# Patient Record
Sex: FEMALE | Race: WHITE | Employment: OTHER | URBAN - NONMETROPOLITAN AREA
[De-identification: names, ages, dates, MRNs, and addresses within clinical notes are randomized per-mention and may not be internally consistent; named-entity substitution may affect disease eponyms.]

---

## 2017-03-28 ENCOUNTER — HOSPITAL ENCOUNTER (OUTPATIENT)
Dept: PHARMACY | Age: 82
Setting detail: THERAPIES SERIES
Discharge: HOME | End: 2017-03-28
Payer: MEDICARE

## 2017-03-28 VITALS
HEART RATE: 78 BPM | WEIGHT: 122.6 LBS | BODY MASS INDEX: 21.04 KG/M2 | DIASTOLIC BLOOD PRESSURE: 84 MMHG | SYSTOLIC BLOOD PRESSURE: 121 MMHG

## 2017-03-28 DIAGNOSIS — Z79.01 LONG TERM (CURRENT) USE OF ANTICOAGULANTS: ICD-10-CM

## 2017-03-28 LAB — INR BLD: 1.6

## 2017-03-28 PROCEDURE — G0463 HOSPITAL OUTPT CLINIC VISIT: HCPCS

## 2017-03-28 PROCEDURE — 85610 PROTHROMBIN TIME: CPT

## 2017-04-04 ENCOUNTER — HOSPITAL ENCOUNTER (OUTPATIENT)
Dept: PHARMACY | Age: 82
Setting detail: THERAPIES SERIES
Discharge: HOME | End: 2017-04-04
Payer: MEDICARE

## 2017-04-04 VITALS
DIASTOLIC BLOOD PRESSURE: 77 MMHG | WEIGHT: 124 LBS | SYSTOLIC BLOOD PRESSURE: 144 MMHG | HEART RATE: 79 BPM | BODY MASS INDEX: 21.28 KG/M2

## 2017-04-04 DIAGNOSIS — Z79.01 LONG TERM (CURRENT) USE OF ANTICOAGULANTS: ICD-10-CM

## 2017-04-04 LAB — INR BLD: 2.9

## 2017-04-04 PROCEDURE — G0463 HOSPITAL OUTPT CLINIC VISIT: HCPCS

## 2017-04-04 PROCEDURE — 85610 PROTHROMBIN TIME: CPT

## 2017-04-25 ENCOUNTER — HOSPITAL ENCOUNTER (OUTPATIENT)
Age: 82
Discharge: HOME | End: 2017-04-25
Payer: MEDICARE

## 2017-04-25 ENCOUNTER — HOSPITAL ENCOUNTER (OUTPATIENT)
Dept: PHARMACY | Age: 82
Setting detail: THERAPIES SERIES
Discharge: HOME | End: 2017-04-25
Payer: MEDICARE

## 2017-04-25 VITALS
DIASTOLIC BLOOD PRESSURE: 77 MMHG | WEIGHT: 123.2 LBS | BODY MASS INDEX: 21.15 KG/M2 | HEART RATE: 77 BPM | SYSTOLIC BLOOD PRESSURE: 145 MMHG

## 2017-04-25 DIAGNOSIS — Z79.01 LONG TERM (CURRENT) USE OF ANTICOAGULANTS: ICD-10-CM

## 2017-04-25 LAB
ABSOLUTE EOS #: 0.2 K/UL (ref 0–0.4)
ABSOLUTE LYMPH #: 1.6 K/UL (ref 1–4.8)
ABSOLUTE MONO #: 0.7 K/UL (ref 0–1)
ALBUMIN SERPL-MCNC: 3.9 G/DL (ref 3.5–5.2)
ALBUMIN/GLOBULIN RATIO: 1.6 (ref 1–2.5)
ALP BLD-CCNC: 77 U/L (ref 35–104)
ALT SERPL-CCNC: 23 U/L (ref 5–33)
ANION GAP SERPL CALCULATED.3IONS-SCNC: 13 MMOL/L (ref 9–17)
AST SERPL-CCNC: 28 U/L
BASOPHILS # BLD: 0 %
BASOPHILS ABSOLUTE: 0 K/UL (ref 0–0.2)
BILIRUB SERPL-MCNC: 0.51 MG/DL (ref 0.3–1.2)
BUN BLDV-MCNC: 29 MG/DL (ref 8–23)
BUN/CREAT BLD: 18 (ref 9–20)
CALCIUM SERPL-MCNC: 9.2 MG/DL (ref 8.6–10.4)
CARCINOEMBRYONIC ANTIGEN: 2.6 NG/ML
CHLORIDE BLD-SCNC: 105 MMOL/L (ref 98–107)
CO2: 23 MMOL/L (ref 20–31)
CREAT SERPL-MCNC: 1.59 MG/DL (ref 0.5–0.9)
DIFFERENTIAL TYPE: ABNORMAL
EOSINOPHILS RELATIVE PERCENT: 3 %
GFR AFRICAN AMERICAN: 38 ML/MIN
GFR NON-AFRICAN AMERICAN: 31 ML/MIN
GFR SERPL CREATININE-BSD FRML MDRD: ABNORMAL ML/MIN/{1.73_M2}
GFR SERPL CREATININE-BSD FRML MDRD: ABNORMAL ML/MIN/{1.73_M2}
GLUCOSE BLD-MCNC: 106 MG/DL (ref 70–99)
HCT VFR BLD CALC: 34.7 % (ref 36–46)
HEMOGLOBIN: 11.3 G/DL (ref 12–16)
INR BLD: 2.1
LYMPHOCYTES # BLD: 25 %
MCH RBC QN AUTO: 30.4 PG (ref 26–34)
MCHC RBC AUTO-ENTMCNC: 32.6 G/DL (ref 31–37)
MCV RBC AUTO: 93.4 FL (ref 80–100)
MONOCYTES # BLD: 11 %
PDW BLD-RTO: 14.1 % (ref 12.1–15.2)
PLATELET # BLD: 154 K/UL (ref 140–450)
PLATELET ESTIMATE: ABNORMAL
PMV BLD AUTO: ABNORMAL FL (ref 6–12)
POTASSIUM SERPL-SCNC: 5.7 MMOL/L (ref 3.7–5.3)
RBC # BLD: 3.72 M/UL (ref 4–5.2)
RBC # BLD: ABNORMAL 10*6/UL
SEG NEUTROPHILS: 61 %
SEGMENTED NEUTROPHILS ABSOLUTE COUNT: 3.9 K/UL (ref 1.8–7.7)
SODIUM BLD-SCNC: 141 MMOL/L (ref 135–144)
TOTAL PROTEIN: 6.4 G/DL (ref 6.4–8.3)
WBC # BLD: 6.4 K/UL (ref 3.5–11)
WBC # BLD: ABNORMAL 10*3/UL

## 2017-04-25 PROCEDURE — 85025 COMPLETE CBC W/AUTO DIFF WBC: CPT

## 2017-04-25 PROCEDURE — 36415 COLL VENOUS BLD VENIPUNCTURE: CPT

## 2017-04-25 PROCEDURE — 82378 CARCINOEMBRYONIC ANTIGEN: CPT

## 2017-04-25 PROCEDURE — 80053 COMPREHEN METABOLIC PANEL: CPT

## 2017-04-25 PROCEDURE — G0463 HOSPITAL OUTPT CLINIC VISIT: HCPCS

## 2017-04-25 PROCEDURE — 85610 PROTHROMBIN TIME: CPT

## 2017-04-25 RX ORDER — LANOLIN ALCOHOL/MO/W.PET/CERES
1000 CREAM (GRAM) TOPICAL DAILY
COMMUNITY

## 2017-04-26 ENCOUNTER — OFFICE VISIT (OUTPATIENT)
Dept: ONCOLOGY | Age: 82
End: 2017-04-26
Payer: MEDICARE

## 2017-04-26 VITALS
WEIGHT: 122 LBS | BODY MASS INDEX: 20.83 KG/M2 | TEMPERATURE: 99 F | HEART RATE: 75 BPM | HEIGHT: 64 IN | DIASTOLIC BLOOD PRESSURE: 92 MMHG | RESPIRATION RATE: 16 BRPM | SYSTOLIC BLOOD PRESSURE: 145 MMHG

## 2017-04-26 DIAGNOSIS — N18.30 CKD (CHRONIC KIDNEY DISEASE) STAGE 3, GFR 30-59 ML/MIN (HCC): Chronic | ICD-10-CM

## 2017-04-26 DIAGNOSIS — C18.3 MALIGNANT NEOPLASM OF HEPATIC FLEXURE (HCC): Primary | ICD-10-CM

## 2017-04-26 PROCEDURE — 1036F TOBACCO NON-USER: CPT | Performed by: INTERNAL MEDICINE

## 2017-04-26 PROCEDURE — 4040F PNEUMOC VAC/ADMIN/RCVD: CPT | Performed by: INTERNAL MEDICINE

## 2017-04-26 PROCEDURE — 1090F PRES/ABSN URINE INCON ASSESS: CPT | Performed by: INTERNAL MEDICINE

## 2017-04-26 PROCEDURE — 1123F ACP DISCUSS/DSCN MKR DOCD: CPT | Performed by: INTERNAL MEDICINE

## 2017-04-26 PROCEDURE — G8427 DOCREV CUR MEDS BY ELIG CLIN: HCPCS | Performed by: INTERNAL MEDICINE

## 2017-04-26 PROCEDURE — G8400 PT W/DXA NO RESULTS DOC: HCPCS | Performed by: INTERNAL MEDICINE

## 2017-04-26 PROCEDURE — 99214 OFFICE O/P EST MOD 30 MIN: CPT | Performed by: INTERNAL MEDICINE

## 2017-04-26 PROCEDURE — G8419 CALC BMI OUT NRM PARAM NOF/U: HCPCS | Performed by: INTERNAL MEDICINE

## 2017-04-26 RX ORDER — MECLIZINE HCL 12.5 MG/1
12.5 TABLET ORAL 3 TIMES DAILY PRN
COMMUNITY

## 2017-04-26 RX ORDER — LOPERAMIDE HYDROCHLORIDE 2 MG/1
2 CAPSULE ORAL 4 TIMES DAILY PRN
COMMUNITY
End: 2021-11-02

## 2017-05-23 ENCOUNTER — HOSPITAL ENCOUNTER (OUTPATIENT)
Dept: PHARMACY | Age: 82
Setting detail: THERAPIES SERIES
Discharge: HOME | End: 2017-05-23
Payer: MEDICARE

## 2017-05-23 VITALS
BODY MASS INDEX: 21.28 KG/M2 | DIASTOLIC BLOOD PRESSURE: 97 MMHG | SYSTOLIC BLOOD PRESSURE: 157 MMHG | WEIGHT: 124 LBS | HEART RATE: 80 BPM

## 2017-05-23 DIAGNOSIS — Z79.01 LONG TERM (CURRENT) USE OF ANTICOAGULANTS: ICD-10-CM

## 2017-05-23 LAB — INR BLD: 2

## 2017-05-23 PROCEDURE — 85610 PROTHROMBIN TIME: CPT

## 2017-05-23 PROCEDURE — 99211 OFF/OP EST MAY X REQ PHY/QHP: CPT

## 2017-06-27 ENCOUNTER — HOSPITAL ENCOUNTER (OUTPATIENT)
Dept: PHARMACY | Age: 82
Setting detail: THERAPIES SERIES
Discharge: HOME | End: 2017-06-27
Payer: MEDICARE

## 2017-06-27 VITALS
BODY MASS INDEX: 20.94 KG/M2 | SYSTOLIC BLOOD PRESSURE: 151 MMHG | WEIGHT: 122 LBS | HEART RATE: 73 BPM | DIASTOLIC BLOOD PRESSURE: 79 MMHG

## 2017-06-27 DIAGNOSIS — Z79.01 LONG TERM (CURRENT) USE OF ANTICOAGULANTS: ICD-10-CM

## 2017-06-27 LAB — INR BLD: 2.2

## 2017-06-27 PROCEDURE — 99211 OFF/OP EST MAY X REQ PHY/QHP: CPT

## 2017-06-27 PROCEDURE — 85610 PROTHROMBIN TIME: CPT

## 2017-07-24 ENCOUNTER — HOSPITAL ENCOUNTER (OUTPATIENT)
Dept: PHARMACY | Age: 82
Setting detail: THERAPIES SERIES
Discharge: HOME | End: 2017-07-24
Payer: MEDICARE

## 2017-07-24 VITALS
DIASTOLIC BLOOD PRESSURE: 83 MMHG | WEIGHT: 121 LBS | SYSTOLIC BLOOD PRESSURE: 155 MMHG | BODY MASS INDEX: 20.77 KG/M2 | HEART RATE: 73 BPM

## 2017-07-24 DIAGNOSIS — Z79.01 LONG TERM (CURRENT) USE OF ANTICOAGULANTS: ICD-10-CM

## 2017-07-24 LAB — INR BLD: 2.3

## 2017-07-24 PROCEDURE — 99211 OFF/OP EST MAY X REQ PHY/QHP: CPT

## 2017-07-24 PROCEDURE — 85610 PROTHROMBIN TIME: CPT

## 2017-09-05 ENCOUNTER — HOSPITAL ENCOUNTER (OUTPATIENT)
Dept: PHARMACY | Age: 82
Setting detail: THERAPIES SERIES
Discharge: HOME | End: 2017-09-05
Payer: MEDICARE

## 2017-09-05 VITALS
BODY MASS INDEX: 20.94 KG/M2 | HEART RATE: 74 BPM | DIASTOLIC BLOOD PRESSURE: 87 MMHG | SYSTOLIC BLOOD PRESSURE: 137 MMHG | WEIGHT: 122 LBS

## 2017-09-05 DIAGNOSIS — Z79.01 LONG TERM (CURRENT) USE OF ANTICOAGULANTS: ICD-10-CM

## 2017-09-05 LAB — INR BLD: 2.3

## 2017-09-05 PROCEDURE — 85610 PROTHROMBIN TIME: CPT

## 2017-09-05 PROCEDURE — 99211 OFF/OP EST MAY X REQ PHY/QHP: CPT

## 2017-09-05 RX ORDER — NEOMYCIN SULFATE, POLYMYXIN B SULFATE, AND DEXAMETHASONE 3.5; 10000; 1 MG/G; [USP'U]/G; MG/G
OINTMENT OPHTHALMIC NIGHTLY
COMMUNITY
End: 2017-10-17 | Stop reason: ALTCHOICE

## 2017-10-11 ENCOUNTER — HOSPITAL ENCOUNTER (OUTPATIENT)
Age: 82
Discharge: HOME | End: 2017-10-11
Payer: MEDICARE

## 2017-10-11 DIAGNOSIS — C80.1 ADENOCARCINOMA (HCC): ICD-10-CM

## 2017-10-11 DIAGNOSIS — C18.2 MALIGNANT NEOPLASM OF ASCENDING COLON (HCC): ICD-10-CM

## 2017-10-11 LAB
ABSOLUTE EOS #: 0.2 K/UL (ref 0–0.4)
ABSOLUTE LYMPH #: 1.5 K/UL (ref 1–4.8)
ABSOLUTE MONO #: 0.6 K/UL (ref 0–1)
ALBUMIN SERPL-MCNC: 3.7 G/DL (ref 3.5–5.2)
ALBUMIN/GLOBULIN RATIO: 1.3 (ref 1–2.5)
ALP BLD-CCNC: 78 U/L (ref 35–104)
ALT SERPL-CCNC: 21 U/L (ref 5–33)
ANION GAP SERPL CALCULATED.3IONS-SCNC: 12 MMOL/L (ref 9–17)
AST SERPL-CCNC: 28 U/L
BASOPHILS # BLD: 1 %
BASOPHILS ABSOLUTE: 0 K/UL (ref 0–0.2)
BILIRUB SERPL-MCNC: 0.55 MG/DL (ref 0.3–1.2)
BUN BLDV-MCNC: 21 MG/DL (ref 8–23)
BUN/CREAT BLD: 15 (ref 9–20)
CALCIUM SERPL-MCNC: 8.9 MG/DL (ref 8.6–10.4)
CARCINOEMBRYONIC ANTIGEN: 2.6 NG/ML
CHLORIDE BLD-SCNC: 104 MMOL/L (ref 98–107)
CO2: 23 MMOL/L (ref 20–31)
CREAT SERPL-MCNC: 1.42 MG/DL (ref 0.5–0.9)
DIFFERENTIAL TYPE: ABNORMAL
EOSINOPHILS RELATIVE PERCENT: 4 %
GFR AFRICAN AMERICAN: 43 ML/MIN
GFR NON-AFRICAN AMERICAN: 35 ML/MIN
GFR SERPL CREATININE-BSD FRML MDRD: ABNORMAL ML/MIN/{1.73_M2}
GFR SERPL CREATININE-BSD FRML MDRD: ABNORMAL ML/MIN/{1.73_M2}
GLUCOSE BLD-MCNC: 94 MG/DL (ref 70–99)
HCT VFR BLD CALC: 35.7 % (ref 36–46)
HEMOGLOBIN: 11.6 G/DL (ref 12–16)
LYMPHOCYTES # BLD: 23 %
MCH RBC QN AUTO: 30.6 PG (ref 26–34)
MCHC RBC AUTO-ENTMCNC: 32.5 G/DL (ref 31–37)
MCV RBC AUTO: 94.1 FL (ref 80–100)
MONOCYTES # BLD: 10 %
PDW BLD-RTO: 14 % (ref 12.1–15.2)
PLATELET # BLD: 129 K/UL (ref 140–450)
PLATELET ESTIMATE: ABNORMAL
PMV BLD AUTO: 11.3 FL (ref 6–12)
POTASSIUM SERPL-SCNC: 5.9 MMOL/L (ref 3.7–5.3)
RBC # BLD: 3.8 M/UL (ref 4–5.2)
RBC # BLD: ABNORMAL 10*6/UL
SEG NEUTROPHILS: 62 %
SEGMENTED NEUTROPHILS ABSOLUTE COUNT: 4.2 K/UL (ref 1.8–7.7)
SODIUM BLD-SCNC: 139 MMOL/L (ref 135–144)
TOTAL PROTEIN: 6.6 G/DL (ref 6.4–8.3)
WBC # BLD: 6.5 K/UL (ref 3.5–11)
WBC # BLD: ABNORMAL 10*3/UL

## 2017-10-11 PROCEDURE — 36415 COLL VENOUS BLD VENIPUNCTURE: CPT

## 2017-10-11 PROCEDURE — 80053 COMPREHEN METABOLIC PANEL: CPT

## 2017-10-11 PROCEDURE — 85025 COMPLETE CBC W/AUTO DIFF WBC: CPT

## 2017-10-11 PROCEDURE — 82378 CARCINOEMBRYONIC ANTIGEN: CPT

## 2017-10-13 ENCOUNTER — TELEPHONE (OUTPATIENT)
Dept: GASTROENTEROLOGY | Age: 82
End: 2017-10-13

## 2017-10-13 ENCOUNTER — OFFICE VISIT (OUTPATIENT)
Dept: ONCOLOGY | Age: 82
End: 2017-10-13
Payer: MEDICARE

## 2017-10-13 VITALS
DIASTOLIC BLOOD PRESSURE: 98 MMHG | WEIGHT: 122 LBS | HEIGHT: 64 IN | SYSTOLIC BLOOD PRESSURE: 153 MMHG | TEMPERATURE: 98.6 F | RESPIRATION RATE: 18 BRPM | HEART RATE: 72 BPM | BODY MASS INDEX: 20.83 KG/M2

## 2017-10-13 DIAGNOSIS — Z90.49 STATUS POST RIGHT HEMICOLECTOMY: ICD-10-CM

## 2017-10-13 DIAGNOSIS — C18.3 MALIGNANT NEOPLASM OF HEPATIC FLEXURE (HCC): Primary | ICD-10-CM

## 2017-10-13 DIAGNOSIS — Z85.038 HISTORY OF COLON CANCER: Primary | ICD-10-CM

## 2017-10-13 PROCEDURE — 4040F PNEUMOC VAC/ADMIN/RCVD: CPT | Performed by: INTERNAL MEDICINE

## 2017-10-13 PROCEDURE — 99214 OFFICE O/P EST MOD 30 MIN: CPT | Performed by: INTERNAL MEDICINE

## 2017-10-13 PROCEDURE — 1090F PRES/ABSN URINE INCON ASSESS: CPT | Performed by: INTERNAL MEDICINE

## 2017-10-13 PROCEDURE — G8420 CALC BMI NORM PARAMETERS: HCPCS | Performed by: INTERNAL MEDICINE

## 2017-10-13 PROCEDURE — G8427 DOCREV CUR MEDS BY ELIG CLIN: HCPCS | Performed by: INTERNAL MEDICINE

## 2017-10-13 PROCEDURE — G8484 FLU IMMUNIZE NO ADMIN: HCPCS | Performed by: INTERNAL MEDICINE

## 2017-10-13 PROCEDURE — 1123F ACP DISCUSS/DSCN MKR DOCD: CPT | Performed by: INTERNAL MEDICINE

## 2017-10-13 PROCEDURE — G8400 PT W/DXA NO RESULTS DOC: HCPCS | Performed by: INTERNAL MEDICINE

## 2017-10-13 PROCEDURE — 1036F TOBACCO NON-USER: CPT | Performed by: INTERNAL MEDICINE

## 2017-10-13 NOTE — LETTER
Luc Daniel MD    10/13/2017     Corazon Cuellar MD   [facility], [address]  [address]    Dear Christian Kevin : Thank you for referring Anabel Francis, 1935, to me for evaluation. Below are the relevant portions of my assessment and plan of care. 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 CURRENT THERAPY:         S/p Rt hemicolectomy 6/20/16. BRIEF CASE HISTORY:      Ms. Anabel Francis is a very pleasant 80 y.o. female with history of DVT status post IVC filter placement 1 year ago. She is maintained on Coumadin. No PE. Patient noticed rectal bleeding recently. She had blood mixed with the stool. This has been going on for the last few weeks. No abnormal pain. No nausea or vomiting. No constipation. No signs of obstruction. Patient had CT scan of the abdomen and pelvis on 5/24/2016 which was fine. She had colonoscopy on 5/25/2016 which showed a mass in the hepatic flexure with biopsy positive for invasive carcinoma. No weight loss or decreased appetite. No chest pain or shortness of breath. No headaches no back pain. No other complaints. Last colonoscopy was 5 years ago and was negative. INTERIM HISTORY:   Seen for follow up colon cancer. Doing well clinically. No complaints related to cancer. No abdominal pain. No GI bleeding. No weight loss or decreased appetite. She has chronic back pain. She didn't have  colonoscopy since her surgery  No other complaints. PAST MEDICAL HISTORY: has a past medical history of Abnormal EMG; Cancer ([address] Chronic kidney disease; Colon polyp; Compression fracture of lumbar vertebra (HCC) / prednisone related to PMR; Diarrhea; DVT (deep venous thrombosis) ([address] Emphysema; HTN (hypertension);  Hyperlipidemia; ALKPHOS 78 10/11/2017 1410    AST 28 10/11/2017 1410    ALT 21 10/11/2017 1410    BILITOT 0.55 10/11/2017 1410        Lab Results   Component Value Date    CEA 2.6 10/11/2017     CT scan of the abdomen and pelvis 5/24/2016:  Impression:    1. No acute abnormalities. 2. Mild nonspecific pericardial effusion. 3. Mild bibasilar atelectasis and pleural thickening. 4. Small bilateral peripelvic renal cysts. 5. Constipation and distal diverticulosis. 6. Nonvisualization of the appendix without secondary pericecal inflammatory changes. 7. Pelvic phleboliths. 8. Small amount of air in the bladder which may be related to recent catheterization. Recommend clinical correlation. 9. Aortic plaque and multilevel degenerative changes in the spine. 10. Chronic T11 compression fracture. Pathology from hepatic flexure mass by colonoscopy from 5/25/16:  -- Diagnosis --  COLON, HEPATIC FLEXURE MASS, BIOPSIES:  - INVASIVE ADENOCARCINOMA. SEE MICROSCOPIC DESCRIPTION. Pathology from surgery 6/20/16:  TERMINAL ILEUM AND RIGHT COLON, RESECTION.     -  MODERATELY TO POORLY DIFFERENTIATED (HIGH GRADE)            ADENOCARCINOMA OF THE ASCENDING COLON, INVADING          THROUGH THE BOWEL WALL TO THE FREE SEROSAL SURFACE            (SEROSAL SURFACE POSITIVE FOR ADENOCARCINOMA).       -  THE SURGICAL MARGINS ARE NEGATIVE FOR NEOPLASM.     -  LYMPHOVASCULAR INVASION IS PRESENT.         -  REGIONAL LYMPH NODES ARE NEGATIVE FOR MALIGNANCY (0/28). 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 Rectal bleeding secondary to colon cancer. resolved  Anemia secondary to bleeding. Resolved. History of DVT one year ago status post IVC filter placement, maintained on Coumadin. ECOG performance score 2      PLAN:   She is s/p right hemicolectomy. Labs reviewed and discussed. No clinical evidence of recurrence. CKD, follow up with PCP. Encourage hydration. Labs were reviewed  She will need a follow-up colonoscopy  Return in 6 months with labs            [facility] MD Cari  Hematologist/Medical Oncologist  Cell: [phone]                         If you have questions, please do not hesitate to call me. I look forward to following Zeke Ours along with you.     Sincerely,    Eli Zimmerman MD  Hematology/ Oncology  Cell [phone]

## 2017-10-13 NOTE — PROGRESS NOTES
(1949); Skin cancer excision; Hysterectomy (1973); Tonsillectomy (1941); Colonoscopy (24822080); Colonoscopy (5/25/2016); and colectomy (Right, 06/20/2016). CURRENT MEDICATIONS:  has a current medication list which includes the following prescription(s): lisinopril, carboxymethylcellulose, neomycin-polymyxin-dexameth, warfarin, carvedilol, meclizine, loperamide, vitamin b-12, atorvastatin, gabapentin, multiple vitamins-minerals, calcium carbonate, acetaminophen, and handicap placard. ALLERGIES:  is allergic to budesonide and hydrochlorothiazide. FAMILY HISTORY: Negative for any hematological or oncological conditions. SOCIAL HISTORY:  reports that she quit smoking about 36 years ago. She has a 30.00 pack-year smoking history. She has never used smokeless tobacco. She reports that she does not drink alcohol or use drugs. REVIEW OF SYSTEMS:     · General: No weakness or fatigue. No unanticipated weight loss or decreased appetite. No fever or chills. · Eyes: No blurred vision, eye pain or double vision. · Ears: No hearing problems or drainage. No tinnitus. · Throat: No sore throat, problems with swallowing or dysphagia. · Respiratory: No cough, sputum or hemoptysis. No shortness of breath. No pleuritic chest pain. · Cardiovascular: No chest pain, orthopnea or PND. No lower extremity edema. No palpitation. · Gastrointestinal: as above,   · Genitourinary: No dysuria, hematuria, frequency or urgency. · Musculoskeletal: No muscle aches or pains. No limitation of movement. No back pain. No gait disturbance, No joint complaints. · Dermatologic: No skin rashes or pruritus. No skin lesions or discolorations. · Psychiatric: No depression, anxiety, or stress or signs of schizophrenia. No change in mood or affect. · Hematologic: No history of bleeding tendency. No bruises or ecchymosis. No history of clotting problems. · Infectious disease: No fever, chills or frequent infections.

## 2017-10-17 ENCOUNTER — HOSPITAL ENCOUNTER (OUTPATIENT)
Dept: PHARMACY | Age: 82
Setting detail: THERAPIES SERIES
Discharge: HOME | End: 2017-10-17
Payer: MEDICARE

## 2017-10-17 VITALS
HEART RATE: 75 BPM | DIASTOLIC BLOOD PRESSURE: 77 MMHG | WEIGHT: 124.2 LBS | SYSTOLIC BLOOD PRESSURE: 137 MMHG | BODY MASS INDEX: 21.32 KG/M2

## 2017-10-17 DIAGNOSIS — I27.82 CHRONIC SADDLE PULMONARY EMBOLISM WITHOUT ACUTE COR PULMONALE (HCC): ICD-10-CM

## 2017-10-17 DIAGNOSIS — I26.92 CHRONIC SADDLE PULMONARY EMBOLISM WITHOUT ACUTE COR PULMONALE (HCC): ICD-10-CM

## 2017-10-17 DIAGNOSIS — Z79.01 LONG TERM CURRENT USE OF ANTICOAGULANT THERAPY: ICD-10-CM

## 2017-10-17 LAB — INR BLD: 2

## 2017-10-17 PROCEDURE — 85610 PROTHROMBIN TIME: CPT | Performed by: FAMILY MEDICINE

## 2017-10-17 PROCEDURE — 99211 OFF/OP EST MAY X REQ PHY/QHP: CPT | Performed by: FAMILY MEDICINE

## 2017-10-17 NOTE — PROGRESS NOTES
Fingerstick INR drawn per clinic protocol. Patient states no visible blood in urine and no black tarry stool. Denies any missed doses of warfarin. No change in other maintenance medications or in diet. Will continue current warfarin regimen and recheck INR in 6 week(s). Patient is scheduling her yearly colonoscopy-likely late December.

## 2017-10-21 RX ORDER — SODIUM, POTASSIUM,MAG SULFATES 17.5-3.13G
SOLUTION, RECONSTITUTED, ORAL ORAL
Qty: 2 BOTTLE | Refills: 0 | Status: ON HOLD | OUTPATIENT
Start: 2017-10-21 | End: 2017-11-12 | Stop reason: HOSPADM

## 2017-10-24 ENCOUNTER — TELEPHONE (OUTPATIENT)
Dept: GASTROENTEROLOGY | Age: 82
End: 2017-10-24

## 2017-11-09 ENCOUNTER — HOSPITAL ENCOUNTER (INPATIENT)
Age: 82
LOS: 3 days | Discharge: HOME | DRG: 305 | End: 2017-11-12
Attending: INTERNAL MEDICINE | Admitting: INTERNAL MEDICINE
Payer: MEDICARE

## 2017-11-09 ENCOUNTER — HOSPITAL ENCOUNTER (OUTPATIENT)
Dept: GENERAL RADIOLOGY | Age: 82
Discharge: HOME | End: 2017-11-09
Payer: MEDICARE

## 2017-11-09 ENCOUNTER — HOSPITAL ENCOUNTER (EMERGENCY)
Age: 82
Discharge: ANOTHER HOSPITAL | End: 2017-11-09
Attending: FAMILY MEDICINE
Payer: MEDICARE

## 2017-11-09 ENCOUNTER — HOSPITAL ENCOUNTER (OUTPATIENT)
Age: 82
Discharge: HOME | End: 2017-11-09
Payer: MEDICARE

## 2017-11-09 ENCOUNTER — APPOINTMENT (OUTPATIENT)
Dept: GENERAL RADIOLOGY | Age: 82
End: 2017-11-09
Payer: MEDICARE

## 2017-11-09 VITALS
OXYGEN SATURATION: 93 % | TEMPERATURE: 98.4 F | SYSTOLIC BLOOD PRESSURE: 176 MMHG | WEIGHT: 122 LBS | DIASTOLIC BLOOD PRESSURE: 90 MMHG | RESPIRATION RATE: 18 BRPM | BODY MASS INDEX: 20.83 KG/M2 | HEART RATE: 81 BPM | HEIGHT: 64 IN

## 2017-11-09 DIAGNOSIS — I27.82 CHRONIC SADDLE PULMONARY EMBOLISM WITHOUT ACUTE COR PULMONALE (HCC): ICD-10-CM

## 2017-11-09 DIAGNOSIS — N18.30 CKD (CHRONIC KIDNEY DISEASE) STAGE 3, GFR 30-59 ML/MIN (HCC): Primary | Chronic | ICD-10-CM

## 2017-11-09 DIAGNOSIS — I26.92 CHRONIC SADDLE PULMONARY EMBOLISM WITHOUT ACUTE COR PULMONALE (HCC): ICD-10-CM

## 2017-11-09 DIAGNOSIS — I10 ESSENTIAL HYPERTENSION: ICD-10-CM

## 2017-11-09 DIAGNOSIS — R07.9 CHEST PAIN, UNSPECIFIED TYPE: ICD-10-CM

## 2017-11-09 DIAGNOSIS — I16.0 HYPERTENSIVE URGENCY: ICD-10-CM

## 2017-11-09 DIAGNOSIS — Z79.01 LONG TERM CURRENT USE OF ANTICOAGULANT THERAPY: ICD-10-CM

## 2017-11-09 DIAGNOSIS — M54.6 ACUTE THORACIC BACK PAIN, UNSPECIFIED BACK PAIN LATERALITY: ICD-10-CM

## 2017-11-09 DIAGNOSIS — I21.4 NSTEMI (NON-ST ELEVATED MYOCARDIAL INFARCTION) (HCC): Primary | ICD-10-CM

## 2017-11-09 DIAGNOSIS — I82.412 ACUTE DEEP VEIN THROMBOSIS (DVT) OF FEMORAL VEIN OF LEFT LOWER EXTREMITY (HCC): ICD-10-CM

## 2017-11-09 LAB
ABSOLUTE EOS #: 0.4 K/UL (ref 0–0.4)
ABSOLUTE IMMATURE GRANULOCYTE: ABNORMAL K/UL (ref 0–0.3)
ABSOLUTE LYMPH #: 1.9 K/UL (ref 1–4.8)
ABSOLUTE MONO #: 0.7 K/UL (ref 0–1)
ALBUMIN SERPL-MCNC: 4.1 G/DL (ref 3.5–5.2)
ALBUMIN/GLOBULIN RATIO: 1.4 (ref 1–2.5)
ALP BLD-CCNC: 81 U/L (ref 35–104)
ALT SERPL-CCNC: 21 U/L (ref 5–33)
ANION GAP SERPL CALCULATED.3IONS-SCNC: 13 MMOL/L (ref 9–17)
ANION GAP SERPL CALCULATED.3IONS-SCNC: 14 MMOL/L (ref 9–17)
AST SERPL-CCNC: 30 U/L
BASOPHILS # BLD: 1 %
BASOPHILS ABSOLUTE: 0 K/UL (ref 0–0.2)
BILIRUB SERPL-MCNC: 0.47 MG/DL (ref 0.3–1.2)
BNP INTERPRETATION: ABNORMAL
BNP INTERPRETATION: ABNORMAL
BUN BLDV-MCNC: 26 MG/DL (ref 8–23)
BUN BLDV-MCNC: 27 MG/DL (ref 8–23)
BUN/CREAT BLD: 18 (ref 9–20)
BUN/CREAT BLD: 19 (ref 9–20)
C-REACTIVE PROTEIN: 0.4 MG/L (ref 0–5)
CALCIUM SERPL-MCNC: 9.1 MG/DL (ref 8.6–10.4)
CALCIUM SERPL-MCNC: 9.3 MG/DL (ref 8.6–10.4)
CHLORIDE BLD-SCNC: 106 MMOL/L (ref 98–107)
CHLORIDE BLD-SCNC: 106 MMOL/L (ref 98–107)
CK MB: 10.1 NG/ML
CO2: 21 MMOL/L (ref 20–31)
CO2: 23 MMOL/L (ref 20–31)
CREAT SERPL-MCNC: 1.43 MG/DL (ref 0.5–0.9)
CREAT SERPL-MCNC: 1.43 MG/DL (ref 0.5–0.9)
DIFFERENTIAL TYPE: ABNORMAL
EKG ATRIAL RATE: 85 BPM
EKG P AXIS: 36 DEGREES
EKG P-R INTERVAL: 134 MS
EKG Q-T INTERVAL: 356 MS
EKG QRS DURATION: 68 MS
EKG QTC CALCULATION (BAZETT): 423 MS
EKG R AXIS: -7 DEGREES
EKG T AXIS: 28 DEGREES
EKG VENTRICULAR RATE: 85 BPM
EOSINOPHILS RELATIVE PERCENT: 5 %
GFR AFRICAN AMERICAN: 43 ML/MIN
GFR AFRICAN AMERICAN: 43 ML/MIN
GFR NON-AFRICAN AMERICAN: 35 ML/MIN
GFR NON-AFRICAN AMERICAN: 35 ML/MIN
GFR SERPL CREATININE-BSD FRML MDRD: ABNORMAL ML/MIN/{1.73_M2}
GLUCOSE BLD-MCNC: 102 MG/DL (ref 70–99)
GLUCOSE BLD-MCNC: 103 MG/DL (ref 70–99)
HCT VFR BLD CALC: 37.1 % (ref 36–46)
HCT VFR BLD CALC: 38 % (ref 36–46)
HEMOGLOBIN: 12.1 G/DL (ref 12–16)
HEMOGLOBIN: 12.2 G/DL (ref 12–16)
IMMATURE GRANULOCYTES: ABNORMAL %
INR BLD: 2.3 (ref 0.9–1.2)
LYMPHOCYTES # BLD: 25 %
MCH RBC QN AUTO: 30 PG (ref 26–34)
MCH RBC QN AUTO: 30.5 PG (ref 26–34)
MCHC RBC AUTO-ENTMCNC: 31.8 G/DL (ref 31–37)
MCHC RBC AUTO-ENTMCNC: 32.8 G/DL (ref 31–37)
MCV RBC AUTO: 92.9 FL (ref 80–100)
MCV RBC AUTO: 94.6 FL (ref 80–100)
MONOCYTES # BLD: 10 %
PDW BLD-RTO: 14.3 % (ref 12.1–15.2)
PDW BLD-RTO: 14.6 % (ref 12.1–15.2)
PLATELET # BLD: 133 K/UL (ref 140–450)
PLATELET # BLD: 151 K/UL (ref 140–450)
PLATELET ESTIMATE: ABNORMAL
PMV BLD AUTO: 10.8 FL (ref 6–12)
PMV BLD AUTO: 11.1 FL (ref 6–12)
POTASSIUM SERPL-SCNC: 5.1 MMOL/L (ref 3.7–5.3)
POTASSIUM SERPL-SCNC: 5.1 MMOL/L (ref 3.7–5.3)
PRO-BNP: 888 PG/ML
PRO-BNP: 966 PG/ML
PROTHROMBIN TIME: 25.3 SEC (ref 9.7–12.2)
RBC # BLD: 3.99 M/UL (ref 4–5.2)
RBC # BLD: 4.02 M/UL (ref 4–5.2)
RBC # BLD: ABNORMAL 10*6/UL
SEG NEUTROPHILS: 59 %
SEGMENTED NEUTROPHILS ABSOLUTE COUNT: 4.7 K/UL (ref 1.8–7.7)
SODIUM BLD-SCNC: 141 MMOL/L (ref 135–144)
SODIUM BLD-SCNC: 142 MMOL/L (ref 135–144)
TOTAL PROTEIN: 7.1 G/DL (ref 6.4–8.3)
TROPONIN INTERP: ABNORMAL
TROPONIN T: 0.06 NG/ML
TROPONIN T: 0.07 NG/ML
TROPONIN T: 0.08 NG/ML
WBC # BLD: 7.7 K/UL (ref 3.5–11)
WBC # BLD: 8.4 K/UL (ref 3.5–11)
WBC # BLD: ABNORMAL 10*3/UL

## 2017-11-09 PROCEDURE — 80048 BASIC METABOLIC PNL TOTAL CA: CPT

## 2017-11-09 PROCEDURE — 6370000000 HC RX 637 (ALT 250 FOR IP): Performed by: FAMILY MEDICINE

## 2017-11-09 PROCEDURE — 85025 COMPLETE CBC W/AUTO DIFF WBC: CPT

## 2017-11-09 PROCEDURE — 85610 PROTHROMBIN TIME: CPT

## 2017-11-09 PROCEDURE — 80053 COMPREHEN METABOLIC PANEL: CPT

## 2017-11-09 PROCEDURE — 83880 ASSAY OF NATRIURETIC PEPTIDE: CPT

## 2017-11-09 PROCEDURE — 72072 X-RAY EXAM THORAC SPINE 3VWS: CPT

## 2017-11-09 PROCEDURE — 1200000000 HC SEMI PRIVATE

## 2017-11-09 PROCEDURE — 36415 COLL VENOUS BLD VENIPUNCTURE: CPT

## 2017-11-09 PROCEDURE — 85027 COMPLETE CBC AUTOMATED: CPT

## 2017-11-09 PROCEDURE — 93005 ELECTROCARDIOGRAM TRACING: CPT

## 2017-11-09 PROCEDURE — 84484 ASSAY OF TROPONIN QUANT: CPT

## 2017-11-09 PROCEDURE — 82553 CREATINE MB FRACTION: CPT

## 2017-11-09 PROCEDURE — 6370000000 HC RX 637 (ALT 250 FOR IP): Performed by: INTERNAL MEDICINE

## 2017-11-09 PROCEDURE — 86140 C-REACTIVE PROTEIN: CPT

## 2017-11-09 PROCEDURE — 71020 XR CHEST STANDARD TWO VW: CPT

## 2017-11-09 PROCEDURE — 99285 EMERGENCY DEPT VISIT HI MDM: CPT

## 2017-11-09 PROCEDURE — 96374 THER/PROPH/DIAG INJ IV PUSH: CPT

## 2017-11-09 PROCEDURE — 6360000002 HC RX W HCPCS: Performed by: FAMILY MEDICINE

## 2017-11-09 PROCEDURE — 2580000003 HC RX 258: Performed by: INTERNAL MEDICINE

## 2017-11-09 RX ORDER — SODIUM CHLORIDE 0.9 % (FLUSH) 0.9 %
10 SYRINGE (ML) INJECTION PRN
Status: DISCONTINUED | OUTPATIENT
Start: 2017-11-09 | End: 2017-11-12 | Stop reason: HOSPADM

## 2017-11-09 RX ORDER — POTASSIUM CHLORIDE 20 MEQ/1
40 TABLET, EXTENDED RELEASE ORAL PRN
Status: DISCONTINUED | OUTPATIENT
Start: 2017-11-09 | End: 2017-11-12 | Stop reason: HOSPADM

## 2017-11-09 RX ORDER — HEPARIN SODIUM 5000 [USP'U]/ML
5000 INJECTION, SOLUTION INTRAVENOUS; SUBCUTANEOUS ONCE
Status: COMPLETED | OUTPATIENT
Start: 2017-11-09 | End: 2017-11-09

## 2017-11-09 RX ORDER — SODIUM CHLORIDE 0.9 % (FLUSH) 0.9 %
10 SYRINGE (ML) INJECTION EVERY 12 HOURS SCHEDULED
Status: DISCONTINUED | OUTPATIENT
Start: 2017-11-09 | End: 2017-11-12 | Stop reason: HOSPADM

## 2017-11-09 RX ORDER — MORPHINE SULFATE 4 MG/ML
4 INJECTION, SOLUTION INTRAMUSCULAR; INTRAVENOUS
Status: DISCONTINUED | OUTPATIENT
Start: 2017-11-09 | End: 2017-11-12 | Stop reason: HOSPADM

## 2017-11-09 RX ORDER — ACETAMINOPHEN 325 MG/1
650 TABLET ORAL EVERY 4 HOURS PRN
Status: DISCONTINUED | OUTPATIENT
Start: 2017-11-09 | End: 2017-11-12 | Stop reason: HOSPADM

## 2017-11-09 RX ORDER — DOCUSATE SODIUM 100 MG/1
100 CAPSULE, LIQUID FILLED ORAL 2 TIMES DAILY
Status: DISCONTINUED | OUTPATIENT
Start: 2017-11-09 | End: 2017-11-12 | Stop reason: HOSPADM

## 2017-11-09 RX ORDER — MAGNESIUM SULFATE 1 G/100ML
1 INJECTION INTRAVENOUS PRN
Status: DISCONTINUED | OUTPATIENT
Start: 2017-11-09 | End: 2017-11-12 | Stop reason: HOSPADM

## 2017-11-09 RX ORDER — NITROGLYCERIN 0.4 MG/1
0.4 TABLET SUBLINGUAL EVERY 5 MIN PRN
Status: DISCONTINUED | OUTPATIENT
Start: 2017-11-09 | End: 2017-11-12 | Stop reason: HOSPADM

## 2017-11-09 RX ORDER — POTASSIUM CHLORIDE 20MEQ/15ML
40 LIQUID (ML) ORAL PRN
Status: DISCONTINUED | OUTPATIENT
Start: 2017-11-09 | End: 2017-11-12 | Stop reason: HOSPADM

## 2017-11-09 RX ORDER — ATORVASTATIN CALCIUM 20 MG/1
20 TABLET, FILM COATED ORAL NIGHTLY
Status: DISCONTINUED | OUTPATIENT
Start: 2017-11-09 | End: 2017-11-10

## 2017-11-09 RX ORDER — POTASSIUM CHLORIDE 7.45 MG/ML
10 INJECTION INTRAVENOUS PRN
Status: DISCONTINUED | OUTPATIENT
Start: 2017-11-09 | End: 2017-11-12 | Stop reason: HOSPADM

## 2017-11-09 RX ORDER — ONDANSETRON 2 MG/ML
4 INJECTION INTRAMUSCULAR; INTRAVENOUS EVERY 6 HOURS PRN
Status: DISCONTINUED | OUTPATIENT
Start: 2017-11-09 | End: 2017-11-12 | Stop reason: HOSPADM

## 2017-11-09 RX ORDER — HYDROCODONE BITARTRATE AND ACETAMINOPHEN 5; 325 MG/1; MG/1
1 TABLET ORAL EVERY 4 HOURS PRN
Status: DISCONTINUED | OUTPATIENT
Start: 2017-11-09 | End: 2017-11-12 | Stop reason: HOSPADM

## 2017-11-09 RX ORDER — ONDANSETRON 2 MG/ML
4 INJECTION INTRAMUSCULAR; INTRAVENOUS EVERY 6 HOURS PRN
Status: DISCONTINUED | OUTPATIENT
Start: 2017-11-09 | End: 2017-11-09 | Stop reason: SDUPTHER

## 2017-11-09 RX ORDER — ASPIRIN 81 MG/1
324 TABLET, CHEWABLE ORAL ONCE
Status: COMPLETED | OUTPATIENT
Start: 2017-11-09 | End: 2017-11-09

## 2017-11-09 RX ORDER — METOPROLOL TARTRATE 5 MG/5ML
5 INJECTION INTRAVENOUS EVERY 4 HOURS PRN
Status: DISCONTINUED | OUTPATIENT
Start: 2017-11-09 | End: 2017-11-12 | Stop reason: HOSPADM

## 2017-11-09 RX ORDER — NICOTINE 21 MG/24HR
1 PATCH, TRANSDERMAL 24 HOURS TRANSDERMAL DAILY PRN
Status: DISCONTINUED | OUTPATIENT
Start: 2017-11-09 | End: 2017-11-12 | Stop reason: HOSPADM

## 2017-11-09 RX ORDER — MORPHINE SULFATE 2 MG/ML
2 INJECTION, SOLUTION INTRAMUSCULAR; INTRAVENOUS
Status: DISCONTINUED | OUTPATIENT
Start: 2017-11-09 | End: 2017-11-12 | Stop reason: HOSPADM

## 2017-11-09 RX ADMIN — ASPIRIN 81 MG 324 MG: 81 TABLET ORAL at 17:36

## 2017-11-09 RX ADMIN — ATORVASTATIN CALCIUM 20 MG: 20 TABLET, FILM COATED ORAL at 23:58

## 2017-11-09 RX ADMIN — HEPARIN SODIUM 5000 UNITS: 5000 INJECTION, SOLUTION INTRAVENOUS; SUBCUTANEOUS at 17:39

## 2017-11-09 RX ADMIN — Medication 10 ML: at 23:10

## 2017-11-09 ASSESSMENT — PAIN SCALES - GENERAL
PAINLEVEL_OUTOF10: 0
PAINLEVEL_OUTOF10: 0
PAINLEVEL_OUTOF10: 8

## 2017-11-09 ASSESSMENT — PAIN DESCRIPTION - LOCATION: LOCATION: CHEST

## 2017-11-09 ASSESSMENT — PAIN DESCRIPTION - PAIN TYPE: TYPE: ACUTE PAIN

## 2017-11-09 NOTE — ED PROVIDER NOTES
[facility]  eMERGENCY dEPARTMENT eNCOUnter          [address] [facility]       Chief Complaint   Patient presents with    Chest Pain     onset about a week ago--mid epigastric       Nurses Notes reviewed and I agree except as noted in the HPI. HISTORY OF PRESENT ILLNESS    Lin Villanueva is a 80 y.o. female who presents To the emergency room with complaint of chest pain, patient states onset of midepigastric pain approximately one week prior, states yesterday while at the store she was having some back pain but attributed to her known issues at her T8 T9 T11 disc issues, states she was having some chest discomfort afterwards no shortness of breath and felt somewhat warm, had gone out to the car sit for a while felt better and went back in however again began to feel unwell. Patient followed up with her primary care today who ordered some outpatient labs after EKG and chest x-ray, outpatient labs showed an elevated troponin and CK-MB, patient was contacted or the emergency room for further evaluation. Patient describes the pain in her chest is primarily pressure-like, radiating an 8 out of 10, often worse with exertion though states it goes away with some rest.  Denies nausea denies diaphoresis denies trauma falls denies fever chills. Patient denies any specific cardiac history, does not have a cardiologist.    REVIEW OF SYSTEMS     Review of Systems   All other systems reviewed and are negative. PAST MEDICAL HISTORY    has a past medical history of Abnormal EMG; Cancer ([address] Chronic kidney disease; Colon polyp; Compression fracture of lumbar vertebra (HCC) / prednisone related to PMR; Diarrhea; DVT (deep venous thrombosis) ([address] Emphysema; HTN (hypertension); Hyperlipidemia; Irritable bowel syndrome; Lumbago; Osteoarthritis; Polymyalgia rheumatica ([address] Rheumatism; and Seborrheic keratoses.     SURGICAL HISTORY      has a past surgical history that includes Cataract removal with implant; Glaucoma surgery; Nasal septum surgery; Dental surgery; Appendectomy (); Skin cancer excision; Hysterectomy (); Tonsillectomy (); Colonoscopy ([id number]); Colonoscopy (2016); and colectomy (Right, 2016). CURRENT MEDICATIONS       Previous Medications    ACETAMINOPHEN 650 MG TABS    Take 650 mg by mouth every 4 hours as needed    ATORVASTATIN (LIPITOR) 80 MG TABLET    Take 1 tablet by mouth every evening    CALCIUM CARBONATE (OSCAL) 500 MG TABS TABLET    Take 500 mg by mouth daily    CARBOXYMETHYLCELLULOSE 1 % OPHTHALMIC SOLUTION    Place 1 drop into the right eye 3 times daily    CARVEDILOL (COREG) 25 MG TABLET    Take 1 tablet by mouth 2 times daily    GABAPENTIN (NEURONTIN) 100 MG CAPSULE    Take 100 mg by mouth daily as needed     HANDICAP PLACARD MISC    by Does not apply route Expires in 5 yrs. LISINOPRIL (PRINIVIL;ZESTRIL) 20 MG TABLET    Take 2 tablets by mouth daily    LOPERAMIDE (IMODIUM) 2 MG CAPSULE    Take 2 mg by mouth 4 times daily as needed for Diarrhea    MECLIZINE (ANTIVERT) 12.5 MG TABLET    Take 12.5 mg by mouth 3 times daily as needed    MULTIPLE VITAMINS-MINERALS (CENTRUM SILVER PO)    Take by mouth daily    NA SULFATE-K SULFATE-MG SULF (SUPREP BOWEL PREP KIT) 17.5-3.13-1.6 GM/180ML SOLN    Take as directed    VITAMIN B-12 (CYANOCOBALAMIN) 1000 MCG TABLET    Take 1,000 mcg by mouth daily    WARFARIN (COUMADIN) 2.5 MG TABLET    Take 1 tablet by mouth See Admin Instructions Takes 5 mg on Monday, Wednesday and  . Takes 2.5 mg all remaining days. ALLERGIES     is allergic to budesonide and hydrochlorothiazide. FAMILY HISTORY     indicated that her mother is . She indicated that her father is . She indicated that the status of her brother is unknown.    family history includes Colon Cancer in her father; Heart Disease in her brother; High Blood Pressure in her brother; Stroke in her brother.     SOCIAL HISTORY      reports that EKG: All EKG's are interpreted by the Emergency Department Physician who either signs or Co-signs this chart in the absence of a cardiologist.  EKG    The patient had an EKG which is interpreted by me in the absence of a Cardiologist.   [] Without comparison to previous. [x] With comparison to a previous EKG Dated 6/27/2017    Sinus rhythm rate 85 normal axis normal intervals, noted T wave inversions lead III seen in prior      RADIOLOGY: non-plain film images(s) such as CT, Ultrasound and MRI are read by the radiologist.  No orders to display       LABS:   Labs Reviewed   CBC WITH AUTO DIFFERENTIAL - Abnormal; Notable for the following:        Result Value    RBC 3.99 (*)     Platelets 087 (*)     All other components within normal limits   BASIC METABOLIC PANEL - Abnormal; Notable for the following:     Glucose 103 (*)     BUN 27 (*)     CREATININE 1.43 (*)     GFR Non- 35 (*)     GFR  43 (*)     All other components within normal limits   TROPONIN - Abnormal; Notable for the following:     Troponin T 0.08 (*)     All other components within normal limits   BRAIN NATRIURETIC PEPTIDE - Abnormal; Notable for the following:     Pro- (*)     All other components within normal limits   PROTIME-INR - Abnormal; Notable for the following:     Protime 25.3 (*)     INR 2.3 (*)     All other components within normal limits       EMERGENCY DEPARTMENT COURSE:   Vitals:    Vitals:    11/09/17 1610   BP: (!) 178/91   Pulse: 84   Resp: 18   Temp: 98.4 °F (36.9 °C)   TempSrc: Tympanic   SpO2: 97%   Weight: 122 lb (55.3 kg)   Height: 5' 4\" (1.626 m)     Patient history and physical exam taken at bedside, discussed patient's symptoms and exam findings as well as initial plan of workup to include EKG, chest x-ray, blood work with saline lock insertion, and chewable aspirin. Patient placed on cardiac monitor and continuous pulse oximetry resting semi-Fowlers in bed.     EKG as above    Chest x-ray reviewed    Outpatient labs reviewed noted troponin 0.07 CK-MB 10.1    Heparin 5000 units IV ordered    Current lab work reviewed noting troponin 0.08 proBNP 966    Discussed with patient diagnosis of NSTEMI, discussed need for further cardiac workup and transfer to higher care facility, usually patient had some resistance of this and she is concerned about her 's ability to get to the hospital, and explained the patient that not following through on this could become life-threatening, patient does agree to go.     Heart Score:6  History: High (+2), Moderate (+1), Slight (0)  EKG: ST depression (+2), non-specific repol disturbance (+1), normal (0)  Age: >65 (+2), 45-65 (+1), <45 (0)  Troponin: >3x limit (+2), 1-3x (+1), normal (0)  Risk Factors: >3 (+2), 1-2 (+1), none (0)  (Risk Factors include HTN, HLD, DM, tobacco, FHx, obesity)      Major Adverse Cardiac Events (MACE) stratification  0-3: 0.9-1.7% risk  4-6: 12-16.6% risk  >7: 50-65% risk    DEEPTI score:3  Age >71  ASA use in past 7 days  2 angina episodes in 24 hours  ST changes of 0.5mm on admission EKG  elevated cardiac markers  known CAD  Risk Factors (3 or more): HTN, HLD, DM, tobacco, FHx, obesity    Score 0-1 = 4.7% risk  Score 2 = 8.3% risk  Score 3 = 13.2% risk  Score 4 = 19.9% risk  Score 5 = 26.2% risk    Cardiology paged via transfer line @ ~ 1715 hrs    Received callback at 1742 hrs, Dr. Veda Lozano, cardiology at [facility], discussed patient's presentation and current workup, advised that patient transferred admitted through hospitalist consult, transplanted to contact hospitalist group    Received callback at 1802 hrs, Dr. Tone Suresh, hospitalist at [place] [address], called and discussed patient presentation and current workup including conversation with cardiology, accepted for transfer        CRITICAL CARE: There was a high probability of clinically significant/life threatening deterioration in this patient's condition which required my urgent intervention. Total critical care time was 30 minutes. This excludes any time for separately reportable procedures. CONSULTS:  As above    FINAL IMPRESSION      1. NSTEMI (non-ST elevated myocardial infarction) ([facility]          DISPOSITION/PLAN   Transfer    PATIENT REFERRED TO:  No follow-up provider specified. DISCHARGE MEDICATIONS:  New Prescriptions    No medications on file           Summation      Patient Course:  Transfer    ED Medications administered this visit:    Medications   heparin (porcine) injection 5,000 Units (not administered)   aspirin chewable tablet 324 mg (not administered)       New Prescriptions from this visit:    New Prescriptions    No medications on file       Follow-up:  No follow-up provider specified. Final Impression:   1.  NSTEMI (non-ST elevated myocardial infarction) ([facility]               (Please note that portions of this note were completed with a voice recognition program.  Efforts were made to edit the dictations but occasionally words are mis-transcribed.)    MD Janae Yousif MD  11/09/17 [id number]

## 2017-11-10 ENCOUNTER — ANTI-COAG VISIT (OUTPATIENT)
Dept: PHARMACY | Age: 82
End: 2017-11-10

## 2017-11-10 DIAGNOSIS — Z79.01 LONG TERM CURRENT USE OF ANTICOAGULANT THERAPY: ICD-10-CM

## 2017-11-10 LAB
ALBUMIN SERPL-MCNC: 3.7 G/DL (ref 3.5–5.2)
ALBUMIN/GLOBULIN RATIO: 1.3 (ref 1–2.5)
ALP BLD-CCNC: 72 U/L (ref 35–104)
ALT SERPL-CCNC: 19 U/L (ref 5–33)
ANION GAP SERPL CALCULATED.3IONS-SCNC: 15 MMOL/L (ref 9–17)
AST SERPL-CCNC: 27 U/L
BILIRUB SERPL-MCNC: 0.56 MG/DL (ref 0.3–1.2)
BUN BLDV-MCNC: 27 MG/DL (ref 8–23)
BUN/CREAT BLD: ABNORMAL (ref 9–20)
CALCIUM SERPL-MCNC: 8.9 MG/DL (ref 8.6–10.4)
CHLORIDE BLD-SCNC: 103 MMOL/L (ref 98–107)
CHOLESTEROL/HDL RATIO: 1.9
CHOLESTEROL: 120 MG/DL
CO2: 19 MMOL/L (ref 20–31)
CREAT SERPL-MCNC: 1.26 MG/DL (ref 0.5–0.9)
EKG ATRIAL RATE: 83 BPM
EKG P AXIS: 20 DEGREES
EKG P-R INTERVAL: 136 MS
EKG Q-T INTERVAL: 376 MS
EKG QRS DURATION: 82 MS
EKG QTC CALCULATION (BAZETT): 441 MS
EKG R AXIS: -15 DEGREES
EKG T AXIS: 10 DEGREES
EKG VENTRICULAR RATE: 83 BPM
GFR AFRICAN AMERICAN: 49 ML/MIN
GFR NON-AFRICAN AMERICAN: 41 ML/MIN
GFR SERPL CREATININE-BSD FRML MDRD: ABNORMAL ML/MIN/{1.73_M2}
GFR SERPL CREATININE-BSD FRML MDRD: ABNORMAL ML/MIN/{1.73_M2}
GLUCOSE BLD-MCNC: 92 MG/DL (ref 70–99)
HCT VFR BLD CALC: 37.7 % (ref 36.3–47.1)
HDLC SERPL-MCNC: 63 MG/DL
HEMOGLOBIN: 11.7 G/DL (ref 11.9–15.1)
INR BLD: 2.3
LDL CHOLESTEROL: 37 MG/DL (ref 0–130)
MCH RBC QN AUTO: 30.3 PG (ref 25.2–33.5)
MCHC RBC AUTO-ENTMCNC: 31 G/DL (ref 28.4–34.8)
MCV RBC AUTO: 97.7 FL (ref 82.6–102.9)
PDW BLD-RTO: 13.6 % (ref 11.8–14.4)
PLATELET # BLD: 124 K/UL (ref 138–453)
PMV BLD AUTO: 12.7 FL (ref 8.1–13.5)
POTASSIUM SERPL-SCNC: 4.9 MMOL/L (ref 3.7–5.3)
PROTHROMBIN TIME: 24.9 SEC (ref 9.4–12.6)
RBC # BLD: 3.86 M/UL (ref 3.95–5.11)
SODIUM BLD-SCNC: 137 MMOL/L (ref 135–144)
TOTAL PROTEIN: 6.6 G/DL (ref 6.4–8.3)
TRIGL SERPL-MCNC: 101 MG/DL
TROPONIN INTERP: ABNORMAL
TROPONIN T: 0.05 NG/ML
TROPONIN T: 0.05 NG/ML
TROPONIN T: 0.06 NG/ML
VLDLC SERPL CALC-MCNC: NORMAL MG/DL (ref 1–30)
WBC # BLD: 7.3 K/UL (ref 3.5–11.3)

## 2017-11-10 PROCEDURE — 85027 COMPLETE CBC AUTOMATED: CPT

## 2017-11-10 PROCEDURE — 80061 LIPID PANEL: CPT

## 2017-11-10 PROCEDURE — 36415 COLL VENOUS BLD VENIPUNCTURE: CPT

## 2017-11-10 PROCEDURE — 84484 ASSAY OF TROPONIN QUANT: CPT

## 2017-11-10 PROCEDURE — G8978 MOBILITY CURRENT STATUS: HCPCS

## 2017-11-10 PROCEDURE — 85610 PROTHROMBIN TIME: CPT

## 2017-11-10 PROCEDURE — 1200000000 HC SEMI PRIVATE

## 2017-11-10 PROCEDURE — G8987 SELF CARE CURRENT STATUS: HCPCS

## 2017-11-10 PROCEDURE — 97162 PT EVAL MOD COMPLEX 30 MIN: CPT

## 2017-11-10 PROCEDURE — 6370000000 HC RX 637 (ALT 250 FOR IP): Performed by: INTERNAL MEDICINE

## 2017-11-10 PROCEDURE — 97165 OT EVAL LOW COMPLEX 30 MIN: CPT

## 2017-11-10 PROCEDURE — 2580000003 HC RX 258: Performed by: INTERNAL MEDICINE

## 2017-11-10 PROCEDURE — 2500000003 HC RX 250 WO HCPCS: Performed by: NURSE PRACTITIONER

## 2017-11-10 PROCEDURE — G8988 SELF CARE GOAL STATUS: HCPCS

## 2017-11-10 PROCEDURE — 97535 SELF CARE MNGMENT TRAINING: CPT

## 2017-11-10 PROCEDURE — 99222 1ST HOSP IP/OBS MODERATE 55: CPT | Performed by: INTERNAL MEDICINE

## 2017-11-10 PROCEDURE — G8979 MOBILITY GOAL STATUS: HCPCS

## 2017-11-10 PROCEDURE — 80053 COMPREHEN METABOLIC PANEL: CPT

## 2017-11-10 PROCEDURE — 97530 THERAPEUTIC ACTIVITIES: CPT

## 2017-11-10 RX ORDER — CARVEDILOL 25 MG/1
25 TABLET ORAL 2 TIMES DAILY
Status: DISCONTINUED | OUTPATIENT
Start: 2017-11-10 | End: 2017-11-12 | Stop reason: HOSPADM

## 2017-11-10 RX ORDER — AMLODIPINE BESYLATE 5 MG/1
5 TABLET ORAL DAILY
Status: DISCONTINUED | OUTPATIENT
Start: 2017-11-10 | End: 2017-11-12 | Stop reason: HOSPADM

## 2017-11-10 RX ORDER — CALCIUM CARBONATE 500(1250)
500 TABLET ORAL DAILY
Status: DISCONTINUED | OUTPATIENT
Start: 2017-11-10 | End: 2017-11-11 | Stop reason: CLARIF

## 2017-11-10 RX ORDER — AMINOPHYLLINE DIHYDRATE 25 MG/ML
100 INJECTION, SOLUTION INTRAVENOUS
Status: DISCONTINUED | OUTPATIENT
Start: 2017-11-10 | End: 2017-11-10

## 2017-11-10 RX ORDER — SODIUM CHLORIDE 0.9 % (FLUSH) 0.9 %
10 SYRINGE (ML) INJECTION PRN
Status: DISCONTINUED | OUTPATIENT
Start: 2017-11-10 | End: 2017-11-10

## 2017-11-10 RX ORDER — NITROGLYCERIN 0.4 MG/1
0.4 TABLET SUBLINGUAL EVERY 5 MIN PRN
Status: DISCONTINUED | OUTPATIENT
Start: 2017-11-10 | End: 2017-11-10

## 2017-11-10 RX ORDER — CHOLECALCIFEROL (VITAMIN D3) 125 MCG
1000 CAPSULE ORAL DAILY
Status: DISCONTINUED | OUTPATIENT
Start: 2017-11-10 | End: 2017-11-12 | Stop reason: HOSPADM

## 2017-11-10 RX ORDER — LISINOPRIL 20 MG/1
40 TABLET ORAL DAILY
Status: DISCONTINUED | OUTPATIENT
Start: 2017-11-10 | End: 2017-11-12

## 2017-11-10 RX ORDER — SODIUM CHLORIDE 9 MG/ML
INJECTION, SOLUTION INTRAVENOUS ONCE
Status: DISCONTINUED | OUTPATIENT
Start: 2017-11-10 | End: 2017-11-10

## 2017-11-10 RX ORDER — ATORVASTATIN CALCIUM 80 MG/1
80 TABLET, FILM COATED ORAL EVERY EVENING
Status: DISCONTINUED | OUTPATIENT
Start: 2017-11-10 | End: 2017-11-12 | Stop reason: HOSPADM

## 2017-11-10 RX ORDER — WARFARIN SODIUM 5 MG/1
5 TABLET ORAL
Status: DISCONTINUED | OUTPATIENT
Start: 2017-11-10 | End: 2017-11-12 | Stop reason: HOSPADM

## 2017-11-10 RX ORDER — WARFARIN SODIUM 2.5 MG/1
2.5 TABLET ORAL
Status: DISCONTINUED | OUTPATIENT
Start: 2017-11-11 | End: 2017-11-12 | Stop reason: HOSPADM

## 2017-11-10 RX ORDER — METOPROLOL TARTRATE 5 MG/5ML
2.5 INJECTION INTRAVENOUS PRN
Status: DISCONTINUED | OUTPATIENT
Start: 2017-11-10 | End: 2017-11-10

## 2017-11-10 RX ORDER — LOPERAMIDE HYDROCHLORIDE 2 MG/1
2 CAPSULE ORAL 4 TIMES DAILY PRN
Status: DISCONTINUED | OUTPATIENT
Start: 2017-11-10 | End: 2017-11-12 | Stop reason: HOSPADM

## 2017-11-10 RX ORDER — WARFARIN SODIUM 2.5 MG/1
2.5 TABLET ORAL SEE ADMIN INSTRUCTIONS
Status: DISCONTINUED | OUTPATIENT
Start: 2017-11-10 | End: 2017-11-10 | Stop reason: DRUGHIGH

## 2017-11-10 RX ADMIN — CARVEDILOL 25 MG: 25 TABLET, FILM COATED ORAL at 09:30

## 2017-11-10 RX ADMIN — METOPROLOL TARTRATE 5 MG: 1 INJECTION, SOLUTION INTRAVENOUS at 04:07

## 2017-11-10 RX ADMIN — ATORVASTATIN CALCIUM 80 MG: 80 TABLET, FILM COATED ORAL at 16:25

## 2017-11-10 RX ADMIN — WARFARIN SODIUM 5 MG: 5 TABLET ORAL at 16:25

## 2017-11-10 RX ADMIN — LISINOPRIL 40 MG: 20 TABLET ORAL at 09:30

## 2017-11-10 RX ADMIN — AMLODIPINE BESYLATE 5 MG: 5 TABLET ORAL at 16:17

## 2017-11-10 RX ADMIN — Medication 10 ML: at 20:20

## 2017-11-10 RX ADMIN — CARVEDILOL 25 MG: 25 TABLET, FILM COATED ORAL at 20:19

## 2017-11-10 RX ADMIN — Medication 1000 MCG: at 16:20

## 2017-11-10 RX ADMIN — ASPIRIN 325 MG: 325 TABLET, COATED ORAL at 16:23

## 2017-11-10 RX ADMIN — Medication 10 ML: at 16:21

## 2017-11-10 RX ADMIN — SODIUM CHLORIDE, PRESERVATIVE FREE 10 ML: 5 INJECTION INTRAVENOUS at 10:00

## 2017-11-10 ASSESSMENT — PAIN SCALES - GENERAL: PAINLEVEL_OUTOF10: 0

## 2017-11-10 NOTE — PROGRESS NOTES
Occupational Therapy   Occupational Therapy Initial Assessment  Date: 11/10/2017   Patient Name: Emanuel Hamilton  MRN: [id number]     : 1935     HISTORY OF PRESENT ILLNESS:       The patient is a 80 y.o.  female with past medical hx of HTN, grade II diastolic dysfunction, colon ca s/p resection, DVT on chronic  Coumadin therapy who is admitted to the hospital for atypical chest pain. As per pt and EMS, pt reported mid epigastric chest pain that started 1week ago, the pain was exacerbated one day prior to admission while the pt was grocery shopping she developed pressure like chest pain rated 8/10 that was aggravated by exertion and alleviated by rest. The pain was accompanied by shortness of breath. Pt denies palpitations, diaphoresis, nausea or vomiting. The pt then followed up with PCP who ordered outpatient labs that revealed a positive cardiac injury panel. She subsequently presented to the ED where a repeat troponin T and CK-MB were also elevated to 0.07 and 10.1 respectively. The patient is on chronic coumadin therapy for a hx of DVTs however, she reports not taking it for the past couple of days because she is scheduled to have a colonoscopy on 17. Following that she has a stress test scheduled on 11/15/17. When the pt was seen and examined at bedside, she was short of breath following occupational therapy session. She denied chest pain, palpitations or diaphoresis. She has no other complaints     Patient Diagnosis(es): There were no encounter diagnoses. has a past medical history of Abnormal EMG; Cancer ([address] Chronic kidney disease; Colon polyp; Compression fracture of lumbar vertebra (HCC) / prednisone related to PMR; Diarrhea; DVT (deep venous thrombosis) ([address] Emphysema; HTN (hypertension); Hyperlipidemia; Irritable bowel syndrome; Lumbago; Osteoarthritis; Polymyalgia rheumatica ([address] Rheumatism; and Seborrheic keratoses.    has a past surgical history that includes Cataract removal with implant; Glaucoma surgery; Nasal septum surgery; Dental surgery; Appendectomy (1949); Skin cancer excision; Hysterectomy (1973); Tonsillectomy (1941); Colonoscopy ([id number]); Colonoscopy (5/25/2016); and colectomy (Right, 06/20/2016). Restrictions  Restrictions/Precautions  Restrictions/Precautions: Fall Risk, Cardiac  Required Braces or Orthoses?: No  Position Activity Restriction  Other position/activity restrictions:  Up with assistance    Subjective   General  Patient assessed for rehabilitation services?: Yes  Family / Caregiver Present: No  Pain Assessment  Patient Currently in Pain: Denies  Pain Assessment: 0-10  Pain Level: 0     Social/Functional History  Social/Functional History  Lives With: Spouse  Type of Home: House  Home Layout: One level  Home Access: Stairs to enter with rails  Entrance Stairs - Number of Steps: 2  Bathroom Shower/Tub: Tub/Shower unit  Bathroom Toilet: Standard  Bathroom Equipment: Grab bars in shower, Shower chair  Bathroom Accessibility: Accessible  Home Equipment: Rolling walker, Quad cane  Receives Help From: Family  ADL Assistance: Independent  Homemaking Assistance: Independent  Homemaking Responsibilities: Yes  Meal Prep Responsibility: Primary  Laundry Responsibility: Primary  Cleaning Responsibility: Primary  Shopping Responsibility: Secondary (shared with )  Ambulation Assistance: Independent  Transfer Assistance: Independent  Active : Yes  Occupation: Retired  Type of occupation: machinery   Leisure & Hobbies: like to play organ and paint     Objective   Vision: Impaired  Vision Exceptions: Wears glasses at all times  Hearing: Exceptions to [facility]  Hearing Exceptions: Bilateral hearing aid    Orientation  Overall Orientation Status: Within Functional Limits  Observation/Palpation  Posture: Good  Balance  Sitting Balance: Independent (seated in chair)  Standing Balance: Contact guard assistance (no device)  Standing Balance  Time: 3 min  Activity: sit <> stand transfer, functional mobility <> toilet  Sit to stand: Stand by assistance  Stand to sit: Stand by assistance  Comment: pt demo no LOB. SOB on increased effort. Pt reports only using cane for long distances or standing in line at the grocery story  Functional Mobility  Functional - Mobility Device: No device  Activity: To/from bathroom  Assist Level: Contact guard assistance  Toilet Transfers  Toilet - Technique: Ambulating  Equipment Used: Standard toilet (use of GB)  Toilet Transfer: Stand by assistance  ADL  Feeding: Independent  Grooming: Stand by assistance (for oral care and to comb hair)  UE Bathing: Stand by assistance (to wash chest/ B UE seated in chair)  LE Bathing: Stand by assistance (to wash LE seated in chair and complete pericare standing)  UE Dressing: Stand by assistance (to don/ doff gown)  LE Dressing: Stand by assistance (to don/ doff socks)  Toileting: Stand by assistance (use of standard toilet)  Additional Comments: Pt seated in chair on arrival. Pt performed sit > stand transfer and functional mobility vahid bathroom for toileting. Pt completed toileting and pericare and transferred to chair for sinkside ADL. Pt completed ADL activities as documented above. Pt noted to be SOB following activitt, required 5 min rest break seated in chair prior to return to bed. Pt returned to bed, call light in reach and RN notified on therapist exit.    Tone RUE  RUE Tone: Normotonic  Tone LUE  LUE Tone: Normotonic  Coordination  Movements Are Fluid And Coordinated: Yes     Bed mobility  Rolling to Right: Supervision  Supine to Sit:  (pt up to chair on arrival)  Sit to Supine: Supervision  Scooting: Supervision  Transfers  Stand Step Transfers: Contact guard assistance  Sit to stand: Stand by assistance  Stand to sit: Stand by assistance     Cognition  Overall Cognitive Status: WFL  Perception  Overall Perceptual Status: WFL     Sensation  Overall Sensation Status: WNL (denies numbness/ tingling)      LUE AROM (degrees)  LUE AROM : WFL  RUE AROM (degrees)  RUE AROM : WFL  LUE Strength  Gross LUE Strength: WFL  RUE Strength  Gross RUE Strength: WFL    Assessment   Performance deficits / Impairments: Decreased functional mobility ; Decreased endurance;Decreased ADL status; Decreased high-level IADLs  Prognosis: Good  Decision Making: Low Complexity  Patient Education: OT POC, discharge planning, rest breaks   Discharge Recommendations: Home with assist PRN  REQUIRES OT FOLLOW UP: Yes  Activity Tolerance  Activity Tolerance: Patient Tolerated treatment well;Patient limited by fatigue  Safety Devices  Safety Devices in place: Yes  Type of devices: Left in bed;Nurse notified;Call light within reach;Gait belt  Restraints  Initially in place: No  OT Equipment Recommendations  Equipment Needed: No        Discharge Recommendations:  Home with assist PRN     Plan   Plan  Times per week: 4-5x/wk   Current Treatment Recommendations: Endurance Training, Safety Education & Training, Self-Care / ADL, Patient/Caregiver Education & Training, Equipment Evaluation, Education, & procurement, Home Management Training    G-Code  OT G-codes  Functional Assessment Tool Used: Barthel Index  Score: 18/20  Functional Limitation: Self care  Self Care Current Status ():  At least 1 percent but less than 20 percent impaired, limited or restricted  Self Care Goal Status (): 0 percent impaired, limited or restricted    Goals  Short term goals  Time Frame for Short term goals: by discharge, pt will  Short term goal 1: demo I in UE ADL activites   Short term goal 2: demo I in LE ADL activities   Short term goal 3: demo understanding and I use of safe transfer tech during fucntional activities with use of quad cane PRN  Short term goal 4: demo understanding and I use of EC/WS, proper pursed lipped breathing tech and fall prevention tech during functional activites   Short term goal 5: demo increased standing

## 2017-11-10 NOTE — PROGRESS NOTES
Nutrition Assessment    Type and Reason for Visit: Initial, Positive Nutrition Screen    Nutrition Recommendations: Advance oral diet as tolerated. Malnutrition Assessment:  · Malnutrition Status: At risk for malnutrition    Nutrition Diagnosis:   · Problem: No nutrition diagnosis at this time  · Etiology: related to Insufficient energy/nutrient consumption     Signs and symptoms:  as evidenced by NPO status due to medical condition    Nutrition Assessment:  · Subjective Assessment: Pt sleeping at time of visit. Currently NPO. No chewing and swallowing difficulties at this time. · Current Nutrition Therapies:  · Oral Diet Orders: NPO   · Oral Diet intake: Unable to assess, NPO  · Oral Nutrition Supplement (ONS) Orders: None  · ONS intake: NPO  · Anthropometric Measures:  · Ht: 5' 2\" (157.5 cm)   · Current Body Wt: 117 lb 15.1 oz (53.5 kg)  · Ideal Body Wt: 110 lb (49.9 kg),  ·  % Ideal Body 107%  · BMI Classification: BMI 18.5 - 24.9 Normal Weight  · Comparative Standards (Estimated Nutrition Needs):  · Estimated Daily Total Kcal: 1400- 1600 kcals/day  · Estimated Daily Protein (g): 55-65 g/day    Estimated Intake vs Estimated Needs: Insufficient Data    Nutrition Risk Level: Moderate    Nutrition Interventions:   Continue NPO, Start oral diet  Continued Inpatient Monitoring, Education Not Indicated    Nutrition Evaluation:   · Evaluation: Goals set   · Goals: As diet is advanced, increase po intake to meet 50-75% of estimated nutrient needs    · Monitoring: Diet Progression, NPO Status, Meal Intake, Diet Tolerance, Chewing/Swallowing    See Adult Nutrition Doc Flowsheet for more detail.      Electronically signed by Antonio Mccoy DTR on 11/10/17 at 2:38 PM    Contact Number: [phone]

## 2017-11-10 NOTE — H&P
[facility]    HISTORY AND PHYSICAL EXAMINATION            Date:   11/10/2017  Patient name:  Michelle Schroeder  Date of admission:  11/9/2017  9:18 PM  MRN:   [id number]  Account:  [de-identified]  YOB: 1935  PCP:    Fausto Ceron MD  Room:   3027/3027-01  Code Status:    Full Code    Chief Complaint:     Chest Pain    History Obtained From:     patient, electronic medical record    History of Present Illness: The patient is a 80 y.o. Non-/non  female who presents with chest pain and she is admitted to the hospital for the management of  NSTEMI. 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 Of note, The patient is on chronic coumadin therapy for a hx of DVTs however, she reports not taking it for the past couple of days because she is scheduled to have a colonoscopy on Monday 11/13/17. She presented to [facility] with back pain, exertional SOB, and chest discomfort, which was mid-sternal, non radiating, pressure like, 8/10 at its worst in intensity. It started one week ago and initially she thought the pain may be related to her T8-T11 disc issues. She went to her PCP's office and EKG/CXR and basic labs were ordered and she was found to have an elevated trop and CKMB. Hence, she was sent to the ED for further evaluation. She was found to have trop of 0.07 which increased to 0.08. CKMB was elevated at 10.1. ProBNP was 966. Creatinine was elevated at 1.43. Also, BP was elevated at 180s/90s. Heart Score 6, DEEPTI score 3. She was given baby ASA, Heparin IV 5000 Units, and transferred here for further care.          Past Medical History:     Past Medical History:   Diagnosis Date    Abnormal EMG 2016    Right Carpal Tunnel & C5 Radiculopathy    Cancer ([facility] [place] [address]     colon    Chronic kidney negative increase in drinking, increase in urination, hot or cold intolerance  MUSCULOSKELETAL:  negative joint pains, muscle aches, swelling of joints  NEUROLOGICAL:  negative for headaches, dizziness, lightheadedness, numbness, pain, tingling extremities  BEHAVIOR/PSYCH:  negative for depression, anxiety    Physical Exam:   BP (!) 178/92   Pulse 87   Temp 98.1 °F (36.7 °C) (Oral)   Resp 21   Ht 5' 2\" (1.575 m)   Wt 118 lb (53.5 kg)   SpO2 97%   BMI 21.58 kg/m²   Temp (24hrs), Av °F (36.7 °C), Min:97.7 °F (36.5 °C), Max:98.4 °F (36.9 °C)    No results for input(s): POCGLU in the last 72 hours. Intake/Output Summary (Last 24 hours) at 11/10/17 1255  Last data filed at 11/10/17 5087   Gross per 24 hour   Intake              300 ml   Output             1100 ml   Net             -800 ml       General Appearance:  alert, well appearing, and in no acute distress  Mental status: oriented to person, place, and time with normal affect  Head:  normocephalic, atraumatic. Eye: no icterus, redness, pupils equal and reactive, extraocular eye movements intact, conjunctiva clear  Ear: normal external ear, no discharge, hearing intact  Nose:  no drainage noted  Mouth: mucous membranes moist  Neck: supple, no carotid bruits, thyroid not palpable  Lungs: Bilateral equal air entry, clear to ausculation, no wheezing, rales or rhonchi, normal effort  Cardiovascular: normal rate, regular rhythm, no murmur, gallop, rub. Abdomen: Soft, nontender, nondistended, normal bowel sounds, no hepatomegaly or splenomegaly  Neurologic: There are no new focal motor or sensory deficits, normal muscle tone and bulk, no abnormal sensation, normal speech, cranial nerves II through XII grossly intact  Skin: No gross lesions, rashes, bruising or bleeding on exposed skin area  Extremities:  peripheral pulses palpable, no pedal edema or calf pain with palpation. Bruising present on left arm, just proximal to popliteal fossa.  She says this is from tight BP cuff.    Psych: normal affect     Investigations:      Laboratory Testing:  Recent Results (from the past 24 hour(s))   CBC    Collection Time: 11/09/17  2:03 PM   Result Value Ref Range    WBC 8.4 3.5 - 11.0 k/uL    RBC 4.02 4.0 - 5.2 m/uL    Hemoglobin 12.1 12.0 - 16.0 g/dL    Hematocrit 38.0 36 - 46 %    MCV 94.6 80 - 100 fL    MCH 30.0 26 - 34 pg    MCHC 31.8 31 - 37 g/dL    RDW 14.6 12.1 - 15.2 %    Platelets 830 945 - 625 k/uL    MPV 11.1 6.0 - 12.0 fL   Comprehensive Metabolic Panel    Collection Time: 11/09/17  2:03 PM   Result Value Ref Range    Glucose 102 (H) 70 - 99 mg/dL    BUN 26 (H) 8 - 23 mg/dL    CREATININE 1.43 (H) 0.50 - 0.90 mg/dL    Bun/Cre Ratio 18 9 - 20    Calcium 9.3 8.6 - 10.4 mg/dL    Sodium 142 135 - 144 mmol/L    Potassium 5.1 3.7 - 5.3 mmol/L    Chloride 106 98 - 107 mmol/L    CO2 23 20 - 31 mmol/L    Anion Gap 13 9 - 17 mmol/L    Alkaline Phosphatase 81 35 - 104 U/L    ALT 21 5 - 33 U/L    AST 30 <32 U/L    Total Bilirubin 0.47 0.3 - 1.2 mg/dL    Total Protein 7.1 6.4 - 8.3 g/dL    Alb 4.1 3.5 - 5.2 g/dL    Albumin/Globulin Ratio 1.4 1.0 - 2.5    GFR Non- 35 (L) >60 mL/min    GFR  43 (L) >60 mL/min    GFR Comment          GFR Staging         Troponin I    Collection Time: 11/09/17  2:03 PM   Result Value Ref Range    Troponin T 0.07 (H) <0.03 ng/mL    Troponin Interp         CK MB    Collection Time: 11/09/17  2:03 PM   Result Value Ref Range    CK-MB 10.1 (HH) <5.4 ng/mL   C-Reactive Protein    Collection Time: 11/09/17  2:03 PM   Result Value Ref Range    CRP 0.4 0.0 - 5.0 mg/L   EKG 12 Lead    Collection Time: 11/09/17  4:09 PM   Result Value Ref Range    Ventricular Rate 85 BPM    Atrial Rate 85 BPM    P-R Interval 134 ms    QRS Duration 68 ms    Q-T Interval 356 ms    QTc Calculation (Bazett) 423 ms    P Axis 36 degrees    R Axis -7 degrees    T Axis 28 degrees   CBC Auto Differential    Collection Time: 11/09/17  4:28 PM   Result Value Time: 11/09/17 10:03 PM   Result Value Ref Range    Troponin T 0.06 (H) <0.03 ng/mL    Troponin Interp         EKG 12 Lead    Collection Time: 11/09/17 11:00 PM   Result Value Ref Range    Ventricular Rate 83 BPM    Atrial Rate 83 BPM    P-R Interval 136 ms    QRS Duration 82 ms    Q-T Interval 376 ms    QTc Calculation (Bazett) 441 ms    P Axis 20 degrees    R Axis -15 degrees    T Axis 10 degrees   Troponin    Collection Time: 11/10/17  1:47 AM   Result Value Ref Range    Troponin T 0.06 (H) <0.03 ng/mL    Troponin Interp         CBC    Collection Time: 11/10/17  5:45 AM   Result Value Ref Range    WBC 7.3 3.5 - 11.3 k/uL    RBC 3.86 (L) 3.95 - 5.11 m/uL    Hemoglobin 11.7 (L) 11.9 - 15.1 g/dL    Hematocrit 37.7 36.3 - 47.1 %    MCV 97.7 82.6 - 102.9 fL    MCH 30.3 25.2 - 33.5 pg    MCHC 31.0 28.4 - 34.8 g/dL    RDW 13.6 11.8 - 14.4 %    Platelets 753 (L) 246 - 453 k/uL    MPV 12.7 8.1 - 13.5 fL   Comprehensive metabolic panel    Collection Time: 11/10/17  5:45 AM   Result Value Ref Range    Glucose 92 70 - 99 mg/dL    BUN 27 (H) 8 - 23 mg/dL    CREATININE 1.26 (H) 0.50 - 0.90 mg/dL    Bun/Cre Ratio NOT REPORTED 9 - 20    Calcium 8.9 8.6 - 10.4 mg/dL    Sodium 137 135 - 144 mmol/L    Potassium 4.9 3.7 - 5.3 mmol/L    Chloride 103 98 - 107 mmol/L    CO2 19 (L) 20 - 31 mmol/L    Anion Gap 15 9 - 17 mmol/L    Alkaline Phosphatase 72 35 - 104 U/L    ALT 19 5 - 33 U/L    AST 27 <32 U/L    Total Bilirubin 0.56 0.3 - 1.2 mg/dL    Total Protein 6.6 6.4 - 8.3 g/dL    Alb 3.7 3.5 - 5.2 g/dL    Albumin/Globulin Ratio 1.3 1.0 - 2.5    GFR Non- 41 (L) >60 mL/min    GFR  49 (L) >60 mL/min    GFR Comment          GFR Staging NOT REPORTED    Lipid panel - fasting    Collection Time: 11/10/17  5:45 AM   Result Value Ref Range    Cholesterol 120 <200 mg/dL    HDL 63 >40 mg/dL    LDL Cholesterol 37 0 - 130 mg/dL    Chol/HDL Ratio 1.9 <5    Triglycerides 101 <150 mg/dL    VLDL NOT REPORTED 1 - 30 mg/dL PROTIME-INR    Collection Time: 11/10/17  5:45 AM   Result Value Ref Range    Protime 24.9 (H) 9.4 - 12.6 sec    INR 2.3    Troponin    Collection Time: 11/10/17  5:45 AM   Result Value Ref Range    Troponin T 0.05 (H) <0.03 ng/mL    Troponin Interp         Troponin    Collection Time: 11/10/17  9:19 AM   Result Value Ref Range    Troponin T 0.05 (H) <0.03 ng/mL    Troponin Interp             Imaging/Diagnostics:    EXAM:  XR CHEST STANDARD TWO VW  Impression Impression:  No acute cardiopulmonary process seen. No change. Increased tortuosity of the descending thoracic aorta. Progressive compression deformity at T9 and stable compression deformity of T11. EXAM:  XR THORACIC SPINE STANDARD  Impression Impression:  Worsening of compression deformities at T11 and T9 compared to 05/27/2016. Near vertebral plana morphology. Resulting exaggerated thoracic kyphosis. Consider follow-up MRI as warranted. Assessment :      Primary Problem  NSTEMI (non-ST elevated myocardial infarction) [facility])    Active Hospital Problems    Diagnosis Date Noted    NSTEMI (non-ST elevated myocardial infarction) ([facility] [address] [I21.4]     Hypertensive urgency [I16.0]     CKD (chronic kidney disease) stage 3, GFR 30-59 ml/min [N18.3] 05/26/2016    HTN (hypertension) [I10] 05/18/2015       Plan:     Patient status Admit as inpatient in the  Med/Surge    NSTEMI. Patient has no significant cardiac history. H/O HTN. Heart Score 6, DEEPTI Score 3. Received ASA 81 mg at OSH, as well as Heparin IV 5000 units once. On Coumadin therapy for h/o DVT, but on hold in anticipation of colonoscopy on Monday. Cardiology on board. Await further recs. NPO at this time. Will start heparin gtt as patient is supposed to be on therapeutic anticoagulation (lovenox) due to her h/o DVT. Hypertensive Urgency. BPmax of 213/83. PRN Labetalol. If continues to rise, may need cardene gtt vs nitro gtt. Cont home meds. CKD Stage 3, Baseline creat 1.3-1.4.  Currently normal-- 1.26. Avoid nephrotoxins. May need fluid/Mucomyst before any dye-loading procedures. Pain Mx/Nausea Mx    DVT PPx. On coumadin therapy. Consultations:   IP CONSULT TO CARDIOLOGY  PHARMACY TO DOSE WARFARIN    Patient is admitted as inpatient status because of co-morbidities listed above, severity of signs and symptoms as outlined, requirement for current medical therapies and most importantly because of direct risk to patient if care not provided in a hospital setting.     Bety Gregorio MD  11/10/2017  12:55 PM    Copy sent to Dr. Corazon Cuellar MD

## 2017-11-10 NOTE — CARE COORDINATION
Case Management Initial Discharge Plan  Gaston Parmar,         Readmission Risk              Readmission Risk:        8.25       Age 72 or Greater:  1    Admitted from SNF or Requires Paid or Family Care:  0    Currently has CHF,COPD,ARF,CRI,or is on dialysis:  0    Takes more than 5 Prescription Medications:  4    Takes Digoxin,Insulin,Anticoagulants,Narcotics or ASA/Plavix:  [address] in Past 12 Months:  0    On Disability:  0    Patient Considers own Health:  1.25            Met with:patient to discuss discharge plans. Information verified: address, contacts, phone number, , insurance Yes  PCP: Santy Harvey MD  Date of last visit: 2 months     Insurance Provider: medicare     Discharge Planning  Current Residence:  Private Residence  Living Arrangements:  Spouse/Significant Other   Home has one stories/no stairs to climb  Support Systems:  Children, Spouse/Significant Other  Current Services PTA:  None Supplier: n/a  Patient able to perform ADL's:Independent  DME used to aid ambulation prior to admission: cane/during admission, cane     Potential Assistance Needed:  N/A    Pharmacy: [facility]    Potential Assistance Purchasing Medications:  No  Does patient want to participate in local refill/ meds to beds program?  No    Patient agreeable to home care: No  Slaughter of choice provided:  n/a      Type of Home Care Services:  None  Patient expects to be discharged to:  home    Prior SNF/Rehab Placement and Facility: n/a  Agreeable to SNF/Rehab: No  Slaughter of choice provided: n/a   Evaluation: no    Expected Discharge date:      Follow Up Appointment: Best Day/ Time:      Transportation provider: family   Transportation arrangements needed for discharge: No    Discharge Plan: return home independently         Electronically signed by Karina Evans RN on 11/10/17 at 1:04 PM

## 2017-11-10 NOTE — PROGRESS NOTES
Smoking Cessation - topics covered   []  Health Risks  []  Benefits of Quitting   []  Smoking Cessation  []  Patient has no history of tobacco use  [x]  Patient is former smoker. Patient quit in 1981. [x]  No need for tobacco cessation education. []  Booklet given  []  Patient verbalizes understanding. []  Patient denies need for tobacco cessation education.   Brice Yepez  9:24 AM

## 2017-11-10 NOTE — PROGRESS NOTES
Pt admitted to Room 3027 by EMS from [facility]. Pt walked to rest room with nurse assist, connected to monitor, pt not complaining of any pain or discomfort at this time. Will continue to monitor.

## 2017-11-10 NOTE — CONSULTS
Attestation signed by      Attending Physician Statement:    I have discussed the care of  Laureen Aquino , including pertinent history and exam findings, with the Cardiology fellow/resident. I have seen and examined the patient and the key elements of all parts of the encounter have been performed by me. I agree with the assessment, plan and orders as documented by the fellow/resident, after I modified exam findings and plan of treatments, and the final version is my approved version of the assessment. Additional Comments:     81 yo F with CKD and mild troponin elevation comes in for some epigastric to lower chest pain mainly related to bending over. EKG was unremarkable. Plan: Unlikely ACS, will get stress test in am and Echo for evaluation of LV function. Start IV heparin once INR is lower than 2. Add Norvasc for uncontrolled systolic HTN       [facility]    Consult / H&P               Today's Date: 11/10/2017  Patient Name: Laureen Aquino  Date of admission: 11/9/2017  9:18 PM  Patient's age: 80 y.o., 1935  Admission Dx: NSTEMI (non-ST elevated myocardial infarction) ([facility] [address] [I21.4]    Reason for Consult:  Cardiac evaluation    Requesting Physician: Claude Massing, MD    CHIEF COMPLAINT:  Atypical chest pain    History Obtained From:  patient, electronic medical record    HISTORY OF PRESENT ILLNESS:      The patient is a 80 y.o.  female with past medical hx of HTN, grade II diastolic dysfunction, colon ca s/p resection, DVT on chronic  Coumadin therapy who is admitted to the hospital for atypical chest pain. As per pt and EMS, pt reported mid epigastric chest pain that started 1week ago, the pain was exacerbated one day prior to admission while the pt was grocery shopping she developed pressure like chest pain rated 8/10 that was aggravated by exertion and alleviated by rest. The pain was accompanied by shortness of breath.  Pt denies palpitations, diaphoresis, nausea or vomiting. The pt then followed up with PCP who ordered outpatient labs that revealed a positive cardiac injury panel. She subsequently presented to the ED where a repeat troponin T and CK-MB were also elevated to 0.07 and 10.1 respectively. The patient is on chronic coumadin therapy for a hx of DVTs however, she reports not taking it for the past couple of days because she is scheduled to have a colonoscopy on Monday 11/13/17. Following that she has a stress test scheduled on 11/15/17. When the pt was seen and examined at bedside, she was short of breath following occupational therapy session. She denied chest pain, palpitations or diaphoresis. She has no other complaints     Past Medical History:   has a past medical history of Abnormal EMG; Cancer ([facility] [address] Chronic kidney disease; Colon polyp; Compression fracture of lumbar vertebra (HCC) / prednisone related to PMR; Diarrhea; DVT (deep venous thrombosis) ([facility] [address] Emphysema; HTN (hypertension); Hyperlipidemia; Irritable bowel syndrome; Lumbago; Osteoarthritis; Polymyalgia rheumatica ([facility] [address] Rheumatism; and Seborrheic keratoses. Past Surgical History:   has a past surgical history that includes Cataract removal with implant; Glaucoma surgery; Nasal septum surgery; Dental surgery; Appendectomy (1949); Skin cancer excision; Hysterectomy (1973); Tonsillectomy (1941); Colonoscopy ([id number]); Colonoscopy (5/25/2016); and colectomy (Right, 06/20/2016). Home Medications:    Prior to Admission medications    Medication Sig Start Date End Date Taking?  Authorizing Provider   Na Sulfate-K Sulfate-Mg Sulf (SUPREP BOWEL PREP KIT) 17.5-3.13-1.6 GM/180ML SOLN Take as directed 10/21/17   Juliane Andres MD   lisinopril (PRINIVIL;ZESTRIL) 20 MG tablet Take 2 tablets by mouth daily 9/25/17   Kassandra eDy MD   carboxymethylcellulose 1 % ophthalmic solution Place 1 drop into the right eye 3 times daily    Historical Provider, MD   warfarin (COUMADIN) 2.5 MG tablet Take 1 tablet by mouth See Admin Instructions Takes 5 mg on Monday, Wednesday and  Fridays. Takes 2.5 mg all remaining days.  8/15/17   Marilee Hackett MD   carvedilol (COREG) 25 MG tablet Take 1 tablet by mouth 2 times daily 5/31/17   Marilee Hackett MD   meclizine (ANTIVERT) 12.5 MG tablet Take 12.5 mg by mouth 3 times daily as needed    Historical Provider, MD   loperamide (IMODIUM) 2 MG capsule Take 2 mg by mouth 4 times daily as needed for Diarrhea    Historical Provider, MD   vitamin B-12 (CYANOCOBALAMIN) 1000 MCG tablet Take 1,000 mcg by mouth daily    Historical Provider, MD   Handicap Placard MISC by Does not apply route Expires in 5 yrs. 2/28/17   Marilee Hackett MD   atorvastatin (LIPITOR) 80 MG tablet Take 1 tablet by mouth every evening 12/12/16   Marilee Hackett MD   gabapentin (NEURONTIN) 100 MG capsule Take 100 mg by mouth daily as needed  11/17/16   Historical Provider, MD   Multiple Vitamins-Minerals (CENTRUM SILVER PO) Take by mouth daily    Historical Provider, MD   calcium carbonate (OSCAL) 500 MG TABS tablet Take 500 mg by mouth daily    Historical Provider, MD   acetaminophen 650 MG TABS Take 650 mg by mouth every 4 hours as needed 6/16/16   Kiana Hunter DO      Current Facility-Administered Medications: atorvastatin (LIPITOR) tablet 80 mg, 80 mg, Oral, QPM  calcium carbonate (OSCAL) tablet 500 mg, 500 mg, Oral, Daily  carboxymethylcellulose 1 % ophthalmic solution 1 drop, 1 drop, Right Eye, TID  carvedilol (COREG) tablet 25 mg, 25 mg, Oral, BID  lisinopril (PRINIVIL;ZESTRIL) tablet 40 mg, 40 mg, Oral, Daily  loperamide (IMODIUM) capsule 2 mg, 2 mg, Oral, 4x Daily PRN  vitamin B-12 (CYANOCOBALAMIN) tablet 1,000 mcg, 1,000 mcg, Oral, Daily  warfarin (COUMADIN) daily dosing (placeholder), , Other, RX Placeholder  warfarin (COUMADIN) tablet 5 mg, 5 mg, Oral, Once per day on Mon Wed Fri  [START ON 11/11/2017] warfarin (COUMADIN) tablet 2.5 mg, 2.5 mg, Oral, Once per day on Sun Tue Thu There's been No change in energy level, No change in activity level. · Eyes: No visual changes or diplopia. No scleral icterus. · ENT: No Headaches  · Cardiovascular: No cardiac history  · Respiratory: No previous pulmonary problems, No cough  · Gastrointestinal: No abdominal pain. No change in bowel or bladder habits. · Genitourinary: No dysuria, trouble voiding, or hematuria. · Musculoskeletal:  No gait disturbance, No weakness or joint complaints. · Integumentary: No rash or pruritis. · Neurological: No headache, diplopia, change in muscle strength, numbness or tingling. No change in gait, balance, coordination, mood, affect, memory, mentation, behavior. · Psychiatric: No anxiety, or depression. · Endocrine: No temperature intolerance. No excessive thirst, fluid intake, or urination. No tremor. · Hematologic/Lymphatic: No abnormal bruising or bleeding, blood clots or swollen lymph nodes. · Allergic/Immunologic: No nasal congestion or hives. PHYSICAL EXAM:      BP (!) 188/92   Pulse 83   Temp 98 °F (36.7 °C) (Oral)   Resp 12   Ht 5' 2\" (1.575 m)   Wt 118 lb (53.5 kg)   SpO2 96%   BMI 21.58 kg/m²    Constitutional and General Appearance: alert, cooperative, no distress and appears stated age  HEENT: PERRL, no cervical lymphadenopathy. No masses palpable. Normal oral mucosa  Respiratory:  · Normal excursion and expansion without use of accessory muscles  · Resp Auscultation: Good respiratory effort. No for increased work of breathing.  On auscultation: clear to auscultation bilaterally  Cardiovascular:  · The apical impulse is not displaced  · Heart tones are crisp and normal. regular S1 and S2.  · Jugular venous pulsation Normal  · The carotid upstroke is normal in amplitude and contour without delay or bruit  · Peripheral pulses are symmetrical and full   Abdomen:   · No masses or tenderness  · Bowel sounds present  Extremities:  ·  No Cyanosis or Clubbing  ·  Lower extremity edema: No  · Skin: Warm and dry  Neurological:  · Alert and oriented. · Moves all extremities well  · No abnormalities of mood, affect, memory, mentation, or behavior are noted    DATA:    Diagnostics:    EKG: normal EKG, normal sinus rhythm, there are no previous tracings available for comparison. ECHO: previously taken 06/16 and show LVEF>55%, Grade II diastolic dysfunction and small pericardial effusion without any tamponade physiology. Ejection fraction: more than 55%  Stress Test: ordered, but not yet obtained. Cardiac Angiography: not obtained. Labs:     CBC:   Recent Labs      11/09/17   1628  11/10/17   0545   WBC  7.7  7.3   HGB  12.2  11.7*   HCT  37.1  37.7   PLT  133*  124*     BMP:   Recent Labs      11/09/17   1628  11/10/17   0545   NA  141  137   K  5.1  4.9   CO2  21  19*   BUN  27*  27*   CREATININE  1.43*  1.26*   LABGLOM  35*  41*   GLUCOSE  103*  92     BNP: No results for input(s): BNP in the last 72 hours. PT/INR:   Recent Labs      11/09/17   1628  11/10/17   0545   PROTIME  25.3*  24.9*   INR  2.3*  2.3     APTT:No results for input(s): APTT in the last 72 hours.   CARDIAC ENZYMES:  Recent Labs      11/09/17   1403   CKMB  10.1*     FASTING LIPID PANEL:  Lab Results   Component Value Date    HDL 63 11/10/2017    TRIG 101 11/10/2017     LIVER PROFILE:  Recent Labs      11/09/17   1403  11/10/17   0545   AST  30  27   ALT  21  19   LABALBU  4.1  3.7       IMPRESSION:    Patient Active Problem List   Diagnosis    Nuclear sclerotic cataract of left eye    Polymyalgia rheumatica ([facility] [address]    Low back pain / Compression Fracture Related    HTN (hypertension)    Compression fracture of lumbar vertebra (HCC) / prednisone related to PMR    Acute lower GI bleeding    CKD (chronic kidney disease) stage 3, GFR 30-59 ml/min    Mass of hepatic flexure of colon    UTI (urinary tract infection)    Adenocarcinoma (HCC)    Rectal bleeding    Anemia    Chronic deep vein thrombosis (DVT) of lower extremity ([facility] [address]    Failure to thrive in adult    Status post right hemicolectomy    Severe protein-calorie malnutrition Chrissy Cartagena: less than 60% of standard weight) ([facility] [address]    Long term current use of anticoagulant therapy    Pulmonary embolism (HCC)    Acute deep vein thrombosis (DVT) of femoral vein of left lower extremity (HCC)    Essential hypertension    Mid back pain on left side / compression fracture related    Malignant neoplasm of hepatic flexure (HCC)       RECOMMENDATIONS:  1. NSTEMI  - pt reports acute chest pain with positive cardiac injury profile  - EKG did not demonstrate any acute changes   - pt on ASA and coumadin, however coumadin was previously held due to colonoscopy appointment, latest INR 2.3   - stress test scheduled on 11/15/2017, Creatinine baseline around 1.2-1.4  - continue monitor and optimize medical therapy   2. Hypertension   - pt on coreg 25mg and lisinopril 40mg  - start on norvasc 5mg     3. HFpEF, Grade II dysfunction  - maintain positive fluid balance   - optimize medical therapy     Discussed with patient and Nurse.     Electronically signed by Maria Victoria Graham on 11/10/2017 at 10:00 [address] [place] [address] [facility]      [phone]

## 2017-11-10 NOTE — PROGRESS NOTES
Physical Therapy    Facility/Department: [facility]  Initial Assessment    NAME: Lin Villanueva  : 1935  MRN: [id number]    Date of Service: 11/10/2017  Lin Villanueva is a 80 y.o. female who presents To the emergency room with complaint of chest pain, patient states onset of midepigastric pain approximately one week prior, states yesterday while at the store she was having some back pain but attributed to her known issues at her T8 T9 T11 disc issues, states she was having some chest discomfort afterwards no shortness of breath and felt somewhat warm, had gone out to the car sit for a while felt better and went back in however again began to feel unwell. Patient followed up with her primary care today who ordered some outpatient labs after EKG and chest x-ray, outpatient labs showed an elevated troponin and CK-MB, patient was contacted or the emergency room for further evaluation. Patient describes the pain in her chest is primarily pressure-like, radiating an 8 out of 10, often worse with exertion though states it goes away with some rest.  Denies nausea denies diaphoresis denies trauma falls denies fever chills. Patient denies any specific cardiac history, does not have a cardiologist.  Patient Diagnosis(es): There were no encounter diagnoses. has a past medical history of Abnormal EMG; Cancer ([address] Chronic kidney disease; Colon polyp; Compression fracture of lumbar vertebra (HCC) / prednisone related to PMR; Diarrhea; DVT (deep venous thrombosis) ([address] Emphysema; HTN (hypertension); Hyperlipidemia; Irritable bowel syndrome; Lumbago; Osteoarthritis; Polymyalgia rheumatica ([address] Rheumatism; and Seborrheic keratoses. has a past surgical history that includes Cataract removal with implant; Glaucoma surgery; Nasal septum surgery; Dental surgery; Appendectomy (); Skin cancer excision; Hysterectomy (); Tonsillectomy (); Colonoscopy ([id number]);  Colonoscopy (2016); and colectomy (Right,

## 2017-11-11 ENCOUNTER — APPOINTMENT (OUTPATIENT)
Dept: NUCLEAR MEDICINE | Age: 82
DRG: 305 | End: 2017-11-11
Attending: INTERNAL MEDICINE
Payer: MEDICARE

## 2017-11-11 LAB
ANION GAP SERPL CALCULATED.3IONS-SCNC: 12 MMOL/L (ref 9–17)
BUN BLDV-MCNC: 32 MG/DL (ref 8–23)
BUN/CREAT BLD: ABNORMAL (ref 9–20)
CALCIUM SERPL-MCNC: 8.5 MG/DL (ref 8.6–10.4)
CHLORIDE BLD-SCNC: 111 MMOL/L (ref 98–107)
CO2: 18 MMOL/L (ref 20–31)
CREAT SERPL-MCNC: 1.42 MG/DL (ref 0.5–0.9)
GFR AFRICAN AMERICAN: 43 ML/MIN
GFR NON-AFRICAN AMERICAN: 35 ML/MIN
GFR SERPL CREATININE-BSD FRML MDRD: ABNORMAL ML/MIN/{1.73_M2}
GFR SERPL CREATININE-BSD FRML MDRD: ABNORMAL ML/MIN/{1.73_M2}
GLUCOSE BLD-MCNC: 95 MG/DL (ref 70–99)
HCT VFR BLD CALC: 36.6 % (ref 36.3–47.1)
HEMOGLOBIN: 11.4 G/DL (ref 11.9–15.1)
INR BLD: 2.3
LV EF: 65 %
LVEF MODALITY: NORMAL
MAGNESIUM: 2 MG/DL (ref 1.6–2.6)
MCH RBC QN AUTO: 30.7 PG (ref 25.2–33.5)
MCHC RBC AUTO-ENTMCNC: 31.1 G/DL (ref 28.4–34.8)
MCV RBC AUTO: 98.7 FL (ref 82.6–102.9)
PDW BLD-RTO: 13.4 % (ref 11.8–14.4)
PLATELET # BLD: 133 K/UL (ref 138–453)
PMV BLD AUTO: 12.4 FL (ref 8.1–13.5)
POTASSIUM SERPL-SCNC: 5.1 MMOL/L (ref 3.7–5.3)
PROTHROMBIN TIME: 25.1 SEC (ref 9.4–12.6)
RBC # BLD: 3.71 M/UL (ref 3.95–5.11)
SODIUM BLD-SCNC: 141 MMOL/L (ref 135–144)
WBC # BLD: 6.3 K/UL (ref 3.5–11.3)

## 2017-11-11 PROCEDURE — 6370000000 HC RX 637 (ALT 250 FOR IP): Performed by: INTERNAL MEDICINE

## 2017-11-11 PROCEDURE — 83735 ASSAY OF MAGNESIUM: CPT

## 2017-11-11 PROCEDURE — 99232 SBSQ HOSP IP/OBS MODERATE 35: CPT | Performed by: INTERNAL MEDICINE

## 2017-11-11 PROCEDURE — 6360000002 HC RX W HCPCS: Performed by: INTERNAL MEDICINE

## 2017-11-11 PROCEDURE — 2580000003 HC RX 258: Performed by: INTERNAL MEDICINE

## 2017-11-11 PROCEDURE — 3430000000 HC RX DIAGNOSTIC RADIOPHARMACEUTICAL: Performed by: INTERNAL MEDICINE

## 2017-11-11 PROCEDURE — 1200000000 HC SEMI PRIVATE

## 2017-11-11 PROCEDURE — 85610 PROTHROMBIN TIME: CPT

## 2017-11-11 PROCEDURE — A9500 TC99M SESTAMIBI: HCPCS | Performed by: INTERNAL MEDICINE

## 2017-11-11 PROCEDURE — 80048 BASIC METABOLIC PNL TOTAL CA: CPT

## 2017-11-11 PROCEDURE — 36415 COLL VENOUS BLD VENIPUNCTURE: CPT

## 2017-11-11 PROCEDURE — 78452 HT MUSCLE IMAGE SPECT MULT: CPT

## 2017-11-11 PROCEDURE — 85027 COMPLETE CBC AUTOMATED: CPT

## 2017-11-11 PROCEDURE — 93017 CV STRESS TEST TRACING ONLY: CPT | Performed by: NURSE PRACTITIONER

## 2017-11-11 RX ORDER — IBUPROFEN 200 MG
1250 CAPSULE ORAL DAILY
Status: DISCONTINUED | OUTPATIENT
Start: 2017-11-11 | End: 2017-11-12 | Stop reason: HOSPADM

## 2017-11-11 RX ORDER — AMINOPHYLLINE DIHYDRATE 25 MG/ML
100 INJECTION, SOLUTION INTRAVENOUS
Status: COMPLETED | OUTPATIENT
Start: 2017-11-11 | End: 2017-11-11

## 2017-11-11 RX ORDER — METOPROLOL TARTRATE 5 MG/5ML
2.5 INJECTION INTRAVENOUS PRN
Status: DISCONTINUED | OUTPATIENT
Start: 2017-11-11 | End: 2017-11-11

## 2017-11-11 RX ORDER — SODIUM CHLORIDE 0.9 % (FLUSH) 0.9 %
10 SYRINGE (ML) INJECTION PRN
Status: DISCONTINUED | OUTPATIENT
Start: 2017-11-11 | End: 2017-11-12 | Stop reason: HOSPADM

## 2017-11-11 RX ORDER — NITROGLYCERIN 0.4 MG/1
0.4 TABLET SUBLINGUAL EVERY 5 MIN PRN
Status: DISCONTINUED | OUTPATIENT
Start: 2017-11-11 | End: 2017-11-11

## 2017-11-11 RX ORDER — SODIUM CHLORIDE 0.9 % (FLUSH) 0.9 %
10 SYRINGE (ML) INJECTION PRN
Status: DISCONTINUED | OUTPATIENT
Start: 2017-11-11 | End: 2017-11-11

## 2017-11-11 RX ORDER — SODIUM CHLORIDE 9 MG/ML
INJECTION, SOLUTION INTRAVENOUS ONCE
Status: COMPLETED | OUTPATIENT
Start: 2017-11-11 | End: 2017-11-11

## 2017-11-11 RX ADMIN — Medication 10 ML: at 13:11

## 2017-11-11 RX ADMIN — TETRAKIS(2-METHOXYISOBUTYLISOCYANIDE)COPPER(I) TETRAFLUOROBORATE 14 MILLICURIE: 1 INJECTION, POWDER, LYOPHILIZED, FOR SOLUTION INTRAVENOUS at 07:45

## 2017-11-11 RX ADMIN — WARFARIN SODIUM 2.5 MG: 2.5 TABLET ORAL at 18:41

## 2017-11-11 RX ADMIN — Medication 500 MG: at 18:47

## 2017-11-11 RX ADMIN — SODIUM CHLORIDE, PRESERVATIVE FREE 10 ML: 5 INJECTION INTRAVENOUS at 11:13

## 2017-11-11 RX ADMIN — CARVEDILOL 25 MG: 25 TABLET, FILM COATED ORAL at 13:06

## 2017-11-11 RX ADMIN — REGADENOSON 0.4 MG: 0.08 INJECTION, SOLUTION INTRAVENOUS at 11:11

## 2017-11-11 RX ADMIN — SODIUM CHLORIDE: 9 INJECTION, SOLUTION INTRAVENOUS at 10:44

## 2017-11-11 RX ADMIN — LISINOPRIL 40 MG: 20 TABLET ORAL at 13:08

## 2017-11-11 RX ADMIN — ASPIRIN 325 MG: 325 TABLET, COATED ORAL at 13:08

## 2017-11-11 RX ADMIN — SODIUM CHLORIDE, PRESERVATIVE FREE 10 ML: 5 INJECTION INTRAVENOUS at 07:45

## 2017-11-11 RX ADMIN — Medication 1000 MCG: at 13:06

## 2017-11-11 RX ADMIN — Medication 10 ML: at 10:44

## 2017-11-11 RX ADMIN — TETRAKIS(2-METHOXYISOBUTYLISOCYANIDE)COPPER(I) TETRAFLUOROBORATE 30.5 MILLICURIE: 1 INJECTION, POWDER, LYOPHILIZED, FOR SOLUTION INTRAVENOUS at 11:13

## 2017-11-11 RX ADMIN — ATORVASTATIN CALCIUM 80 MG: 80 TABLET, FILM COATED ORAL at 18:41

## 2017-11-11 RX ADMIN — Medication 10 ML: at 21:05

## 2017-11-11 RX ADMIN — CARBOXYMETHYLCELLULOSE SODIUM 1 DROP: 10 GEL OPHTHALMIC at 21:04

## 2017-11-11 RX ADMIN — AMINOPHYLLINE 50 MG: 25 INJECTION, SOLUTION INTRAVENOUS at 11:14

## 2017-11-11 RX ADMIN — AMLODIPINE BESYLATE 5 MG: 5 TABLET ORAL at 13:08

## 2017-11-11 ASSESSMENT — PAIN SCALES - GENERAL: PAINLEVEL_OUTOF10: 4

## 2017-11-11 ASSESSMENT — PAIN DESCRIPTION - LOCATION: LOCATION: BACK

## 2017-11-11 ASSESSMENT — PAIN DESCRIPTION - PAIN TYPE: TYPE: CHRONIC PAIN

## 2017-11-11 ASSESSMENT — PAIN DESCRIPTION - DESCRIPTORS: DESCRIPTORS: SORE

## 2017-11-11 NOTE — PROGRESS NOTES
[facility]   Progress Note                   Date:   11/11/2017  Patient name: Aylin Ballesteros  Date of admission:  11/9/2017  9:18 PM  MRN:   [id number]  YOB: 1935  PCP: Katie Kwon MD    Reason for Admission:      Subjective:       No acute events overnight and BP is well controlled  No chest pain       Medications:   Scheduled Meds:   atorvastatin  80 mg Oral QPM    calcium carbonate  500 mg Oral Daily    carvedilol  25 mg Oral BID    lisinopril  40 mg Oral Daily    vitamin B-12  1,000 mcg Oral Daily    warfarin (COUMADIN) daily dosing (placeholder)   Other RX Placeholder    warfarin  5 mg Oral Once per day on Mon Wed Fri    warfarin  2.5 mg Oral Once per day on Sun Tue Thu Sat    amLODIPine  5 mg Oral Daily    carboxymethylcellulose PF  1 drop Right Eye TID    aspirin  325 mg Oral Daily    docusate sodium  100 mg Oral BID    sodium chloride flush  10 mL Intravenous 2 times per day    sodium chloride flush  10 mL Intravenous 2 times per day       Continuous Infusions:   sodium chloride         CBC:   Recent Labs      11/09/17   1628  11/10/17   0545  11/11/17   0709   WBC  7.7  7.3  6.3   HGB  12.2  11.7*  11.4*   PLT  133*  124*  133*     BMP:    Recent Labs      11/09/17   1628  11/10/17   0545  11/11/17   0709   NA  141  137  141   K  5.1  4.9  5.1   CL  106  103  111*   CO2  21  19*  18*   BUN  27*  27*  32*   CREATININE  1.43*  1.26*  1.42*   GLUCOSE  103*  92  95     Hepatic:   Recent Labs      11/09/17   1403  11/10/17   0545   AST  30  27   ALT  21  19   BILITOT  0.47  0.56   ALKPHOS  81  72     Troponin: No results for input(s): TROPONINI in the last 72 hours. BNP: No results for input(s): BNP in the last 72 hours.   Lipids:   Recent Labs      11/10/17   0545   CHOL  120   HDL  63     INR:   Recent Labs      11/09/17   1628  11/10/17   0545  11/11/17   0709   INR  2.3*  2.3  2.3       Objective:   Vitals: /81   Pulse 78   Temp 98.2 °F (36.8 °C) (Oral)   Resp 14   Ht 5' 2\" (1.575 m)   Wt 115 lb 9.6 oz (52.4 kg)   SpO2 94%   BMI 21.14 kg/m²     General appearance: awake, alert, in no apparent respiratory distress   HEENT: Head: Normocephalic, no lesions, without obvious abnormality  Neck: no JVD  Lungs: clear to auscultation bilaterally, no basilar rales, no wheezing   Heart: regular rate and rhythm, S1, S2 normal, no murmur, click, rub or gallop  Abdomen: soft, non-tender; bowel sounds normal  Extremities: No LE edema  Neurologic: Mental status: Alert, oriented. Motor and sensory not done. EKG: Normal sinus rhythm          Assessment / Acute Cardiac Problems:       Patient Active Problem List:     Nuclear sclerotic cataract of left eye     Polymyalgia rheumatica (HCC)     Low back pain / Compression Fracture Related     HTN (hypertension)     Compression fracture of lumbar vertebra (HCC) / prednisone related to PMR     Acute lower GI bleeding     CKD (chronic kidney disease) stage 3, GFR 30-59 ml/min     Mass of hepatic flexure of colon     UTI (urinary tract infection)     Adenocarcinoma (HCC)     Rectal bleeding     Anemia     Chronic deep vein thrombosis (DVT) of lower extremity (HCC)     Failure to thrive in adult     Status post right hemicolectomy     Severe protein-calorie malnutrition Josphine Carissa: less than 60% of standard weight) ([address]     Long term current use of anticoagulant therapy     Pulmonary embolism (HCC)     Acute deep vein thrombosis (DVT) of femoral vein of left lower extremity (HCC)     Essential hypertension     Mid back pain on left side / compression fracture related     Malignant neoplasm of hepatic flexure (HCC)     NSTEMI (non-ST elevated myocardial infarction) ([address]     Hypertensive urgency    1. Atypical chest pain   2. HTN urgency   3. CKD stage 3  4. Hx of DVT     Plan of Treatment:   1. F/U stress test and 2D ECHO   2. INR is 2   3. BP control   4. Continue ASA, BB, ACE       Discussed with patient and nursing. Nikky Anand MD PGY [address] [facility]   Pager - [phone]  Attending Physician Statement  I have discussed the care of the patient, including pertinent history and exam findings, with the resident. I have seen and examined the patient and the key elements of all parts of the encounter have been performed by me. I agree with the assessment, plan and orders as documented by the resident. Awaiting for  Stress test result . If negative then DC   Follow up in 2-3 wks  DR. Margarita Connolly

## 2017-11-11 NOTE — PLAN OF CARE
Problem: Falls - Risk of  Goal: Absence of falls  Outcome: Ongoing      Problem: Musculor/Skeletal Functional Status  Goal: Highest potential functional level  Outcome: Ongoing    Goal: Absence of falls  Outcome: Ongoing      Problem: Pain:  Goal: Pain level will decrease  Pain level will decrease   Outcome: Ongoing    Goal: Control of acute pain  Control of acute pain   Outcome: Ongoing    Goal: Control of chronic pain  Control of chronic pain   Outcome: Ongoing

## 2017-11-11 NOTE — PLAN OF CARE
[facility]   [address]    Second Visit Note  For more detailed information please refer to the progress note of the day      11/11/2017    6:29 PM    Name:   Bessy Ya  MRN:     [id number]     [place]:      [de-identified]   Room:   [facility]3027-01   Day:  2  Admit Date:  11/9/2017  9:18 PM    PCP:   Marilee Hackett MD  Code Status:  Full Code        Pt vitals were reviewed   New labs were reviewed   Patient was seen    Updated plan :     1. Patient's BP variable, from 190s SBP to 160s, then early 100s. 2. Monitor overnight as BP meds may need to be adjusted further. 3. Likely dc tomorrow.          Alan Lopez MD  11/11/2017  6:29 PM

## 2017-11-11 NOTE — PROGRESS NOTES
Emphysema; HTN (hypertension); Hyperlipidemia; Irritable bowel syndrome; Lumbago; Osteoarthritis; Polymyalgia rheumatica ([address] Rheumatism; and Seborrheic keratoses. Social History:   reports that she quit smoking about 36 years ago. She has a 30.00 pack-year smoking history. She has never used smokeless tobacco. She reports that she does not drink alcohol or use drugs. Family History:   Family History   Problem Relation Age of Onset    Colon Cancer Father     Heart Disease Brother     High Blood Pressure Brother     Stroke Brother        Vitals:  /81   Pulse 78   Temp 98.2 °F (36.8 °C) (Oral)   Resp 14   Ht 5' 2\" (1.575 m)   Wt 115 lb 9.6 oz (52.4 kg)   SpO2 94%   BMI 21.14 kg/m²   Temp (24hrs), Av °F (36.7 °C), Min:97.5 °F (36.4 °C), Max:98.2 °F (36.8 °C)    No results for input(s): POCGLU in the last 72 hours. I/O (24Hr):     Intake/Output Summary (Last 24 hours) at 17 0733  Last data filed at 17 0505   Gross per 24 hour   Intake              240 ml   Output              800 ml   Net             -560 ml       Labs:    Hematology:  Recent Labs      17   1403  17   1628  11/10/17   0545  17   0709   WBC  8.4  7.7  7.3  6.3   RBC  4.02  3.99*  3.86*  3.71*   HGB  12.1  12.2  11.7*  11.4*   HCT  38.0  37.1  37.7  36.6   MCV  94.6  92.9  97.7  98.7   MCH  30.0  30.5  30.3  30.7   MCHC  31.8  32.8  31.0  31.1   RDW  14.6  14.3  13.6  13.4   PLT  151  133*  124*  133*   MPV  11.1  10.8  12.7  12.4   CRP  0.4   --    --    --    INR   --   2.3*  2.3  2.3     Chemistry:  Recent Labs      17   1403  17   1628  17   2203  11/10/17   0147  11/10/17   0545  11/10/17   0919   NA  142  141   --    --   137   --    K  5.1  5.1   --    --   4.9   --    CL  106  106   --    --   103   --    CO2  23  21   --    --   19*   --    GLUCOSE  102*  103*   --    --   92   --    BUN  26*  27*   --    --   27*   --    CREATININE  1.43*  1.43*   --    --   1.26* nondistended, normal bowel sounds, no masses, hepatomegaly, splenomegaly  Extremities:  no edema, redness, tenderness in the calves  Skin:  no gross lesions, rashes, induration    Assessment:        Primary Problem  NSTEMI (non-ST elevated myocardial infarction) [facility])    Active Hospital Problems    Diagnosis Date Noted    NSTEMI (non-ST elevated myocardial infarction) ([facility] [address] [I21.4]     Hypertensive urgency [I16.0]     CKD (chronic kidney disease) stage 3, GFR 30-59 ml/min [N18.3] 05/26/2016    HTN (hypertension) [I10] 05/18/2015       Plan:        Atypical Chest Pain. H/O HTN. Heart Score 6, DEEPTI Score 3. Received ASA 81 mg at OSH, as well as Heparin IV 5000 units once. On Coumadin therapy for h/o DVT, but on hold in anticipation of colonoscopy on Monday. Currently on heparin gtt as patient is supposed to be on therapeutic anticoagulation (lovenox) due to her h/o DVT. PEr cardiology, stress test today-- negative. Discharge today with follow up in 2-3 weeks.      Hypertensive Urgency, resolved. Amlodipine added by cardiology yesterday. Lisinopril increased to 40 mg QD. Cont coreg at 25 BID.     CKD Stage 3, Baseline creat 1.3-1.4. Currently normal-- 1.26. Avoid nephrotoxins. May need fluid/Mucomyst before any dye-loading procedures.      Pain Mx/Nausea Mx     DVT PPx. Will need therapeutic lovenox for tomorrow until Monday-- day of colonoscopy.      Judy Godinez MD  11/11/2017  7:33 AM

## 2017-11-12 VITALS
RESPIRATION RATE: 18 BRPM | BODY MASS INDEX: 23.61 KG/M2 | OXYGEN SATURATION: 99 % | DIASTOLIC BLOOD PRESSURE: 62 MMHG | WEIGHT: 128.3 LBS | HEIGHT: 62 IN | SYSTOLIC BLOOD PRESSURE: 142 MMHG | HEART RATE: 65 BPM | TEMPERATURE: 97.9 F

## 2017-11-12 LAB
ANION GAP SERPL CALCULATED.3IONS-SCNC: 12 MMOL/L (ref 9–17)
BUN BLDV-MCNC: 41 MG/DL (ref 8–23)
BUN/CREAT BLD: ABNORMAL (ref 9–20)
CALCIUM SERPL-MCNC: 8.6 MG/DL (ref 8.6–10.4)
CHLORIDE BLD-SCNC: 104 MMOL/L (ref 98–107)
CO2: 21 MMOL/L (ref 20–31)
CREAT SERPL-MCNC: 1.64 MG/DL (ref 0.5–0.9)
GFR AFRICAN AMERICAN: 36 ML/MIN
GFR NON-AFRICAN AMERICAN: 30 ML/MIN
GFR SERPL CREATININE-BSD FRML MDRD: ABNORMAL ML/MIN/{1.73_M2}
GFR SERPL CREATININE-BSD FRML MDRD: ABNORMAL ML/MIN/{1.73_M2}
GLUCOSE BLD-MCNC: 100 MG/DL (ref 70–99)
HCT VFR BLD CALC: 37.3 % (ref 36.3–47.1)
HEMOGLOBIN: 11.6 G/DL (ref 11.9–15.1)
INR BLD: 2.5
MCH RBC QN AUTO: 30.3 PG (ref 25.2–33.5)
MCHC RBC AUTO-ENTMCNC: 31.1 G/DL (ref 28.4–34.8)
MCV RBC AUTO: 97.4 FL (ref 82.6–102.9)
PDW BLD-RTO: 13.3 % (ref 11.8–14.4)
PLATELET # BLD: 132 K/UL (ref 138–453)
PMV BLD AUTO: 12.1 FL (ref 8.1–13.5)
POTASSIUM SERPL-SCNC: 4.8 MMOL/L (ref 3.7–5.3)
PROTHROMBIN TIME: 27.1 SEC (ref 9.4–12.6)
RBC # BLD: 3.83 M/UL (ref 3.95–5.11)
SODIUM BLD-SCNC: 137 MMOL/L (ref 135–144)
WBC # BLD: 7.5 K/UL (ref 3.5–11.3)

## 2017-11-12 PROCEDURE — 85027 COMPLETE CBC AUTOMATED: CPT

## 2017-11-12 PROCEDURE — 80048 BASIC METABOLIC PNL TOTAL CA: CPT

## 2017-11-12 PROCEDURE — 2580000003 HC RX 258: Performed by: INTERNAL MEDICINE

## 2017-11-12 PROCEDURE — 6370000000 HC RX 637 (ALT 250 FOR IP): Performed by: INTERNAL MEDICINE

## 2017-11-12 PROCEDURE — 36415 COLL VENOUS BLD VENIPUNCTURE: CPT

## 2017-11-12 PROCEDURE — 85610 PROTHROMBIN TIME: CPT

## 2017-11-12 PROCEDURE — 99232 SBSQ HOSP IP/OBS MODERATE 35: CPT | Performed by: INTERNAL MEDICINE

## 2017-11-12 RX ORDER — LOPERAMIDE HYDROCHLORIDE 2 MG/1
2 CAPSULE ORAL 3 TIMES DAILY PRN
Status: DISCONTINUED | OUTPATIENT
Start: 2017-11-12 | End: 2017-11-12 | Stop reason: SDUPTHER

## 2017-11-12 RX ORDER — NITROGLYCERIN 0.4 MG/1
TABLET SUBLINGUAL
Qty: 25 TABLET | Refills: 3 | Status: SHIPPED | OUTPATIENT
Start: 2017-11-12 | End: 2017-11-17

## 2017-11-12 RX ORDER — LISINOPRIL 20 MG/1
20 TABLET ORAL DAILY
Status: DISCONTINUED | OUTPATIENT
Start: 2017-11-12 | End: 2017-11-12 | Stop reason: HOSPADM

## 2017-11-12 RX ORDER — LISINOPRIL 20 MG/1
20 TABLET ORAL DAILY
Qty: 30 TABLET | Refills: 3 | Status: SHIPPED | OUTPATIENT
Start: 2017-11-13 | End: 2017-12-14 | Stop reason: SDUPTHER

## 2017-11-12 RX ORDER — AMLODIPINE BESYLATE 5 MG/1
5 TABLET ORAL DAILY
Qty: 30 TABLET | Refills: 3 | Status: SHIPPED | OUTPATIENT
Start: 2017-11-13 | End: 2018-03-27 | Stop reason: SDUPTHER

## 2017-11-12 RX ADMIN — CALCIUM CARBONATE 1250 MG: 1250 TABLET ORAL at 08:22

## 2017-11-12 RX ADMIN — Medication 10 ML: at 08:28

## 2017-11-12 RX ADMIN — LOPERAMIDE HYDROCHLORIDE 2 MG: 2 CAPSULE ORAL at 08:23

## 2017-11-12 RX ADMIN — Medication 1000 MCG: at 08:22

## 2017-11-12 RX ADMIN — AMLODIPINE BESYLATE 5 MG: 5 TABLET ORAL at 08:27

## 2017-11-12 RX ADMIN — CARBOXYMETHYLCELLULOSE SODIUM 1 DROP: 10 GEL OPHTHALMIC at 08:24

## 2017-11-12 RX ADMIN — ASPIRIN 325 MG: 325 TABLET, COATED ORAL at 08:24

## 2017-11-12 RX ADMIN — CARVEDILOL 25 MG: 25 TABLET, FILM COATED ORAL at 08:27

## 2017-11-12 RX ADMIN — LISINOPRIL 20 MG: 20 TABLET ORAL at 08:27

## 2017-11-12 NOTE — PLAN OF CARE
Problem: Falls - Risk of  Goal: Absence of falls  Outcome: Ongoing      Problem: Musculor/Skeletal Functional Status  Goal: Highest potential functional level  Outcome: Ongoing      Problem: Pain:  Goal: Pain level will decrease  Pain level will decrease   Outcome: Ongoing    Goal: Control of acute pain  Control of acute pain   Outcome: Ongoing    Goal: Control of chronic pain  Control of chronic pain   Outcome: Ongoing

## 2017-11-12 NOTE — PROGRESS NOTES
Continue BB, ACE and ASA  4.Okay to dc after BP control as per cardiology and follow up outpatient in 2 week sin the clinic      Discussed with patient and nursing. Ruth Parrish MD [facility]   Pager - [phone]  Attending Physician Statement  I have discussed the care of the patient, including pertinent history and exam findings, with the resident. I have seen and examined the patient and the key elements of all parts of the encounter have been performed by me. I agree with the assessment, plan and orders as documented by the resident. Follow up in the office 2-3 wks   DR. Margarita Connolly

## 2017-11-12 NOTE — PROGRESS NOTES
at 180s/90s. Heart Score 6, DEEPTI score 3.      She was given baby ASA, Heparin IV 5000 Units, and transferred here for further care. Review of Systems:     Constitutional:  negative for chills, fevers, sweats  Respiratory:  negative for cough, dyspnea on exertion, hemoptysis, shortness of breath, wheezing  Cardiovascular:  negative for chest pain, chest pressure/discomfort, lower extremity edema, palpitations  Gastrointestinal:  negative for abdominal pain, constipation, diarrhea, nausea, vomiting  Neurological:  negative for dizziness, headache    Medications: Allergies: Allergies   Allergen Reactions    Budesonide      UNKNOWN REACTION    Hydrochlorothiazide      UNKNOWN REACTION       Current Meds:   Scheduled Meds:    lisinopril  20 mg Oral Daily    calcium carbonate  1,250 mg Oral Daily    atorvastatin  80 mg Oral QPM    carvedilol  25 mg Oral BID    vitamin B-12  1,000 mcg Oral Daily    warfarin (COUMADIN) daily dosing (placeholder)   Other RX Placeholder    warfarin  5 mg Oral Once per day on Mon Wed Fri    warfarin  2.5 mg Oral Once per day on Sun Tue Thu Sat    amLODIPine  5 mg Oral Daily    carboxymethylcellulose PF  1 drop Right Eye TID    aspirin  325 mg Oral Daily    docusate sodium  100 mg Oral BID    sodium chloride flush  10 mL Intravenous 2 times per day    sodium chloride flush  10 mL Intravenous 2 times per day     Continuous Infusions:    PRN Meds: sodium chloride flush, sodium chloride flush, loperamide, nitroGLYCERIN, potassium chloride **OR** potassium chloride **OR** potassium chloride, acetaminophen, HYDROcodone 5 mg - acetaminophen, magnesium hydroxide, morphine **OR** morphine, ondansetron, sodium chloride flush, magnesium sulfate, nicotine, sodium chloride flush, metoprolol    Data:     Past Medical History:   has a past medical history of Abnormal EMG; Cancer ([facility] [address] Chronic kidney disease; Colon polyp;  Compression fracture of lumbar vertebra (HCC) / prednisone related to PMR; Diarrhea; DVT (deep venous thrombosis) ([facility] [address] Emphysema; HTN (hypertension); Hyperlipidemia; Irritable bowel syndrome; Lumbago; Osteoarthritis; Polymyalgia rheumatica ([facility] [address] Rheumatism; and Seborrheic keratoses. Social History:   reports that she quit smoking about 36 years ago. She has a 30.00 pack-year smoking history. She has never used smokeless tobacco. She reports that she does not drink alcohol or use drugs. Family History:   Family History   Problem Relation Age of Onset    Colon Cancer Father     Heart Disease Brother     High Blood Pressure Brother     Stroke Brother        Vitals:  BP (!) 142/62   Pulse 66   Temp 97.9 °F (36.6 °C) (Oral)   Resp 18   Ht 5' 2\" (1.575 m)   Wt 128 lb 4.8 oz (58.2 kg)   SpO2 99%   BMI 23.47 kg/m²   Temp (24hrs), Av.7 °F (36.5 °C), Min:97.1 °F (36.2 °C), Max:98.1 °F (36.7 °C)    No results for input(s): POCGLU in the last 72 hours. I/O (24Hr):     Intake/Output Summary (Last 24 hours) at 17 0755  Last data filed at 17 1838   Gross per 24 hour   Intake              500 ml   Output              200 ml   Net              300 ml       Labs:    Hematology:  Recent Labs      17   1403  17   1628  11/10/17   0545  17   0709  17   0730   WBC  8.4  7.7  7.3  6.3  7.5   RBC  4.02  3.99*  3.86*  3.71*  3.83*   HGB  12.1  12.2  11.7*  11.4*  11.6*   HCT  38.0  37.1  37.7  36.6  37.3   MCV  94.6  92.9  97.7  98.7  97.4   MCH  30.0  30.5  30.3  30.7  30.3   MCHC  31.8  32.8  31.0  31.1  31.1   RDW  14.6  14.3  13.6  13.4  13.3   PLT  151  133*  124*  133*  132*   MPV  11.1  10.8  12.7  12.4  12.1   CRP  0.4   --    --    --    --    INR   --   2.3*  2.3  2.3   --      Chemistry:  Recent Labs      17   1403  17   1628  17   2203  11/10/17   0147  11/10/17   0545  11/10/17   0919  17   0709   NA  142  141   --    --   137   --   141   K  5.1  5.1   --    --   4.9   --   5.1   CL  106  106   -- --   103   --   111*   CO2  23  21   --    --   19*   --   18*   GLUCOSE  102*  103*   --    --   92   --   95   BUN  26*  27*   --    --   27*   --   32*   CREATININE  1.43*  1.43*   --    --   1.26*   --   1.42*   MG   --    --    --    --    --    --   2.0   ANIONGAP  13  14   --    --   15   --   12   LABGLOM  35*  35*   --    --   41*   --   35*   GFRAA  43*  43*   --    --   49*   --   43*   CALCIUM  9.3  9.1   --    --   8.9   --   8.5*   PROBNP   --   966*  888*   --    --    --    --    TROPONINT  0.07*  0.08*  0.06*  0.06*  0.05*  0.05*   --    CKMB  10.1*   --    --    --    --    --    --      Recent Labs      11/09/17   1403  11/10/17   0545   PROT  7.1  6.6   LABALBU  4.1  3.7   AST  30  27   ALT  21  19   ALKPHOS  81  72   BILITOT  0.47  0.56   CHOL   --   120   HDL   --   63   LDLCHOLESTEROL   --   37   CHOLHDLRATIO   --   1.9   TRIG   --   101   VLDL   --   NOT REPORTED         Lab Results   Component Value Date/Time    SPECIAL  UNSPECIFIED 06/22/2016 06:55 PM     Lab Results   Component Value Date/Time    CULTURE NO GROWTH 2 DAYS 06/22/2016 06:55 PM    CULTURE  06/22/2016 06:55 PM     Performed at [facility], [address] [place] [address]    CULTURE  ([phone] 06/22/2016 06:55 PM       No results found for: POCPH, PHART, PH, POCPCO2, KBU3NDT, PCO2, POCPO2, PO2ART, PO2, POCHCO3, KBV2FSG, HCO3, NBEA, PBEA, BEART, BE, THGBART, THB, NUF2DHG, NUSV7AUD, U8RAHZGM, O2SAT, FIO2    Radiology:    EXAM:  XR CHEST STANDARD TWO VW  Impression Impression:  No acute cardiopulmonary process seen. No change. Increased tortuosity of the descending thoracic aorta. Progressive compression deformity at T9 and stable compression deformity of T11.      EXAM:  XR THORACIC SPINE STANDARD  Impression Impression:  Worsening of compression deformities at T11 and T9 compared to 05/27/2016. Near vertebral plana morphology. Resulting exaggerated thoracic kyphosis. Consider follow-up MRI as warranted.

## 2017-11-13 ENCOUNTER — ANTI-COAG VISIT (OUTPATIENT)
Dept: PHARMACY | Age: 82
End: 2017-11-13

## 2017-11-13 DIAGNOSIS — Z79.01 LONG TERM CURRENT USE OF ANTICOAGULANT THERAPY: ICD-10-CM

## 2017-11-16 ENCOUNTER — HOSPITAL ENCOUNTER (OUTPATIENT)
Dept: NON INVASIVE DIAGNOSTICS | Age: 82
Discharge: HOME | End: 2017-11-16

## 2017-11-20 NOTE — DISCHARGE SUMMARY
[facility]    Discharge Summary     Patient ID: Imelda Bustos  :  1935   MRN: [id number]     ACCOUNT:  [de-identified]   Patient's PCP: Sam Barron MD  Admit Date: 2017   Discharge Date: 17    Length of Stay: 3  Code Status:  Prior  Admitting Physician: Alexandra Gill MD  Discharge Physician: Alexandra Gill MD     Active Discharge Diagnoses:     Primary Problem  NSTEMI (non-ST elevated myocardial infarction) [facility])      [facility] Problems    Diagnosis Date Noted    NSTEMI (non-ST elevated myocardial infarction) ([facility] [I21.4]     Hypertensive urgency [I16.0]     CKD (chronic kidney disease) stage 3, GFR 30-59 ml/min [N18.3] 2016    HTN (hypertension) [I10] 2015       Admission Condition:  fair     Discharged Condition: good    Hospital Stay:     Hospital Course: Imelda Bustos is a 80 y.o. female who was admitted for the management of   NSTEMI (non-ST elevated myocardial infarction) ([facility] , presented to ER with chest pain. The patient is a 80 y.o.  Non-/non  female who presents with chest pain and she is admitted to the hospital for the management of  NSTEMI.      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      Of note, The patient is on chronic coumadin therapy for a hx of DVTs however, she reports not taking it for the past couple of days because she is scheduled to have a colonoscopy on 17.      She presented to [address] [place] [address] with back pain, exertional SOB, and chest discomfort, which was mid-sternal, non radiating, pressure like, 8/10 at its worst in intensity.  It started one week ago and initially she thought the pain may be related to her T8-T11 disc issues.      She went to her PCP's office and EKG/CXR and basic labs were ordered and she was 11/12/2017    K 4.8 11/12/2017     11/12/2017    CO2 21 11/12/2017    ANIONGAP 12 11/12/2017    BUN 41 11/12/2017    CREATININE 1.64 11/12/2017    BUNCRER NOT REPORTED 11/12/2017    CALCIUM 8.6 11/12/2017    LABGLOM 30 11/12/2017    GFRAA 36 11/12/2017    GFR      11/12/2017    GFR NOT REPORTED 11/12/2017     CMP:    Lab Results   Component Value Date    GLUCOSE 100 11/12/2017     11/12/2017    K 4.8 11/12/2017     11/12/2017    CO2 21 11/12/2017    BUN 41 11/12/2017    CREATININE 1.64 11/12/2017    ANIONGAP 12 11/12/2017    ALKPHOS 72 11/10/2017    ALT 19 11/10/2017    AST 27 11/10/2017    BILITOT 0.56 11/10/2017    LABALBU 3.7 11/10/2017    ALBUMIN 1.3 11/10/2017    LABGLOM 30 11/12/2017    GFRAA 36 11/12/2017    GFR      11/12/2017    GFR NOT REPORTED 11/12/2017    PROT 6.6 11/10/2017    CALCIUM 8.6 11/12/2017     PT/INR:  No results found for: PTINR  U/A:    Lab Results   Component Value Date    COLORU YELLOW 06/22/2016    TURBIDITY CLEAR 06/22/2016    SPECGRAV 1.010 06/22/2016    HGBUR TRACE 06/22/2016    PHUR 7.0 06/22/2016    PROTEINU NEGATIVE 06/22/2016    GLUCOSEU NEGATIVE 06/22/2016    KETUA NEGATIVE 06/22/2016    BILIRUBINUR NEGATIVE 06/22/2016    UROBILINOGEN Normal 06/22/2016    NITRU NEGATIVE 06/22/2016    LEUKOCYTESUR TRACE 06/22/2016     TSH:    Lab Results   Component Value Date    TSH 0.74 11/12/2013       Radiology:    EXAM:  XR CHEST STANDARD TWO VW  Impression Impression:  No acute cardiopulmonary process seen. No change. Increased tortuosity of the descending thoracic aorta. Progressive compression deformity at T9 and stable compression deformity of T11.      EXAM:  XR THORACIC SPINE STANDARD  Impression Impression:  Worsening of compression deformities at T11 and T9 compared to 05/27/2016. Near vertebral plana morphology. Resulting exaggerated thoracic kyphosis.  Consider follow-up MRI as warranted.      Nm Myocardial Spect Rest Exercise Or Rx    Result Date: 11/11/2017  EXAMINATION: MYOCARDIAL PERFUSION IMAGING 11/11/2017 1:39 pm     1. No discrete perfusion abnormality to suggest myocardial ischemia/infarction. 2. No wall motion abnormality. Calculated ejection fraction of 65%. Consultations:    Consults:     Final Specialist Recommendations/Findings:   IP CONSULT TO CARDIOLOGY  PHARMACY TO DOSE WARFARIN      The patient was seen and examined on day of discharge and this discharge summary is in conjunction with any daily progress note from day of discharge. Discharge plan:     Disposition: Home    Physician Follow Up:     Florian Garcia MD  [place] [id number], [facility] [address]  [phone]    Schedule an appointment as soon as possible for a visit in 1 week         Requiring Further Evaluation/Follow Up POST HOSPITALIZATION/Incidental Findings: F/U with PCP, GI as planned    Diet: cardiac diet    Activity: As tolerated    Instructions to Patient: F/U with PCP and GI as planned    Discharge Medications:      Medication List      START taking these medications    amLODIPine 5 MG tablet  Commonly known as:  NORVASC  Take 1 tablet by mouth daily  Notes to patient:  Check blood pressure twice a day minimum         CHANGE how you take these medications    lisinopril 20 MG tablet  Commonly known as:  PRINIVIL;ZESTRIL  Take 1 tablet by mouth daily  What changed:  how much to take        CONTINUE taking these medications    Acetaminophen 650 MG Tabs  Take 650 mg by mouth every 4 hours as needed     atorvastatin 80 MG tablet  Commonly known as:  LIPITOR  Take 1 tablet by mouth every evening     calcium carbonate 500 MG Tabs tablet  Commonly known as:  OSCAL     carboxymethylcellulose 1 % ophthalmic solution     carvedilol 25 MG tablet  Commonly known as:  COREG  Take 1 tablet by mouth 2 times daily     CENTRUM SILVER PO     gabapentin 100 MG capsule  Commonly known as:  NEURONTIN     Handicap Placard Misc  by Does not apply route Expires in 5 yrs.

## 2017-11-28 ENCOUNTER — HOSPITAL ENCOUNTER (OUTPATIENT)
Dept: PHARMACY | Age: 82
Setting detail: THERAPIES SERIES
Discharge: HOME | End: 2017-11-28
Payer: MEDICARE

## 2017-11-28 VITALS
DIASTOLIC BLOOD PRESSURE: 80 MMHG | WEIGHT: 121 LBS | HEART RATE: 73 BPM | BODY MASS INDEX: 22.13 KG/M2 | SYSTOLIC BLOOD PRESSURE: 127 MMHG

## 2017-11-28 DIAGNOSIS — Z79.01 LONG TERM CURRENT USE OF ANTICOAGULANT THERAPY: ICD-10-CM

## 2017-11-28 LAB — INR BLD: 2.8

## 2017-11-28 PROCEDURE — 85610 PROTHROMBIN TIME: CPT

## 2017-11-28 PROCEDURE — 99211 OFF/OP EST MAY X REQ PHY/QHP: CPT

## 2017-12-08 ENCOUNTER — ANTI-COAG VISIT (OUTPATIENT)
Dept: PHARMACY | Age: 82
End: 2017-12-08

## 2017-12-08 DIAGNOSIS — I26.99 OTHER PULMONARY EMBOLISM WITHOUT ACUTE COR PULMONALE, UNSPECIFIED CHRONICITY (HCC): ICD-10-CM

## 2017-12-08 DIAGNOSIS — Z79.01 LONG TERM CURRENT USE OF ANTICOAGULANT THERAPY: ICD-10-CM

## 2017-12-08 PROBLEM — Z85.038 HISTORY OF COLON CANCER: Status: ACTIVE | Noted: 2017-12-08

## 2017-12-09 ENCOUNTER — HOSPITAL ENCOUNTER (OUTPATIENT)
Dept: INFUSION THERAPY | Age: 82
Discharge: HOME | End: 2017-12-09
Payer: MEDICARE

## 2017-12-09 VITALS
DIASTOLIC BLOOD PRESSURE: 88 MMHG | SYSTOLIC BLOOD PRESSURE: 136 MMHG | RESPIRATION RATE: 16 BRPM | HEART RATE: 73 BPM | TEMPERATURE: 97.8 F | OXYGEN SATURATION: 97 %

## 2017-12-09 DIAGNOSIS — I26.99 OTHER PULMONARY EMBOLISM WITHOUT ACUTE COR PULMONALE, UNSPECIFIED CHRONICITY (HCC): ICD-10-CM

## 2017-12-09 DIAGNOSIS — I82.412 ACUTE DEEP VEIN THROMBOSIS (DVT) OF FEMORAL VEIN OF LEFT LOWER EXTREMITY (HCC): ICD-10-CM

## 2017-12-09 DIAGNOSIS — Z79.01 LONG TERM CURRENT USE OF ANTICOAGULANT THERAPY: ICD-10-CM

## 2017-12-09 PROCEDURE — 96372 THER/PROPH/DIAG INJ SC/IM: CPT

## 2017-12-09 PROCEDURE — 6360000002 HC RX W HCPCS: Performed by: INTERNAL MEDICINE

## 2017-12-09 RX ADMIN — ENOXAPARIN SODIUM 60 MG: 60 INJECTION SUBCUTANEOUS at 11:10

## 2017-12-10 ENCOUNTER — HOSPITAL ENCOUNTER (OUTPATIENT)
Dept: INFUSION THERAPY | Age: 82
Discharge: HOME | End: 2017-12-10
Payer: MEDICARE

## 2017-12-10 VITALS
DIASTOLIC BLOOD PRESSURE: 73 MMHG | SYSTOLIC BLOOD PRESSURE: 121 MMHG | RESPIRATION RATE: 16 BRPM | OXYGEN SATURATION: 97 % | HEART RATE: 78 BPM | TEMPERATURE: 98.6 F

## 2017-12-10 DIAGNOSIS — I26.99 OTHER PULMONARY EMBOLISM WITHOUT ACUTE COR PULMONALE, UNSPECIFIED CHRONICITY (HCC): ICD-10-CM

## 2017-12-10 DIAGNOSIS — Z79.01 LONG TERM CURRENT USE OF ANTICOAGULANT THERAPY: ICD-10-CM

## 2017-12-10 DIAGNOSIS — I82.412 ACUTE DEEP VEIN THROMBOSIS (DVT) OF FEMORAL VEIN OF LEFT LOWER EXTREMITY (HCC): ICD-10-CM

## 2017-12-10 PROCEDURE — 96372 THER/PROPH/DIAG INJ SC/IM: CPT

## 2017-12-10 PROCEDURE — 6360000002 HC RX W HCPCS: Performed by: INTERNAL MEDICINE

## 2017-12-10 RX ADMIN — ENOXAPARIN SODIUM 60 MG: 60 INJECTION SUBCUTANEOUS at 10:59

## 2017-12-11 ENCOUNTER — HOSPITAL ENCOUNTER (OUTPATIENT)
Dept: INFUSION THERAPY | Age: 82
Discharge: HOME | End: 2017-12-11
Payer: MEDICARE

## 2017-12-11 VITALS
SYSTOLIC BLOOD PRESSURE: 141 MMHG | DIASTOLIC BLOOD PRESSURE: 83 MMHG | HEART RATE: 72 BPM | RESPIRATION RATE: 20 BRPM | TEMPERATURE: 97 F

## 2017-12-11 DIAGNOSIS — I82.412 ACUTE DEEP VEIN THROMBOSIS (DVT) OF FEMORAL VEIN OF LEFT LOWER EXTREMITY (HCC): ICD-10-CM

## 2017-12-11 DIAGNOSIS — I26.99 OTHER PULMONARY EMBOLISM WITHOUT ACUTE COR PULMONALE, UNSPECIFIED CHRONICITY (HCC): ICD-10-CM

## 2017-12-11 DIAGNOSIS — Z79.01 LONG TERM CURRENT USE OF ANTICOAGULANT THERAPY: ICD-10-CM

## 2017-12-11 PROCEDURE — 96372 THER/PROPH/DIAG INJ SC/IM: CPT

## 2017-12-11 PROCEDURE — 6360000002 HC RX W HCPCS: Performed by: INTERNAL MEDICINE

## 2017-12-11 RX ADMIN — ENOXAPARIN SODIUM 60 MG: 60 INJECTION SUBCUTANEOUS at 10:57

## 2017-12-12 ENCOUNTER — HOSPITAL ENCOUNTER (OUTPATIENT)
Dept: INFUSION THERAPY | Age: 82
Discharge: HOME | End: 2017-12-12
Payer: MEDICARE

## 2017-12-12 ENCOUNTER — ANESTHESIA EVENT (OUTPATIENT)
Dept: OPERATING ROOM | Age: 82
End: 2017-12-12
Payer: MEDICARE

## 2017-12-12 VITALS
TEMPERATURE: 96.2 F | HEART RATE: 76 BPM | RESPIRATION RATE: 20 BRPM | DIASTOLIC BLOOD PRESSURE: 69 MMHG | SYSTOLIC BLOOD PRESSURE: 114 MMHG

## 2017-12-12 DIAGNOSIS — Z79.01 LONG TERM CURRENT USE OF ANTICOAGULANT THERAPY: ICD-10-CM

## 2017-12-12 DIAGNOSIS — I26.99 OTHER PULMONARY EMBOLISM WITHOUT ACUTE COR PULMONALE, UNSPECIFIED CHRONICITY (HCC): ICD-10-CM

## 2017-12-12 DIAGNOSIS — I82.412 ACUTE DEEP VEIN THROMBOSIS (DVT) OF FEMORAL VEIN OF LEFT LOWER EXTREMITY (HCC): ICD-10-CM

## 2017-12-12 PROCEDURE — 6360000002 HC RX W HCPCS: Performed by: INTERNAL MEDICINE

## 2017-12-12 PROCEDURE — 96372 THER/PROPH/DIAG INJ SC/IM: CPT

## 2017-12-12 RX ADMIN — ENOXAPARIN SODIUM 60 MG: 60 INJECTION SUBCUTANEOUS at 10:53

## 2017-12-13 ENCOUNTER — ANESTHESIA (OUTPATIENT)
Dept: OPERATING ROOM | Age: 82
End: 2017-12-13
Payer: MEDICARE

## 2017-12-13 ENCOUNTER — HOSPITAL ENCOUNTER (OUTPATIENT)
Age: 82
Setting detail: OUTPATIENT SURGERY
Discharge: HOME | End: 2017-12-13
Attending: INTERNAL MEDICINE | Admitting: INTERNAL MEDICINE
Payer: MEDICARE

## 2017-12-13 VITALS
HEIGHT: 64 IN | BODY MASS INDEX: 19.29 KG/M2 | TEMPERATURE: 97.6 F | RESPIRATION RATE: 16 BRPM | HEART RATE: 77 BPM | DIASTOLIC BLOOD PRESSURE: 73 MMHG | SYSTOLIC BLOOD PRESSURE: 151 MMHG | WEIGHT: 113 LBS | OXYGEN SATURATION: 94 %

## 2017-12-13 VITALS
RESPIRATION RATE: 6 BRPM | DIASTOLIC BLOOD PRESSURE: 44 MMHG | SYSTOLIC BLOOD PRESSURE: 87 MMHG | OXYGEN SATURATION: 97 %

## 2017-12-13 PROCEDURE — 3700000001 HC ADD 15 MINUTES (ANESTHESIA): Performed by: INTERNAL MEDICINE

## 2017-12-13 PROCEDURE — 7100000011 HC PHASE II RECOVERY - ADDTL 15 MIN: Performed by: INTERNAL MEDICINE

## 2017-12-13 PROCEDURE — 3700000000 HC ANESTHESIA ATTENDED CARE: Performed by: INTERNAL MEDICINE

## 2017-12-13 PROCEDURE — 2500000003 HC RX 250 WO HCPCS: Performed by: NURSE ANESTHETIST, CERTIFIED REGISTERED

## 2017-12-13 PROCEDURE — C1773 RET DEV, INSERTABLE: HCPCS | Performed by: INTERNAL MEDICINE

## 2017-12-13 PROCEDURE — 3609010600 HC COLONOSCOPY POLYPECTOMY SNARE/COLD BIOPSY: Performed by: INTERNAL MEDICINE

## 2017-12-13 PROCEDURE — 6360000002 HC RX W HCPCS: Performed by: NURSE ANESTHETIST, CERTIFIED REGISTERED

## 2017-12-13 PROCEDURE — 45385 COLONOSCOPY W/LESION REMOVAL: CPT | Performed by: INTERNAL MEDICINE

## 2017-12-13 PROCEDURE — 7100000010 HC PHASE II RECOVERY - FIRST 15 MIN: Performed by: INTERNAL MEDICINE

## 2017-12-13 PROCEDURE — 88305 TISSUE EXAM BY PATHOLOGIST: CPT

## 2017-12-13 PROCEDURE — 2580000003 HC RX 258: Performed by: INTERNAL MEDICINE

## 2017-12-13 RX ORDER — FENTANYL CITRATE 50 UG/ML
INJECTION, SOLUTION INTRAMUSCULAR; INTRAVENOUS PRN
Status: DISCONTINUED | OUTPATIENT
Start: 2017-12-13 | End: 2017-12-13 | Stop reason: SDUPTHER

## 2017-12-13 RX ORDER — SODIUM CHLORIDE, SODIUM LACTATE, POTASSIUM CHLORIDE, CALCIUM CHLORIDE 600; 310; 30; 20 MG/100ML; MG/100ML; MG/100ML; MG/100ML
INJECTION, SOLUTION INTRAVENOUS CONTINUOUS
Status: DISCONTINUED | OUTPATIENT
Start: 2017-12-13 | End: 2017-12-13 | Stop reason: HOSPADM

## 2017-12-13 RX ORDER — PROPOFOL 10 MG/ML
INJECTION, EMULSION INTRAVENOUS CONTINUOUS PRN
Status: DISCONTINUED | OUTPATIENT
Start: 2017-12-13 | End: 2017-12-13 | Stop reason: SDUPTHER

## 2017-12-13 RX ORDER — PROPOFOL 10 MG/ML
INJECTION, EMULSION INTRAVENOUS PRN
Status: DISCONTINUED | OUTPATIENT
Start: 2017-12-13 | End: 2017-12-13 | Stop reason: SDUPTHER

## 2017-12-13 RX ORDER — LIDOCAINE HYDROCHLORIDE 20 MG/ML
INJECTION, SOLUTION INFILTRATION; PERINEURAL PRN
Status: DISCONTINUED | OUTPATIENT
Start: 2017-12-13 | End: 2017-12-13 | Stop reason: SDUPTHER

## 2017-12-13 RX ADMIN — FENTANYL CITRATE 50 MCG: 50 INJECTION INTRAMUSCULAR; INTRAVENOUS at 11:15

## 2017-12-13 RX ADMIN — LIDOCAINE HYDROCHLORIDE 100 MG: 20 INJECTION, SOLUTION INFILTRATION; PERINEURAL at 11:10

## 2017-12-13 RX ADMIN — PROPOFOL 120 MCG/KG/MIN: 10 INJECTION, EMULSION INTRAVENOUS at 11:10

## 2017-12-13 RX ADMIN — SODIUM CHLORIDE, POTASSIUM CHLORIDE, SODIUM LACTATE AND CALCIUM CHLORIDE: 600; 310; 30; 20 INJECTION, SOLUTION INTRAVENOUS at 10:43

## 2017-12-13 RX ADMIN — PROPOFOL 100 MG: 10 INJECTION, EMULSION INTRAVENOUS at 11:10

## 2017-12-13 RX ADMIN — FENTANYL CITRATE 25 MCG: 50 INJECTION INTRAMUSCULAR; INTRAVENOUS at 11:20

## 2017-12-13 ASSESSMENT — PAIN SCALES - GENERAL
PAINLEVEL_OUTOF10: 0
PAINLEVEL_OUTOF10: 0

## 2017-12-13 ASSESSMENT — PAIN - FUNCTIONAL ASSESSMENT: PAIN_FUNCTIONAL_ASSESSMENT: 0-10

## 2017-12-13 ASSESSMENT — COPD QUESTIONNAIRES: CAT_SEVERITY: MILD

## 2017-12-13 NOTE — ANESTHESIA POSTPROCEDURE EVALUATION
Department of Anesthesiology  Postprocedure Note    Patient: Bessy Ya  MRN: [id number]  [place]: 1935  Date of evaluation: 12/13/2017  Time:  12:51 PM     Procedure Summary     Date:  12/13/17 Room / [facility] ENDO 02 [facility]    Anesthesia Start:  9221 Anesthesia Stop:  0896    Procedure:  COLONOSCOPY POLYPECTOMY SNARE/COLD BIOPSY (N/A ) Diagnosis:  (PERSONAL HISTORY OF COLON CANCER)    Surgeon:  Goldie Bowman MD Responsible Provider:  Claiborne Aschoff, CRNA    Anesthesia Type:  general ASA Status:  3          Anesthesia Type: general    Moon Phase I:      Moon Phase II: Moon Score: 10    Last vitals: Reviewed and per EMR flowsheets.        Anesthesia Post Evaluation    Patient location during evaluation: PACU  Patient participation: complete - patient participated  Level of consciousness: awake and alert  Pain score: 0  Airway patency: patent  Nausea & Vomiting: no nausea and no vomiting  Complications: no  Cardiovascular status: blood pressure returned to baseline  Respiratory status: acceptable  Hydration status: euvolemic

## 2017-12-13 NOTE — ANESTHESIA PRE PROCEDURE
Facility-Administered Medications   Medication Dose Route Frequency Provider Last Rate Last Dose    lactated ringers infusion   Intravenous Continuous Hansel Caputo  mL/hr at 12/13/17 1043         Allergies:     Allergies   Allergen Reactions    Budesonide      UNKNOWN REACTION    Hydrochlorothiazide      UNKNOWN REACTION       Problem List:    Patient Active Problem List   Diagnosis Code    Nuclear sclerotic cataract of left eye H25.12    Polymyalgia rheumatica (HCC) M35.3    Low back pain / Compression Fracture Related M54.5    HTN (hypertension) I10    Compression fracture of lumbar vertebra (HCC) / prednisone related to PMR S32.000A    Acute lower GI bleeding K92.2    CKD (chronic kidney disease) stage 3, GFR 30-59 ml/min N18.3    Mass of hepatic flexure of colon K63.9    UTI (urinary tract infection) N39.0    Adenocarcinoma (HCC) C80.1    Rectal bleeding K62.5    Anemia D64.9    Chronic deep vein thrombosis (DVT) of lower extremity (HCC) I82.509    Failure to thrive in adult R62.7    Status post right hemicolectomy Z90.49    Severe protein-calorie malnutrition ([place]: less than 60% of standard weight) ([facility] [address] E43    Long term current use of anticoagulant therapy Z79.01    Pulmonary embolism (HCC) I26.99    Acute deep vein thrombosis (DVT) of femoral vein of left lower extremity (HCC) I82.412    Essential hypertension I10    Mid back pain on left side / compression fracture related M54.9    Malignant neoplasm of hepatic flexure (HCC) C18.3    NSTEMI (non-ST elevated myocardial infarction) ([facility] [address] I21.4    Hypertensive urgency I16.0    History of colon cancer Z85.038       Past Medical History:        Diagnosis Date    Abnormal EMG 2016    Right Carpal Tunnel & C5 Radiculopathy    Cancer (HCC)     colon    Chronic kidney disease     Colon polyp 2006, 2009    Compression fracture of lumbar vertebra (HCC) / prednisone related to PMR 7/15/2015    Diarrhea     DVT (deep venous thrombosis) ([facility] [address] 2015    Emphysema     HTN (hypertension)     Hyperlipidemia     Irritable bowel syndrome     Lumbago     Osteoarthritis     Polymyalgia rheumatica (HCC) 2015    Rheumatism     Seborrheic keratoses     on back       Past Surgical History:        Procedure Laterality Date    APPENDECTOMY  1949    CATARACT REMOVAL WITH IMPLANT      COLECTOMY Right 06/20/2016    open right hemicolectomy bowel resection    COLONOSCOPY  [id number]    Dr. Roman Friedman COLONOSCOPY  5/25/2016    -bx mass hepatic flexure,tattoo    DENTAL SURGERY      Implants    GLAUCOMA SURGERY      preventative surg    HYSTERECTOMY  1973    NASAL SEPTUM SURGERY      Deriated Septum Surgery    SKIN CANCER EXCISION      Basal cell on back and leg    TONSILLECTOMY  1941       Social History:    Social History   Substance Use Topics    Smoking status: Former Smoker     Packs/day: 1.00     Years: 30.00     Quit date: 2/17/1981    Smokeless tobacco: Never Used    Alcohol use No      Comment: rarely                                Counseling given: Not Answered      Vital Signs (Current):   Vitals:    12/13/17 1035   BP: (!) 129/90   Pulse: 91   Resp: 18   Temp: 37.3 °C (99.2 °F)   TempSrc: Temporal   SpO2: 95%   Weight: 113 lb (51.3 kg)   Height: 5' 4\" (1.626 m)                                              BP Readings from Last 3 Encounters:   12/13/17 (!) 129/90   12/12/17 114/69   12/11/17 (!) 141/83       NPO Status: Time of last liquid consumption: 0430                        Time of last solid consumption: 0700                        Date of last liquid consumption: 12/13/17                        Date of last solid food consumption: 12/12/17    BMI:   Wt Readings from Last 3 Encounters:   12/13/17 113 lb (51.3 kg)   11/28/17 121 lb (54.9 kg)   11/17/17 122 lb (55.3 kg)     Body mass index is 19.4 kg/m².     CBC:   Lab Results   Component Value Date    WBC 7.5 11/12/2017    RBC 3.83 11/12/2017    HGB 11.6 11/12/2017

## 2017-12-13 NOTE — H&P
Brayan Durbin, CRNA, 25 mcg at 12/13/17 1120       -- Budesonide     --  UNKNOWN REACTION   -- Hydrochlorothiazide     --  UNKNOWN REACTION  Review of patient's family history indicates:    Colon Cancer                   Father                    Heart Disease                  Brother                   High Blood Pressure            Brother                   Stroke                         Brother                   Social History    Marital status:              Spouse name:                       Years of education:                 Number of children:               Occupational History    None on file    Social History Main Topics    Smoking status: Former Smoker                                                                Packs/day: 1.00      Years: 30.00          Quit date: 2/17/1981    Smokeless tobacco: Never Used                        Alcohol use: No                 Comment: rarely    Drug use: No              Sexual activity: Not on file          Other Topics            Concern    None on file    Social History Narrative    None on file      ROS: Non-contributory    Physical Exam:  ---------------------------               12/13/17                      1035         ---------------------------   BP:        (!) 129/90       Pulse:         91           Resp:          18           Temp:   99.2 °F (37.3 °C)   SpO2:          95%         ---------------------------    Chest: Breath sounds were clear and equal with no rales, wheezes, or rhonchi. Respiratory effort was normal with no retractions or use of accessory muscles. Cardiovascular: Heart sounds were normal with a regular rate and rhythm. There were no murmurs, gallops or rubs. Abdomen: Bowel sounds were normal.  The abdomen was soft and non distended. There was no tenderness, guarding, rebound, or rigidity. There were no masses, hepatosplenomegaly, or hernias.     Plan: Colonoscopy      Electronically by Karen Eason Sherlynn Brunner, MD  on 12/13/2017 at 11:41 AM

## 2017-12-14 ENCOUNTER — HOSPITAL ENCOUNTER (OUTPATIENT)
Dept: INFUSION THERAPY | Age: 82
Discharge: HOME | End: 2017-12-14
Payer: MEDICARE

## 2017-12-14 VITALS
SYSTOLIC BLOOD PRESSURE: 130 MMHG | DIASTOLIC BLOOD PRESSURE: 84 MMHG | RESPIRATION RATE: 20 BRPM | HEART RATE: 94 BPM | TEMPERATURE: 98.1 F

## 2017-12-14 DIAGNOSIS — Z79.01 LONG TERM CURRENT USE OF ANTICOAGULANT THERAPY: ICD-10-CM

## 2017-12-14 DIAGNOSIS — I82.412 ACUTE DEEP VEIN THROMBOSIS (DVT) OF FEMORAL VEIN OF LEFT LOWER EXTREMITY (HCC): ICD-10-CM

## 2017-12-14 DIAGNOSIS — I26.99 OTHER PULMONARY EMBOLISM WITHOUT ACUTE COR PULMONALE, UNSPECIFIED CHRONICITY (HCC): ICD-10-CM

## 2017-12-14 PROCEDURE — 6360000002 HC RX W HCPCS: Performed by: INTERNAL MEDICINE

## 2017-12-14 PROCEDURE — 96372 THER/PROPH/DIAG INJ SC/IM: CPT

## 2017-12-14 RX ADMIN — ENOXAPARIN SODIUM 60 MG: 60 INJECTION SUBCUTANEOUS at 11:06

## 2017-12-15 ENCOUNTER — HOSPITAL ENCOUNTER (OUTPATIENT)
Dept: INFUSION THERAPY | Age: 82
Discharge: HOME | End: 2017-12-15
Payer: MEDICARE

## 2017-12-15 VITALS
RESPIRATION RATE: 20 BRPM | SYSTOLIC BLOOD PRESSURE: 130 MMHG | TEMPERATURE: 98.2 F | HEART RATE: 79 BPM | DIASTOLIC BLOOD PRESSURE: 85 MMHG

## 2017-12-15 DIAGNOSIS — I82.412 ACUTE DEEP VEIN THROMBOSIS (DVT) OF FEMORAL VEIN OF LEFT LOWER EXTREMITY (HCC): ICD-10-CM

## 2017-12-15 DIAGNOSIS — I26.99 OTHER PULMONARY EMBOLISM WITHOUT ACUTE COR PULMONALE, UNSPECIFIED CHRONICITY (HCC): ICD-10-CM

## 2017-12-15 DIAGNOSIS — Z79.01 LONG TERM CURRENT USE OF ANTICOAGULANT THERAPY: ICD-10-CM

## 2017-12-15 LAB — SURGICAL PATHOLOGY REPORT: NORMAL

## 2017-12-15 PROCEDURE — 96372 THER/PROPH/DIAG INJ SC/IM: CPT

## 2017-12-15 PROCEDURE — 6360000002 HC RX W HCPCS: Performed by: INTERNAL MEDICINE

## 2017-12-15 RX ADMIN — ENOXAPARIN SODIUM 60 MG: 60 INJECTION SUBCUTANEOUS at 10:56

## 2017-12-16 ENCOUNTER — HOSPITAL ENCOUNTER (OUTPATIENT)
Dept: INFUSION THERAPY | Age: 82
Discharge: HOME | End: 2017-12-16
Payer: MEDICARE

## 2017-12-16 ENCOUNTER — TELEPHONE (OUTPATIENT)
Dept: GASTROENTEROLOGY | Age: 82
End: 2017-12-16

## 2017-12-16 VITALS
SYSTOLIC BLOOD PRESSURE: 128 MMHG | DIASTOLIC BLOOD PRESSURE: 65 MMHG | RESPIRATION RATE: 20 BRPM | HEART RATE: 74 BPM | TEMPERATURE: 99.5 F

## 2017-12-16 DIAGNOSIS — I26.99 OTHER PULMONARY EMBOLISM WITHOUT ACUTE COR PULMONALE, UNSPECIFIED CHRONICITY (HCC): ICD-10-CM

## 2017-12-16 DIAGNOSIS — Z79.01 LONG TERM CURRENT USE OF ANTICOAGULANT THERAPY: ICD-10-CM

## 2017-12-16 DIAGNOSIS — I82.412 ACUTE DEEP VEIN THROMBOSIS (DVT) OF FEMORAL VEIN OF LEFT LOWER EXTREMITY (HCC): ICD-10-CM

## 2017-12-16 PROCEDURE — 6360000002 HC RX W HCPCS: Performed by: INTERNAL MEDICINE

## 2017-12-16 PROCEDURE — 96372 THER/PROPH/DIAG INJ SC/IM: CPT

## 2017-12-16 RX ADMIN — ENOXAPARIN SODIUM 60 MG: 60 INJECTION SUBCUTANEOUS at 11:36

## 2017-12-16 NOTE — PLAN OF CARE
Here for O.P. Lovenox injection, \"no unusual bleeding/bruising\", denies fever/chills/pain. Lovenox given (L) arm. Tolerated. Pt. Discharged ambulatory without complaint.

## 2017-12-17 ENCOUNTER — HOSPITAL ENCOUNTER (OUTPATIENT)
Dept: INFUSION THERAPY | Age: 82
Discharge: HOME | End: 2017-12-17
Payer: MEDICARE

## 2017-12-17 VITALS
SYSTOLIC BLOOD PRESSURE: 121 MMHG | HEART RATE: 73 BPM | TEMPERATURE: 98.6 F | DIASTOLIC BLOOD PRESSURE: 76 MMHG | RESPIRATION RATE: 18 BRPM

## 2017-12-17 DIAGNOSIS — I26.99 OTHER PULMONARY EMBOLISM WITHOUT ACUTE COR PULMONALE, UNSPECIFIED CHRONICITY (HCC): ICD-10-CM

## 2017-12-17 DIAGNOSIS — Z79.01 LONG TERM CURRENT USE OF ANTICOAGULANT THERAPY: ICD-10-CM

## 2017-12-17 DIAGNOSIS — I82.412 ACUTE DEEP VEIN THROMBOSIS (DVT) OF FEMORAL VEIN OF LEFT LOWER EXTREMITY (HCC): ICD-10-CM

## 2017-12-17 PROCEDURE — 96372 THER/PROPH/DIAG INJ SC/IM: CPT

## 2017-12-17 PROCEDURE — 6360000002 HC RX W HCPCS: Performed by: INTERNAL MEDICINE

## 2017-12-17 RX ADMIN — ENOXAPARIN SODIUM 60 MG: 60 INJECTION SUBCUTANEOUS at 11:03

## 2017-12-17 ASSESSMENT — PAIN SCALES - GENERAL: PAINLEVEL_OUTOF10: 0

## 2017-12-17 NOTE — PLAN OF CARE
Pt. Here for O.P. Lovenox, denies unusual bleeding or bruising. Denies diff. Breathing or pain. Lovenox given (R) posterior arm, tolerated. Pt. Discharged ambulatory without complaint.

## 2017-12-18 ENCOUNTER — HOSPITAL ENCOUNTER (OUTPATIENT)
Dept: PHARMACY | Age: 82
Setting detail: THERAPIES SERIES
Discharge: HOME | End: 2017-12-18
Payer: MEDICARE

## 2017-12-18 ENCOUNTER — HOSPITAL ENCOUNTER (OUTPATIENT)
Dept: INFUSION THERAPY | Age: 82
Discharge: HOME | End: 2017-12-18
Payer: MEDICARE

## 2017-12-18 VITALS
WEIGHT: 122 LBS | DIASTOLIC BLOOD PRESSURE: 84 MMHG | HEART RATE: 75 BPM | SYSTOLIC BLOOD PRESSURE: 124 MMHG | BODY MASS INDEX: 20.94 KG/M2

## 2017-12-18 DIAGNOSIS — I82.412 ACUTE DEEP VEIN THROMBOSIS (DVT) OF FEMORAL VEIN OF LEFT LOWER EXTREMITY (HCC): ICD-10-CM

## 2017-12-18 DIAGNOSIS — Z79.01 LONG TERM CURRENT USE OF ANTICOAGULANT THERAPY: ICD-10-CM

## 2017-12-18 DIAGNOSIS — I26.99 OTHER PULMONARY EMBOLISM WITHOUT ACUTE COR PULMONALE, UNSPECIFIED CHRONICITY (HCC): ICD-10-CM

## 2017-12-18 LAB — INR BLD: 1.1

## 2017-12-18 PROCEDURE — 85610 PROTHROMBIN TIME: CPT

## 2017-12-18 PROCEDURE — 99211 OFF/OP EST MAY X REQ PHY/QHP: CPT

## 2017-12-18 PROCEDURE — 6360000002 HC RX W HCPCS: Performed by: INTERNAL MEDICINE

## 2017-12-18 PROCEDURE — 96372 THER/PROPH/DIAG INJ SC/IM: CPT

## 2017-12-18 RX ADMIN — ENOXAPARIN SODIUM 60 MG: 60 INJECTION SUBCUTANEOUS at 14:09

## 2017-12-18 NOTE — PLAN OF CARE
Problem: OTHER  Goal: Other  To give injection with no problems noted,and to notify the Doctor if have any problems with the injection.    Outcome: Completed Date Met: 12/18/17

## 2017-12-18 NOTE — PLAN OF CARE
Problem: KNOWLEDGE DEFICIT  Goal: Patient/S.O. demonstrates understanding of disease process, treatment plan, medications, and discharge instructions.   Outcome: Completed Date Met: 12/18/17

## 2017-12-18 NOTE — PROGRESS NOTES
Fingerstick INR drawn per clinic protocol. Patient states no visible blood in urine and no black tarry stool. Denies any missed doses of warfarin. No change in other maintenance medications or in diet. Patient had colonoscopy on 12/13/17 and held warfarin 5 days prior to procedure and has been bridging with Lovenox 60 mg SQ every 24 hours. Patient was confused about amount of warfarin  to resume after procedure and began taking previous doses (with no dose increases). Patient had been instructed per clinic to take 5 mg daily (12/13/17 thru 12/17/17). INR was 1.1 today. Patient agreed to Lovenox injection today (in outpatient dept), but refused any further injections after today. Patient instructed to take warfarin 5 mg daily for the next 3 days. Will recheck INR in 3 day(s).

## 2017-12-19 NOTE — TELEPHONE ENCOUNTER
Sandrita Pineda called back for results. I informed her that the polyps which were removed were benign. Current guidelines call for a repeat screening colonoscopy in 3 years; however, they also state that it is not necessary to pursue this in view of her age. Dr. Alfreda Jimenez recommends to follow up with her PCP and see him as needed. Voices understanding.

## 2017-12-21 ENCOUNTER — HOSPITAL ENCOUNTER (OUTPATIENT)
Dept: PHARMACY | Age: 82
Setting detail: THERAPIES SERIES
Discharge: HOME | End: 2017-12-21
Payer: MEDICARE

## 2017-12-21 VITALS
WEIGHT: 121.4 LBS | SYSTOLIC BLOOD PRESSURE: 127 MMHG | BODY MASS INDEX: 20.84 KG/M2 | DIASTOLIC BLOOD PRESSURE: 72 MMHG | HEART RATE: 82 BPM

## 2017-12-21 DIAGNOSIS — I26.99 OTHER PULMONARY EMBOLISM WITHOUT ACUTE COR PULMONALE, UNSPECIFIED CHRONICITY (HCC): ICD-10-CM

## 2017-12-21 DIAGNOSIS — Z79.01 LONG TERM CURRENT USE OF ANTICOAGULANT THERAPY: ICD-10-CM

## 2017-12-21 LAB — INR BLD: 2

## 2017-12-21 PROCEDURE — 99211 OFF/OP EST MAY X REQ PHY/QHP: CPT | Performed by: FAMILY MEDICINE

## 2017-12-21 PROCEDURE — 85610 PROTHROMBIN TIME: CPT | Performed by: FAMILY MEDICINE

## 2018-01-17 PROBLEM — G89.4 CHRONIC PAIN SYNDROME: Status: ACTIVE | Noted: 2018-01-17

## 2018-02-01 ENCOUNTER — HOSPITAL ENCOUNTER (OUTPATIENT)
Dept: PHARMACY | Age: 83
Setting detail: THERAPIES SERIES
Discharge: HOME | End: 2018-02-01
Payer: MEDICARE

## 2018-02-01 VITALS
DIASTOLIC BLOOD PRESSURE: 62 MMHG | BODY MASS INDEX: 20.77 KG/M2 | HEART RATE: 77 BPM | WEIGHT: 121 LBS | SYSTOLIC BLOOD PRESSURE: 116 MMHG

## 2018-02-01 DIAGNOSIS — Z79.01 LONG TERM CURRENT USE OF ANTICOAGULANT THERAPY: ICD-10-CM

## 2018-02-01 LAB — INR BLD: 2.5

## 2018-02-01 PROCEDURE — 99211 OFF/OP EST MAY X REQ PHY/QHP: CPT

## 2018-02-01 PROCEDURE — 85610 PROTHROMBIN TIME: CPT

## 2018-03-15 ENCOUNTER — HOSPITAL ENCOUNTER (OUTPATIENT)
Dept: PHARMACY | Age: 83
Setting detail: THERAPIES SERIES
Discharge: HOME | End: 2018-03-15
Payer: MEDICARE

## 2018-03-15 VITALS
BODY MASS INDEX: 21.11 KG/M2 | SYSTOLIC BLOOD PRESSURE: 123 MMHG | DIASTOLIC BLOOD PRESSURE: 82 MMHG | WEIGHT: 123 LBS | HEART RATE: 75 BPM

## 2018-03-15 DIAGNOSIS — Z79.01 LONG TERM CURRENT USE OF ANTICOAGULANT THERAPY: ICD-10-CM

## 2018-03-15 DIAGNOSIS — I26.99 OTHER PULMONARY EMBOLISM WITHOUT ACUTE COR PULMONALE, UNSPECIFIED CHRONICITY (HCC): ICD-10-CM

## 2018-03-15 LAB — INR BLD: 2.1

## 2018-03-15 PROCEDURE — 85610 PROTHROMBIN TIME: CPT

## 2018-03-15 PROCEDURE — 99211 OFF/OP EST MAY X REQ PHY/QHP: CPT

## 2018-03-15 RX ORDER — TRAMADOL HYDROCHLORIDE 50 MG/1
50 TABLET ORAL DAILY PRN
COMMUNITY
End: 2018-04-18

## 2018-04-13 ENCOUNTER — HOSPITAL ENCOUNTER (OUTPATIENT)
Age: 83
Discharge: HOME | End: 2018-04-13
Payer: MEDICARE

## 2018-04-13 DIAGNOSIS — C18.2 MALIGNANT NEOPLASM OF ASCENDING COLON (HCC): ICD-10-CM

## 2018-04-13 DIAGNOSIS — C80.1 ADENOCARCINOMA (HCC): ICD-10-CM

## 2018-04-13 LAB
ALBUMIN SERPL-MCNC: 3.6 G/DL (ref 3.5–5.2)
ALBUMIN/GLOBULIN RATIO: 1.2 (ref 1–2.5)
ALP BLD-CCNC: 89 U/L (ref 35–104)
ALT SERPL-CCNC: 22 U/L (ref 5–33)
ANION GAP SERPL CALCULATED.3IONS-SCNC: 10 MMOL/L (ref 9–17)
AST SERPL-CCNC: 27 U/L
BILIRUB SERPL-MCNC: 0.35 MG/DL (ref 0.3–1.2)
BUN BLDV-MCNC: 30 MG/DL (ref 8–23)
BUN/CREAT BLD: 19 (ref 9–20)
CALCIUM SERPL-MCNC: 9.2 MG/DL (ref 8.6–10.4)
CARCINOEMBRYONIC ANTIGEN: 3.3 NG/ML
CHLORIDE BLD-SCNC: 110 MMOL/L (ref 98–107)
CO2: 20 MMOL/L (ref 20–31)
CREAT SERPL-MCNC: 1.6 MG/DL (ref 0.5–0.9)
GFR AFRICAN AMERICAN: 37 ML/MIN
GFR NON-AFRICAN AMERICAN: 31 ML/MIN
GFR SERPL CREATININE-BSD FRML MDRD: ABNORMAL ML/MIN/{1.73_M2}
GFR SERPL CREATININE-BSD FRML MDRD: ABNORMAL ML/MIN/{1.73_M2}
GLUCOSE BLD-MCNC: 106 MG/DL (ref 70–99)
HCT VFR BLD CALC: 36.3 % (ref 36.3–47.1)
HEMOGLOBIN: 11.4 G/DL (ref 11.9–15.1)
MCH RBC QN AUTO: 31 PG (ref 25.2–33.5)
MCHC RBC AUTO-ENTMCNC: 31.4 G/DL (ref 28.4–34.8)
MCV RBC AUTO: 98.6 FL (ref 82.6–102.9)
NRBC AUTOMATED: 0 PER 100 WBC
PDW BLD-RTO: 13.5 % (ref 11.8–14.4)
PLATELET # BLD: 188 K/UL (ref 138–453)
PMV BLD AUTO: 11.7 FL (ref 8.1–13.5)
POTASSIUM SERPL-SCNC: 5.5 MMOL/L (ref 3.7–5.3)
RBC # BLD: 3.68 M/UL (ref 3.95–5.11)
SODIUM BLD-SCNC: 140 MMOL/L (ref 135–144)
TOTAL PROTEIN: 6.5 G/DL (ref 6.4–8.3)
WBC # BLD: 6.7 K/UL (ref 3.5–11.3)

## 2018-04-13 PROCEDURE — 85027 COMPLETE CBC AUTOMATED: CPT

## 2018-04-13 PROCEDURE — 80053 COMPREHEN METABOLIC PANEL: CPT

## 2018-04-13 PROCEDURE — 82378 CARCINOEMBRYONIC ANTIGEN: CPT

## 2018-04-13 PROCEDURE — 36415 COLL VENOUS BLD VENIPUNCTURE: CPT

## 2018-04-18 ENCOUNTER — OFFICE VISIT (OUTPATIENT)
Dept: ONCOLOGY | Age: 83
End: 2018-04-18
Payer: MEDICARE

## 2018-04-18 VITALS
RESPIRATION RATE: 20 BRPM | HEART RATE: 82 BPM | HEIGHT: 64 IN | DIASTOLIC BLOOD PRESSURE: 72 MMHG | WEIGHT: 122.2 LBS | TEMPERATURE: 99 F | BODY MASS INDEX: 20.86 KG/M2 | SYSTOLIC BLOOD PRESSURE: 118 MMHG

## 2018-04-18 DIAGNOSIS — N18.30 CKD (CHRONIC KIDNEY DISEASE) STAGE 3, GFR 30-59 ML/MIN (HCC): Chronic | ICD-10-CM

## 2018-04-18 DIAGNOSIS — C18.3 MALIGNANT NEOPLASM OF HEPATIC FLEXURE (HCC): Primary | ICD-10-CM

## 2018-04-18 PROCEDURE — 1090F PRES/ABSN URINE INCON ASSESS: CPT | Performed by: INTERNAL MEDICINE

## 2018-04-18 PROCEDURE — G8420 CALC BMI NORM PARAMETERS: HCPCS | Performed by: INTERNAL MEDICINE

## 2018-04-18 PROCEDURE — 99214 OFFICE O/P EST MOD 30 MIN: CPT | Performed by: INTERNAL MEDICINE

## 2018-04-18 PROCEDURE — G8598 ASA/ANTIPLAT THER USED: HCPCS | Performed by: INTERNAL MEDICINE

## 2018-04-18 PROCEDURE — G8427 DOCREV CUR MEDS BY ELIG CLIN: HCPCS | Performed by: INTERNAL MEDICINE

## 2018-04-18 PROCEDURE — 1123F ACP DISCUSS/DSCN MKR DOCD: CPT | Performed by: INTERNAL MEDICINE

## 2018-04-18 PROCEDURE — 1036F TOBACCO NON-USER: CPT | Performed by: INTERNAL MEDICINE

## 2018-04-18 PROCEDURE — G8400 PT W/DXA NO RESULTS DOC: HCPCS | Performed by: INTERNAL MEDICINE

## 2018-04-18 PROCEDURE — 4040F PNEUMOC VAC/ADMIN/RCVD: CPT | Performed by: INTERNAL MEDICINE

## 2018-04-26 ENCOUNTER — HOSPITAL ENCOUNTER (OUTPATIENT)
Dept: PHARMACY | Age: 83
Setting detail: THERAPIES SERIES
Discharge: HOME | End: 2018-04-26
Payer: MEDICARE

## 2018-04-26 VITALS
WEIGHT: 125.8 LBS | SYSTOLIC BLOOD PRESSURE: 127 MMHG | DIASTOLIC BLOOD PRESSURE: 71 MMHG | BODY MASS INDEX: 21.59 KG/M2 | HEART RATE: 78 BPM

## 2018-04-26 DIAGNOSIS — Z79.01 LONG TERM CURRENT USE OF ANTICOAGULANT THERAPY: ICD-10-CM

## 2018-04-26 DIAGNOSIS — I26.99 OTHER PULMONARY EMBOLISM WITHOUT ACUTE COR PULMONALE, UNSPECIFIED CHRONICITY (HCC): ICD-10-CM

## 2018-04-26 LAB — INR BLD: 2.4

## 2018-04-26 PROCEDURE — 85610 PROTHROMBIN TIME: CPT | Performed by: FAMILY MEDICINE

## 2018-04-26 PROCEDURE — 99211 OFF/OP EST MAY X REQ PHY/QHP: CPT | Performed by: FAMILY MEDICINE

## 2018-05-14 ENCOUNTER — HOSPITAL ENCOUNTER (EMERGENCY)
Age: 83
Discharge: HOME | End: 2018-05-14
Payer: MEDICARE

## 2018-05-14 ENCOUNTER — APPOINTMENT (OUTPATIENT)
Dept: VASCULAR LAB | Age: 83
End: 2018-05-14
Payer: MEDICARE

## 2018-05-14 VITALS
WEIGHT: 120 LBS | OXYGEN SATURATION: 99 % | HEART RATE: 81 BPM | TEMPERATURE: 96.8 F | BODY MASS INDEX: 20.6 KG/M2 | RESPIRATION RATE: 16 BRPM | DIASTOLIC BLOOD PRESSURE: 84 MMHG | SYSTOLIC BLOOD PRESSURE: 160 MMHG

## 2018-05-14 DIAGNOSIS — M71.21 SYNOVIAL CYST OF RIGHT POPLITEAL SPACE: ICD-10-CM

## 2018-05-14 DIAGNOSIS — M79.604 RIGHT LEG PAIN: Primary | ICD-10-CM

## 2018-05-14 PROCEDURE — 99284 EMERGENCY DEPT VISIT MOD MDM: CPT

## 2018-05-14 PROCEDURE — 93971 EXTREMITY STUDY: CPT

## 2018-05-14 ASSESSMENT — ENCOUNTER SYMPTOMS
ABDOMINAL PAIN: 0
WHEEZING: 0
COUGH: 0
EYE REDNESS: 0
EYE DISCHARGE: 0
CONSTIPATION: 0
BLOOD IN STOOL: 0
SORE THROAT: 0
BACK PAIN: 0
VOMITING: 0
RHINORRHEA: 0
NAUSEA: 0
DIARRHEA: 0
CHEST TIGHTNESS: 0
SHORTNESS OF BREATH: 0

## 2018-05-14 ASSESSMENT — PAIN SCALES - GENERAL: PAINLEVEL_OUTOF10: 9

## 2018-05-14 ASSESSMENT — PAIN DESCRIPTION - PAIN TYPE: TYPE: ACUTE PAIN

## 2018-05-14 ASSESSMENT — PAIN DESCRIPTION - DESCRIPTORS: DESCRIPTORS: ACHING

## 2018-05-14 ASSESSMENT — PAIN DESCRIPTION - LOCATION: LOCATION: LEG

## 2018-05-14 ASSESSMENT — PAIN DESCRIPTION - ORIENTATION: ORIENTATION: RIGHT

## 2018-06-05 ENCOUNTER — HOSPITAL ENCOUNTER (OUTPATIENT)
Dept: PHARMACY | Age: 83
Setting detail: THERAPIES SERIES
Discharge: HOME | End: 2018-06-05
Payer: MEDICARE

## 2018-06-05 VITALS
BODY MASS INDEX: 20.94 KG/M2 | SYSTOLIC BLOOD PRESSURE: 126 MMHG | HEART RATE: 74 BPM | DIASTOLIC BLOOD PRESSURE: 64 MMHG | WEIGHT: 122 LBS

## 2018-06-05 DIAGNOSIS — Z79.01 LONG TERM CURRENT USE OF ANTICOAGULANT THERAPY: ICD-10-CM

## 2018-06-05 LAB — INR BLD: 2.3

## 2018-06-05 PROCEDURE — 99211 OFF/OP EST MAY X REQ PHY/QHP: CPT

## 2018-06-05 PROCEDURE — 85610 PROTHROMBIN TIME: CPT

## 2018-07-13 ENCOUNTER — HOSPITAL ENCOUNTER (EMERGENCY)
Age: 83
Discharge: HOME | End: 2018-07-13
Payer: MEDICARE

## 2018-07-13 ENCOUNTER — APPOINTMENT (OUTPATIENT)
Dept: CT IMAGING | Age: 83
End: 2018-07-13
Payer: MEDICARE

## 2018-07-13 VITALS
RESPIRATION RATE: 18 BRPM | DIASTOLIC BLOOD PRESSURE: 97 MMHG | WEIGHT: 117 LBS | BODY MASS INDEX: 20.08 KG/M2 | OXYGEN SATURATION: 98 % | HEART RATE: 83 BPM | SYSTOLIC BLOOD PRESSURE: 168 MMHG | TEMPERATURE: 98.5 F

## 2018-07-13 DIAGNOSIS — E87.5 HYPERKALEMIA: ICD-10-CM

## 2018-07-13 DIAGNOSIS — R10.31 RIGHT LOWER QUADRANT ABDOMINAL PAIN: Primary | ICD-10-CM

## 2018-07-13 LAB
-: ABNORMAL
ABSOLUTE EOS #: 0.21 K/UL (ref 0–0.44)
ABSOLUTE IMMATURE GRANULOCYTE: <0.03 K/UL (ref 0–0.3)
ABSOLUTE LYMPH #: 1.23 K/UL (ref 1.1–3.7)
ABSOLUTE MONO #: 0.62 K/UL (ref 0.1–1.2)
ALBUMIN SERPL-MCNC: 3.5 G/DL (ref 3.5–5.2)
ALBUMIN/GLOBULIN RATIO: 1.1 (ref 1–2.5)
ALLEN TEST: ABNORMAL
ALP BLD-CCNC: 77 U/L (ref 35–104)
ALT SERPL-CCNC: 21 U/L (ref 5–33)
AMORPHOUS: ABNORMAL
ANION GAP SERPL CALCULATED.3IONS-SCNC: 10 MMOL/L (ref 9–17)
ANION GAP SERPL CALCULATED.3IONS-SCNC: 11 MMOL/L (ref 9–17)
ANION GAP SERPL CALCULATED.3IONS-SCNC: 12 MMOL/L (ref 9–17)
AST SERPL-CCNC: 29 U/L
BACTERIA: ABNORMAL
BASOPHILS # BLD: 1 % (ref 0–2)
BASOPHILS ABSOLUTE: 0.03 K/UL (ref 0–0.2)
BILIRUB SERPL-MCNC: 0.55 MG/DL (ref 0.3–1.2)
BILIRUBIN URINE: NEGATIVE
BUN BLDV-MCNC: 19 MG/DL (ref 8–23)
BUN BLDV-MCNC: 21 MG/DL (ref 8–23)
BUN BLDV-MCNC: 21 MG/DL (ref 8–23)
BUN/CREAT BLD: 14 (ref 9–20)
CALCIUM SERPL-MCNC: 8.7 MG/DL (ref 8.6–10.4)
CALCIUM SERPL-MCNC: 8.9 MG/DL (ref 8.6–10.4)
CALCIUM SERPL-MCNC: 9.1 MG/DL (ref 8.6–10.4)
CARBOXYHEMOGLOBIN: ABNORMAL % (ref 0–5)
CASTS UA: ABNORMAL /LPF
CHLORIDE BLD-SCNC: 106 MMOL/L (ref 98–107)
CHLORIDE BLD-SCNC: 109 MMOL/L (ref 98–107)
CHLORIDE BLD-SCNC: 110 MMOL/L (ref 98–107)
CO2: 19 MMOL/L (ref 20–31)
CO2: 20 MMOL/L (ref 20–31)
CO2: 20 MMOL/L (ref 20–31)
COLOR: YELLOW
COMMENT UA: ABNORMAL
CREAT SERPL-MCNC: 1.39 MG/DL (ref 0.5–0.9)
CREAT SERPL-MCNC: 1.51 MG/DL (ref 0.5–0.9)
CREAT SERPL-MCNC: 1.51 MG/DL (ref 0.5–0.9)
CRYSTALS, UA: ABNORMAL /HPF
DIFFERENTIAL TYPE: ABNORMAL
EKG ATRIAL RATE: 71 BPM
EKG P AXIS: 3 DEGREES
EKG P-R INTERVAL: 140 MS
EKG Q-T INTERVAL: 390 MS
EKG QRS DURATION: 74 MS
EKG QTC CALCULATION (BAZETT): 423 MS
EKG R AXIS: -18 DEGREES
EKG T AXIS: 15 DEGREES
EKG VENTRICULAR RATE: 71 BPM
EOSINOPHILS RELATIVE PERCENT: 3 % (ref 1–4)
EPITHELIAL CELLS UA: ABNORMAL /HPF (ref 0–25)
FIO2: ABNORMAL
GFR AFRICAN AMERICAN: 40 ML/MIN
GFR AFRICAN AMERICAN: 40 ML/MIN
GFR AFRICAN AMERICAN: 44 ML/MIN
GFR NON-AFRICAN AMERICAN: 33 ML/MIN
GFR NON-AFRICAN AMERICAN: 33 ML/MIN
GFR NON-AFRICAN AMERICAN: 36 ML/MIN
GFR SERPL CREATININE-BSD FRML MDRD: ABNORMAL ML/MIN/{1.73_M2}
GLUCOSE BLD-MCNC: 101 MG/DL (ref 70–99)
GLUCOSE BLD-MCNC: 106 MG/DL (ref 70–99)
GLUCOSE BLD-MCNC: 99 MG/DL (ref 70–99)
GLUCOSE URINE: NEGATIVE
HCO3 VENOUS: 21 MMOL/L (ref 24–30)
HCT VFR BLD CALC: 35 % (ref 36.3–47.1)
HEMOGLOBIN: 11.2 G/DL (ref 11.9–15.1)
IMMATURE GRANULOCYTES: 0 %
KETONES, URINE: NEGATIVE
LEUKOCYTE ESTERASE, URINE: NEGATIVE
LIPASE: 62 U/L (ref 13–60)
LYMPHOCYTES # BLD: 19 % (ref 24–43)
MCH RBC QN AUTO: 31.1 PG (ref 25.2–33.5)
MCHC RBC AUTO-ENTMCNC: 32 G/DL (ref 28.4–34.8)
MCV RBC AUTO: 97.2 FL (ref 82.6–102.9)
METHEMOGLOBIN: ABNORMAL % (ref 0–1.9)
MODE: ABNORMAL
MONOCYTES # BLD: 10 % (ref 3–12)
MUCUS: ABNORMAL
NEGATIVE BASE EXCESS, VEN: 5.4 MMOL/L (ref 0–2)
NITRITE, URINE: NEGATIVE
NOTIFICATION TIME: ABNORMAL
NOTIFICATION: ABNORMAL
NRBC AUTOMATED: 0 PER 100 WBC
O2 DEVICE/FLOW/%: ABNORMAL
O2 SAT, VEN: 53.5 % (ref 60–85)
OTHER OBSERVATIONS UA: ABNORMAL
OXYHEMOGLOBIN: ABNORMAL % (ref 95–98)
PATIENT TEMP: 37
PCO2, VEN, TEMP ADJ: ABNORMAL MMHG (ref 39–55)
PCO2, VEN: 44.3 (ref 39–55)
PDW BLD-RTO: 13.3 % (ref 11.8–14.4)
PEEP/CPAP: ABNORMAL
PH UA: 6 (ref 5–9)
PH VENOUS: 7.29 (ref 7.32–7.42)
PH, VEN, TEMP ADJ: ABNORMAL (ref 7.32–7.42)
PLATELET # BLD: 154 K/UL (ref 138–453)
PLATELET ESTIMATE: ABNORMAL
PMV BLD AUTO: 11.5 FL (ref 8.1–13.5)
PO2, VEN, TEMP ADJ: ABNORMAL MMHG (ref 30–50)
PO2, VEN: 31.4 (ref 30–50)
POSITIVE BASE EXCESS, VEN: ABNORMAL MMOL/L (ref 0–2)
POTASSIUM SERPL-SCNC: 5.3 MMOL/L (ref 3.7–5.3)
POTASSIUM SERPL-SCNC: 6.2 MMOL/L (ref 3.7–5.3)
POTASSIUM SERPL-SCNC: 6.3 MMOL/L (ref 3.7–5.3)
PROTEIN UA: NEGATIVE
PSV: ABNORMAL
PT. POSITION: ABNORMAL
RBC # BLD: 3.6 M/UL (ref 3.95–5.11)
RBC # BLD: ABNORMAL 10*6/UL
RBC UA: ABNORMAL /HPF (ref 0–2)
RENAL EPITHELIAL, UA: ABNORMAL /HPF
RESPIRATORY RATE: ABNORMAL
SAMPLE SITE: ABNORMAL
SEG NEUTROPHILS: 67 % (ref 36–65)
SEGMENTED NEUTROPHILS ABSOLUTE COUNT: 4.27 K/UL (ref 1.5–8.1)
SET RATE: ABNORMAL
SODIUM BLD-SCNC: 137 MMOL/L (ref 135–144)
SODIUM BLD-SCNC: 140 MMOL/L (ref 135–144)
SODIUM BLD-SCNC: 140 MMOL/L (ref 135–144)
SPECIFIC GRAVITY UA: 1.01 (ref 1.01–1.02)
TEXT FOR RESPIRATORY: ABNORMAL
TOTAL HB: ABNORMAL G/DL (ref 12–16)
TOTAL PROTEIN: 6.6 G/DL (ref 6.4–8.3)
TOTAL RATE: ABNORMAL
TRICHOMONAS: ABNORMAL
TURBIDITY: CLEAR
URINE HGB: ABNORMAL
UROBILINOGEN, URINE: NORMAL
VT: ABNORMAL
WBC # BLD: 6.4 K/UL (ref 3.5–11.3)
WBC # BLD: ABNORMAL 10*3/UL
WBC UA: ABNORMAL /HPF (ref 0–5)
YEAST: ABNORMAL

## 2018-07-13 PROCEDURE — 93005 ELECTROCARDIOGRAM TRACING: CPT

## 2018-07-13 PROCEDURE — 2580000003 HC RX 258: Performed by: PHYSICIAN ASSISTANT

## 2018-07-13 PROCEDURE — 82805 BLOOD GASES W/O2 SATURATION: CPT

## 2018-07-13 PROCEDURE — 36415 COLL VENOUS BLD VENIPUNCTURE: CPT

## 2018-07-13 PROCEDURE — 81001 URINALYSIS AUTO W/SCOPE: CPT

## 2018-07-13 PROCEDURE — 6370000000 HC RX 637 (ALT 250 FOR IP): Performed by: PHYSICIAN ASSISTANT

## 2018-07-13 PROCEDURE — 80053 COMPREHEN METABOLIC PANEL: CPT

## 2018-07-13 PROCEDURE — 85025 COMPLETE CBC W/AUTO DIFF WBC: CPT

## 2018-07-13 PROCEDURE — 74176 CT ABD & PELVIS W/O CONTRAST: CPT

## 2018-07-13 PROCEDURE — 87086 URINE CULTURE/COLONY COUNT: CPT

## 2018-07-13 PROCEDURE — 83690 ASSAY OF LIPASE: CPT

## 2018-07-13 PROCEDURE — 6360000004 HC RX CONTRAST MEDICATION: Performed by: PHYSICIAN ASSISTANT

## 2018-07-13 PROCEDURE — 80048 BASIC METABOLIC PNL TOTAL CA: CPT

## 2018-07-13 PROCEDURE — 99284 EMERGENCY DEPT VISIT MOD MDM: CPT

## 2018-07-13 RX ORDER — SODIUM CHLORIDE 9 MG/ML
INJECTION, SOLUTION INTRAVENOUS CONTINUOUS
Status: DISCONTINUED | OUTPATIENT
Start: 2018-07-13 | End: 2018-07-13 | Stop reason: HOSPADM

## 2018-07-13 RX ORDER — SODIUM POLYSTYRENE SULFONATE 15 G/60ML
15 SUSPENSION ORAL; RECTAL ONCE
Status: COMPLETED | OUTPATIENT
Start: 2018-07-13 | End: 2018-07-13

## 2018-07-13 RX ORDER — CALCIUM CARBONATE 500(1250)
500 TABLET ORAL DAILY
COMMUNITY

## 2018-07-13 RX ADMIN — SODIUM CHLORIDE: 9 INJECTION, SOLUTION INTRAVENOUS at 14:24

## 2018-07-13 RX ADMIN — IOHEXOL 25 ML: 240 INJECTION, SOLUTION INTRATHECAL; INTRAVASCULAR; INTRAVENOUS; ORAL at 12:34

## 2018-07-13 RX ADMIN — SODIUM POLYSTYRENE SULFONATE 15 G: 15 SUSPENSION ORAL; RECTAL at 12:58

## 2018-07-13 ASSESSMENT — ENCOUNTER SYMPTOMS
SHORTNESS OF BREATH: 0
COUGH: 0
NAUSEA: 0
VOMITING: 0
DIARRHEA: 1
ABDOMINAL PAIN: 1

## 2018-07-13 ASSESSMENT — PAIN SCALES - GENERAL: PAINLEVEL_OUTOF10: 10

## 2018-07-13 ASSESSMENT — PAIN DESCRIPTION - DESCRIPTORS: DESCRIPTORS: SHARP

## 2018-07-13 ASSESSMENT — PAIN DESCRIPTION - LOCATION: LOCATION: ABDOMEN

## 2018-07-13 ASSESSMENT — PAIN DESCRIPTION - ORIENTATION: ORIENTATION: RIGHT

## 2018-07-13 ASSESSMENT — PAIN DESCRIPTION - PAIN TYPE: TYPE: ACUTE PAIN

## 2018-07-13 NOTE — ED PROVIDER NOTES
[facility] ED  eMERGENCY dEPARTMENT eNCOUnter      Pt Name: Gregorio Fuentes  MRN: [id number]  [place] 1935  Date of evaluation: 7/13/2018  Provider: Ju Patterson PA-C,    CHIEF COMPLAINT       Chief Complaint   Patient presents with    Abdominal Pain     RLQ pain, worse since last weekend       [facility]    Gregorio Fuentes is a 80 y.o. female who presents to the emergency department from home Station right lower quadrant pain radiates to her mid right side mainly for the past month or 2. Patient states that the pain is increased since this past weekend is been more constant. The pain is achy in nature without aggravating factors. She states she has diarrhea but this is not unusual for her. Patient denies any nausea or vomiting, numbness or paresthesias, fevers, back or flank pain or urinary symptoms or any other complaints at present. Triage notes and Nursing notes were reviewed by myself. Any discrepancies are addressed above.     PAST MEDICAL HISTORY     Past Medical History:   Diagnosis Date    Abnormal EMG 2016    Right Carpal Tunnel & C5 Radiculopathy    Cancer ([facility] [address]     colon    Chronic kidney disease     Colon polyp 2006, 2009    Compression fracture of lumbar vertebra (HCC) / prednisone related to PMR 7/15/2015    Diarrhea     DVT (deep venous thrombosis) (HCC) 2015    Emphysema     HTN (hypertension)     Hyperlipidemia     Irritable bowel syndrome     Lumbago     Osteoarthritis     Polymyalgia rheumatica (HCC) 2015    Rheumatism     Seborrheic keratoses     on back       SURGICAL HISTORY       Past Surgical History:   Procedure Laterality Date    APPENDECTOMY  1949    CATARACT REMOVAL WITH IMPLANT      COLECTOMY Right 06/20/2016    open right hemicolectomy bowel resection    COLONOSCOPY  [id number]    Dr. Dayana Durham COLONOSCOPY  5/25/2016    -bx mass hepatic flexure,tattoo    COLONOSCOPY  12/13/2017     reviewed. DIAGNOSTIC RESULTS     EKG: (none if blank)  All EKG's are interpreted by the Emergency Department Physician who either signs or Co-signs this chart in the absence of a cardiologist.        RADIOLOGY: (none if blank)   Interpretation per the Radiologist below, if available at the time of this note:    CT ABDOMEN PELVIS WO CONTRAST Additional Contrast? Radiologist Recommendation   Final Result   NO SIGNIFICANT CHANGE FROM THE STUDY OF 6/10/2016. SMALL PERICARDIAL EFFUSION. 2 AREAS OF SACCULAR DILATATION OF THE INFRARENAL ABDOMINAL AORTA. IVC FILTER. DIVERTICULAR DISEASE OF THE COLON WITHOUT PERICOLIC INFLAMMATORY CHANGE. NO    OBSTRUCTIVE UROPATHY. CHRONIC COMPRESSION FRACTURE OF T11.           LABS:  Labs Reviewed   CBC WITH AUTO DIFFERENTIAL - Abnormal; Notable for the following:        Result Value    RBC 3.60 (*)     Hemoglobin 11.2 (*)     Hematocrit 35.0 (*)     Seg Neutrophils 67 (*)     Lymphocytes 19 (*)     All other components within normal limits   COMPREHENSIVE METABOLIC PANEL - Abnormal; Notable for the following:     Glucose 106 (*)     CREATININE 1.51 (*)     Potassium 6.3 (*)     Chloride 110 (*)     GFR Non- 33 (*)     GFR  40 (*)     All other components within normal limits   LIPASE - Abnormal; Notable for the following:     Lipase 62 (*)     All other components within normal limits   URINALYSIS WITH MICROSCOPIC - Abnormal; Notable for the following:     Urine Hgb TRACE (*)     All other components within normal limits   BASIC METABOLIC PANEL - Abnormal; Notable for the following:     Glucose 101 (*)     CREATININE 1.51 (*)     Potassium 6.2 (*)     GFR Non- 33 (*)     GFR  40 (*)     All other components within normal limits   BLOOD GAS, VENOUS - Abnormal; Notable for the following:     pH, Edgar 7.294 (*)     HCO3, Venous 21.0 (*)     Negative Base Excess, Edgar 5.4 (*)     O2 Sat, Edgar 53.5 (*)     All other components SPENCER Maxwell on 7/13/18 at 11:24 AM             Lou Francisco PA-C  07/13/18 [facility], SPENCER  07/13/18 6518

## 2018-07-13 NOTE — ED NOTES
Lorenzo Hardin from lab calls with Critical K+ of 6.2.   Tamy SARKAR made aware     John Gaines RN  07/13/18 5403

## 2018-07-14 LAB
CULTURE: NORMAL
Lab: NORMAL
SPECIMEN DESCRIPTION: NORMAL
STATUS: NORMAL

## 2018-07-17 ENCOUNTER — HOSPITAL ENCOUNTER (OUTPATIENT)
Dept: PHARMACY | Age: 83
Setting detail: THERAPIES SERIES
Discharge: HOME | End: 2018-07-17
Payer: MEDICARE

## 2018-07-17 VITALS — DIASTOLIC BLOOD PRESSURE: 79 MMHG | SYSTOLIC BLOOD PRESSURE: 116 MMHG | HEART RATE: 75 BPM

## 2018-07-17 DIAGNOSIS — I26.99 OTHER PULMONARY EMBOLISM WITHOUT ACUTE COR PULMONALE, UNSPECIFIED CHRONICITY (HCC): ICD-10-CM

## 2018-07-17 DIAGNOSIS — Z79.01 LONG TERM CURRENT USE OF ANTICOAGULANT THERAPY: ICD-10-CM

## 2018-07-17 LAB — INR BLD: 4

## 2018-07-17 PROCEDURE — 99211 OFF/OP EST MAY X REQ PHY/QHP: CPT

## 2018-07-17 PROCEDURE — 85610 PROTHROMBIN TIME: CPT

## 2018-07-27 PROBLEM — E87.5 DRUG-INDUCED HYPERKALEMIA: Status: ACTIVE | Noted: 2018-07-27

## 2018-07-27 PROBLEM — T50.905A DRUG-INDUCED HYPERKALEMIA: Status: ACTIVE | Noted: 2018-07-27

## 2018-07-31 ENCOUNTER — HOSPITAL ENCOUNTER (OUTPATIENT)
Dept: PHARMACY | Age: 83
Setting detail: THERAPIES SERIES
Discharge: HOME | End: 2018-07-31
Payer: MEDICARE

## 2018-07-31 VITALS
DIASTOLIC BLOOD PRESSURE: 71 MMHG | SYSTOLIC BLOOD PRESSURE: 117 MMHG | WEIGHT: 120 LBS | BODY MASS INDEX: 20.6 KG/M2 | HEART RATE: 76 BPM

## 2018-07-31 DIAGNOSIS — Z79.01 LONG TERM CURRENT USE OF ANTICOAGULANT THERAPY: ICD-10-CM

## 2018-07-31 LAB — INR BLD: 3.2

## 2018-07-31 PROCEDURE — 85610 PROTHROMBIN TIME: CPT

## 2018-07-31 PROCEDURE — 99211 OFF/OP EST MAY X REQ PHY/QHP: CPT

## 2018-07-31 NOTE — PROGRESS NOTES
Patient states no visible blood in urine and no black tarry stool. No change in other medications. Will return to clinic in 3 weeks. Patient will hold warfarin today and decrease warfarin to 5 mg M,F and 2.5 mg all other days. Patient is rocking in her chair throughout the whole appointment due to back pain. Stopped Lisinopril due to hyperkalemia.

## 2018-08-21 ENCOUNTER — HOSPITAL ENCOUNTER (OUTPATIENT)
Dept: PHARMACY | Age: 83
Setting detail: THERAPIES SERIES
Discharge: HOME | End: 2018-08-21
Payer: MEDICARE

## 2018-08-21 VITALS
DIASTOLIC BLOOD PRESSURE: 80 MMHG | HEART RATE: 80 BPM | BODY MASS INDEX: 20.7 KG/M2 | WEIGHT: 120.6 LBS | SYSTOLIC BLOOD PRESSURE: 131 MMHG

## 2018-08-21 DIAGNOSIS — Z79.01 LONG TERM CURRENT USE OF ANTICOAGULANT THERAPY: ICD-10-CM

## 2018-08-21 DIAGNOSIS — I26.90 ACUTE SEPTIC PULMONARY EMBOLISM WITHOUT ACUTE COR PULMONALE (HCC): ICD-10-CM

## 2018-08-21 LAB — INR BLD: 3.2

## 2018-08-21 PROCEDURE — 99212 OFFICE O/P EST SF 10 MIN: CPT

## 2018-08-21 PROCEDURE — 85610 PROTHROMBIN TIME: CPT

## 2018-08-21 NOTE — PATIENT INSTRUCTIONS
Hold warfarin dose today. Decrease current dose of warfarin as instructed on dosing calendar provided. Continue to monitor urine and stool for signs and symptoms of bleeding. Please notify the clinic of any medication changes.

## 2018-08-27 ENCOUNTER — HOSPITAL ENCOUNTER (OUTPATIENT)
Age: 83
Discharge: HOME | End: 2018-08-27
Payer: MEDICARE

## 2018-08-27 DIAGNOSIS — T50.905A DRUG-INDUCED HYPERKALEMIA: ICD-10-CM

## 2018-08-27 DIAGNOSIS — E87.5 DRUG-INDUCED HYPERKALEMIA: ICD-10-CM

## 2018-08-27 LAB
ANION GAP SERPL CALCULATED.3IONS-SCNC: 9 MMOL/L (ref 9–17)
BUN BLDV-MCNC: 26 MG/DL (ref 8–23)
BUN/CREAT BLD: 17 (ref 9–20)
CALCIUM SERPL-MCNC: 9 MG/DL (ref 8.6–10.4)
CHLORIDE BLD-SCNC: 108 MMOL/L (ref 98–107)
CO2: 24 MMOL/L (ref 20–31)
CREAT SERPL-MCNC: 1.56 MG/DL (ref 0.5–0.9)
GFR AFRICAN AMERICAN: 38 ML/MIN
GFR NON-AFRICAN AMERICAN: 32 ML/MIN
GFR SERPL CREATININE-BSD FRML MDRD: ABNORMAL ML/MIN/{1.73_M2}
GFR SERPL CREATININE-BSD FRML MDRD: ABNORMAL ML/MIN/{1.73_M2}
GLUCOSE BLD-MCNC: 100 MG/DL (ref 70–99)
POTASSIUM SERPL-SCNC: 5.6 MMOL/L (ref 3.7–5.3)
SODIUM BLD-SCNC: 141 MMOL/L (ref 135–144)

## 2018-08-27 PROCEDURE — 80048 BASIC METABOLIC PNL TOTAL CA: CPT

## 2018-08-27 PROCEDURE — 36415 COLL VENOUS BLD VENIPUNCTURE: CPT

## 2018-09-04 ENCOUNTER — HOSPITAL ENCOUNTER (OUTPATIENT)
Dept: PHARMACY | Age: 83
Setting detail: THERAPIES SERIES
Discharge: HOME | End: 2018-09-04
Payer: MEDICARE

## 2018-09-04 VITALS
BODY MASS INDEX: 20.43 KG/M2 | WEIGHT: 119 LBS | HEART RATE: 76 BPM | DIASTOLIC BLOOD PRESSURE: 75 MMHG | SYSTOLIC BLOOD PRESSURE: 125 MMHG

## 2018-09-04 DIAGNOSIS — I26.90 ACUTE SEPTIC PULMONARY EMBOLISM WITHOUT ACUTE COR PULMONALE (HCC): ICD-10-CM

## 2018-09-04 DIAGNOSIS — Z79.01 LONG TERM CURRENT USE OF ANTICOAGULANT THERAPY: ICD-10-CM

## 2018-09-04 LAB — INR BLD: 1.7

## 2018-09-04 PROCEDURE — 85610 PROTHROMBIN TIME: CPT | Performed by: FAMILY MEDICINE

## 2018-09-04 PROCEDURE — 99212 OFFICE O/P EST SF 10 MIN: CPT | Performed by: FAMILY MEDICINE

## 2018-09-04 NOTE — PROGRESS NOTES
Fingerstick INR drawn per clinic protocol. Patient states no visible blood in urine and no black tarry stool. Denies any missed doses of warfarin. No change in other maintenance medications or in diet. Will increase current warfarin regimen and recheck INR in 2 week(s) per patient request. Patient states her BP has been running low in the mornings and she is having dizziness. BP this morning was 82/62 and she contacted Dr Jose Torres' office. Patient will take warfarin 3.75mg today then increase weekly dosage 6% to 3.75mg MWF and 2.5mg all other days. Patient confirms she has a pill splitter. Patient acknowledges working in consult agreement with pharmacist as referred by his/her physician.

## 2018-09-18 ENCOUNTER — HOSPITAL ENCOUNTER (OUTPATIENT)
Dept: PHARMACY | Age: 83
Setting detail: THERAPIES SERIES
Discharge: HOME | End: 2018-09-18
Payer: MEDICARE

## 2018-09-18 VITALS
HEART RATE: 86 BPM | SYSTOLIC BLOOD PRESSURE: 121 MMHG | WEIGHT: 119 LBS | DIASTOLIC BLOOD PRESSURE: 73 MMHG | BODY MASS INDEX: 20.43 KG/M2

## 2018-09-18 DIAGNOSIS — Z79.01 LONG TERM CURRENT USE OF ANTICOAGULANT THERAPY: ICD-10-CM

## 2018-09-18 LAB — INR BLD: 1.8

## 2018-09-18 PROCEDURE — 99212 OFFICE O/P EST SF 10 MIN: CPT

## 2018-09-18 PROCEDURE — 85610 PROTHROMBIN TIME: CPT

## 2018-10-02 ENCOUNTER — HOSPITAL ENCOUNTER (OUTPATIENT)
Dept: PHARMACY | Age: 83
Setting detail: THERAPIES SERIES
Discharge: HOME | End: 2018-10-02
Payer: MEDICARE

## 2018-10-02 VITALS — DIASTOLIC BLOOD PRESSURE: 61 MMHG | SYSTOLIC BLOOD PRESSURE: 128 MMHG | HEART RATE: 77 BPM

## 2018-10-02 DIAGNOSIS — Z79.01 LONG TERM CURRENT USE OF ANTICOAGULANT THERAPY: ICD-10-CM

## 2018-10-02 LAB — INR BLD: 2

## 2018-10-02 PROCEDURE — 99211 OFF/OP EST MAY X REQ PHY/QHP: CPT

## 2018-10-02 PROCEDURE — 85610 PROTHROMBIN TIME: CPT

## 2018-10-16 ENCOUNTER — APPOINTMENT (OUTPATIENT)
Dept: CT IMAGING | Age: 83
End: 2018-10-16
Payer: MEDICARE

## 2018-10-16 ENCOUNTER — HOSPITAL ENCOUNTER (EMERGENCY)
Age: 83
Discharge: HOME | End: 2018-10-16
Payer: MEDICARE

## 2018-10-16 ENCOUNTER — APPOINTMENT (OUTPATIENT)
Dept: GENERAL RADIOLOGY | Age: 83
End: 2018-10-16
Payer: MEDICARE

## 2018-10-16 VITALS
RESPIRATION RATE: 16 BRPM | HEART RATE: 82 BPM | DIASTOLIC BLOOD PRESSURE: 88 MMHG | TEMPERATURE: 97.6 F | SYSTOLIC BLOOD PRESSURE: 138 MMHG | OXYGEN SATURATION: 98 %
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
## 2018-10-16 DIAGNOSIS — E04.9 THYROID ENLARGEMENT: ICD-10-CM

## 2018-10-16 DIAGNOSIS — M25.551 PAIN OF RIGHT HIP JOINT: ICD-10-CM

## 2018-10-16 LAB
INR BLD: 1.8 (ref 0.9–1.2)
PROTHROMBIN TIME: 18.3 SEC (ref 9.7–12.2)

## 2018-10-16 PROCEDURE — 36415 COLL VENOUS BLD VENIPUNCTURE: CPT

## 2018-10-16 PROCEDURE — 72125 CT NECK SPINE W/O DYE: CPT

## 2018-10-16 PROCEDURE — 73552 X-RAY EXAM OF FEMUR 2/>: CPT

## 2018-10-16 PROCEDURE — 99284 EMERGENCY DEPT VISIT MOD MDM: CPT

## 2018-10-16 PROCEDURE — 85610 PROTHROMBIN TIME: CPT

## 2018-10-16 PROCEDURE — 70450 CT HEAD/BRAIN W/O DYE: CPT

## 2018-10-16 PROCEDURE — 72192 CT PELVIS W/O DYE: CPT

## 2018-10-16 ASSESSMENT — ENCOUNTER SYMPTOMS
SORE THROAT: 0
SHORTNESS OF BREATH: 0
ABDOMINAL PAIN: 0
CHEST TIGHTNESS: 0
BACK PAIN: 0
BLOOD IN STOOL: 0
EYE DISCHARGE: 0
NAUSEA: 0
EYE REDNESS: 0
VOMITING: 0
RHINORRHEA: 0
CONSTIPATION: 0
DIARRHEA: 0
WHEEZING: 0
COUGH: 0

## 2018-10-16 ASSESSMENT — PAIN SCALES - GENERAL: PAINLEVEL_OUTOF10: 10

## 2018-10-16 ASSESSMENT — PAIN DESCRIPTION - LOCATION: LOCATION: GROIN

## 2018-10-16 ASSESSMENT — PAIN DESCRIPTION - PAIN TYPE: TYPE: ACUTE PAIN

## 2018-10-16 NOTE — ED PROVIDER NOTES
rarely    Drug use: No    Sexual activity: Not Asked     Other Topics Concern    None     Social History Narrative    None       SCREENINGS    [place] Coma Scale  Eye Opening: Spontaneous  Best Verbal Response: Oriented  Best Motor Response: Obeys commands  [place] Coma Scale Score: 15 @FLOW([id number])@      PHYSICAL EXAM    (up to 7 for level 4, 8 or more for level 5)     ED Triage Vitals   BP Temp Temp Source Pulse Resp SpO2 Height Weight   10/16/18 1719 10/16/18 1718 10/16/18 1718 10/16/18 1719 10/16/18 1718 10/16/18 1718 -- --   (!) 145/92 97.5 °F (36.4 °C) Tympanic 87 17 95 %         Physical Exam   Constitutional: She is oriented to person, place, and time. She appears well-developed and well-nourished. Non-toxic appearance. She does not have a sickly appearance. She does not appear ill. No distress. HENT:   Head: Normocephalic and atraumatic. Head is without abrasion, without contusion and without laceration. Hair is normal.   Right Ear: External ear normal.   Left Ear: External ear normal.   Mouth/Throat: Oropharynx is clear and moist. No oropharyngeal exudate. Eyes: Pupils are equal, round, and reactive to light. Conjunctivae and EOM are normal. Right eye exhibits no discharge. Left eye exhibits no discharge. No scleral icterus. Neck: Normal range of motion and full passive range of motion without pain. Neck supple. No spinous process tenderness and no muscular tenderness present. No neck rigidity. No tracheal deviation, no edema, no erythema and normal range of motion present. No thyromegaly present. Cardiovascular: Normal rate, regular rhythm and intact distal pulses. Exam reveals no gallop and no friction rub. No murmur heard. Pulmonary/Chest: Effort normal and breath sounds normal. No stridor. No apnea, no tachypnea and no bradypnea. No respiratory distress. She has no decreased breath sounds. She has no wheezes. She has no rhonchi. She has no rales.  She exhibits no mass, no tenderness, program.  Efforts were made to edit the dictations but occasionally words are mis-transcribed.)            Becky Aparicio PA-C  10/16/18 8090

## 2018-10-19 ENCOUNTER — HOSPITAL ENCOUNTER (OUTPATIENT)
Dept: PHYSICAL THERAPY | Age: 83
Setting detail: THERAPIES SERIES
Discharge: HOME | End: 2018-10-19
Payer: MEDICARE

## 2018-10-19 ENCOUNTER — HOSPITAL ENCOUNTER (OUTPATIENT)
Age: 83
Discharge: HOME | End: 2018-10-19
Payer: MEDICARE

## 2018-10-19 DIAGNOSIS — C80.1 ADENOCARCINOMA (HCC): ICD-10-CM

## 2018-10-19 DIAGNOSIS — C18.2 MALIGNANT NEOPLASM OF ASCENDING COLON (HCC): ICD-10-CM

## 2018-10-19 LAB
ABSOLUTE EOS #: 0.22 K/UL (ref 0–0.44)
ABSOLUTE IMMATURE GRANULOCYTE: <0.03 K/UL (ref 0–0.3)
ABSOLUTE LYMPH #: 1.31 K/UL (ref 1.1–3.7)
ABSOLUTE MONO #: 0.68 K/UL (ref 0.1–1.2)
ALBUMIN SERPL-MCNC: 3.7 G/DL (ref 3.5–5.2)
ALBUMIN/GLOBULIN RATIO: 1.2 (ref 1–2.5)
ALP BLD-CCNC: 103 U/L (ref 35–104)
ALT SERPL-CCNC: 17 U/L (ref 5–33)
ANION GAP SERPL CALCULATED.3IONS-SCNC: 11 MMOL/L (ref 9–17)
AST SERPL-CCNC: 25 U/L
BASOPHILS # BLD: 1 % (ref 0–2)
BASOPHILS ABSOLUTE: 0.03 K/UL (ref 0–0.2)
BILIRUB SERPL-MCNC: 0.48 MG/DL (ref 0.3–1.2)
BUN BLDV-MCNC: 29 MG/DL (ref 8–23)
BUN/CREAT BLD: 19 (ref 9–20)
CALCIUM SERPL-MCNC: 9 MG/DL (ref 8.6–10.4)
CARCINOEMBRYONIC ANTIGEN: 2.7 NG/ML
CHLORIDE BLD-SCNC: 107 MMOL/L (ref 98–107)
CO2: 23 MMOL/L (ref 20–31)
CREAT SERPL-MCNC: 1.49 MG/DL (ref 0.5–0.9)
DIFFERENTIAL TYPE: ABNORMAL
EOSINOPHILS RELATIVE PERCENT: 4 % (ref 1–4)
GFR AFRICAN AMERICAN: 41 ML/MIN
GFR NON-AFRICAN AMERICAN: 33 ML/MIN
GFR SERPL CREATININE-BSD FRML MDRD: ABNORMAL ML/MIN/{1.73_M2}
GFR SERPL CREATININE-BSD FRML MDRD: ABNORMAL ML/MIN/{1.73_M2}
GLUCOSE BLD-MCNC: 110 MG/DL (ref 70–99)
HCT VFR BLD CALC: 35.6 % (ref 36.3–47.1)
HEMOGLOBIN: 10.9 G/DL (ref 11.9–15.1)
IMMATURE GRANULOCYTES: 0 %
LYMPHOCYTES # BLD: 22 % (ref 24–43)
MCH RBC QN AUTO: 31 PG (ref 25.2–33.5)
MCHC RBC AUTO-ENTMCNC: 30.6 G/DL (ref 28.4–34.8)
MCV RBC AUTO: 101.1 FL (ref 82.6–102.9)
MONOCYTES # BLD: 12 % (ref 3–12)
NRBC AUTOMATED: 0 PER 100 WBC
PDW BLD-RTO: 12.9 % (ref 11.8–14.4)
PLATELET # BLD: 179 K/UL (ref 138–453)
PLATELET ESTIMATE: ABNORMAL
PMV BLD AUTO: 11.6 FL (ref 8.1–13.5)
POTASSIUM SERPL-SCNC: 4.9 MMOL/L (ref 3.7–5.3)
RBC # BLD: 3.52 M/UL (ref 3.95–5.11)
RBC # BLD: ABNORMAL 10*6/UL
SEG NEUTROPHILS: 61 % (ref 36–65)
SEGMENTED NEUTROPHILS ABSOLUTE COUNT: 3.62 K/UL (ref 1.5–8.1)
SODIUM BLD-SCNC: 141 MMOL/L (ref 135–144)
TOTAL PROTEIN: 6.7 G/DL (ref 6.4–8.3)
WBC # BLD: 5.9 K/UL (ref 3.5–11.3)
WBC # BLD: ABNORMAL 10*3/UL

## 2018-10-19 PROCEDURE — 80053 COMPREHEN METABOLIC PANEL: CPT

## 2018-10-19 PROCEDURE — G8979 MOBILITY GOAL STATUS: HCPCS

## 2018-10-19 PROCEDURE — G8978 MOBILITY CURRENT STATUS: HCPCS

## 2018-10-19 PROCEDURE — 82378 CARCINOEMBRYONIC ANTIGEN: CPT

## 2018-10-19 PROCEDURE — 97035 APP MDLTY 1+ULTRASOUND EA 15: CPT

## 2018-10-19 PROCEDURE — 85025 COMPLETE CBC W/AUTO DIFF WBC: CPT

## 2018-10-19 PROCEDURE — 97162 PT EVAL MOD COMPLEX 30 MIN: CPT

## 2018-10-19 PROCEDURE — 36415 COLL VENOUS BLD VENIPUNCTURE: CPT

## 2018-10-19 PROCEDURE — G0283 ELEC STIM OTHER THAN WOUND: HCPCS

## 2018-10-19 NOTE — PLAN OF CARE
[facility]           Phone: [phone]             Outpatient Physical Therapy  Fax: [phone]                                           Date: 10/19/2018  Patient: Marvin Granger : 1935 [facility] #: [id number]   Referring Practitioner:  Dr. Amy Maria Referral Date:  10/17/18       [x] Plan of Care   [] Updated Plan of Care    Dates of Service to Include: 10/19/2018 to 18    Diagnosis:  Strain R hip S76.011A    Rehab (Treatment) Diagnosis:  Pain in R hip             Onset Date:  10/07/18    Attendance  Total # of Visits to Date: 1 No Show: 0 Canceled Appointment: 0    Assessment  Assessment: Pt states she was out in the garden about a week ago . She was helping her son and  clean up when she fell in the grass. She was able to get up with minimal noted injury until later when she felt significant pain which has worsened. She states from the fall she has significant groin pain/hip pain, broke 3 vertebrae, and now has blood clots in her LEs. Pt notes she is walking around the house with a walker and does have pretty significant pain ranging from 6 to 10/10, it is constant, can be shooting. Pt is having difficulty with walking. R hip ROM guarded due to pain. Grossly 2+ to 3-/5 and limited by pain    [x] Primary Impairment :   G Code:    [x] Mobility         [] Carry        [] Body Position       [] Self Care      [] Other:   Functional Impairment Current:  [] 0%    [] 1-19% [] 20-39% [] 40-59% [x] 60-79%    [] 80-99% [] 100%  Functional Impairment Goal:  [] 0%    [] 1-19% [x] 20-39% [] 40-59% [] 60-79%    [] 80-99% [] 100%  G Code Functional Impairment determined by:  [x] Clinical Judgment   [] Outcome Measure:     Goals  Short term goals  Time Frame for Short term goals: 3 weeks  Short term goal 1: Initiate appropriate modalities to assist in decreasing pain for improved mobility.   Short term goal 2: no epitaxis

## 2018-10-22 ENCOUNTER — HOSPITAL ENCOUNTER (OUTPATIENT)
Dept: PHYSICAL THERAPY | Age: 83
Setting detail: THERAPIES SERIES
Discharge: HOME | End: 2018-10-22
Payer: MEDICARE

## 2018-10-22 PROCEDURE — G0283 ELEC STIM OTHER THAN WOUND: HCPCS

## 2018-10-22 PROCEDURE — 97110 THERAPEUTIC EXERCISES: CPT

## 2018-10-22 NOTE — PROGRESS NOTES
Phone: Brandi           Fax: [phone]                           Outpatient Physical Therapy                                                                            Daily Note    Patient: Bertha Alvarez : 1935  [facility] #: [id number]   Referring Practitioner:  Dr. Heladio Thakkar          Date: 10/22/2018    Diagnosis: Strain R hip S76.011A  Treatment Diagnosis: Pain in R hip    Onset Date: 10/07/18  PT Insurance Information: Medicare/BCBS  Total # of Visits Approved: 18 Per Physician Order  Total # of Visits to Date: 2  No Show: 0  Canceled Appointment: 0      Pre-Treatment Pain:  3/10  Subjective: Pt states she is feeling better. Pt is walking with quad cane which she just started using today. Reports pain 3/10 in going/hip region     Exercises:  Exercise 1: supine marches, bridging, clam shells 10-15x ea. Exercise 2: SKTC 10x hold 5 sec. Exercise 3: Fig 4 stretch 5x                                                                      Assessment  Assessment: Pt ambulating with quad cane today. Hip ROM is improved and not as guarded. Still unable to perform a SLR today. Mod TTP ant hip soft tissue . Started on supine exercise and light hip stretching with good tolerance. Will progress to more functional exercise as tolerated. IFC/hp hip region at end of session     Patient Education  HEP  Pt verbalized/demonstrated good understanding:     [x] Yes         [] No, pt required further clarification. Post Treatment Pain:  3/10      Plan  Times per week: 2  Plan weeks: 6      Goals  (Total # of Visits to Date: 2)   Short Term Goals - Time Frame for Short term goals: 3 weeks     Short term goal 1: Initiate appropriate modalities to assist in decreasing pain for improved mobility.                                         []Met   []Partially met  []Not met   Short term goal 2: Pt will report no greater than 7/10 and improved tolerance to ADLs  []Met   []Partially met  []Not

## 2018-10-25 ENCOUNTER — HOSPITAL ENCOUNTER (OUTPATIENT)
Dept: PHYSICAL THERAPY | Age: 83
Setting detail: THERAPIES SERIES
Discharge: HOME | End: 2018-10-25
Payer: MEDICARE

## 2018-10-25 PROCEDURE — G0283 ELEC STIM OTHER THAN WOUND: HCPCS

## 2018-10-25 PROCEDURE — 97112 NEUROMUSCULAR REEDUCATION: CPT

## 2018-10-25 PROCEDURE — 97110 THERAPEUTIC EXERCISES: CPT

## 2018-10-25 NOTE — PROGRESS NOTES
agreeable to coming for at least one more visit at this time. Trial ant/lateral step ups on 6in step next visit. Patient Education  Exercise technique and rationale  Pt verbalized/demonstrated good understanding:     [x] Yes         [] No, pt required further clarification. Post Treatment Pain:  2/10      Plan  Times per week: 2  Plan weeks: 6      Goals  (Total # of Visits to Date: 3)   Short Term Goals - Time Frame for Short term goals: 3 weeks     Short term goal 1: Initiate appropriate modalities to assist in decreasing pain for improved mobility. -MET                                        []Met   []Partially met  []Not met   Short term goal 2: Pt will report no greater than 7/10 and improved tolerance to ADLs -met  []Met   []Partially met  []Not met      []Met   []Partially met  []Not met      []Met   []Partially met  []Not met     Long Term Goals - Time Frame for Long term goals : 6 weeks  Long term goal 1: Pt will be independent and compliant with her HEP []Met  []Partially met  []Not met   Long term goal 2: Pt will report improved pain and no greater than 5/10 in order to tolerate household activities []Met  []Partially met  []Not met   Long term goal 3: Pt will be able to return to ambulation with her q.c. with good balance and safety []Met  []Partially met  []Not met     []Met  []Partially met  []Not met     []Met  []Partially met  []Not met       Minutes Tracking:  Time In: 2852  Time Out: 1540  Minutes: Isaiah Alfonso PT, DPT Date: 10/25/2018

## 2018-10-26 ENCOUNTER — OFFICE VISIT (OUTPATIENT)
Dept: ONCOLOGY | Age: 83
End: 2018-10-26
Payer: MEDICARE

## 2018-10-26 VITALS
TEMPERATURE: 98.8 F | SYSTOLIC BLOOD PRESSURE: 132 MMHG | HEART RATE: 77 BPM | WEIGHT: 124.8 LBS | BODY MASS INDEX: 21.31 KG/M2 | RESPIRATION RATE: 18 BRPM | HEIGHT: 64 IN | DIASTOLIC BLOOD PRESSURE: 70 MMHG

## 2018-10-26 DIAGNOSIS — N18.30 CKD (CHRONIC KIDNEY DISEASE) STAGE 3, GFR 30-59 ML/MIN (HCC): ICD-10-CM

## 2018-10-26 DIAGNOSIS — C18.3 MALIGNANT NEOPLASM OF HEPATIC FLEXURE (HCC): Primary | ICD-10-CM

## 2018-10-26 PROCEDURE — G8598 ASA/ANTIPLAT THER USED: HCPCS | Performed by: INTERNAL MEDICINE

## 2018-10-26 PROCEDURE — 1123F ACP DISCUSS/DSCN MKR DOCD: CPT | Performed by: INTERNAL MEDICINE

## 2018-10-26 PROCEDURE — 1090F PRES/ABSN URINE INCON ASSESS: CPT | Performed by: INTERNAL MEDICINE

## 2018-10-26 PROCEDURE — 4040F PNEUMOC VAC/ADMIN/RCVD: CPT | Performed by: INTERNAL MEDICINE

## 2018-10-26 PROCEDURE — G8427 DOCREV CUR MEDS BY ELIG CLIN: HCPCS | Performed by: INTERNAL MEDICINE

## 2018-10-26 PROCEDURE — 1101F PT FALLS ASSESS-DOCD LE1/YR: CPT | Performed by: INTERNAL MEDICINE

## 2018-10-26 PROCEDURE — G8420 CALC BMI NORM PARAMETERS: HCPCS | Performed by: INTERNAL MEDICINE

## 2018-10-26 PROCEDURE — G8400 PT W/DXA NO RESULTS DOC: HCPCS | Performed by: INTERNAL MEDICINE

## 2018-10-26 PROCEDURE — 99214 OFFICE O/P EST MOD 30 MIN: CPT | Performed by: INTERNAL MEDICINE

## 2018-10-26 PROCEDURE — G8484 FLU IMMUNIZE NO ADMIN: HCPCS | Performed by: INTERNAL MEDICINE

## 2018-10-26 PROCEDURE — 1036F TOBACCO NON-USER: CPT | Performed by: INTERNAL MEDICINE

## 2018-10-26 NOTE — PROGRESS NOTES
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 CURRENT THERAPY:         S/p Rt hemicolectomy 6/20/16. Considering her age and comorbidity, she was not offered adjuvant chemotherapy and the decision was for surveillance/observation    BRIEF CASE HISTORY:      Ms. Georges Matos is a very pleasant 80 y.o. female with history of DVT status post IVC filter placement 1 year ago. She is maintained on Coumadin. No PE. Patient noticed rectal bleeding recently. She had blood mixed with the stool. This has been going on for the last few weeks. No abnormal pain. No nausea or vomiting. No constipation. No signs of obstruction. Patient had CT scan of the abdomen and pelvis on 5/24/2016 which was fine. She had colonoscopy on 5/25/2016 which showed a mass in the hepatic flexure with biopsy positive for invasive carcinoma. No weight loss or decreased appetite. No chest pain or shortness of breath. No headaches no back pain. No other complaints. Last colonoscopy was 5 years ago and was negative. Due to her age and comorbidity, after surgery, she did not receive adjuvant chemotherapy and she was observed  INTERIM HISTORY:   Seen for follow up colon cancer. Doing well clinically. No complaints related to cancer. She unfortunately fell on her right side, she was seen by emergency room physicians and CT scans were done, no fractures were appreciated. Chest chronic compression fractures in her thoracic spine but this has not changed. She is sent to physical therapy because of pain and that is helping significantly. She continues to have difficulty especially with going upstairs.   But she has no symptoms related to colon cancer, node about habits, no GI bleeding, no nausea or vomiting or other abdominal pain.    PAST MEDICAL HISTORY: has a past medical history of Abnormal EMG; Cancer ([facility] [address] Chronic kidney disease; Colon polyp; Compression fracture of lumbar vertebra (HCC) / prednisone related to PMR; Diarrhea; DVT (deep venous thrombosis) ([facility] [address] Emphysema; HTN (hypertension); Hyperlipidemia; Irritable bowel syndrome; Lumbago; Osteoarthritis; Polymyalgia rheumatica ([facility] [address] Rheumatism; and Seborrheic keratoses. PAST SURGICAL HISTORY: has a past surgical history that includes Cataract removal with implant; Glaucoma surgery; Nasal septum surgery; Dental surgery; Appendectomy (1949); Skin cancer excision; Hysterectomy (1973); Tonsillectomy (1941); Colonoscopy ([id number]); Colonoscopy (5/25/2016); colectomy (Right, 06/20/2016); Colonoscopy (12/13/2017); and pr colsc flx w/rmvl of tumor polyp lesion snare tq (N/A, 12/13/2017). CURRENT MEDICATIONS:  has a current medication list which includes the following prescription(s): amlodipine, calcium carbonate, warfarin, carvedilol, atorvastatin, meclizine, loperamide, vitamin b-12, handicap placard, and multiple vitamins-minerals. ALLERGIES:  is allergic to budesonide and hydrochlorothiazide. FAMILY HISTORY: Negative for any hematological or oncological conditions. SOCIAL HISTORY:  reports that she quit smoking about 37 years ago. She has a 30.00 pack-year smoking history. She has never used smokeless tobacco. She reports that she does not drink alcohol or use drugs. REVIEW OF SYSTEMS:     · General: No weakness or fatigue. No unanticipated weight loss or decreased appetite. No fever or chills. · Eyes: No blurred vision, eye pain or double vision. · Ears: No hearing problems or drainage. No tinnitus. · Throat: No sore throat, problems with swallowing or dysphagia. · Respiratory: No cough, sputum or hemoptysis. No shortness of breath. No pleuritic chest pain. · Cardiovascular: No chest pain, orthopnea or PND. No lower extremity edema. No palpitation.

## 2018-10-26 NOTE — LETTER
Eda Myles MD    10/26/2018     [facility] [address]  [address]    Dear Barb Gonzalez : Thank you for referring Mary Hoff, 1935, to me for evaluation. Below are the relevant portions of my assessment and plan of care. 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 CURRENT THERAPY:         S/p Rt hemicolectomy 6/20/16. Considering her age and comorbidity, she was not offered adjuvant chemotherapy and the decision was for surveillance/observation    BRIEF CASE HISTORY:      Ms. Mary Hoff is a very pleasant 80 y.o. female with history of DVT status post IVC filter placement 1 year ago. She is maintained on Coumadin. No PE. Patient noticed rectal bleeding recently. She had blood mixed with the stool. This has been going on for the last few weeks. No abnormal pain. No nausea or vomiting. No constipation. No signs of obstruction. Patient had CT scan of the abdomen and pelvis on 5/24/2016 which was fine. She had colonoscopy on 5/25/2016 which showed a mass in the hepatic flexure with biopsy positive for invasive carcinoma. No weight loss or decreased appetite. No chest pain or shortness of breath. No headaches no back pain. No other complaints. Last colonoscopy was 5 years ago and was negative. Due to her age and comorbidity, after surgery, she did not receive adjuvant chemotherapy and she was observed  INTERIM HISTORY:   Seen for follow up colon cancer. Doing well clinically. No complaints related to cancer. She unfortunately fell on her right side, she was seen by emergency room physicians and CT scans were done, no fractures were appreciated.   Chest chronic compression fractures in her thoracic spine but this has not · Ears: No hearing problems or drainage. No tinnitus. · Throat: No sore throat, problems with swallowing or dysphagia. · Respiratory: No cough, sputum or hemoptysis. No shortness of breath. No pleuritic chest pain. · Cardiovascular: No chest pain, orthopnea or PND. No lower extremity edema. No palpitation. · Gastrointestinal: as above,   · Genitourinary: No dysuria, hematuria, frequency or urgency. · Musculoskeletal: No muscle aches or pains. Chronic back pain. Right-sided pelvic pain related to the recent fall  · Dermatologic: No skin rashes or pruritus. No skin lesions or discolorations. · Psychiatric: No depression, anxiety, or stress or signs of schizophrenia. No change in mood or affect. · Hematologic: No history of bleeding tendency. No bruises or ecchymosis. No history of clotting problems. · Infectious disease: No fever, chills or frequent infections. · Endocrine: No problems with opacity. No polydipsia or polyuria. No temperature intolerance. · Neurologic: No headaches or dizziness. No weakness or numbness of the extremities. No changes in balance, coordination,  memory, mentation, behavior. · Allergic/Immunologic: No nasal congestion or hives. No repeated infections. PHYSICAL EXAM:  The patient is not in acute distress. On a wheelchair Vital signs: Height 5' 4\" (1.626 m), weight 124 lb 12.8 oz (56.6 kg), not currently breastfeeding. HEENT:  Eyes are normal. Ears, nose and throat are normal.  Neck: Supple. No lymph node enlargement. No thyroid enlargement. Trachea is centrally located. Chest:  Clear to auscultation. No wheezes or crepitations. Heart: Regular sinus rhythm. Abdomen: Soft, nontender. No hepatosplenomegaly. No masses. Extremities:  With no edema. Lymph Nodes:  No cervical, axillary or inguinal lymph node enlargement. Neurologic:  Conscious and oriented. No focal neurological deficits.  Psychosocial: No depression, anxiety or stress. Skin: No rashes, bruises or ecchymoses. Review of Diagnostic data:   Lab Results   Component Value Date    WBC 5.9 10/19/2018    HGB 10.9 (L) 10/19/2018    HCT 35.6 (L) 10/19/2018    .1 10/19/2018     10/19/2018       Chemistry        Component Value Date/Time     10/19/2018 1446    K 4.9 10/19/2018 1446     10/19/2018 1446    CO2 23 10/19/2018 1446    BUN 29 (H) 10/19/2018 1446    CREATININE 1.49 (H) 10/19/2018 1446        Component Value Date/Time    CALCIUM 9.0 10/19/2018 1446    ALKPHOS 103 10/19/2018 1446    AST 25 10/19/2018 1446    ALT 17 10/19/2018 1446    BILITOT 0.48 10/19/2018 1446        Lab Results   Component Value Date    CEA 2.7 10/19/2018     CT 7/2018  Impression   NO SIGNIFICANT CHANGE FROM THE STUDY OF 6/10/2016. SMALL PERICARDIAL EFFUSION. 2 AREAS OF SACCULAR DILATATION OF THE INFRARENAL ABDOMINAL AORTA. IVC FILTER. DIVERTICULAR DISEASE OF THE COLON WITHOUT PERICOLIC INFLAMMATORY CHANGE. NO    OBSTRUCTIVE UROPATHY. CHRONIC COMPRESSION FRACTURE OF T11. 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 Anemia secondary to bleeding. Resolved. History of DVT   status post IVC filter placement, maintained on Coumadin. Chronic back pain. CKD   ECOG performance score 2      PLAN:   She is s/p right hemicolectomy. Labs reviewed and discussed. No clinical evidence of recurrence. Advised her to continue working with physical therapy because of her recent fall  She is doing half years out of her surgery, due to performance status and comorbidities, I don't think she is a candidate for any aggressive therapy. We'll continue to follow her conservatively and watch for symptoms of recurrence. I will also watch her CEA. Irene Alcala MD  Hematologist/Medical Oncologist  Cell: [phone]                         If you have questions, please do not hesitate to call me.  I look forward

## 2018-10-29 ENCOUNTER — HOSPITAL ENCOUNTER (OUTPATIENT)
Dept: PHYSICAL THERAPY | Age: 83
Setting detail: THERAPIES SERIES
Discharge: HOME | End: 2018-10-29
Payer: MEDICARE

## 2018-10-29 PROCEDURE — G0283 ELEC STIM OTHER THAN WOUND: HCPCS

## 2018-10-29 PROCEDURE — 97110 THERAPEUTIC EXERCISES: CPT

## 2018-10-29 ASSESSMENT — PAIN SCALES - GENERAL: PAINLEVEL_OUTOF10: 1

## 2018-11-01 ENCOUNTER — HOSPITAL ENCOUNTER (OUTPATIENT)
Dept: PHYSICAL THERAPY | Age: 83
Setting detail: THERAPIES SERIES
Discharge: HOME | End: 2018-11-01
Payer: MEDICARE

## 2018-11-13 ENCOUNTER — HOSPITAL ENCOUNTER (OUTPATIENT)
Dept: PHARMACY | Age: 83
Setting detail: THERAPIES SERIES
Discharge: HOME | End: 2018-11-13
Payer: MEDICARE

## 2018-11-13 DIAGNOSIS — Z79.01 LONG TERM CURRENT USE OF ANTICOAGULANT THERAPY: ICD-10-CM

## 2018-11-13 LAB — INR BLD: 2

## 2018-11-13 PROCEDURE — 99211 OFF/OP EST MAY X REQ PHY/QHP: CPT

## 2018-11-13 PROCEDURE — 85610 PROTHROMBIN TIME: CPT

## 2018-12-04 ENCOUNTER — HOSPITAL ENCOUNTER (OUTPATIENT)
Dept: PHARMACY | Age: 83
Setting detail: THERAPIES SERIES
Discharge: HOME | End: 2018-12-04
Payer: MEDICARE

## 2018-12-04 DIAGNOSIS — I26.09 OTHER PULMONARY EMBOLISM WITH ACUTE COR PULMONALE, UNSPECIFIED CHRONICITY (HCC): ICD-10-CM

## 2018-12-04 DIAGNOSIS — Z79.01 LONG TERM CURRENT USE OF ANTICOAGULANT THERAPY: ICD-10-CM

## 2018-12-04 LAB — INR BLD: 1.8

## 2018-12-04 PROCEDURE — 99211 OFF/OP EST MAY X REQ PHY/QHP: CPT

## 2018-12-04 PROCEDURE — 85610 PROTHROMBIN TIME: CPT

## 2019-01-08 ENCOUNTER — HOSPITAL ENCOUNTER (OUTPATIENT)
Dept: PHARMACY | Age: 84
Setting detail: THERAPIES SERIES
Discharge: HOME | End: 2019-01-08
Payer: MEDICARE

## 2019-01-08 VITALS
BODY MASS INDEX: 20.6 KG/M2 | WEIGHT: 120 LBS | SYSTOLIC BLOOD PRESSURE: 150 MMHG | DIASTOLIC BLOOD PRESSURE: 72 MMHG | HEART RATE: 80 BPM

## 2019-01-08 DIAGNOSIS — Z79.01 LONG TERM CURRENT USE OF ANTICOAGULANT THERAPY: ICD-10-CM

## 2019-01-08 LAB — INR BLD: 2.4

## 2019-01-08 PROCEDURE — 99211 OFF/OP EST MAY X REQ PHY/QHP: CPT

## 2019-01-08 PROCEDURE — 85610 PROTHROMBIN TIME: CPT

## 2019-01-18 NOTE — DISCHARGE SUMMARY
Phone: Brandi          Fax: [phone]                            Outpatient Physical Therapy                                                                    Discharge Summary    Patient: Argelia Camacho  : 1935  CSN #: [id number]   Referring physician: No admitting provider for patient encounter. Referring Practitioner: Dr. Jam Shabazz      Diagnosis: Strain R hip S76.011A      Date Treatment Initiated: 10/19/18  Date of Last Treatment: 10/29/18      PT Visit Information  Onset Date: 10/07/18  PT Insurance Information: Medicare/[employer]  Total # of Visits Approved: 18  Total # of Visits to Date: 3  Plan of Care/Certification Expiration Date: 18  No Show: 0  Canceled Appointment: 1      Frequency/Duration   2 times per week   6 weeks      Treatment Received  [x]HP/CP      [x]Electrical Stim   [x]Therapeutic Exercise      [x]Gait Training  []Aquatics   [x]Ultrasound         [x]Patient Education/HEP   [x]Manual Therapy  []Traction    [x]Neuro-marlon        [x]Soft Tissue Mobs            []Home TENS  []Iontophoresis    []Orthotic casting/fitting      [x]Dry Needling    Assessment  Assessment: Pt. met all short term goals for decreased pain and made good progress toward improved functional mobility and was able to walk in the clinic with quad cane safely at last visit. Will dc patient at this time since no further PT visits were scheduled. Goals  Short term goals  Time Frame for Short term goals: 3 weeks  Short term goal 1: Initiate appropriate modalities to assist in decreasing pain for improved mobility. -MET  Short term goal 2: Pt will report no greater than 7/10 and improved tolerance to ADLs -met    Long term goals  Time Frame for Long term goals : 6 weeks  Long term goal 1: Pt will be independent and compliant with her HEP  Long term goal 2: Pt will report improved pain and no greater than 5/10 in order to tolerate household activities  Long term goal 3: Pt will be able to return to ambulation with her q.c. with good balance and safety      Reason for Discharge  [] Goals Achieved                       [] Poor Follow Through/Attendance                  [x] Optimal Function Achieved     [x] Patient Discharged Self    [] Hospitalization                         [] Physician discharge      Thank you for this referral      Nichole Burkitt, PT, DPT                    Date: 1/18/2019

## 2019-02-19 ENCOUNTER — HOSPITAL ENCOUNTER (OUTPATIENT)
Dept: PHARMACY | Age: 84
Setting detail: THERAPIES SERIES
Discharge: HOME | End: 2019-02-19
Payer: MEDICARE

## 2019-02-19 VITALS
DIASTOLIC BLOOD PRESSURE: 74 MMHG | WEIGHT: 121 LBS | BODY MASS INDEX: 20.77 KG/M2 | HEART RATE: 84 BPM | SYSTOLIC BLOOD PRESSURE: 114 MMHG

## 2019-02-19 DIAGNOSIS — Z79.01 LONG TERM CURRENT USE OF ANTICOAGULANT THERAPY: ICD-10-CM

## 2019-02-19 LAB — INR BLD: 2.6

## 2019-02-19 PROCEDURE — 99211 OFF/OP EST MAY X REQ PHY/QHP: CPT

## 2019-02-19 PROCEDURE — 85610 PROTHROMBIN TIME: CPT

## 2019-04-08 ENCOUNTER — HOSPITAL ENCOUNTER (OUTPATIENT)
Dept: PHARMACY | Age: 84
Setting detail: THERAPIES SERIES
Discharge: HOME | End: 2019-04-08
Payer: MEDICARE

## 2019-04-08 VITALS
WEIGHT: 121 LBS | BODY MASS INDEX: 20.77 KG/M2 | DIASTOLIC BLOOD PRESSURE: 59 MMHG | SYSTOLIC BLOOD PRESSURE: 128 MMHG | HEART RATE: 67 BPM

## 2019-04-08 DIAGNOSIS — Z79.01 LONG TERM CURRENT USE OF ANTICOAGULANT THERAPY: ICD-10-CM

## 2019-04-08 LAB — INR BLD: 1.8

## 2019-04-08 PROCEDURE — 85610 PROTHROMBIN TIME: CPT

## 2019-04-08 PROCEDURE — 99211 OFF/OP EST MAY X REQ PHY/QHP: CPT

## 2019-04-08 NOTE — PATIENT INSTRUCTIONS
Continue to monitor urine and stool. Continue to monitor for signs of bleeding. Return to clinic in 6 weeks. Take warfarin 3.75 mg today.

## 2019-04-08 NOTE — PROGRESS NOTES
Patient states no visible blood in urine and no black tarry stool. No change in other medications. Will return to clinic in 6 weeks patient request.  Patient to see Dr Papi Pena this Wed (4-10-19)about her right eye drooping. Patient will take warfarin 3.75 mg today. Patient is very quiet today. She says she's just tired. Her  is not doing well.

## 2019-04-26 ENCOUNTER — HOSPITAL ENCOUNTER (OUTPATIENT)
Age: 84
Discharge: HOME | End: 2019-04-26
Payer: MEDICARE

## 2019-04-26 DIAGNOSIS — C18.3 MALIGNANT NEOPLASM OF HEPATIC FLEXURE (HCC): ICD-10-CM

## 2019-04-26 DIAGNOSIS — I10 ESSENTIAL HYPERTENSION: ICD-10-CM

## 2019-04-26 LAB
ABSOLUTE EOS #: 0.28 K/UL (ref 0–0.44)
ABSOLUTE IMMATURE GRANULOCYTE: <0.03 K/UL (ref 0–0.3)
ABSOLUTE LYMPH #: 1.46 K/UL (ref 1.1–3.7)
ABSOLUTE MONO #: 0.73 K/UL (ref 0.1–1.2)
ALBUMIN SERPL-MCNC: 3.6 G/DL (ref 3.5–5.2)
ALBUMIN/GLOBULIN RATIO: 1.1 (ref 1–2.5)
ALP BLD-CCNC: 88 U/L (ref 35–104)
ALT SERPL-CCNC: 20 U/L (ref 5–33)
ANION GAP SERPL CALCULATED.3IONS-SCNC: 10 MMOL/L (ref 9–17)
AST SERPL-CCNC: 26 U/L
BASOPHILS # BLD: 1 % (ref 0–2)
BASOPHILS ABSOLUTE: 0.04 K/UL (ref 0–0.2)
BILIRUB SERPL-MCNC: 0.46 MG/DL (ref 0.3–1.2)
BUN BLDV-MCNC: 27 MG/DL (ref 8–23)
BUN/CREAT BLD: 16 (ref 9–20)
CALCIUM SERPL-MCNC: 8.8 MG/DL (ref 8.6–10.4)
CARCINOEMBRYONIC ANTIGEN: 2.9 NG/ML
CHLORIDE BLD-SCNC: 107 MMOL/L (ref 98–107)
CO2: 24 MMOL/L (ref 20–31)
CREAT SERPL-MCNC: 1.69 MG/DL (ref 0.5–0.9)
DIFFERENTIAL TYPE: ABNORMAL
EOSINOPHILS RELATIVE PERCENT: 5 % (ref 1–4)
GFR AFRICAN AMERICAN: 35 ML/MIN
GFR NON-AFRICAN AMERICAN: 29 ML/MIN
GFR SERPL CREATININE-BSD FRML MDRD: ABNORMAL ML/MIN/{1.73_M2}
GFR SERPL CREATININE-BSD FRML MDRD: ABNORMAL ML/MIN/{1.73_M2}
GLUCOSE BLD-MCNC: 109 MG/DL (ref 70–99)
HCT VFR BLD CALC: 35.8 % (ref 36.3–47.1)
HEMOGLOBIN: 11.1 G/DL (ref 11.9–15.1)
IMMATURE GRANULOCYTES: 0 %
LYMPHOCYTES # BLD: 23 % (ref 24–43)
MCH RBC QN AUTO: 30.7 PG (ref 25.2–33.5)
MCHC RBC AUTO-ENTMCNC: 31 G/DL (ref 28.4–34.8)
MCV RBC AUTO: 99.2 FL (ref 82.6–102.9)
MONOCYTES # BLD: 12 % (ref 3–12)
NRBC AUTOMATED: 0 PER 100 WBC
PDW BLD-RTO: 13.8 % (ref 11.8–14.4)
PLATELET # BLD: 156 K/UL (ref 138–453)
PLATELET ESTIMATE: ABNORMAL
PMV BLD AUTO: 12.2 FL (ref 8.1–13.5)
POTASSIUM SERPL-SCNC: 5.3 MMOL/L (ref 3.7–5.3)
RBC # BLD: 3.61 M/UL (ref 3.95–5.11)
RBC # BLD: ABNORMAL 10*6/UL
SEG NEUTROPHILS: 59 % (ref 36–65)
SEGMENTED NEUTROPHILS ABSOLUTE COUNT: 3.75 K/UL (ref 1.5–8.1)
SODIUM BLD-SCNC: 141 MMOL/L (ref 135–144)
TOTAL PROTEIN: 6.9 G/DL (ref 6.4–8.3)
WBC # BLD: 6.3 K/UL (ref 3.5–11.3)
WBC # BLD: ABNORMAL 10*3/UL

## 2019-04-26 PROCEDURE — 80053 COMPREHEN METABOLIC PANEL: CPT

## 2019-04-26 PROCEDURE — 82378 CARCINOEMBRYONIC ANTIGEN: CPT

## 2019-04-26 PROCEDURE — 36415 COLL VENOUS BLD VENIPUNCTURE: CPT

## 2019-04-26 PROCEDURE — 85025 COMPLETE CBC W/AUTO DIFF WBC: CPT

## 2019-05-03 ENCOUNTER — OFFICE VISIT (OUTPATIENT)
Dept: ONCOLOGY | Age: 84
End: 2019-05-03
Payer: MEDICARE

## 2019-05-03 VITALS
WEIGHT: 120 LBS | SYSTOLIC BLOOD PRESSURE: 139 MMHG | BODY MASS INDEX: 20.49 KG/M2 | TEMPERATURE: 98.6 F | RESPIRATION RATE: 18 BRPM | HEIGHT: 64 IN | HEART RATE: 77 BPM | DIASTOLIC BLOOD PRESSURE: 78 MMHG

## 2019-05-03 DIAGNOSIS — N18.30 CKD (CHRONIC KIDNEY DISEASE) STAGE 3, GFR 30-59 ML/MIN (HCC): ICD-10-CM

## 2019-05-03 DIAGNOSIS — C18.3 MALIGNANT NEOPLASM OF HEPATIC FLEXURE (HCC): Primary | ICD-10-CM

## 2019-05-03 PROCEDURE — 99214 OFFICE O/P EST MOD 30 MIN: CPT | Performed by: INTERNAL MEDICINE

## 2019-05-03 PROCEDURE — G8400 PT W/DXA NO RESULTS DOC: HCPCS | Performed by: INTERNAL MEDICINE

## 2019-05-03 PROCEDURE — 1123F ACP DISCUSS/DSCN MKR DOCD: CPT | Performed by: INTERNAL MEDICINE

## 2019-05-03 PROCEDURE — G8420 CALC BMI NORM PARAMETERS: HCPCS | Performed by: INTERNAL MEDICINE

## 2019-05-03 PROCEDURE — 1090F PRES/ABSN URINE INCON ASSESS: CPT | Performed by: INTERNAL MEDICINE

## 2019-05-03 PROCEDURE — 4040F PNEUMOC VAC/ADMIN/RCVD: CPT | Performed by: INTERNAL MEDICINE

## 2019-05-03 PROCEDURE — G8427 DOCREV CUR MEDS BY ELIG CLIN: HCPCS | Performed by: INTERNAL MEDICINE

## 2019-05-03 PROCEDURE — G8598 ASA/ANTIPLAT THER USED: HCPCS | Performed by: INTERNAL MEDICINE

## 2019-05-03 PROCEDURE — 1036F TOBACCO NON-USER: CPT | Performed by: INTERNAL MEDICINE

## 2019-05-03 NOTE — PROGRESS NOTES
Irritable bowel syndrome, Lumbago, Osteoarthritis, Polymyalgia rheumatica ([facility] [address] Rheumatism, and Seborrheic keratoses. PAST SURGICAL HISTORY: has a past surgical history that includes Cataract removal with implant; Glaucoma surgery; Nasal septum surgery; Dental surgery; Appendectomy (1949); Skin cancer excision; Hysterectomy (1973); Tonsillectomy (1941); Colonoscopy ([id number]); Colonoscopy (5/25/2016); colectomy (Right, 06/20/2016); Colonoscopy (12/13/2017); and pr colsc flx w/rmvl of tumor polyp lesion snare tq (N/A, 12/13/2017). CURRENT MEDICATIONS:  has a current medication list which includes the following prescription(s): carvedilol, neomycin-polymyxin-dexameth, amlodipine, warfarin, atorvastatin, calcium carbonate, meclizine, loperamide, vitamin b-12, handicap placard, and multiple vitamins-minerals. ALLERGIES:  is allergic to budesonide and hydrochlorothiazide. FAMILY HISTORY: Negative for any hematological or oncological conditions. SOCIAL HISTORY:  reports that she quit smoking about 38 years ago. She has a 30.00 pack-year smoking history. She has never used smokeless tobacco. She reports that she does not drink alcohol or use drugs. REVIEW OF SYSTEMS:     · General: No weakness or fatigue. No unanticipated weight loss or decreased appetite. No fever or chills. · Eyes: No blurred vision, eye pain or double vision. · Ears: No hearing problems or drainage. No tinnitus. · Throat: No sore throat, problems with swallowing or dysphagia. · Respiratory: No cough, sputum or hemoptysis. No shortness of breath. No pleuritic chest pain. · Cardiovascular: No chest pain, orthopnea or PND. No lower extremity edema. No palpitation. · Gastrointestinal: as above,   · Genitourinary: No dysuria, hematuria, frequency or urgency. · Musculoskeletal: No muscle aches or pains. Chronic back pain. Right-sided pelvic pain related to the recent fall  · Dermatologic: No skin rashes or pruritus.  No skin lesions or discolorations. · Psychiatric: No depression, anxiety, or stress or signs of schizophrenia. No change in mood or affect. · Hematologic: No history of bleeding tendency. No bruises or ecchymosis. No history of clotting problems. · Infectious disease: No fever, chills or frequent infections. · Endocrine: No problems with opacity. No polydipsia or polyuria. No temperature intolerance. · Neurologic: No headaches or dizziness. No weakness or numbness of the extremities. No changes in balance, coordination,  memory, mentation, behavior. · Allergic/Immunologic: No nasal congestion or hives. No repeated infections. PHYSICAL EXAM:  The patient is not in acute distress. On a wheelchair Vital signs: Blood pressure 139/78, pulse 77, temperature 98.6 °F (37 °C), temperature source Temporal, resp. rate 18, height 5' 4\" (1.626 m), weight 120 lb (54.4 kg), not currently breastfeeding. HEENT:  Eyes are normal. Ears, nose and throat are normal.  Neck: Supple. No lymph node enlargement. No thyroid enlargement. Trachea is centrally located. Chest:  Clear to auscultation. No wheezes or crepitations. Heart: Regular sinus rhythm. Abdomen: Soft, nontender. No hepatosplenomegaly. No masses. Extremities:  With no edema. Lymph Nodes:  No cervical, axillary or inguinal lymph node enlargement. Neurologic:  Conscious and oriented. No focal neurological deficits. Psychosocial: No depression, anxiety or stress. Skin: No rashes, bruises or ecchymoses.       Review of Diagnostic data:   Lab Results   Component Value Date    WBC 6.3 04/26/2019    HGB 11.1 (L) 04/26/2019    HCT 35.8 (L) 04/26/2019    MCV 99.2 04/26/2019     04/26/2019       Chemistry        Component Value Date/Time     04/26/2019 1228    K 5.3 04/26/2019 1228     04/26/2019 1228    CO2 24 04/26/2019 1228    BUN 27 (H) 04/26/2019 1228    CREATININE 1.69 (H) 04/26/2019 1228        Component Value Date/Time    CALCIUM 8.8 04/26/2019 1228    ALKPHOS 88 04/26/2019 1228    AST 26 04/26/2019 1228    ALT 20 04/26/2019 1228    BILITOT 0.46 04/26/2019 1228        Lab Results   Component Value Date    CEA 2.9 04/26/2019     CT 7/2018  Impression   NO SIGNIFICANT CHANGE FROM THE STUDY OF 6/10/2016. SMALL PERICARDIAL EFFUSION. 2 AREAS OF SACCULAR DILATATION OF THE INFRARENAL ABDOMINAL AORTA. IVC FILTER. DIVERTICULAR DISEASE OF THE COLON WITHOUT PERICOLIC INFLAMMATORY CHANGE. NO    OBSTRUCTIVE UROPATHY. CHRONIC COMPRESSION FRACTURE OF T11. 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 Anemia secondary to bleeding. Resolved. History of DVT   status post IVC filter placement, maintained on Coumadin. Chronic back pain. CKD   ECOG performance score 1  Anemia of chronic renal insufficiency      PLAN:   She is s/p right hemicolectomy. Labs reviewed and discussed. No clinical evidence of recurrence. Doing fairly well clinically. We'll continue to follow her conservatively and watch for symptoms of recurrence. I will also watch her CEA. Patient has anemia of chronic renal insufficiency. No treatment needed at this level. It will be monitored. Patient will be seen again in 6 months with repeated labs. Sooner if she wants any problems.                                   [address] [facility]                          Cell: [phone]

## 2019-05-20 ENCOUNTER — HOSPITAL ENCOUNTER (OUTPATIENT)
Dept: PHARMACY | Age: 84
Setting detail: THERAPIES SERIES
Discharge: HOME | End: 2019-05-20
Payer: MEDICARE

## 2019-05-20 VITALS
DIASTOLIC BLOOD PRESSURE: 69 MMHG | WEIGHT: 118 LBS | HEART RATE: 75 BPM | SYSTOLIC BLOOD PRESSURE: 123 MMHG | BODY MASS INDEX: 20.25 KG/M2

## 2019-05-20 DIAGNOSIS — I26.99 OTHER PULMONARY EMBOLISM WITHOUT ACUTE COR PULMONALE, UNSPECIFIED CHRONICITY (HCC): ICD-10-CM

## 2019-05-20 DIAGNOSIS — Z79.01 LONG TERM CURRENT USE OF ANTICOAGULANT THERAPY: ICD-10-CM

## 2019-05-20 LAB — INR BLD: 2.3

## 2019-05-20 PROCEDURE — 99211 OFF/OP EST MAY X REQ PHY/QHP: CPT

## 2019-05-20 PROCEDURE — 85610 PROTHROMBIN TIME: CPT

## 2019-07-01 ENCOUNTER — HOSPITAL ENCOUNTER (OUTPATIENT)
Dept: PHARMACY | Age: 84
Setting detail: THERAPIES SERIES
Discharge: HOME | End: 2019-07-01
Payer: MEDICARE

## 2019-07-01 VITALS
WEIGHT: 118.2 LBS | SYSTOLIC BLOOD PRESSURE: 139 MMHG | DIASTOLIC BLOOD PRESSURE: 75 MMHG | HEART RATE: 76 BPM | BODY MASS INDEX: 20.29 KG/M2

## 2019-07-01 DIAGNOSIS — Z79.01 LONG TERM CURRENT USE OF ANTICOAGULANT THERAPY: ICD-10-CM

## 2019-07-01 LAB — INR BLD: 1.8

## 2019-07-01 PROCEDURE — 99211 OFF/OP EST MAY X REQ PHY/QHP: CPT

## 2019-07-01 PROCEDURE — 85610 PROTHROMBIN TIME: CPT

## 2019-07-31 PROBLEM — S00.03XA CONTUSION OF SCALP: Status: ACTIVE | Noted: 2019-07-31

## 2019-08-13 ENCOUNTER — HOSPITAL ENCOUNTER (OUTPATIENT)
Dept: PHARMACY | Age: 84
Setting detail: THERAPIES SERIES
Discharge: HOME | End: 2019-08-13
Payer: MEDICARE

## 2019-08-13 VITALS
BODY MASS INDEX: 20.25 KG/M2 | SYSTOLIC BLOOD PRESSURE: 133 MMHG | DIASTOLIC BLOOD PRESSURE: 73 MMHG | HEART RATE: 71 BPM | WEIGHT: 118 LBS

## 2019-08-13 DIAGNOSIS — Z79.01 LONG TERM CURRENT USE OF ANTICOAGULANT THERAPY: ICD-10-CM

## 2019-08-13 DIAGNOSIS — I26.09 OTHER PULMONARY EMBOLISM WITH ACUTE COR PULMONALE, UNSPECIFIED CHRONICITY (HCC): ICD-10-CM

## 2019-08-13 LAB — INR BLD: 1.8

## 2019-08-13 PROCEDURE — 85610 PROTHROMBIN TIME: CPT | Performed by: FAMILY MEDICINE

## 2019-08-13 PROCEDURE — 99212 OFFICE O/P EST SF 10 MIN: CPT | Performed by: FAMILY MEDICINE

## 2019-08-13 NOTE — PROGRESS NOTES
Fingerstick INR drawn per clinic protocol. Patient states no visible blood in urine and no black tarry stool. Denies any missed doses of warfarin. No change in other maintenance medications or in diet. Will increase current warfarin regimen and recheck INR in 2 week(s). Patient states she loves to eat salads and iron slaw and has been having more of both recently. She would like to continue eating these types of foods therefore we will increase her weekly dosage 5% to 2.5mg warfarin on Fridays only and 3.75mg all other days. 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 Patient did see Dr Angie Botello about 5 weeks ago for a contusion on her head which is still sore. Patient was working in [employer] and stood up and hit the back of her head on the metal bird feeder. She did not have bleeding and states Dr Angie Botello indicates she would have tenderness for 6-8 weeks. Patient acknowledges working in consult agreement with pharmacist as referred by his/her physician.

## 2019-08-26 ENCOUNTER — HOSPITAL ENCOUNTER (OUTPATIENT)
Dept: PHARMACY | Age: 84
Setting detail: THERAPIES SERIES
Discharge: HOME | End: 2019-08-26
Payer: MEDICARE

## 2019-08-26 VITALS
BODY MASS INDEX: 20.25 KG/M2 | DIASTOLIC BLOOD PRESSURE: 68 MMHG | WEIGHT: 118 LBS | SYSTOLIC BLOOD PRESSURE: 123 MMHG | HEART RATE: 69 BPM

## 2019-08-26 DIAGNOSIS — Z79.01 LONG TERM CURRENT USE OF ANTICOAGULANT THERAPY: ICD-10-CM

## 2019-08-26 LAB — INR BLD: 2.3

## 2019-08-26 PROCEDURE — 85610 PROTHROMBIN TIME: CPT

## 2019-08-26 PROCEDURE — 99211 OFF/OP EST MAY X REQ PHY/QHP: CPT

## 2019-09-26 ENCOUNTER — HOSPITAL ENCOUNTER (OUTPATIENT)
Dept: CT IMAGING | Age: 84
Discharge: HOME | End: 2019-09-28
Payer: MEDICARE

## 2019-09-26 ENCOUNTER — HOSPITAL ENCOUNTER (OUTPATIENT)
Dept: LAB | Age: 84
Discharge: HOME | End: 2019-09-26
Payer: MEDICARE

## 2019-09-26 DIAGNOSIS — I10 ESSENTIAL HYPERTENSION: ICD-10-CM

## 2019-09-26 DIAGNOSIS — G44.311 INTRACTABLE ACUTE POST-TRAUMATIC HEADACHE: ICD-10-CM

## 2019-09-26 LAB
ALBUMIN SERPL-MCNC: 3.8 G/DL (ref 3.5–5.2)
ALBUMIN/GLOBULIN RATIO: 1.2 (ref 1–2.5)
ALP BLD-CCNC: 79 U/L (ref 35–104)
ALT SERPL-CCNC: 19 U/L (ref 5–33)
ANION GAP SERPL CALCULATED.3IONS-SCNC: 14 MMOL/L (ref 9–17)
AST SERPL-CCNC: 25 U/L
BILIRUB SERPL-MCNC: 0.48 MG/DL (ref 0.3–1.2)
BUN BLDV-MCNC: 25 MG/DL (ref 8–23)
BUN/CREAT BLD: 15 (ref 9–20)
CALCIUM SERPL-MCNC: 8.9 MG/DL (ref 8.6–10.4)
CHLORIDE BLD-SCNC: 107 MMOL/L (ref 98–107)
CO2: 21 MMOL/L (ref 20–31)
CREAT SERPL-MCNC: 1.67 MG/DL (ref 0.5–0.9)
GFR AFRICAN AMERICAN: 35 ML/MIN
GFR NON-AFRICAN AMERICAN: 29 ML/MIN
GFR SERPL CREATININE-BSD FRML MDRD: ABNORMAL ML/MIN/{1.73_M2}
GFR SERPL CREATININE-BSD FRML MDRD: ABNORMAL ML/MIN/{1.73_M2}
GLUCOSE BLD-MCNC: 105 MG/DL (ref 70–99)
POTASSIUM SERPL-SCNC: 5 MMOL/L (ref 3.7–5.3)
SODIUM BLD-SCNC: 142 MMOL/L (ref 135–144)
TOTAL PROTEIN: 7 G/DL (ref 6.4–8.3)

## 2019-09-26 PROCEDURE — 80053 COMPREHEN METABOLIC PANEL: CPT

## 2019-09-26 PROCEDURE — 36415 COLL VENOUS BLD VENIPUNCTURE: CPT

## 2019-09-26 PROCEDURE — 70450 CT HEAD/BRAIN W/O DYE: CPT

## 2019-10-10 ENCOUNTER — HOSPITAL ENCOUNTER (OUTPATIENT)
Dept: PHARMACY | Age: 84
Setting detail: THERAPIES SERIES
Discharge: HOME | End: 2019-10-10
Payer: MEDICARE

## 2019-10-10 VITALS
WEIGHT: 122 LBS | BODY MASS INDEX: 20.94 KG/M2 | SYSTOLIC BLOOD PRESSURE: 135 MMHG | HEART RATE: 77 BPM | DIASTOLIC BLOOD PRESSURE: 88 MMHG

## 2019-10-10 DIAGNOSIS — Z79.01 LONG TERM CURRENT USE OF ANTICOAGULANT THERAPY: ICD-10-CM

## 2019-10-10 LAB — INR BLD: 3.3

## 2019-10-10 PROCEDURE — 85610 PROTHROMBIN TIME: CPT

## 2019-10-10 PROCEDURE — 99212 OFFICE O/P EST SF 10 MIN: CPT

## 2019-10-15 ENCOUNTER — HOSPITAL ENCOUNTER (OUTPATIENT)
Age: 84
Discharge: HOME | End: 2019-10-17
Payer: MEDICARE

## 2019-10-15 ENCOUNTER — HOSPITAL ENCOUNTER (OUTPATIENT)
Dept: GENERAL RADIOLOGY | Age: 84
Discharge: HOME | End: 2019-10-17
Payer: MEDICARE

## 2019-10-15 DIAGNOSIS — M54.6 ACUTE MIDLINE THORACIC BACK PAIN: ICD-10-CM

## 2019-10-15 PROCEDURE — 72072 X-RAY EXAM THORAC SPINE 3VWS: CPT

## 2019-11-01 ENCOUNTER — HOSPITAL ENCOUNTER (OUTPATIENT)
Dept: PHARMACY | Age: 84
Setting detail: THERAPIES SERIES
Discharge: HOME | End: 2019-11-01
Payer: MEDICARE

## 2019-11-01 ENCOUNTER — HOSPITAL ENCOUNTER (OUTPATIENT)
Age: 84
Discharge: HOME | End: 2019-11-01
Payer: MEDICARE

## 2019-11-01 VITALS
SYSTOLIC BLOOD PRESSURE: 143 MMHG | BODY MASS INDEX: 20.43 KG/M2 | DIASTOLIC BLOOD PRESSURE: 72 MMHG | WEIGHT: 119 LBS | HEART RATE: 75 BPM

## 2019-11-01 DIAGNOSIS — Z79.01 LONG TERM CURRENT USE OF ANTICOAGULANT THERAPY: ICD-10-CM

## 2019-11-01 DIAGNOSIS — C18.3 MALIGNANT NEOPLASM OF HEPATIC FLEXURE (HCC): ICD-10-CM

## 2019-11-01 LAB
ABSOLUTE EOS #: 0.2 K/UL (ref 0–0.44)
ABSOLUTE IMMATURE GRANULOCYTE: <0.03 K/UL (ref 0–0.3)
ABSOLUTE LYMPH #: 1.84 K/UL (ref 1.1–3.7)
ABSOLUTE MONO #: 0.71 K/UL (ref 0.1–1.2)
ALBUMIN SERPL-MCNC: 3.7 G/DL (ref 3.5–5.2)
ALBUMIN/GLOBULIN RATIO: 1.2 (ref 1–2.5)
ALP BLD-CCNC: 88 U/L (ref 35–104)
ALT SERPL-CCNC: 18 U/L (ref 5–33)
ANION GAP SERPL CALCULATED.3IONS-SCNC: 11 MMOL/L (ref 9–17)
AST SERPL-CCNC: 25 U/L
BASOPHILS # BLD: 1 % (ref 0–2)
BASOPHILS ABSOLUTE: 0.06 K/UL (ref 0–0.2)
BILIRUB SERPL-MCNC: 0.4 MG/DL (ref 0.3–1.2)
BUN BLDV-MCNC: 25 MG/DL (ref 8–23)
BUN/CREAT BLD: 16 (ref 9–20)
CALCIUM SERPL-MCNC: 9.2 MG/DL (ref 8.6–10.4)
CARCINOEMBRYONIC ANTIGEN: 3 NG/ML
CHLORIDE BLD-SCNC: 106 MMOL/L (ref 98–107)
CO2: 23 MMOL/L (ref 20–31)
CREAT SERPL-MCNC: 1.57 MG/DL (ref 0.5–0.9)
DIFFERENTIAL TYPE: ABNORMAL
EOSINOPHILS RELATIVE PERCENT: 3 % (ref 1–4)
GFR AFRICAN AMERICAN: 38 ML/MIN
GFR NON-AFRICAN AMERICAN: 31 ML/MIN
GFR SERPL CREATININE-BSD FRML MDRD: ABNORMAL ML/MIN/{1.73_M2}
GFR SERPL CREATININE-BSD FRML MDRD: ABNORMAL ML/MIN/{1.73_M2}
GLUCOSE BLD-MCNC: 104 MG/DL (ref 70–99)
HCT VFR BLD CALC: 37.2 % (ref 36.3–47.1)
HEMOGLOBIN: 11.5 G/DL (ref 11.9–15.1)
IMMATURE GRANULOCYTES: 0 %
INR BLD: 4
LYMPHOCYTES # BLD: 28 % (ref 24–43)
MCH RBC QN AUTO: 30.9 PG (ref 25.2–33.5)
MCHC RBC AUTO-ENTMCNC: 30.9 G/DL (ref 28.4–34.8)
MCV RBC AUTO: 100 FL (ref 82.6–102.9)
MONOCYTES # BLD: 11 % (ref 3–12)
NRBC AUTOMATED: 0 PER 100 WBC
PDW BLD-RTO: 13.4 % (ref 11.8–14.4)
PLATELET # BLD: 202 K/UL (ref 138–453)
PLATELET ESTIMATE: ABNORMAL
PMV BLD AUTO: 11.3 FL (ref 8.1–13.5)
POTASSIUM SERPL-SCNC: 5 MMOL/L (ref 3.7–5.3)
RBC # BLD: 3.72 M/UL (ref 3.95–5.11)
RBC # BLD: ABNORMAL 10*6/UL
SEG NEUTROPHILS: 57 % (ref 36–65)
SEGMENTED NEUTROPHILS ABSOLUTE COUNT: 3.82 K/UL (ref 1.5–8.1)
SODIUM BLD-SCNC: 140 MMOL/L (ref 135–144)
TOTAL PROTEIN: 6.9 G/DL (ref 6.4–8.3)
WBC # BLD: 6.7 K/UL (ref 3.5–11.3)
WBC # BLD: ABNORMAL 10*3/UL

## 2019-11-01 PROCEDURE — 85025 COMPLETE CBC W/AUTO DIFF WBC: CPT

## 2019-11-01 PROCEDURE — 85610 PROTHROMBIN TIME: CPT

## 2019-11-01 PROCEDURE — 99212 OFFICE O/P EST SF 10 MIN: CPT

## 2019-11-01 PROCEDURE — 80053 COMPREHEN METABOLIC PANEL: CPT

## 2019-11-01 PROCEDURE — 36415 COLL VENOUS BLD VENIPUNCTURE: CPT

## 2019-11-01 PROCEDURE — 82378 CARCINOEMBRYONIC ANTIGEN: CPT

## 2019-11-08 ENCOUNTER — OFFICE VISIT (OUTPATIENT)
Dept: ONCOLOGY | Age: 84
End: 2019-11-08
Payer: MEDICARE

## 2019-11-08 VITALS
TEMPERATURE: 98.4 F | RESPIRATION RATE: 18 BRPM | DIASTOLIC BLOOD PRESSURE: 73 MMHG | WEIGHT: 119 LBS | BODY MASS INDEX: 20.32 KG/M2 | HEART RATE: 81 BPM | HEIGHT: 64 IN | SYSTOLIC BLOOD PRESSURE: 118 MMHG

## 2019-11-08 DIAGNOSIS — C18.3 MALIGNANT NEOPLASM OF HEPATIC FLEXURE (HCC): Primary | ICD-10-CM

## 2019-11-08 DIAGNOSIS — N18.30 CKD (CHRONIC KIDNEY DISEASE) STAGE 3, GFR 30-59 ML/MIN (HCC): ICD-10-CM

## 2019-11-08 PROCEDURE — G8427 DOCREV CUR MEDS BY ELIG CLIN: HCPCS | Performed by: INTERNAL MEDICINE

## 2019-11-08 PROCEDURE — G8598 ASA/ANTIPLAT THER USED: HCPCS | Performed by: INTERNAL MEDICINE

## 2019-11-08 PROCEDURE — G8420 CALC BMI NORM PARAMETERS: HCPCS | Performed by: INTERNAL MEDICINE

## 2019-11-08 PROCEDURE — 1090F PRES/ABSN URINE INCON ASSESS: CPT | Performed by: INTERNAL MEDICINE

## 2019-11-08 PROCEDURE — G8482 FLU IMMUNIZE ORDER/ADMIN: HCPCS | Performed by: INTERNAL MEDICINE

## 2019-11-08 PROCEDURE — 1123F ACP DISCUSS/DSCN MKR DOCD: CPT | Performed by: INTERNAL MEDICINE

## 2019-11-08 PROCEDURE — 99214 OFFICE O/P EST MOD 30 MIN: CPT | Performed by: INTERNAL MEDICINE

## 2019-11-08 PROCEDURE — 1036F TOBACCO NON-USER: CPT | Performed by: INTERNAL MEDICINE

## 2019-11-08 PROCEDURE — 4040F PNEUMOC VAC/ADMIN/RCVD: CPT | Performed by: INTERNAL MEDICINE

## 2019-11-08 PROCEDURE — G8400 PT W/DXA NO RESULTS DOC: HCPCS | Performed by: INTERNAL MEDICINE

## 2019-11-14 ENCOUNTER — HOSPITAL ENCOUNTER (OUTPATIENT)
Dept: PHARMACY | Age: 84
Setting detail: THERAPIES SERIES
Discharge: HOME | End: 2019-11-14
Payer: MEDICARE

## 2019-11-14 VITALS
BODY MASS INDEX: 20.77 KG/M2 | DIASTOLIC BLOOD PRESSURE: 84 MMHG | SYSTOLIC BLOOD PRESSURE: 141 MMHG | HEART RATE: 76 BPM | WEIGHT: 121 LBS

## 2019-11-14 DIAGNOSIS — Z79.01 LONG TERM CURRENT USE OF ANTICOAGULANT THERAPY: ICD-10-CM

## 2019-11-14 LAB — INR BLD: 3.9

## 2019-11-14 PROCEDURE — 99212 OFFICE O/P EST SF 10 MIN: CPT

## 2019-11-14 PROCEDURE — 85610 PROTHROMBIN TIME: CPT

## 2019-12-02 ENCOUNTER — HOSPITAL ENCOUNTER (OUTPATIENT)
Dept: PHARMACY | Age: 84
Setting detail: THERAPIES SERIES
Discharge: HOME | End: 2019-12-02
Payer: MEDICARE

## 2019-12-02 ENCOUNTER — HOSPITAL ENCOUNTER (OUTPATIENT)
Age: 84
Discharge: HOME | End: 2019-12-02
Payer: MEDICARE

## 2019-12-02 VITALS
SYSTOLIC BLOOD PRESSURE: 140 MMHG | WEIGHT: 118 LBS | HEART RATE: 80 BPM | DIASTOLIC BLOOD PRESSURE: 77 MMHG | BODY MASS INDEX: 20.25 KG/M2

## 2019-12-02 DIAGNOSIS — Z13.220 SCREENING CHOLESTEROL LEVEL: ICD-10-CM

## 2019-12-02 DIAGNOSIS — N18.30 CKD (CHRONIC KIDNEY DISEASE) STAGE 3, GFR 30-59 ML/MIN (HCC): ICD-10-CM

## 2019-12-02 DIAGNOSIS — Z79.01 LONG TERM CURRENT USE OF ANTICOAGULANT THERAPY: ICD-10-CM

## 2019-12-02 DIAGNOSIS — I10 ESSENTIAL HYPERTENSION: ICD-10-CM

## 2019-12-02 LAB
ALBUMIN SERPL-MCNC: 3.8 G/DL (ref 3.5–5.2)
ALBUMIN/GLOBULIN RATIO: 1.2 (ref 1–2.5)
ALP BLD-CCNC: 82 U/L (ref 35–104)
ALT SERPL-CCNC: 17 U/L (ref 5–33)
ANION GAP SERPL CALCULATED.3IONS-SCNC: 9 MMOL/L (ref 9–17)
AST SERPL-CCNC: 26 U/L
BILIRUB SERPL-MCNC: 0.69 MG/DL (ref 0.3–1.2)
BUN BLDV-MCNC: 27 MG/DL (ref 8–23)
BUN/CREAT BLD: 18 (ref 9–20)
CALCIUM SERPL-MCNC: 9.2 MG/DL (ref 8.6–10.4)
CHLORIDE BLD-SCNC: 107 MMOL/L (ref 98–107)
CHOLESTEROL/HDL RATIO: 1.9
CHOLESTEROL: 127 MG/DL
CO2: 22 MMOL/L (ref 20–31)
CREAT SERPL-MCNC: 1.5 MG/DL (ref 0.5–0.9)
GFR AFRICAN AMERICAN: 40 ML/MIN
GFR NON-AFRICAN AMERICAN: 33 ML/MIN
GFR SERPL CREATININE-BSD FRML MDRD: ABNORMAL ML/MIN/{1.73_M2}
GFR SERPL CREATININE-BSD FRML MDRD: ABNORMAL ML/MIN/{1.73_M2}
GLUCOSE FASTING: 109 MG/DL (ref 70–99)
HDLC SERPL-MCNC: 66 MG/DL
INR BLD: 2.7
LDL CHOLESTEROL: 41 MG/DL (ref 0–130)
POTASSIUM SERPL-SCNC: 5 MMOL/L (ref 3.7–5.3)
SODIUM BLD-SCNC: 138 MMOL/L (ref 135–144)
TOTAL PROTEIN: 7 G/DL (ref 6.4–8.3)
TRIGL SERPL-MCNC: 99 MG/DL
VLDLC SERPL CALC-MCNC: NORMAL MG/DL (ref 1–30)

## 2019-12-02 PROCEDURE — 80053 COMPREHEN METABOLIC PANEL: CPT

## 2019-12-02 PROCEDURE — 85610 PROTHROMBIN TIME: CPT

## 2019-12-02 PROCEDURE — 99211 OFF/OP EST MAY X REQ PHY/QHP: CPT

## 2019-12-02 PROCEDURE — 80061 LIPID PANEL: CPT

## 2019-12-02 PROCEDURE — 36415 COLL VENOUS BLD VENIPUNCTURE: CPT

## 2020-01-13 ENCOUNTER — HOSPITAL ENCOUNTER (OUTPATIENT)
Dept: PHARMACY | Age: 85
Setting detail: THERAPIES SERIES
Discharge: HOME | End: 2020-01-13
Payer: MEDICARE

## 2020-01-13 VITALS
WEIGHT: 120 LBS | SYSTOLIC BLOOD PRESSURE: 139 MMHG | HEART RATE: 77 BPM | BODY MASS INDEX: 20.6 KG/M2 | DIASTOLIC BLOOD PRESSURE: 76 MMHG

## 2020-01-13 LAB — INR BLD: 2.5

## 2020-01-13 PROCEDURE — 99211 OFF/OP EST MAY X REQ PHY/QHP: CPT

## 2020-01-13 PROCEDURE — 85610 PROTHROMBIN TIME: CPT

## 2020-01-13 NOTE — PATIENT INSTRUCTIONS
Continue to monitor urine and stool. Continue to monitor for signs of bleeding. Return to clinic in 6 weeks. Continue warfarin dosage of 1.5 tablets for 3.75 mg on M,W,F and whole 2.5 mg tablet all other days.

## 2020-01-14 ENCOUNTER — TELEPHONE (OUTPATIENT)
Dept: PHARMACY | Age: 85
End: 2020-01-14

## 2020-01-14 NOTE — TELEPHONE ENCOUNTER
Tess from Dr Saurabh Rosario' office called to clarify referral details. Patient has recent history of PE and DVT and Dr Saurabh Rosario' would like to continue warfarin indefinitely. Patient has a check up appointment with him in April and they will discuss anticoagulation at that time. I have updated referral to reflect this information and faxed to HIM.

## 2020-03-05 ENCOUNTER — HOSPITAL ENCOUNTER (OUTPATIENT)
Dept: PHARMACY | Age: 85
Setting detail: THERAPIES SERIES
Discharge: HOME | End: 2020-03-05
Payer: MEDICARE

## 2020-03-05 VITALS
BODY MASS INDEX: 20.6 KG/M2 | SYSTOLIC BLOOD PRESSURE: 134 MMHG | HEART RATE: 75 BPM | DIASTOLIC BLOOD PRESSURE: 72 MMHG | WEIGHT: 120 LBS

## 2020-03-05 LAB — INR BLD: 2

## 2020-03-05 PROCEDURE — 85610 PROTHROMBIN TIME: CPT

## 2020-03-05 PROCEDURE — 99211 OFF/OP EST MAY X REQ PHY/QHP: CPT

## 2020-03-05 NOTE — PROGRESS NOTES
Patient states no visible blood in urine and no black tarry stool.  No change in other medications.  Will return to clinic in [facility] dosage of 1.5 tablets for 3.75 mg on M,W,F and whole 2.5 mg tablet all other days. Tess from Dr Melquiades Fuentes' office called (1/14/20) to clarify referral details. Patient has recent history of PE and DVT and Dr Melquiades Fuentes' would like to continue warfarin indefinitely. Patient has a check up appointment with him in April and they will discuss anticoagulation at that time. I have updated referral to reflect this information and faxed to HIM.

## 2020-04-14 ENCOUNTER — TELEPHONE (OUTPATIENT)
Dept: PHARMACY | Age: 85
End: 2020-04-14

## 2020-04-14 NOTE — TELEPHONE ENCOUNTER
Linette Lemus contacted clinic requesting details for appt on Thursday. I have screened patient at this time and provided instruction for entering facility. Patient states understanding and does not have a mask at this time.

## 2020-04-16 ENCOUNTER — APPOINTMENT (OUTPATIENT)
Dept: PHARMACY | Age: 85
End: 2020-04-16
Payer: MEDICARE

## 2020-04-16 ENCOUNTER — HOSPITAL ENCOUNTER (OUTPATIENT)
Dept: PHARMACY | Age: 85
Setting detail: THERAPIES SERIES
Discharge: HOME | End: 2020-04-16
Payer: MEDICARE

## 2020-04-16 DIAGNOSIS — I82.501 CHRONIC DEEP VEIN THROMBOSIS (DVT) OF RIGHT LOWER EXTREMITY, UNSPECIFIED VEIN (HCC): ICD-10-CM

## 2020-04-16 LAB
INR REFERENCE RANGE:: ABNORMAL
POC INR: 2.2 (ref 0.9–1.2)

## 2020-04-16 PROCEDURE — 36415 COLL VENOUS BLD VENIPUNCTURE: CPT

## 2020-04-16 PROCEDURE — 85610 PROTHROMBIN TIME: CPT

## 2020-04-16 PROCEDURE — 99211 OFF/OP EST MAY X REQ PHY/QHP: CPT

## 2020-04-16 NOTE — PROGRESS NOTES
Coagucheck finger stick INR resulted by [facility] lab today is 2.2. Patient states no visible blood in urine and no black tarry stool. No change in other medications. Will return to clinic in 8 weeks. Patient will continue warfarin dosage of 1.5 tablets for 3.75 mg on M,W,F and whole 2.5 mg tablet all other days. Patient verbalizes via telephone call understanding of warfarin dosing instruction. CLINICAL PHARMACY CONSULT: MED RECONCILIATION/REVIEW ADDENDUM    For Pharmacy Admin Tracking Only    PHSO: Yes  Total # of Interventions Recommended: 0    - Maintenance Safety Lab Monitoring #: 1  Recommended intervention potential cost savings:   Accepted intervention potential cost savings:    Total Interventions Accepted: 0  Time Spent (min): 30    Kofi AzevedoD

## 2020-06-10 ENCOUNTER — TELEPHONE (OUTPATIENT)
Dept: PHARMACY | Age: 85
End: 2020-06-10

## 2020-06-11 ENCOUNTER — HOSPITAL ENCOUNTER (OUTPATIENT)
Dept: PHARMACY | Age: 85
Setting detail: THERAPIES SERIES
Discharge: HOME | End: 2020-06-11
Payer: MEDICARE

## 2020-06-11 VITALS — TEMPERATURE: 97.8 F

## 2020-06-11 LAB — INR BLD: 2

## 2020-06-11 PROCEDURE — 99211 OFF/OP EST MAY X REQ PHY/QHP: CPT

## 2020-06-11 PROCEDURE — 85610 PROTHROMBIN TIME: CPT

## 2020-06-11 NOTE — PROGRESS NOTES
Patient states no visible blood in urine and no black tarry stool. No change in other medications. Will return to clinic in 6 weeks. Patient states she is very busy and needs to go to [address]. She is getting new kitchen phyllis. Patient will continue   warfarin dosage of 1.5 tablets for 3.75 mg on M,W,F and whole 2.5 mg tablet all other days.      CLINICAL PHARMACY CONSULT: MED RECONCILIATION/REVIEW ADDENDUM    For Pharmacy Admin Tracking Only    PHSO: Yes  Total # of Interventions Recommended: 0    - Maintenance Safety Lab Monitoring #: 1  Recommended intervention potential cost savings:   Accepted intervention potential cost savings:    Total Interventions Accepted: 0  Time Spent (min): 30    James Mak PharmD

## 2020-07-22 ENCOUNTER — TELEPHONE (OUTPATIENT)
Dept: PHARMACY | Age: 85
End: 2020-07-22

## 2020-07-23 ENCOUNTER — HOSPITAL ENCOUNTER (OUTPATIENT)
Dept: PHARMACY | Age: 85
Setting detail: THERAPIES SERIES
Discharge: HOME | End: 2020-07-23
Payer: MEDICARE

## 2020-07-23 VITALS — TEMPERATURE: 99.5 F

## 2020-07-23 LAB — INR BLD: 2.1

## 2020-07-23 PROCEDURE — 85610 PROTHROMBIN TIME: CPT

## 2020-07-23 PROCEDURE — 99211 OFF/OP EST MAY X REQ PHY/QHP: CPT

## 2020-07-23 NOTE — PROGRESS NOTES
Patient states no visible blood in urine and no black tarry stool.  No change in other medications.  Will return to clinic in [place] Patient will continue   warfarin dosage of 1.5 tablets for 3.75 mg on M,W,F and whole 2.5 mg tablet all other days.   Saw patient DULCE. CLINICAL PHARMACY CONSULT: MED RECONCILIATION/REVIEW ADDENDUM    For Pharmacy Admin Tracking Only    PHSO: Yes  Total # of Interventions Recommended: 0    - Maintenance Safety Lab Monitoring #: 1  Recommended intervention potential cost savings:   Accepted intervention potential cost savings:    Total Interventions Accepted: 1  Time Spent (min): 30    Cristian Rose, PharmD

## 2020-09-02 ENCOUNTER — TELEPHONE (OUTPATIENT)
Dept: PHARMACY | Age: 85
End: 2020-09-02

## 2020-09-02 NOTE — TELEPHONE ENCOUNTER
Recent Travel Screening and Travel History documentation:     Travel Screening     Question   Response    In the last month, have you been in contact with someone who was confirmed or suspected to have Coronavirus / COVID-19? No / Unsure    Do you have any of the following symptoms? None of these    Have you traveled internationally in the last month? No      Travel History   Travel since 08/02/20     No documented travel since 08/02/20         Patient denies s/s covid/travel screen. Provided instruction for curbside INR check/appointment. Patient states understanding.

## 2020-09-03 ENCOUNTER — HOSPITAL ENCOUNTER (OUTPATIENT)
Dept: PHARMACY | Age: 85
Setting detail: THERAPIES SERIES
Discharge: HOME | End: 2020-09-03
Payer: MEDICARE

## 2020-09-03 VITALS — TEMPERATURE: 98.5 F

## 2020-09-03 LAB — INR BLD: 2

## 2020-09-03 PROCEDURE — 99211 OFF/OP EST MAY X REQ PHY/QHP: CPT

## 2020-09-03 PROCEDURE — 85610 PROTHROMBIN TIME: CPT

## 2020-09-03 NOTE — PROGRESS NOTES
Patient states no visible blood in urine and no black tarry stool.  No change in other medications.  Will return to clinic in 6 weeks.   Patient will continue   warfarin dosage of 1.5 tablets for 3.75 mg on M,W,F and whole 2.5 mg tablet all other days.   Saw patient DULCE. CLINICAL PHARMACY CONSULT: MED RECONCILIATION/REVIEW ADDENDUM    For Pharmacy Admin Tracking Only    PHSO: Yes  Total # of Interventions Recommended: 0    - Maintenance Safety Lab Monitoring #: 1  Recommended intervention potential cost savings:   Accepted intervention potential cost savings:    Total Interventions Accepted: 1  Time Spent (min): 30    Carolina Paulson, KofiD

## 2020-09-28 ENCOUNTER — HOSPITAL ENCOUNTER (EMERGENCY)
Age: 85
Discharge: HOME | End: 2020-09-28
Attending: FAMILY MEDICINE
Payer: MEDICARE

## 2020-09-28 VITALS
TEMPERATURE: 98 F | RESPIRATION RATE: 16 BRPM | HEART RATE: 72 BPM | DIASTOLIC BLOOD PRESSURE: 71 MMHG | OXYGEN SATURATION: 97 % | SYSTOLIC BLOOD PRESSURE: 143 MMHG

## 2020-09-28 PROCEDURE — 99283 EMERGENCY DEPT VISIT LOW MDM: CPT

## 2020-09-28 PROCEDURE — 6360000002 HC RX W HCPCS: Performed by: FAMILY MEDICINE

## 2020-09-28 PROCEDURE — 90715 TDAP VACCINE 7 YRS/> IM: CPT | Performed by: FAMILY MEDICINE

## 2020-09-28 PROCEDURE — 90471 IMMUNIZATION ADMIN: CPT | Performed by: FAMILY MEDICINE

## 2020-09-28 RX ADMIN — TETANUS TOXOID, REDUCED DIPHTHERIA TOXOID AND ACELLULAR PERTUSSIS VACCINE, ADSORBED 0.5 ML: 5; 2.5; 8; 8; 2.5 SUSPENSION INTRAMUSCULAR at 14:29

## 2020-09-28 NOTE — ED PROVIDER NOTES
[facility] ED  EMERGENCY DEPARTMENT ENCOUNTER      Pt Name: Phylis Klinefelter  MRN: [id number]  [place] 1935  Date of evaluation: 9/28/2020  Provider: Velia Hardy MD    CHIEF COMPLAINT     Chief Complaint   Patient presents with    Hand Injury     patient has small abraision to right hand. HISTORY OF PRESENT ILLNESS   (Location/Symptom, Timing/Onset, Context/Setting,Quality, Duration, Modifying Factors, Severity)  Note limiting factors. Phylis Klinefelter is a80 y.o. female who presents to the emergency department      A 80years old lady that was working in the garden yesterday and hit her knuckle on the right hand against the stalk of a plant. She sustained a small abrasion and she is concerned it might be infected. Nursing Notes werereviewed. REVIEW OF SYSTEMS    (2-9 systems for level 4, 10 or more for level 5)     Review of Systems   Skin:        SmAll abrasion to NPH MP joint #2 on the right about 3 mm in length surrounded by a small bruise roughly about 1 cm in diameter. Except as noted above the remainder of the review of systems was reviewed and negative.        PAST MEDICAL HISTORY     Past Medical History:   Diagnosis Date    Abnormal EMG 2016    Right Carpal Tunnel & C5 Radiculopathy    Cancer ([facility] [address]     colon    Chronic kidney disease     Colon polyp 2006, 2009    Compression fracture of lumbar vertebra (HCC) / prednisone related to PMR 7/15/2015    Diarrhea     DVT (deep venous thrombosis) ([facility]) 2015    Emphysema     HTN (hypertension)     Hyperlipidemia     Irritable bowel syndrome     Lumbago     Osteoarthritis     Polymyalgia rheumatica ([facility]) 2015    Rheumatism     Seborrheic keratoses     on back         SURGICALHISTORY       Past Surgical History:   Procedure Laterality Date    APPENDECTOMY  1949    CATARACT REMOVAL WITH IMPLANT      COLECTOMY Right 06/20/2016    open right hemicolectomy bowel resection    COLONOSCOPY  [id number]    Dr. Toni Mcgowan  COLONOSCOPY  5/25/2016    -bx mass hepatic flexure,tattoo    COLONOSCOPY  12/13/2017    Dr Morales-polyp(adenomatous)hemorrhoids    DENTAL SURGERY      Implants    GLAUCOMA SURGERY      preventative surg    HYSTERECTOMY  1973    NASAL SEPTUM SURGERY      Deriated Septum Surgery    GA COLSC FLX W/RMVL OF TUMOR POLYP LESION SNARE TQ N/A 12/13/2017    COLONOSCOPY POLYPECTOMY SNARE/COLD BIOPSY performed by Veda Velazquez MD at [facility]      Basal cell on back and leg    TONSILLECTOMY  1941         CURRENT MEDICATIONS       Previous Medications    AMLODIPINE (NORVASC) 5 MG TABLET    Take 1 tablet by mouth daily    ATORVASTATIN (LIPITOR) 80 MG TABLET    Take 1 tablet by mouth every evening    CALCIUM CARBONATE (OSCAL) 500 MG TABS TABLET    Take 500 mg by mouth daily    CARVEDILOL (COREG) 25 MG TABLET    Take 1 tablet by mouth 2 times daily    HANDICAP PLACARD MISC    by Does not apply route Expires in 5 yrs.     LOPERAMIDE (IMODIUM) 2 MG CAPSULE    Take 2 mg by mouth 4 times daily as needed for Diarrhea    MECLIZINE (ANTIVERT) 12.5 MG TABLET    Take 12.5 mg by mouth 3 times daily as needed    MULTIPLE VITAMINS-MINERALS (CENTRUM SILVER PO)    Take by mouth daily    NEOMYCIN-POLYMYXIN-DEXAMETH 3.5-25997-1.1 OINT        VITAMIN B-12 (CYANOCOBALAMIN) 1000 MCG TABLET    Take 1,000 mcg by mouth daily    WARFARIN (COUMADIN) 2.5 MG TABLET    Take 1 tablet by mouth daily Take 1 tablet (2.5 mg) Fridays  and take 1.5 tablets (3.75 mg) tab orally on remaining days            Budesonide and Hydrochlorothiazide    FAMILY HISTORY       Family History   Problem Relation Age of Onset    Colon Cancer Father     Heart Disease Brother     High Blood Pressure Brother     Stroke Brother           SOCIAL HISTORY       Social History     Socioeconomic History    Marital status:      Spouse name: None    Number of children: None    Years of education: None    Highest education level: None Occupational History    None   Social Needs    Financial resource strain: Not hard at all   [employer] insecurity     Worry: Never true     Inability: Never true   [employer] needs     Medical: No     Non-medical: No   Tobacco Use    Smoking status: Former Smoker     Packs/day: 1.00     Years: 30.00     Pack years: 30.00     Last attempt to quit: 1981     Years since quittin.6    Smokeless tobacco: Never Used   Substance and Sexual Activity    Alcohol use: No     Frequency: Never     Drinks per session: Patient refused     Binge frequency: Never     Comment: rarely    Drug use: No    Sexual activity: None   Lifestyle    Physical activity     Days per week: 0 days     Minutes per session: 0 min    Stress: Not at all   Relationships    Social connections     Talks on phone: None     Gets together: None     Attends [employer] service: None     Active member of club or organization: None     Attends meetings of clubs or organizations: None     Relationship status: None    Intimate partner violence     Fear of current or ex partner: None     Emotionally abused: None     Physically abused: None     Forced sexual activity: None   Other Topics Concern    None   Social History Narrative    None       SCREENINGS                    PHYSICAL EXAM    (up to 7 for level 4, 8 or more for level 5)     ED Triage Vitals [20 1339]   BP Temp Temp Source Pulse Resp SpO2 Height Weight   (!) 143/71 98 °F (36.7 °C) Oral 72 16 97 % -- --       Physical Exam  Skin:     Comments: Altered millimeter abrasion to the MP joint area of digit 2 on the right surrounded by a small bruise roughly about 1 cm in diameter. No bony deformity no evidence of a cellulitis/infection. There is no erythema or edema.          DIAGNOSTIC RESULTS     EKG: All EKG's are interpreted by the Emergency Department Physician who either signs orCo-signs this chart in the absence of a cardiologist.        RADIOLOGY:   plain film images such as CT, Ultrasound and MRI are read by the radiologist. Plain radiographic images are visualized and preliminarily interpreted by the emergency physician with the below findings:        Interpretation per the Radiologist below, ifavailable at the time of this note:    No orders to display         ED BEDSIDE ULTRASOUND:   Performed by ED Physician - none    LABS:  Labs Reviewed - No data to display    All other labs were within normal range ornot returned as of this dictation. EMERGENCY DEPARTMENT COURSE and DIFFERENTIAL DIAGNOSIS/MDM:   Vitals:    Vitals:    09/28/20 1339   BP: (!) 143/71   Pulse: 72   Resp: 16   Temp: 98 °F (36.7 °C)   TempSrc: Oral   SpO2: 97%           MDM  Number of Diagnoses or Management Options  Abrasion of right hand, initial encounter:   Diagnosis management comments: Small hand abrasion should be discharged home with wound care instructions and received a Tdap shot in the ER. CRITICAL CARE TIME   Total CriticalCare time was  minutes, excluding separately reportable procedures. There was a high probability of clinically significant/life threatening deterioration in the patient's condition which required my urgent intervention. CONSULTS:  None    PROCEDURES:  Unlessotherwise noted below, none     Procedures    FINAL IMPRESSION      1.  Abrasion of right hand, initial encounter          DISPOSITION/PLAN   DISPOSITION        PATIENT REFERRED TO:  Cristobal Serna MD  [facility], [address]  [phone]    In 2 days  For wound re-check      DISCHARGE MEDICATIONS:  New Prescriptions    No medications on file              (Please note that portions of this note were completed with a voice recognition program.  Efforts were made to edit the dictations but occasionally words are mis-transcribed.)      Gracie Sylvester MD (electronically signed)  Attending Emergency Physician            Gracie Sylvester MD  09/28/20 Samm Rivas MD  09/30/20

## 2020-10-14 ENCOUNTER — TELEPHONE (OUTPATIENT)
Dept: PHARMACY | Age: 85
End: 2020-10-14

## 2020-10-14 NOTE — TELEPHONE ENCOUNTER
Recent Travel Screening and Travel History documentation:     Travel Screening     Question   Response    In the last month, have you been in contact with someone who was confirmed or suspected to have Coronavirus / COVID-19? Have you had a COVID-19 viral test in the last 14 days? Unable to assess    Do you have any of the following new or worsening symptoms? Unable to assess    Have you traveled internationally in the last month? Unable to assess      Travel History   Travel since 09/14/20     No documented travel since 09/14/20         Left message for patient and provided instruction for crubside INR check/appiontment. Instructed patient to notify clinic  if fever or s/s respiratory illness.

## 2020-10-15 ENCOUNTER — HOSPITAL ENCOUNTER (OUTPATIENT)
Dept: PHARMACY | Age: 85
Setting detail: THERAPIES SERIES
Discharge: HOME | End: 2020-10-15
Payer: MEDICARE

## 2020-10-15 VITALS — TEMPERATURE: 96.9 F

## 2020-10-15 LAB — INR BLD: 2.7

## 2020-10-15 PROCEDURE — 85610 PROTHROMBIN TIME: CPT

## 2020-10-15 PROCEDURE — 99211 OFF/OP EST MAY X REQ PHY/QHP: CPT

## 2020-10-15 NOTE — PROGRESS NOTES
[facility] [address] [facility]/[place]  Medication Management  ANTICOAGULATION    Referring Doctor: Talon Hutton INR: 2-3    TODAY'S INR: 2.7    WARFARIN Dosage:  Patient will continue warfarin dosage of 1.5 tablets for 3.75 mg on M,W,F and whole 2.5 mg tablet all other days.     Saw patient CURBSIDE. INR (no units)   Date Value   10/15/2020 2.7   09/03/2020 2   07/23/2020 2.1   06/11/2020 2   03/05/2020 2   01/13/2020 2.5   12/02/2019 2.7     POC INR (no units)   Date Value   04/16/2020 2.2 (H)     INR Reference Range: (no units)   Date Value   04/16/2020            Hemoglobin (g/dL)   Date Value   11/01/2019 11.5 (L)   04/26/2019 11.1 (L)   10/19/2018 10.9 (L)     Hematocrit (%)   Date Value   11/01/2019 37.2   04/26/2019 35.8 (L)   10/19/2018 35.6 (L)     Platelets (k/uL)   Date Value   11/01/2019 202   04/26/2019 156   10/19/2018 179     Notes:    Fingerstick INR drawn per clinic protocol. Patient states no visible blood in urine and no black tarry stool. Denies any missed doses of warfarin. No change in other maintenance medications or in diet. Will recheck INR in 6 weeks. Patient acknowledges working in consult agreement with pharmacist as referred by his/her physician. CLINICAL PHARMACY CONSULT: MED RECONCILIATION/REVIEW ADDENDUM    For Pharmacy Admin Tracking Only    PHSO: Yes  Total # of Interventions Recommended: 0    - Maintenance Safety Lab Monitoring #: 1  Recommended intervention potential cost savings:   Accepted intervention potential cost savings:    Total Interventions Accepted: 1  Time Spent (min): 30    Karo Mendez, KofiD

## 2020-10-15 NOTE — PATIENT INSTRUCTIONS
Continue to monitor urine and stool. Continue to monitor for signs of bleeding. Return to clinic in 6 weeks.   Patient will continue   warfarin dosage of 1.5 tablets for 3.75 mg on M,W,F and whole 2.5 mg tablet all other days.

## 2020-10-20 ENCOUNTER — HOSPITAL ENCOUNTER (OUTPATIENT)
Age: 85
Discharge: HOME | End: 2020-10-20
Payer: MEDICARE

## 2020-10-20 LAB
ANION GAP SERPL CALCULATED.3IONS-SCNC: 8 MMOL/L (ref 9–17)
BUN BLDV-MCNC: 25 MG/DL (ref 8–23)
BUN/CREAT BLD: 17 (ref 9–20)
CALCIUM SERPL-MCNC: 8.7 MG/DL (ref 8.6–10.4)
CHLORIDE BLD-SCNC: 111 MMOL/L (ref 98–107)
CHOLESTEROL, FASTING: 121 MG/DL
CHOLESTEROL/HDL RATIO: 1.9
CO2: 23 MMOL/L (ref 20–31)
CREAT SERPL-MCNC: 1.47 MG/DL (ref 0.5–0.9)
GFR AFRICAN AMERICAN: 41 ML/MIN
GFR NON-AFRICAN AMERICAN: 34 ML/MIN
GFR SERPL CREATININE-BSD FRML MDRD: ABNORMAL ML/MIN/{1.73_M2}
GFR SERPL CREATININE-BSD FRML MDRD: ABNORMAL ML/MIN/{1.73_M2}
GLUCOSE FASTING: 97 MG/DL (ref 70–99)
HDLC SERPL-MCNC: 64 MG/DL
LDL CHOLESTEROL: 38 MG/DL (ref 0–130)
POTASSIUM SERPL-SCNC: 5.1 MMOL/L (ref 3.7–5.3)
SODIUM BLD-SCNC: 142 MMOL/L (ref 135–144)
TRIGLYCERIDE, FASTING: 95 MG/DL
VLDLC SERPL CALC-MCNC: NORMAL MG/DL (ref 1–30)

## 2020-10-20 PROCEDURE — 80048 BASIC METABOLIC PNL TOTAL CA: CPT

## 2020-10-20 PROCEDURE — 80061 LIPID PANEL: CPT

## 2020-10-20 PROCEDURE — 36415 COLL VENOUS BLD VENIPUNCTURE: CPT

## 2020-11-13 ENCOUNTER — HOSPITAL ENCOUNTER (OUTPATIENT)
Age: 85
Discharge: HOME | End: 2020-11-13
Payer: MEDICARE

## 2020-11-13 LAB
ABSOLUTE EOS #: 0.25 K/UL (ref 0–0.44)
ABSOLUTE IMMATURE GRANULOCYTE: <0.03 K/UL (ref 0–0.3)
ABSOLUTE LYMPH #: 1.43 K/UL (ref 1.1–3.7)
ABSOLUTE MONO #: 0.81 K/UL (ref 0.1–1.2)
ALBUMIN SERPL-MCNC: 3.7 G/DL (ref 3.5–5.2)
ALBUMIN/GLOBULIN RATIO: 1.4 (ref 1–2.5)
ALP BLD-CCNC: 75 U/L (ref 35–104)
ALT SERPL-CCNC: 18 U/L (ref 5–33)
ANION GAP SERPL CALCULATED.3IONS-SCNC: 7 MMOL/L (ref 9–17)
AST SERPL-CCNC: 28 U/L
BASOPHILS # BLD: 1 % (ref 0–2)
BASOPHILS ABSOLUTE: 0.03 K/UL (ref 0–0.2)
BILIRUB SERPL-MCNC: 0.36 MG/DL (ref 0.3–1.2)
BUN BLDV-MCNC: 25 MG/DL (ref 8–23)
BUN/CREAT BLD: 15 (ref 9–20)
CALCIUM SERPL-MCNC: 8.8 MG/DL (ref 8.6–10.4)
CARCINOEMBRYONIC ANTIGEN: 3.1 NG/ML
CHLORIDE BLD-SCNC: 109 MMOL/L (ref 98–107)
CO2: 20 MMOL/L (ref 20–31)
CREAT SERPL-MCNC: 1.65 MG/DL (ref 0.5–0.9)
DIFFERENTIAL TYPE: ABNORMAL
EOSINOPHILS RELATIVE PERCENT: 5 % (ref 1–4)
GFR AFRICAN AMERICAN: 36 ML/MIN
GFR NON-AFRICAN AMERICAN: 30 ML/MIN
GFR SERPL CREATININE-BSD FRML MDRD: ABNORMAL ML/MIN/{1.73_M2}
GFR SERPL CREATININE-BSD FRML MDRD: ABNORMAL ML/MIN/{1.73_M2}
GLUCOSE BLD-MCNC: 108 MG/DL (ref 70–99)
HCT VFR BLD CALC: 36.2 % (ref 36.3–47.1)
HEMOGLOBIN: 11.4 G/DL (ref 11.9–15.1)
IMMATURE GRANULOCYTES: 0 %
LYMPHOCYTES # BLD: 26 % (ref 24–43)
MCH RBC QN AUTO: 32.2 PG (ref 25.2–33.5)
MCHC RBC AUTO-ENTMCNC: 31.5 G/DL (ref 28.4–34.8)
MCV RBC AUTO: 102.3 FL (ref 82.6–102.9)
MONOCYTES # BLD: 15 % (ref 3–12)
NRBC AUTOMATED: 0 PER 100 WBC
PDW BLD-RTO: 13.1 % (ref 11.8–14.4)
PLATELET # BLD: 156 K/UL (ref 138–453)
PLATELET ESTIMATE: ABNORMAL
PMV BLD AUTO: 11.3 FL (ref 8.1–13.5)
POTASSIUM SERPL-SCNC: 4.9 MMOL/L (ref 3.7–5.3)
RBC # BLD: 3.54 M/UL (ref 3.95–5.11)
RBC # BLD: ABNORMAL 10*6/UL
SEG NEUTROPHILS: 53 % (ref 36–65)
SEGMENTED NEUTROPHILS ABSOLUTE COUNT: 2.92 K/UL (ref 1.5–8.1)
SODIUM BLD-SCNC: 136 MMOL/L (ref 135–144)
TOTAL PROTEIN: 6.3 G/DL (ref 6.4–8.3)
WBC # BLD: 5.5 K/UL (ref 3.5–11.3)
WBC # BLD: ABNORMAL 10*3/UL

## 2020-11-13 PROCEDURE — 80053 COMPREHEN METABOLIC PANEL: CPT

## 2020-11-13 PROCEDURE — 85025 COMPLETE CBC W/AUTO DIFF WBC: CPT

## 2020-11-13 PROCEDURE — 36415 COLL VENOUS BLD VENIPUNCTURE: CPT

## 2020-11-13 PROCEDURE — 82378 CARCINOEMBRYONIC ANTIGEN: CPT

## 2020-11-18 ENCOUNTER — OFFICE VISIT (OUTPATIENT)
Dept: ONCOLOGY | Age: 85
End: 2020-11-18
Payer: MEDICARE

## 2020-11-18 VITALS
WEIGHT: 113 LBS | HEIGHT: 64 IN | TEMPERATURE: 98.3 F | SYSTOLIC BLOOD PRESSURE: 139 MMHG | HEART RATE: 79 BPM | DIASTOLIC BLOOD PRESSURE: 78 MMHG | RESPIRATION RATE: 18 BRPM | BODY MASS INDEX: 19.29 KG/M2

## 2020-11-18 PROCEDURE — 99211 OFF/OP EST MAY X REQ PHY/QHP: CPT

## 2020-11-18 PROCEDURE — 99213 OFFICE O/P EST LOW 20 MIN: CPT | Performed by: INTERNAL MEDICINE

## 2020-11-18 PROCEDURE — 1036F TOBACCO NON-USER: CPT | Performed by: INTERNAL MEDICINE

## 2020-11-18 PROCEDURE — G8428 CUR MEDS NOT DOCUMENT: HCPCS | Performed by: INTERNAL MEDICINE

## 2020-11-18 PROCEDURE — G8400 PT W/DXA NO RESULTS DOC: HCPCS | Performed by: INTERNAL MEDICINE

## 2020-11-18 PROCEDURE — 4040F PNEUMOC VAC/ADMIN/RCVD: CPT | Performed by: INTERNAL MEDICINE

## 2020-11-18 PROCEDURE — G8420 CALC BMI NORM PARAMETERS: HCPCS | Performed by: INTERNAL MEDICINE

## 2020-11-18 PROCEDURE — 1090F PRES/ABSN URINE INCON ASSESS: CPT | Performed by: INTERNAL MEDICINE

## 2020-11-18 PROCEDURE — 1123F ACP DISCUSS/DSCN MKR DOCD: CPT | Performed by: INTERNAL MEDICINE

## 2020-11-18 PROCEDURE — G8484 FLU IMMUNIZE NO ADMIN: HCPCS | Performed by: INTERNAL MEDICINE

## 2020-11-18 NOTE — PROGRESS NOTES
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 CURRENT THERAPY:         S/p Rt hemicolectomy 6/20/16. Considering her age and comorbidity, she was not offered adjuvant chemotherapy and the decision was for surveillance/observation    BRIEF CASE HISTORY:      Ms. Ann Jacob is a very pleasant 80 y.o. female with history of DVT status post IVC filter placement 1 year ago. She is maintained on Coumadin. No PE. Patient noticed rectal bleeding recently. She had blood mixed with the stool. This has been going on for the last few weeks. No abnormal pain. No nausea or vomiting. No constipation. No signs of obstruction. Patient had CT scan of the abdomen and pelvis on 5/24/2016 which was fine. She had colonoscopy on 5/25/2016 which showed a mass in the hepatic flexure with biopsy positive for invasive carcinoma. No weight loss or decreased appetite. No chest pain or shortness of breath. No headaches no back pain. No other complaints. Last colonoscopy was 5 years ago and was negative. Due to her age and comorbidity, after surgery, she did not receive adjuvant chemotherapy and she was observed    INTERIM HISTORY:   Seen for follow up colon cancer. Doing well clinically. No complaints related to cancer. Back pain is about the same. No nausea or vomiting. No GI bleeding.    Overall condition is stable     PAST MEDICAL HISTORY: has a past medical history of Abnormal EMG, Cancer ([address] Chronic kidney disease, Colon polyp, Compression fracture of lumbar vertebra (HCC) / prednisone related to PMR, Diarrhea, DVT (deep venous thrombosis) ([address] Emphysema, HTN (hypertension), Hyperlipidemia, Irritable bowel syndrome, Lumbago, Osteoarthritis, Polymyalgia rheumatica ([address] Rheumatism, and Seborrheic · Hematologic: No history of bleeding tendency. No bruises or ecchymosis. No history of clotting problems. · Infectious disease: No fever, chills or frequent infections. · Endocrine: No problems with opacity. No polydipsia or polyuria. No temperature intolerance. · Neurologic: No headaches or dizziness. No weakness or numbness of the extremities. No changes in balance, coordination,  memory, mentation, behavior. · Allergic/Immunologic: No nasal congestion or hives. No repeated infections. PHYSICAL EXAM:  The patient is not in acute distress. On a wheelchair Vital signs: Blood pressure 139/78, pulse 79, temperature 98.3 °F (36.8 °C), temperature source Temporal, resp. rate 18, height 5' 4\" (1.626 m), weight 113 lb (51.3 kg), not currently breastfeeding. HEENT:  Eyes are normal. Ears, nose and throat are normal.  Neck: Supple. No lymph node enlargement. No thyroid enlargement. Trachea is centrally located. Chest:  Clear to auscultation. No wheezes or crepitations. Heart: Regular sinus rhythm. Abdomen: Soft, nontender. No hepatosplenomegaly. No masses. Extremities:  With no edema. Lymph Nodes:  No cervical, axillary or inguinal lymph node enlargement. Neurologic:  Conscious and oriented. No focal neurological deficits. Psychosocial: No depression, anxiety or stress. Skin: No rashes, bruises or ecchymoses.       Review of Diagnostic data:   Lab Results   Component Value Date    WBC 5.5 11/13/2020    HGB 11.4 (L) 11/13/2020    HCT 36.2 (L) 11/13/2020    .3 11/13/2020     11/13/2020       Chemistry        Component Value Date/Time     11/13/2020 1309    K 4.9 11/13/2020 1309     (H) 11/13/2020 1309    CO2 20 11/13/2020 1309    BUN 25 (H) 11/13/2020 1309    CREATININE 1.65 (H) 11/13/2020 1309        Component Value Date/Time    CALCIUM 8.8 11/13/2020 1309    ALKPHOS 75 11/13/2020 1309    AST 28 11/13/2020 1309    ALT 18 11/13/2020 1309    BILITOT 0.36 11/13/2020 1309 Lab Results   Component Value Date    CEA 3.1 11/13/2020     CT 7/2018  Impression   NO SIGNIFICANT CHANGE FROM THE STUDY OF 6/10/2016. SMALL PERICARDIAL EFFUSION. 2 AREAS OF SACCULAR DILATATION OF THE INFRARENAL ABDOMINAL AORTA. IVC FILTER. DIVERTICULAR DISEASE OF THE COLON WITHOUT PERICOLIC INFLAMMATORY CHANGE. NO    OBSTRUCTIVE UROPATHY. CHRONIC COMPRESSION FRACTURE OF T11. 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 Anemia secondary to bleeding. Resolved. History of DVT   status post IVC filter placement, maintained on Coumadin. Chronic back pain. CKD   ECOG performance score 1  Anemia of chronic renal insufficiency      PLAN:   Pt is doing well, no evidence of recurrence. Clinical condition is stable labs are stable  CEA is slightly up, likely due to renal dysfunction   We will continue to follow until the 5 years edilberto. Return in 6 months.                                  BILL RM [facility] MD Cari  Hematologist/Medical Oncologist  Cell: [phone]

## 2020-12-02 ENCOUNTER — TELEPHONE (OUTPATIENT)
Dept: PHARMACY | Age: 85
End: 2020-12-02

## 2020-12-03 ENCOUNTER — HOSPITAL ENCOUNTER (OUTPATIENT)
Dept: PHARMACY | Age: 85
Setting detail: THERAPIES SERIES
Discharge: HOME | End: 2020-12-03
Payer: MEDICARE

## 2020-12-03 VITALS
DIASTOLIC BLOOD PRESSURE: 62 MMHG | WEIGHT: 115 LBS | HEART RATE: 78 BPM | BODY MASS INDEX: 19.74 KG/M2 | SYSTOLIC BLOOD PRESSURE: 103 MMHG

## 2020-12-03 LAB — INR BLD: 1.9

## 2020-12-03 PROCEDURE — 85610 PROTHROMBIN TIME: CPT | Performed by: FAMILY MEDICINE

## 2020-12-03 PROCEDURE — 99211 OFF/OP EST MAY X REQ PHY/QHP: CPT | Performed by: FAMILY MEDICINE

## 2020-12-03 NOTE — PATIENT INSTRUCTIONS
Please take 3.75mg (1 1//2 tablets) warfarin today then return to your regular dosing. Continue current dose of warfarin as instructed on dosing calendar provided. Continue to monitor urine and stool for signs and symptoms of bleeding. Please notify the clinic of any medication changes. Please remember to bring all medications (both prescription and OTC) to your next visit. Kindly notify the clinic if you are unable to make to your next appointment.

## 2020-12-03 NOTE — PROGRESS NOTES
Linda [address] [facility]/[place]  Medication Management  ANTICOAGULATION    Referring Doctor: Marybeth Murrieta    GOAL INR: 2.0-3.0    TODAY'S INR: 1.9    WARFARIN Dosage: 3.75mg x1 then 3.75mg MWF, 2.5mg all other days    INR (no units)   Date Value   12/03/2020 1.9   10/15/2020 2.7   09/03/2020 2   07/23/2020 2.1   06/11/2020 2   03/05/2020 2   01/13/2020 2.5     POC INR (no units)   Date Value   04/16/2020 2.2 (H)     INR Reference Range: (no units)   Date Value   04/16/2020            Notes:    Fingerstick INR drawn per clinic protocol. Patient states no visible blood in urine and no black tarry stool. Denies any missed doses of warfarin. No change in other maintenance medications or in diet. Will recheck INR in 6 weeks. Patient acknowledges working in consult agreement with pharmacist as referred by his/her physician.                   CLINICAL PHARMACY CONSULT: MED RECONCILIATION/REVIEW ADDENDUM    For Pharmacy Admin Tracking Only    PHSO: Yes  Total # of Interventions Recommended: 1  - Increased Dose #: 1  - Maintenance Safety Lab Monitoring #: 1    Total Interventions Accepted: 2  Time Spent (min): 81 [facility] [facility]

## 2020-12-21 ENCOUNTER — APPOINTMENT (OUTPATIENT)
Dept: GENERAL RADIOLOGY | Age: 85
End: 2020-12-21
Payer: MEDICARE

## 2020-12-21 ENCOUNTER — HOSPITAL ENCOUNTER (OUTPATIENT)
Age: 85
Setting detail: OBSERVATION
Discharge: HOME | End: 2020-12-22
Attending: EMERGENCY MEDICINE | Admitting: INTERNAL MEDICINE
Payer: MEDICARE

## 2020-12-21 PROBLEM — R07.9 CHEST PAIN: Status: ACTIVE | Noted: 2020-12-21

## 2020-12-21 LAB
ABSOLUTE EOS #: 0.23 K/UL (ref 0–0.44)
ABSOLUTE IMMATURE GRANULOCYTE: <0.03 K/UL (ref 0–0.3)
ABSOLUTE LYMPH #: 1.69 K/UL (ref 1.1–3.7)
ABSOLUTE MONO #: 0.87 K/UL (ref 0.1–1.2)
ALBUMIN SERPL-MCNC: 3.6 G/DL (ref 3.5–5.2)
ALBUMIN/GLOBULIN RATIO: 1.3 (ref 1–2.5)
ALP BLD-CCNC: 87 U/L (ref 35–104)
ALT SERPL-CCNC: 15 U/L (ref 5–33)
ANION GAP SERPL CALCULATED.3IONS-SCNC: 9 MMOL/L (ref 9–17)
AST SERPL-CCNC: 25 U/L
BASOPHILS # BLD: 0 % (ref 0–2)
BASOPHILS ABSOLUTE: 0.03 K/UL (ref 0–0.2)
BILIRUB SERPL-MCNC: 0.27 MG/DL (ref 0.3–1.2)
BUN BLDV-MCNC: 23 MG/DL (ref 8–23)
BUN/CREAT BLD: 15 (ref 9–20)
CALCIUM SERPL-MCNC: 8.5 MG/DL (ref 8.6–10.4)
CHLORIDE BLD-SCNC: 108 MMOL/L (ref 98–107)
CO2: 22 MMOL/L (ref 20–31)
CREAT SERPL-MCNC: 1.5 MG/DL (ref 0.5–0.9)
DIFFERENTIAL TYPE: ABNORMAL
EOSINOPHILS RELATIVE PERCENT: 3 % (ref 1–4)
GFR AFRICAN AMERICAN: 40 ML/MIN
GFR NON-AFRICAN AMERICAN: 33 ML/MIN
GFR SERPL CREATININE-BSD FRML MDRD: ABNORMAL ML/MIN/{1.73_M2}
GFR SERPL CREATININE-BSD FRML MDRD: ABNORMAL ML/MIN/{1.73_M2}
GLUCOSE BLD-MCNC: 111 MG/DL (ref 70–99)
HCT VFR BLD CALC: 35.3 % (ref 36.3–47.1)
HEMOGLOBIN: 11 G/DL (ref 11.9–15.1)
IMMATURE GRANULOCYTES: 0 %
INR BLD: 2.8
LYMPHOCYTES # BLD: 25 % (ref 24–43)
MCH RBC QN AUTO: 31 PG (ref 25.2–33.5)
MCHC RBC AUTO-ENTMCNC: 31.2 G/DL (ref 28.4–34.8)
MCV RBC AUTO: 99.4 FL (ref 82.6–102.9)
MONOCYTES # BLD: 13 % (ref 3–12)
NRBC AUTOMATED: 0 PER 100 WBC
PARTIAL THROMBOPLASTIN TIME: 36.8 SEC (ref 23.9–33.8)
PDW BLD-RTO: 13.2 % (ref 11.8–14.4)
PLATELET # BLD: 161 K/UL (ref 138–453)
PLATELET ESTIMATE: ABNORMAL
PMV BLD AUTO: 11.2 FL (ref 8.1–13.5)
POTASSIUM SERPL-SCNC: 4.9 MMOL/L (ref 3.7–5.3)
PROTHROMBIN TIME: 29.8 SEC (ref 11.5–14.2)
RBC # BLD: 3.55 M/UL (ref 3.95–5.11)
RBC # BLD: ABNORMAL 10*6/UL
SARS-COV-2, RAPID: NOT DETECTED
SARS-COV-2: NORMAL
SARS-COV-2: NORMAL
SEG NEUTROPHILS: 59 % (ref 36–65)
SEGMENTED NEUTROPHILS ABSOLUTE COUNT: 3.87 K/UL (ref 1.5–8.1)
SODIUM BLD-SCNC: 139 MMOL/L (ref 135–144)
SOURCE: NORMAL
TOTAL PROTEIN: 6.4 G/DL (ref 6.4–8.3)
TROPONIN INTERP: ABNORMAL
TROPONIN T: ABNORMAL NG/ML
TROPONIN, HIGH SENSITIVITY: 54 NG/L (ref 0–14)
TROPONIN, HIGH SENSITIVITY: 61 NG/L (ref 0–14)
TROPONIN, HIGH SENSITIVITY: 62 NG/L (ref 0–14)
WBC # BLD: 6.7 K/UL (ref 3.5–11.3)
WBC # BLD: ABNORMAL 10*3/UL

## 2020-12-21 PROCEDURE — G0378 HOSPITAL OBSERVATION PER HR: HCPCS

## 2020-12-21 PROCEDURE — 85730 THROMBOPLASTIN TIME PARTIAL: CPT

## 2020-12-21 PROCEDURE — 72070 X-RAY EXAM THORAC SPINE 2VWS: CPT

## 2020-12-21 PROCEDURE — 85610 PROTHROMBIN TIME: CPT

## 2020-12-21 PROCEDURE — U0002 COVID-19 LAB TEST NON-CDC: HCPCS

## 2020-12-21 PROCEDURE — 85025 COMPLETE CBC W/AUTO DIFF WBC: CPT

## 2020-12-21 PROCEDURE — C9803 HOPD COVID-19 SPEC COLLECT: HCPCS

## 2020-12-21 PROCEDURE — 80053 COMPREHEN METABOLIC PANEL: CPT

## 2020-12-21 PROCEDURE — 36415 COLL VENOUS BLD VENIPUNCTURE: CPT

## 2020-12-21 PROCEDURE — 93005 ELECTROCARDIOGRAM TRACING: CPT | Performed by: EMERGENCY MEDICINE

## 2020-12-21 PROCEDURE — 6370000000 HC RX 637 (ALT 250 FOR IP): Performed by: EMERGENCY MEDICINE

## 2020-12-21 PROCEDURE — 6370000000 HC RX 637 (ALT 250 FOR IP): Performed by: FAMILY MEDICINE

## 2020-12-21 PROCEDURE — U0003 INFECTIOUS AGENT DETECTION BY NUCLEIC ACID (DNA OR RNA); SEVERE ACUTE RESPIRATORY SYNDROME CORONAVIRUS 2 (SARS-COV-2) (CORONAVIRUS DISEASE [COVID-19]), AMPLIFIED PROBE TECHNIQUE, MAKING USE OF HIGH THROUGHPUT TECHNOLOGIES AS DESCRIBED BY CMS-2020-01-R: HCPCS

## 2020-12-21 PROCEDURE — 84484 ASSAY OF TROPONIN QUANT: CPT

## 2020-12-21 PROCEDURE — 99284 EMERGENCY DEPT VISIT MOD MDM: CPT

## 2020-12-21 PROCEDURE — 71045 X-RAY EXAM CHEST 1 VIEW: CPT

## 2020-12-21 PROCEDURE — 2580000003 HC RX 258: Performed by: FAMILY MEDICINE

## 2020-12-21 RX ORDER — ATORVASTATIN CALCIUM 40 MG/1
40 TABLET, FILM COATED ORAL NIGHTLY
Status: DISCONTINUED | OUTPATIENT
Start: 2020-12-21 | End: 2020-12-22 | Stop reason: HOSPADM

## 2020-12-21 RX ORDER — ASPIRIN 81 MG/1
324 TABLET, CHEWABLE ORAL ONCE
Status: COMPLETED | OUTPATIENT
Start: 2020-12-21 | End: 2020-12-21

## 2020-12-21 RX ORDER — ACETAMINOPHEN 650 MG/1
650 SUPPOSITORY RECTAL EVERY 6 HOURS PRN
Status: DISCONTINUED | OUTPATIENT
Start: 2020-12-21 | End: 2020-12-22 | Stop reason: HOSPADM

## 2020-12-21 RX ORDER — WARFARIN SODIUM 2.5 MG/1
2.5 TABLET ORAL
Status: DISCONTINUED | OUTPATIENT
Start: 2020-12-22 | End: 2020-12-22 | Stop reason: SDUPTHER

## 2020-12-21 RX ORDER — ONDANSETRON 2 MG/ML
4 INJECTION INTRAMUSCULAR; INTRAVENOUS EVERY 6 HOURS PRN
Status: DISCONTINUED | OUTPATIENT
Start: 2020-12-21 | End: 2020-12-22 | Stop reason: HOSPADM

## 2020-12-21 RX ORDER — LISINOPRIL 5 MG/1
5 TABLET ORAL DAILY
Status: DISCONTINUED | OUTPATIENT
Start: 2020-12-22 | End: 2020-12-22 | Stop reason: HOSPADM

## 2020-12-21 RX ORDER — FAMOTIDINE 20 MG/1
20 TABLET, FILM COATED ORAL DAILY
Status: DISCONTINUED | OUTPATIENT
Start: 2020-12-22 | End: 2020-12-22

## 2020-12-21 RX ORDER — ACETAMINOPHEN 325 MG/1
650 TABLET ORAL EVERY 6 HOURS PRN
Status: DISCONTINUED | OUTPATIENT
Start: 2020-12-21 | End: 2020-12-22 | Stop reason: HOSPADM

## 2020-12-21 RX ORDER — POLYETHYLENE GLYCOL 3350 17 G/17G
17 POWDER, FOR SOLUTION ORAL DAILY PRN
Status: DISCONTINUED | OUTPATIENT
Start: 2020-12-21 | End: 2020-12-22 | Stop reason: HOSPADM

## 2020-12-21 RX ORDER — WARFARIN SODIUM 7.5 MG/1
3.75 TABLET ORAL
Status: DISCONTINUED | OUTPATIENT
Start: 2020-12-21 | End: 2020-12-22 | Stop reason: SDUPTHER

## 2020-12-21 RX ORDER — SODIUM CHLORIDE 0.9 % (FLUSH) 0.9 %
10 SYRINGE (ML) INJECTION EVERY 12 HOURS SCHEDULED
Status: DISCONTINUED | OUTPATIENT
Start: 2020-12-21 | End: 2020-12-22 | Stop reason: HOSPADM

## 2020-12-21 RX ORDER — SODIUM CHLORIDE 9 MG/ML
INJECTION, SOLUTION INTRAVENOUS CONTINUOUS
Status: DISCONTINUED | OUTPATIENT
Start: 2020-12-21 | End: 2020-12-22 | Stop reason: HOSPADM

## 2020-12-21 RX ORDER — PROMETHAZINE HYDROCHLORIDE 25 MG/1
12.5 TABLET ORAL EVERY 6 HOURS PRN
Status: DISCONTINUED | OUTPATIENT
Start: 2020-12-21 | End: 2020-12-22 | Stop reason: HOSPADM

## 2020-12-21 RX ORDER — SODIUM CHLORIDE 0.9 % (FLUSH) 0.9 %
10 SYRINGE (ML) INJECTION PRN
Status: DISCONTINUED | OUTPATIENT
Start: 2020-12-21 | End: 2020-12-22 | Stop reason: HOSPADM

## 2020-12-21 RX ADMIN — METOPROLOL TARTRATE 25 MG: 25 TABLET, FILM COATED ORAL at 22:41

## 2020-12-21 RX ADMIN — SODIUM CHLORIDE: 9 INJECTION, SOLUTION INTRAVENOUS at 22:45

## 2020-12-21 RX ADMIN — ASPIRIN 81 MG CHEWABLE TABLET 324 MG: 81 TABLET CHEWABLE at 18:27

## 2020-12-21 RX ADMIN — WARFARIN SODIUM 3.75 MG: 7.5 TABLET ORAL at 22:41

## 2020-12-21 RX ADMIN — ATORVASTATIN CALCIUM 40 MG: 40 TABLET, FILM COATED ORAL at 22:41

## 2020-12-21 ASSESSMENT — PAIN SCALES - GENERAL
PAINLEVEL_OUTOF10: 0
PAINLEVEL_OUTOF10: 5

## 2020-12-21 ASSESSMENT — PAIN DESCRIPTION - LOCATION: LOCATION: BACK

## 2020-12-21 ASSESSMENT — PAIN DESCRIPTION - ORIENTATION: ORIENTATION: LOWER

## 2020-12-21 ASSESSMENT — PAIN DESCRIPTION - PAIN TYPE: TYPE: CHRONIC PAIN

## 2020-12-21 ASSESSMENT — PAIN DESCRIPTION - DESCRIPTORS: DESCRIPTORS: ACHING

## 2020-12-21 NOTE — ED PROVIDER NOTES
[facility]  EMERGENCY DEPARTMENT ENCOUNTER      Pt Name: Reyna Boothe  MRN: [id number]  [place] 1935  Date of evaluation: 12/21/2020  Provider: Marian Alonso MD    CHIEF COMPLAINT       Chief Complaint   Patient presents with    Chest Pain     pt states she had chest pain earlier today, denies any at present         HISTORY OF PRESENT ILLNESS   (Location/Symptom, Timing/Onset, Context/Setting, Quality, Duration, Modifying Factors, Severity)  Note limiting factors. Reyna Boothe is a 80 y.o. female who presents to the emergency department      66-year-old female presented to emergency department for evaluation of sudden onset of substernal chest pain that started at approximately 5 PM.  Patient states she was at home making dinner when the symptoms began. Patient states she experienced sharp aching pressure that extended towards her left shoulder and back. Pain lasted several minutes. Able to the emergency department the pain is completely resolved. She did not have any nausea or vomiting. She did not any diaphoresis. Denies any cough. Denies any shortness of breath. Patient has a occasional intermittent episodes of similar type pain but they have not been nearly as intense. She denies any known history of any cardiac disease. She does have a history of DVT and currently takes Coumadin. She has not had any fevers or chills. She has not had any known sick contacts. Denies any URI symptoms. No other acute concerns. Nothing was taken at home for relief. Nursing Notes were reviewed. REVIEW OF SYSTEMS    (2-9 systems for level 4, 10 or more for level 5)     Review of Systems   All other systems reviewed and are negative. Except as noted above the remainder of the review of systems was reviewed and negative.        PAST MEDICAL HISTORY     Past Medical History:   Diagnosis Date    Abnormal EMG 2016    Right Carpal Tunnel & C5 Radiculopathy    Cancer ([facility] [address]     colon  Chronic kidney disease     Colon polyp 2006, 2009    Compression fracture of lumbar vertebra (HCC) / prednisone related to PMR 7/15/2015    Diarrhea     DVT (deep venous thrombosis) (HCC) 2015    Emphysema     HTN (hypertension)     Hyperlipidemia     Irritable bowel syndrome     Lumbago     Osteoarthritis     Polymyalgia rheumatica (HCC) 2015    Rheumatism     Seborrheic keratoses     on back         SURGICAL HISTORY       Past Surgical History:   Procedure Laterality Date    APPENDECTOMY  1949    CATARACT REMOVAL WITH IMPLANT      COLECTOMY Right 06/20/2016    open right hemicolectomy bowel resection    COLONOSCOPY  [id number]    Dr. Markell Lopez COLONOSCOPY  5/25/2016    -bx mass hepatic flexure,tattoo    COLONOSCOPY  12/13/2017    Dr Morales-polyp(adenomatous)hemorrhoids    DENTAL SURGERY      Implants    GLAUCOMA SURGERY      preventative surg    HYSTERECTOMY  1973    NASAL SEPTUM SURGERY      Deriated Septum Surgery    IN COLSC FLX W/RMVL OF TUMOR POLYP LESION SNARE TQ N/A 12/13/2017    COLONOSCOPY POLYPECTOMY SNARE/COLD BIOPSY performed by Camilo Roque MD at [facility]      Basal cell on back and leg    TONSILLECTOMY  1941         CURRENT MEDICATIONS       Previous Medications    AMLODIPINE (NORVASC) 5 MG TABLET    Take 1 tablet by mouth daily    ATORVASTATIN (LIPITOR) 80 MG TABLET    Take 1 tablet by mouth every evening    CALCIUM CARBONATE (OSCAL) 500 MG TABS TABLET    Take 500 mg by mouth daily    CARVEDILOL (COREG) 25 MG TABLET    Take 1 tablet by mouth 2 times daily    HANDICAP PLACARD MISC    by Does not apply route Expires in 5 yrs.     LOPERAMIDE (IMODIUM) 2 MG CAPSULE    Take 2 mg by mouth 4 times daily as needed for Diarrhea    MECLIZINE (ANTIVERT) 12.5 MG TABLET    Take 12.5 mg by mouth 3 times daily as needed    MULTIPLE VITAMINS-MINERALS (CENTRUM SILVER PO)    Take by mouth daily VITAMIN B-12 (CYANOCOBALAMIN) 1000 MCG TABLET    Take 1,000 mcg by mouth daily    WARFARIN (COUMADIN) 2.5 MG TABLET    Take 1 tablet by mouth daily Take 1 tablet (2.5 mg)   and take 1.5 tablets (3.75 mg) tab orally on remaining days       ALLERGIES     Budesonide and Hydrochlorothiazide    FAMILY HISTORY       Family History   Problem Relation Age of Onset    Colon Cancer Father     Heart Disease Brother     High Blood Pressure Brother     Stroke Brother           SOCIAL HISTORY       Social History     Socioeconomic History    Marital status:      Spouse name: Not on file    Number of children: Not on file    Years of education: Not on file    Highest education level: Not on file   Occupational History    Not on file   Social Needs    Financial resource strain: Not hard at all   Qiro insecurity     Worry: Never true     Inability: Never true   [employer] needs     Medical: No     Non-medical: No   Tobacco Use    Smoking status: Former Smoker     Packs/day: 1.00     Years: 30.00     Pack years: 30.00     Quit date: 1981     Years since quittin.8    Smokeless tobacco: Never Used   Substance and Sexual Activity    Alcohol use: No     Frequency: Never     Drinks per session: Patient refused     Binge frequency: Never     Comment: rarely    Drug use: No    Sexual activity: Not on file   Lifestyle    Physical activity     Days per week: 0 days     Minutes per session: 0 min    Stress: Not at all   Relationships    Social connections     Talks on phone: Once a week     Gets together: Once a week     Attends Jewish service: Never     Active member of club or organization: No     Attends meetings of clubs or organizations: Never     Relationship status:     Intimate partner violence     Fear of current or ex partner: No     Emotionally abused: No     Physically abused: No     Forced sexual activity: No   Other Topics Concern    Not on file Social History Narrative    Not on file       SCREENINGS                        PHYSICAL EXAM    (up to 7 for level 4, 8 or more for level 5)     ED Triage Vitals [12/21/20 1718]   BP Temp Temp Source Pulse Resp SpO2 Height Weight   (!) 169/63 98.4 °F (36.9 °C) Tympanic 79 16 97 % -- 111 lb (50.3 kg)       Physical Exam  Vitals signs reviewed. Constitutional:       General: She is not in acute distress. Appearance: She is not toxic-appearing. Comments: Febrile. Nontoxic-appearing. Patient is resting comfortably and appears in no acute distress   HENT:      Head: Normocephalic and atraumatic. Eyes:      General: No scleral icterus. Conjunctiva/sclera: Conjunctivae normal.   Neck:      Musculoskeletal: Normal range of motion and neck supple. Cardiovascular:      Rate and Rhythm: Normal rate and regular rhythm. Pulmonary:      Effort: Pulmonary effort is normal. No respiratory distress. Breath sounds: Normal breath sounds. Abdominal:      General: There is no distension. Palpations: Abdomen is soft. Tenderness: There is no abdominal tenderness. Musculoskeletal: Normal range of motion. General: No swelling or tenderness. Comments: Kyphosis. Patient has no midline tenderness to palpation of the thoracic cervical or lumbar spine   Skin:     General: Skin is warm and dry. Findings: No rash. Neurological:      General: No focal deficit present. Mental Status: She is alert and oriented to person, place, and time.          DIAGNOSTIC RESULTS     EKG: All EKG's are interpreted by the Emergency Department Physician who either signs or Co-signs this chart in the absence of a cardiologist.        RADIOLOGY:   Non-plain film images such as CT, Ultrasound and MRI are read by the radiologist. Plain radiographic images are visualized and preliminarily interpreted by the emergency physician with the below findings: She is resting comfortably. She remains pain-free. She did receive aspirin 324 mg in the ED. Initial EKG did not show any acute findings of ischemia or infarction. Her initial troponin is elevated at 61. She has had a stress test in the past which was unremarkable. Denies having any cardiac catheterizations or previous heart issues. She was discussed via telephone with Dr. Yenny Bell hospitalist on call. We will repeat her EKG as well as her troponin. If these are not significantly changed we will consider admitting locally for observation and rule out. Patient was signed out to Dr. Nataly Vasquez for final disposition         [address] [place] [address]   Total Critical Care time was  minutes, excluding separately reportable procedures. There was a high probability of clinically significant/life threatening deterioration in the patient's condition which required my urgent intervention. CONSULTS:  None    PROCEDURES:  Unless otherwise noted below, none     Procedures        FINAL IMPRESSION    No diagnosis found. DISPOSITION/PLAN   DISPOSITION        PATIENT REFERRED TO:  No follow-up provider specified. DISCHARGE MEDICATIONS:  New Prescriptions    No medications on file     Controlled Substances Monitoring:     No flowsheet data found.     (Please note that portions of this note were completed with a voice recognition program.  Efforts were made to edit the dictations but occasionally words are mis-transcribed.)    Roberto Regalado MD (electronically signed)  Attending Emergency Physician            Roberto Regalado MD  12/21/20 Eliza Ziegler MD  12/21/20 [id number]

## 2020-12-22 ENCOUNTER — APPOINTMENT (OUTPATIENT)
Dept: NON INVASIVE DIAGNOSTICS | Age: 85
End: 2020-12-22
Payer: MEDICARE

## 2020-12-22 VITALS
SYSTOLIC BLOOD PRESSURE: 177 MMHG | RESPIRATION RATE: 16 BRPM | TEMPERATURE: 99.1 F | OXYGEN SATURATION: 98 % | HEIGHT: 64 IN | HEART RATE: 77 BPM | WEIGHT: 113.4 LBS | BODY MASS INDEX: 19.36 KG/M2 | DIASTOLIC BLOOD PRESSURE: 74 MMHG

## 2020-12-22 LAB
CHOLESTEROL/HDL RATIO: 1.8
CHOLESTEROL: 128 MG/DL
EKG ATRIAL RATE: 72 BPM
EKG ATRIAL RATE: 74 BPM
EKG ATRIAL RATE: 77 BPM
EKG ATRIAL RATE: 79 BPM
EKG P AXIS: 15 DEGREES
EKG P AXIS: 30 DEGREES
EKG P AXIS: 7 DEGREES
EKG P AXIS: 71 DEGREES
EKG P-R INTERVAL: 134 MS
EKG P-R INTERVAL: 134 MS
EKG P-R INTERVAL: 136 MS
EKG P-R INTERVAL: 138 MS
EKG Q-T INTERVAL: 380 MS
EKG Q-T INTERVAL: 388 MS
EKG Q-T INTERVAL: 388 MS
EKG Q-T INTERVAL: 402 MS
EKG QRS DURATION: 68 MS
EKG QRS DURATION: 76 MS
EKG QRS DURATION: 78 MS
EKG QRS DURATION: 82 MS
EKG QTC CALCULATION (BAZETT): 430 MS
EKG QTC CALCULATION (BAZETT): 435 MS
EKG QTC CALCULATION (BAZETT): 439 MS
EKG QTC CALCULATION (BAZETT): 440 MS
EKG R AXIS: -13 DEGREES
EKG R AXIS: -20 DEGREES
EKG R AXIS: -21 DEGREES
EKG R AXIS: 0 DEGREES
EKG T AXIS: 17 DEGREES
EKG T AXIS: 26 DEGREES
EKG T AXIS: 30 DEGREES
EKG T AXIS: 9 DEGREES
EKG VENTRICULAR RATE: 72 BPM
EKG VENTRICULAR RATE: 74 BPM
EKG VENTRICULAR RATE: 77 BPM
EKG VENTRICULAR RATE: 79 BPM
HDLC SERPL-MCNC: 72 MG/DL
INR BLD: 2.6
LDL CHOLESTEROL: 42 MG/DL (ref 0–130)
LV EF: 60 %
LVEF MODALITY: NORMAL
PROTHROMBIN TIME: 27.6 SEC (ref 11.5–14.2)
TRIGL SERPL-MCNC: 71 MG/DL
TROPONIN INTERP: ABNORMAL
TROPONIN T: ABNORMAL NG/ML
TROPONIN, HIGH SENSITIVITY: 57 NG/L (ref 0–14)
VLDLC SERPL CALC-MCNC: NORMAL MG/DL (ref 1–30)

## 2020-12-22 PROCEDURE — 85610 PROTHROMBIN TIME: CPT

## 2020-12-22 PROCEDURE — 93010 ELECTROCARDIOGRAM REPORT: CPT | Performed by: INTERNAL MEDICINE

## 2020-12-22 PROCEDURE — 93005 ELECTROCARDIOGRAM TRACING: CPT | Performed by: EMERGENCY MEDICINE

## 2020-12-22 PROCEDURE — 6370000000 HC RX 637 (ALT 250 FOR IP): Performed by: FAMILY MEDICINE

## 2020-12-22 PROCEDURE — 36415 COLL VENOUS BLD VENIPUNCTURE: CPT

## 2020-12-22 PROCEDURE — 93306 TTE W/DOPPLER COMPLETE: CPT

## 2020-12-22 PROCEDURE — 78452 HT MUSCLE IMAGE SPECT MULT: CPT

## 2020-12-22 PROCEDURE — G0378 HOSPITAL OBSERVATION PER HR: HCPCS

## 2020-12-22 PROCEDURE — 99204 OFFICE O/P NEW MOD 45 MIN: CPT | Performed by: FAMILY MEDICINE

## 2020-12-22 PROCEDURE — 84484 ASSAY OF TROPONIN QUANT: CPT

## 2020-12-22 PROCEDURE — 6360000002 HC RX W HCPCS: Performed by: FAMILY MEDICINE

## 2020-12-22 PROCEDURE — 3430000000 HC RX DIAGNOSTIC RADIOPHARMACEUTICAL: Performed by: INTERNAL MEDICINE

## 2020-12-22 PROCEDURE — 93017 CV STRESS TEST TRACING ONLY: CPT

## 2020-12-22 PROCEDURE — A9500 TC99M SESTAMIBI: HCPCS | Performed by: INTERNAL MEDICINE

## 2020-12-22 PROCEDURE — 80061 LIPID PANEL: CPT

## 2020-12-22 RX ORDER — WARFARIN SODIUM 2.5 MG/1
2.5 TABLET ORAL
Status: DISCONTINUED | OUTPATIENT
Start: 2020-12-22 | End: 2020-12-22 | Stop reason: HOSPADM

## 2020-12-22 RX ORDER — LISINOPRIL 5 MG/1
5 TABLET ORAL DAILY
Qty: 30 TABLET | Refills: 3 | Status: SHIPPED | OUTPATIENT
Start: 2020-12-23 | End: 2021-01-04

## 2020-12-22 RX ORDER — FAMOTIDINE 10 MG
10 TABLET ORAL DAILY
Status: DISCONTINUED | OUTPATIENT
Start: 2020-12-22 | End: 2020-12-22 | Stop reason: HOSPADM

## 2020-12-22 RX ADMIN — Medication 10 MILLICURIE: at 12:23

## 2020-12-22 RX ADMIN — FAMOTIDINE 10 MG: 10 TABLET ORAL at 08:41

## 2020-12-22 RX ADMIN — REGADENOSON 0.4 MG: 0.08 INJECTION, SOLUTION INTRAVENOUS at 12:33

## 2020-12-22 RX ADMIN — LISINOPRIL 5 MG: 5 TABLET ORAL at 08:41

## 2020-12-22 RX ADMIN — Medication 30 MILLICURIE: at 12:23

## 2020-12-22 RX ADMIN — ACETAMINOPHEN 650 MG: 325 TABLET, FILM COATED ORAL at 14:29

## 2020-12-22 ASSESSMENT — PAIN DESCRIPTION - DESCRIPTORS: DESCRIPTORS: HEADACHE

## 2020-12-22 ASSESSMENT — PAIN DESCRIPTION - LOCATION: LOCATION: HEAD

## 2020-12-22 ASSESSMENT — PAIN SCALES - GENERAL
PAINLEVEL_OUTOF10: 10
PAINLEVEL_OUTOF10: 0
PAINLEVEL_OUTOF10: 8

## 2020-12-22 ASSESSMENT — PAIN DESCRIPTION - PAIN TYPE: TYPE: ACUTE PAIN

## 2020-12-22 NOTE — ED PROVIDER NOTES
-----------------------------------------PROGRESS NOTES---------------------------------------------       Time: 7:02 PM EST   Care assumed from Dr. Alexus James (EP). Past medical, surgical, social, and family history reviewed. Vitals and home medications reviewed. Pending second troponin, repeat EKG. Time: 8:02 PM EST  Discussed all findings with the patient who is agreeable to admission overnight for repeat evaluation and cardiology consultation. Case discussed with Dr. Catherine Garza who agrees to place the patient on observation pending cardiology evaluation.   Case discussed with Dr. Leena Michaels, the on-duty cardiologist, who agrees to consult on the patient in the a.m.    -------------------------------------LABORATORY RESULTS------------------------------------------    Recent Results (from the past 48 hour(s))   CBC Auto Differential    Collection Time: 12/21/20  5:35 PM   Result Value Ref Range    WBC 6.7 3.5 - 11.3 k/uL    RBC 3.55 (L) 3.95 - 5.11 m/uL    Hemoglobin 11.0 (L) 11.9 - 15.1 g/dL    Hematocrit 35.3 (L) 36.3 - 47.1 %    MCV 99.4 82.6 - 102.9 fL    MCH 31.0 25.2 - 33.5 pg    MCHC 31.2 28.4 - 34.8 g/dL    RDW 13.2 11.8 - 14.4 %    Platelets 857 188 - 590 k/uL    MPV 11.2 8.1 - 13.5 fL    NRBC Automated 0.0 0.0 per 100 WBC    Differential Type NOT REPORTED     Seg Neutrophils 59 36 - 65 %    Lymphocytes 25 24 - 43 %    Monocytes 13 (H) 3 - 12 %    Eosinophils % 3 1 - 4 %    Basophils 0 0 - 2 %    Immature Granulocytes 0 0 %    Segs Absolute 3.87 1.50 - 8.10 k/uL    Absolute Lymph # 1.69 1.10 - 3.70 k/uL    Absolute Mono # 0.87 0.10 - 1.20 k/uL    Absolute Eos # 0.23 0.00 - 0.44 k/uL    Basophils Absolute 0.03 0.00 - 0.20 k/uL    Absolute Immature Granulocyte <0.03 0.00 - 0.30 k/uL    WBC Morphology NOT REPORTED     RBC Morphology NOT REPORTED     Platelet Estimate NOT REPORTED    Troponin    Collection Time: 12/21/20  5:35 PM   Result Value Ref Range    Troponin, High Sensitivity 61 (HH) 0 - 14 ng/L Troponin T NOT REPORTED <0.03 ng/mL    Troponin Interp NOT REPORTED    Comprehensive Metabolic Panel w/ Reflex to MG    Collection Time: 12/21/20  5:35 PM   Result Value Ref Range    Glucose 111 (H) 70 - 99 mg/dL    BUN 23 8 - 23 mg/dL    CREATININE 1.50 (H) 0.50 - 0.90 mg/dL    Bun/Cre Ratio 15 9 - 20    Calcium 8.5 (L) 8.6 - 10.4 mg/dL    Sodium 139 135 - 144 mmol/L    Potassium 4.9 3.7 - 5.3 mmol/L    Chloride 108 (H) 98 - 107 mmol/L    CO2 22 20 - 31 mmol/L    Anion Gap 9 9 - 17 mmol/L    Alkaline Phosphatase 87 35 - 104 U/L    ALT 15 5 - 33 U/L    AST 25 <32 U/L    Total Bilirubin 0.27 (L) 0.3 - 1.2 mg/dL    Total Protein 6.4 6.4 - 8.3 g/dL    Alb 3.6 3.5 - 5.2 g/dL    Albumin/Globulin Ratio 1.3 1.0 - 2.5    GFR Non- 33 (L) >60 mL/min    GFR African American 40 (L) >60 mL/min    GFR Comment          GFR Staging         Protime-INR    Collection Time: 12/21/20  5:35 PM   Result Value Ref Range    Protime 29.8 (H) 11.5 - 14.2 sec    INR 2.8    APTT    Collection Time: 12/21/20  5:35 PM   Result Value Ref Range    PTT 36.8 (H) 23.9 - 33.8 sec   EKG 12 Lead    Collection Time: 12/21/20  5:46 PM   Result Value Ref Range    Ventricular Rate 79 BPM    Atrial Rate 79 BPM    P-R Interval 134 ms    QRS Duration 78 ms    Q-T Interval 380 ms    QTc Calculation (Bazett) 435 ms    P Axis 30 degrees    R Axis -13 degrees    T Axis 9 degrees   Troponin    Collection Time: 12/21/20  7:05 PM   Result Value Ref Range    Troponin, High Sensitivity 62 (HH) 0 - 14 ng/L    Troponin T NOT REPORTED <0.03 ng/mL    Troponin Interp NOT REPORTED    EKG 12 Lead    Collection Time: 12/21/20  7:14 PM   Result Value Ref Range    Ventricular Rate 74 BPM    Atrial Rate 74 BPM    P-R Interval 136 ms    QRS Duration 82 ms    Q-T Interval 388 ms    QTc Calculation (Bazett) 430 ms    P Axis 7 degrees    R Axis 0 degrees    T Axis 30 degrees   COVID-19    Collection Time: 12/21/20  7:35 PM    Specimen: Other Result Value Ref Range    SARS-CoV-2          SARS-CoV-2, Rapid Not Detected Not Detected    Source . NASOPHARYNGEAL SWAB     SARS-CoV-2              ---------------------------------------RADIOLOGY RESULTS-------------------------------------------    XR THORACIC SPINE (2 VIEWS)   Final Result   1. Multilevel vertebral body height loss with kyphosis, similar to prior   examination. 2. Multilevel degenerative change. 3. Possible fractured leg of the IVC filter. XR CHEST PORTABLE   Final Result   No acute cardiopulmonary disease. Stable marked tortuosity of the thoracic   aorta. I have personally viewed all of the X-ray images and reviewed the radiologist's interpretation(s) and concur.         ---------------------------------------------COUNSELING--------------------------------------------------    Counseling: The emergency provider has spoken with the patient and her  and discussed today's findings, in addition to providing specific detail for the plan of care and counseling regarding the diagnosis and prognosis. They are agreeable with the plan.       -----------------------------------------PROCEDURE NOTE---------------------------------------------    None.    -----------------------------------MEDICAL DECISION MAKING--------------------------------------    Vital Signs: Reviewed the patient's vital signs. Pulse oximetry interpretation: Not hypoxic     Nursing Notes: Reviewed and utilized the nursing notes.     ED Orders:    Orders Placed This Encounter   Procedures    XR CHEST PORTABLE    XR THORACIC SPINE (2 VIEWS)    CBC Auto Differential    Troponin    Comprehensive Metabolic Panel w/ Reflex to MG    Protime-INR    APTT    Troponin    COVID-19    Telemetry monitoring    Vital signs    EKG 12 Lead    EKG 12 Lead    EKG 12 Lead    Insert peripheral IV    Insert peripheral IV       Electrocardiogram:  EKG # 2 Ordered, reviewed, and independently interpreted by the EP. Time Interpreted: 1915 hrs. EKG interpreted by me in the absence of the cardiologist contemporaneously with patient care shows normal sinus rhythm rate of 74 bpm, normal ME 0.14, normal QTC 0.43, normal axis +0. No STEMI. No bundle branch block pattern. No acute ischemia. ED Physician Core Measures: Measure #34, Adult Chest Pain:  A 12-lead EKG was performed in the ED. Critical Care:  No      -------------------------CLINICAL IMPRESSION AND DISPOSITION-----------------------------    CLINICAL IMPRESSION:  1.  Chest pain, moderate coronary artery risk        PRESCRIPTIONS:  New Prescriptions    No medications on file        DISPOSITION:  DISPOSITION Admitted To Observation 12/21/2020 08:03:54 PM      PATIENT CONDITION:  stable         Tatiana Johns MD  12/21/20 2007

## 2020-12-22 NOTE — CONSULTS
Molly [place] am scribing for and in the presence of Rikki Colbert. Oswald LINARES, MS, F.A.C.C..    Patient: Sean Mayo  : 1935  Date of Admission: 2020  Primary Care Physician: Cassia Vicente  Today's Date: 2020    REASON FOR CONSULTATION/CC: Chest discomfort     HPI: I had the pleasure of seeing Ms. Mathew Johnson today who is a 80 y.o. female with a history of intermittent chest discomfort leading to this consultation. She denies having heart disease in the past. She has had blood clots in past is on Warfarin. Her stress test and echo done today 2020 were both unremarkable. Ms. Mathew Johnson was admitted yesterday for chest pain. She denies any further episodes since being admitted. She states it felt like she was hit by 2x4 happened several times for about 20-30 minutes. Ms. Mathew Johnson reports she took blood pressure and it was very high around 190's and worsened with deep breaths. She felt sick today after testing. She does report she did have some pains occasionally although those only last seconds. She did not know if it was from her back pain. She has had heart burn in past although this is different type of pain. She does have lightheaded/dizziness at times. She does not eat very much and drinks a lot of pop. Denies blood in urine or stool. She denied any current shortness of breath, abdominal pain, bleeding problems, problems with her medications or any other concerns at this time.      Past Medical History:   Diagnosis Date    Abnormal EMG 2016    Right Carpal Tunnel & C5 Radiculopathy    Cancer ([address]     colon    Chronic kidney disease     Colon polyp ,     Compression fracture of lumbar vertebra (HCC) / prednisone related to PMR 7/15/2015    Diarrhea     DVT (deep venous thrombosis) (HCC) 2015    Emphysema     HTN (hypertension)     Hyperlipidemia     Irritable bowel syndrome     Lumbago     Osteoarthritis     Polymyalgia rheumatica ([address] 2015    Rheumatism  Seborrheic keratoses     on back       CURRENT ALLERGIES: Budesonide and Hydrochlorothiazide REVIEW OF SYSTEMS: 14 systems were reviewed. Pertinent positives and negatives as above, all else negative.      Past Surgical History:   Procedure Laterality Date    APPENDECTOMY  194    CATARACT REMOVAL WITH IMPLANT      COLECTOMY Right 2016    open right hemicolectomy bowel resection    COLONOSCOPY  [id number]    Dr. Roddy Grissom COLONOSCOPY  2016    -bx mass hepatic flexure,tattoo    COLONOSCOPY  2017    Dr Morales-polyp(adenomatous)hemorrhoids    DENTAL SURGERY      Implants    GLAUCOMA SURGERY      preventative surg    HYSTERECTOMY      NASAL SEPTUM SURGERY      Deriated Septum Surgery    CA COLSC FLX W/RMVL OF TUMOR POLYP LESION SNARE TQ N/A 2017    COLONOSCOPY POLYPECTOMY SNARE/COLD BIOPSY performed by Veda Velazquez MD at . [facility] 69 cell on back and leg    TONSILLECTOMY      Social History:  Social History     Tobacco Use    Smoking status: Former Smoker     Packs/day: 1.00     Years: 30.00     Pack years: 30.00     Quit date: 1981     Years since quittin.8    Smokeless tobacco: Never Used   Substance Use Topics    Alcohol use: No     Frequency: Never     Drinks per session: Patient refused     Binge frequency: Never     Comment: rarely    Drug use: No        OUTPATIENT MEDICATIONS:  Outpatient Medications Marked as Taking for the 20 encounter [facility] Encounter)   Medication Sig Dispense Refill    atorvastatin (LIPITOR) 80 MG tablet Take 1 tablet by mouth every evening 90 tablet 3    warfarin (COUMADIN) 2.5 MG tablet Take 1 tablet by mouth daily Take 1 tablet (2.5 mg)   and take 1.5 tablets (3.75 mg) tab orally on remaining days 135 tablet 3    amLODIPine (NORVASC) 5 MG tablet Take 1 tablet by mouth daily 90 tablet 2    carvedilol (COREG) 25 MG tablet Take 1 tablet by mouth 2 times daily 180 tablet 3 Pulmonary/Chest: Effort normal and breath sounds normal. No respiratory distress. She has no wheezes, rhonchi or rales. Abdominal: Soft, non-tender. Bowel sounds and aorta are normal. She exhibits no organomegaly, mass or bruit. Extremities: None. No cyanosis or clubbing. 2+ radial and carotid pulses. Distal extremity pulses: 2+ bilaterally. Neurological: She is alert and oriented to person, place, and time. No evidence of gross cranial nerve deficit. Coordination appeared normal.   Skin: Skin is warm and dry. There is no rash or diaphoresis. Psychiatric: She has a normal mood and affect.  Her speech is normal and behavior is normal.        MOST RECENT LABS ON RECORD:   Lab Results   Component Value Date    WBC 6.7 12/21/2020    HGB 11.0 (L) 12/21/2020    HCT 35.3 (L) 12/21/2020     12/21/2020    CHOL 127 12/02/2019    TRIG 99 12/02/2019    HDL 64 10/20/2020    ALT 15 12/21/2020    AST 25 12/21/2020     12/21/2020    K 4.9 12/21/2020     (H) 12/21/2020    CREATININE 1.50 (H) 12/21/2020    BUN 23 12/21/2020    CO2 22 12/21/2020    TSH 0.74 11/12/2013    INR 2.6 12/22/2020    GLUF 97 10/20/2020       ASSESSMENT:  Patient Active Problem List    Diagnosis Date Noted    Chest pain 12/21/2020    Contusion of scalp 07/31/2019    Drug-induced hyperkalemia from Lisinopril 07/27/2018    Chronic pain syndrome / Chronic Compression Fractures 01/17/2018    History of colon cancer 12/08/2017    NSTEMI (non-ST elevated myocardial infarction) ([address]     Essential hypertension 08/22/2016    Mid back pain on left side / compression fracture related 08/22/2016    Acute deep vein thrombosis (DVT) of femoral vein of left lower extremity ([address] 08/15/2016    Long term current use of anticoagulant therapy 08/03/2016    Pulmonary embolism ([address] 08/03/2016    Status post right hemicolectomy 06/21/2016    Rectal bleeding 06/05/2016    Anemia 06/05/2016 I believe that the risk of significant morbidity and mortality related to the patient's current medical conditions are: intermediate-high. The documentation recorded by the scribe, accurately and completely reflects the services I personally performed and the decisions made by me. Feli Lawson MD, MS, F.A.C.C.  December 22, 2020

## 2020-12-22 NOTE — PROGRESS NOTES
Pt resting comfortably in bed. Pt inquiring when stress test will be. Informed pt that there is no order for a stress test at this time. Vitals obtained and morning assessment completed. Pt denies further needs at this time. Call light in reach. Will continue to monitor.

## 2020-12-22 NOTE — PROGRESS NOTES
Pharmacy Renal Assessment Note    Devora Martinez is a 80 y.o. female. Pharmacist assessment of renally cleared medications. Recent Labs     12/21/20  1735   CREATININE 1.50*     Estimated Creatinine Clearance: 22 mL/min (A) (based on SCr of 1.5 mg/dL (H)).     Height:   Ht Readings from Last 1 Encounters:   12/22/20 5' 4\" (1.626 m)     Weight:  Wt Readings from Last 1 Encounters:   12/22/20 113 lb 6.4 oz (51.4 kg)     Adjusted famotidine to 10 mg once daily for CrCl <30 mL/min    Radha Yusuf, 12/22/2020, 7:43 AM

## 2020-12-22 NOTE — PROGRESS NOTES
Pharmacy Note  Warfarin Consult    Anabel Parrish is a 80 y.o. female for whom pharmacy has been consulted to manage warfarin therapy. Consulting Physician: Mirna Easley  Reason for Admission: Chest pain    Warfarin dose prior to admission: 3.75 mg MWF, 2.5 mg AOD  Warfarin indication: DVT/PE hx  Target INR range: 2-3     Past Medical History:   Diagnosis Date    Abnormal EMG 2016    Right Carpal Tunnel & C5 Radiculopathy    Cancer ([facility]     colon    Chronic kidney disease     Colon polyp 2006, 2009    Compression fracture of lumbar vertebra (HCC) / prednisone related to PMR 7/15/2015    Diarrhea     DVT (deep venous thrombosis) ([facility] 2015    Emphysema     HTN (hypertension)     Hyperlipidemia     Irritable bowel syndrome     Lumbago     Osteoarthritis     Polymyalgia rheumatica ([facility] 2015    Rheumatism     Seborrheic keratoses     on back                Recent Labs     12/21/20  1735   INR 2.8     Recent Labs     12/21/20  1735   HGB 11.0*   HCT 35.3*          Current warfarin drug-drug interactions:       Date             INR        Dose   12/21/2020     2.8    3.75    Daily PT/INR while inpatient. PT/INR ordered to start 12/22. Thank you for the consult. Will continue to follow. MANUELA Solis, PharmD  12/21/2020 10:06 PM

## 2020-12-22 NOTE — PROGRESS NOTES
Pt returned to dept from radiology, pt ambulated standby assist to bathroom. Was incontinent of stool. Pt c.o headache. PRN Tylenol given. Pt denies other needs at this time. Call light in reach. Will continue to monitor.

## 2020-12-22 NOTE — DISCHARGE SUMMARY
EXAMINATION: ONE XRAY VIEW OF THE CHEST 12/21/2020 5:30 pm COMPARISON: 11/09/2017. HISTORY: ORDERING SYSTEM PROVIDED HISTORY: cp TECHNOLOGIST PROVIDED HISTORY: cp FINDINGS: Lung volumes are diminished. The exam is rotated. Allowing for this, the cardiac silhouette is stable. Marked tortuosity of the thoracic aorta with atherosclerotic plaque. The lungs are clear with no infiltrate, pleural fluid or evidence of overt failure. IVC filter projects in the upper abdomen. No acute cardiopulmonary disease. Stable marked tortuosity of the thoracic aorta.        Assessment and Plan:  Patient Active Problem List    Diagnosis Date Noted    Chest pain 12/21/2020    Contusion of scalp 07/31/2019    Drug-induced hyperkalemia from Lisinopril 07/27/2018    Chronic pain syndrome / Chronic Compression Fractures 01/17/2018    History of colon cancer 12/08/2017    NSTEMI (non-ST elevated myocardial infarction) ([address]     Essential hypertension 08/22/2016    Mid back pain on left side / compression fracture related 08/22/2016    Acute deep vein thrombosis (DVT) of femoral vein of left lower extremity ([address] 08/15/2016    Long term current use of anticoagulant therapy 08/03/2016    Pulmonary embolism ([address] 08/03/2016    Status post right hemicolectomy 06/21/2016    Rectal bleeding 06/05/2016    Anemia 06/05/2016    Chronic deep vein thrombosis (DVT) of right lower extremity ([address] 06/05/2016    Adenocarcinoma ([address] 06/01/2016    Mass of hepatic flexure of colon 05/27/2016    CKD (chronic kidney disease) stage 3, GFR 30-59 ml/min 05/26/2016    Compression fracture of lumbar vertebra (HCC) / prednisone related to PMR 07/15/2015    Polymyalgia rheumatica ([address]     Nuclear sclerotic cataract of left eye 11/12/2012        Discharge Medications:       Dellie Fish   Home Medication Instructions [id number]    Printed on:12/22/20 1640   Medication Information GARRY Jacobo, NP-C  12/22/2020, 4:40 PM

## 2020-12-22 NOTE — PROGRESS NOTES
Received call from lab for critical high troponin of 57. Will update Dr. Nelson Lacey when he arrives for rounds.

## 2020-12-22 NOTE — PROGRESS NOTES
Pharmacy Note     Renal Dose Adjustment    William Del Valle is a 80 y.o. female. Pharmacist assessment of renally cleared medications. Recent Labs     12/21/20  1735   BUN 23       Recent Labs     12/21/20  1735   CREATININE 1.50*       Estimated Creatinine Clearance: 23 mL/min (A) (based on SCr of 1.5 mg/dL (H)). Estimated CrCl using Ideal Body Weight: 23 mL/min (based on IBW 52 kg)    Height:   Ht Readings from Last 1 Encounters:   12/21/20 5' 4\" (1.626 m)     Weight:  Wt Readings from Last 1 Encounters:   12/21/20 114 lb 12.8 oz (52.1 kg)       The following medication dose has been adjusted based upon renal function per P&T Guidelines:             Famotidine 20 mg oral twice daily changed to famotidine 20 mg oral once daily.

## 2020-12-22 NOTE — H&P
 COLONOSCOPY  12/13/2017    Dr Morales-polyp(adenomatous)hemorrhoids    DENTAL SURGERY      Implants    GLAUCOMA SURGERY      preventative surg    HYSTERECTOMY  1973    NASAL SEPTUM SURGERY      Deriated Septum Surgery    NE COLSC FLX W/RMVL OF TUMOR POLYP LESION SNARE TQ N/A 12/13/2017    COLONOSCOPY POLYPECTOMY SNARE/COLD BIOPSY performed by Becki Cortes MD at . [facility] 69 cell on back and leg    TONSILLECTOMY  1941       Medications Prior to Admission:    Prior to Admission medications    Medication Sig Start Date End Date Taking?  Authorizing Provider   atorvastatin (LIPITOR) 80 MG tablet Take 1 tablet by mouth every evening 12/21/20  Yes Jenifer Schmidt MD   warfarin (COUMADIN) 2.5 MG tablet Take 1 tablet by mouth daily Take 1 tablet (2.5 mg) Fridays  and take 1.5 tablets (3.75 mg) tab orally on remaining days 9/15/20  Yes Jenifer Schmidt MD   amLODIPine (NORVASC) 5 MG tablet Take 1 tablet by mouth daily 7/27/20  Yes Jenifer Schmidt MD   carvedilol (COREG) 25 MG tablet Take 1 tablet by mouth 2 times daily 5/5/20  Yes Jenifer Schmidt MD   calcium carbonate (OSCAL) 500 MG TABS tablet Take 500 mg by mouth daily   Yes Historical Provider, MD   loperamide (IMODIUM) 2 MG capsule Take 2 mg by mouth 4 times daily as needed for Diarrhea   Yes Historical Provider, MD   vitamin B-12 (CYANOCOBALAMIN) 1000 MCG tablet Take 1,000 mcg by mouth daily   Yes Historical Provider, MD   Multiple Vitamins-Minerals (CENTRUM SILVER PO) Take by mouth daily   Yes Historical Provider, MD   meclizine (ANTIVERT) 12.5 MG tablet Take 12.5 mg by mouth 3 times daily as needed    Historical Provider, MD   Handicap Placard MISC by Does not apply route Expires in 5 yrs. 2/28/17   Jenifer Schmidt MD       Allergies:  Budesonide and Hydrochlorothiazide    Social History: TOBACCO:   reports that she quit smoking about 39 years ago. She has a 30.00 pack-year smoking history. She has never used smokeless tobacco.  ELICIT DRUG USE:    Social History     Substance and Sexual Activity   Drug Use No     ETOH:   reports no history of alcohol use. Family History:       Problem Relation Age of Onset    Colon Cancer Father     Heart Disease Brother     High Blood Pressure Brother     Stroke Brother        Review of Systems:  Constitutional:negative  for fevers, and negative for chills. Respiratory: positive for shortness of breath, negative for cough, and negative for wheezing  Cardiovascular: Positive for chest pain, negative for palpitations, and negative for syncope  Gastrointestinal: negative for abdominal pain, negative for nausea,negative for vomiting, negative for diarrhea, negative for constipation, and negative for hematochezia or melena  Genitourinary: negative for dysuria, negative for urinary urgency, negative for urinary frequency, and negative for hematuria  Neurological: negative for unilateral weakness, numbness or tingling. All other systems were reviewed with the patient and are negative except as stated    Objective:    Vitals:   Vitals:    12/22/20 1429   BP: (!) 177/74   Pulse: 77   Resp: 16   Temp: 99.1 °F (37.3 °C)   SpO2:      Weight: 113 lb 6.4 oz (51.4 kg)   Height: 5' 4\" (162.6 cm)   -----------------------------------------------------------------  Exam:  GEN:  alert and oriented to person, place and time, well-developed and well-nourished, in no acute distress  EYES: No gross abnormalities. , PERRL and EOMI  NECK: normal, supple, no lymphadenopathy,  no carotid bruits  PULM: clear to auscultation bilaterally- no wheezes, rales or rhonchi, normal air movement, no respiratory distress  COR: regular rate & rhythm, no murmurs, no gallops, S1 normal and S2 normal  ABD:  soft, non-tender, non-distended, normal bowel sounds, no masses or organomegaly EXT:   no cyanosis, clubbing or edema present    NEURO: follows commands, CORNELL, no deficits  SKIN:  no rashes or significant lesions  -----------------------------------------------------------------  Diagnostic Data:   · All diagnostic data was reviewed  Lab Results   Component Value Date    BUN 23 12/21/2020    CREATININE 1.50 (H) 12/21/2020     12/21/2020    K 4.9 12/21/2020    CALCIUM 8.5 (L) 12/21/2020     (H) 12/21/2020    CO2 22 12/21/2020     Lab Results   Component Value Date    MYOGLOBIN 116 (H) 11/06/2013    TROPONINT NOT REPORTED 12/22/2020    CKTOTAL 25 (L) 06/27/2016    CKMB 10.1 (HH) 11/09/2017    PROBNP 888 (H) 11/09/2017     Lab Results   Component Value Date    GLUCOSE 111 (H) 12/21/2020     Lab Results   Component Value Date    CHOL 127 12/02/2019    LDLCHOLESTEROL 38 10/20/2020    HDL 64 10/20/2020    TRIG 99 12/02/2019    ALT 15 12/21/2020    AST 25 12/21/2020     Lab Results   Component Value Date    WBC 6.7 12/21/2020    HGB 11.0 (L) 12/21/2020    MCV 99.4 12/21/2020     12/21/2020     Lab Results   Component Value Date    WBCUA 0 TO 2 07/13/2018    RBCUA 0 TO 2 07/13/2018    EPITHUA 0 TO 2 07/13/2018    LEUKOCYTESUR NEGATIVE 07/13/2018    SPECGRAV 1.015 07/13/2018    GLUCOSEU NEGATIVE 07/13/2018    KETUA NEGATIVE 07/13/2018    PROTEINU NEGATIVE 07/13/2018    HGBUR TRACE (A) 07/13/2018    CASTUA NOT REPORTED 07/13/2018    CRYSTUA NOT REPORTED 07/13/2018    BACTERIA NOT REPORTED 07/13/2018    YEAST NOT REPORTED 07/13/2018       Assessment:      · Chest pain / unstable angina  Active Problems:    Chest pain  Resolved Problems:    * No resolved hospital problems.  *      Patient Active Problem List    Diagnosis Date Noted    Chest pain 12/21/2020    Contusion of scalp 07/31/2019    Drug-induced hyperkalemia from Lisinopril 07/27/2018    Chronic pain syndrome / Chronic Compression Fractures 01/17/2018    History of colon cancer 12/08/2017  NSTEMI (non-ST elevated myocardial infarction) [facility]     Essential hypertension 08/22/2016    Mid back pain on left side / compression fracture related 08/22/2016    Acute deep vein thrombosis (DVT) of femoral vein of left lower extremity ([facility] 08/15/2016    Long term current use of anticoagulant therapy 08/03/2016    Pulmonary embolism ([facility] 08/03/2016    Status post right hemicolectomy 06/21/2016    Rectal bleeding 06/05/2016    Anemia 06/05/2016    Chronic deep vein thrombosis (DVT) of right lower extremity (HCC) 06/05/2016    Adenocarcinoma ([facility] 06/01/2016    Mass of hepatic flexure of colon 05/27/2016    CKD (chronic kidney disease) stage 3, GFR 30-59 ml/min 05/26/2016    Compression fracture of lumbar vertebra (HCC) / prednisone related to PMR 07/15/2015    Polymyalgia rheumatica ([facility]     Nuclear sclerotic cataract of left eye 11/12/2012       Plan:     · This patient requires overnight observation on a telemetry monitored bed due to chest pain / unstable angina  · Factors affecting the medical complexity of this patient include CKD, CAD  · Estimated length of stay is 1 day  · Discussed patient's symptoms and data results including labs and imaging studies with the ER MD at time of admission  · Monitor serial cardiac enzymes  · continue aspirin therapy  · Lovenox for DVT prophylaxis  · Will order Lexican stress test with nuclear imaging as an inpatient today  · If stress test is negative the patient will be discharged with close f/u in next 1-2 weeks with PCP  · High risk drug monitoring: Coumadin    CORE MEASURES  DVT prophylaxis: already on chronic anticoagulation  Decubitus ulcer present on admission: No  CODE STATUS: FULL CODE  Nutrition Status: good   Physical therapy: NA   Old Charts reviewed: Yes  EKG Reviewed:  Yes  Advance Directive Addressed: Yes    GARRY Tapia, NP-C  12/22/2020, 2:47 PM

## 2020-12-22 NOTE — CONSULTS
[facility] Pharmacy Department    Clinical Pharmacy Note    Warfarin consult follow-up    INR (no units)   Date Value   12/22/2020 2.6   12/21/2020 2.8   12/03/2020 1.9   10/15/2020 2.7   09/03/2020 2   07/23/2020 2.1   06/11/2020 2       Hemoglobin (g/dL)   Date Value   12/21/2020 11.0 (L)   11/13/2020 11.4 (L)   11/01/2019 11.5 (L)     Hematocrit (%)   Date Value   12/21/2020 35.3 (L)   11/13/2020 36.2 (L)   11/01/2019 37.2     Platelets (k/uL)   Date Value   12/21/2020 161   11/13/2020 156   11/01/2019 202       Target INR Range: 2-3    Significant Drug-Drug Interactions:  New warfarin drug-drug interactions: none  Discontinued drug-drug interactions: none      Notes:  Please give warfarin 2.5 mg by mouth today. Thanks! Daily PT/INR until stable within therapeutic range.       Kisha Chavarria, Pharm D, 12/22/2020, 8:12 AM

## 2020-12-23 NOTE — PROCEDURES
[address] [place], [place] [address]                              CARDIAC STRESS TEST    PATIENT NAME: ZEB LARSON                       :        1935  MED REC NO:   [id number]                              ROOM:       2688  ACCOUNT NO:   [de-identified]                           ADMIT DATE: 2020  PROVIDER:     Roman Cherry MD    CARDIOVASCULAR DIAGNOSTIC DEPARTMENT    DATE OF STUDY:  2020    ORDERING PROVIDER:  Blossom Roman. Johnnie Collier, APRN-CNP    PRIMARY CARE PROVIDER:  Seleta Goldberg. Avi Davison MD    INTERPRETING PHYSICIAN:  Malissa Remy MD    PHARMACOLOGIC MYOCARDIAL PERFUSION STRESS TESTING    Rest/stress single isotope SPECT imaging with exercise stress and gated  SPECT imaging. INDICATIONS:  Assessment of recent chest pain and/or chest discomfort. CLINICAL HISTORY:  The patient is an 71-year-old woman with no known  coronary artery disease. Previous cardiac history includes:  None. Other previous history includes:  Chest pain, arm pain, hypertension. Symptoms just prior to testing include:  None. Relevant medications:  Carvedilol. PROCEDURE:  The heart rate was 77 at baseline and jakub to 109 beats per  minute during the regadenoson infusion. The rest blood pressure was  159/94 mm/Hg and decreased to 102/58 mm/Hg. The patient did not  complain of any significant symptoms following infusion. Pharmacologic stress testing was performed with regadenoson at a dose of  0.4 mg. Additionally, low level exercise using hand compressions were  performed along with vasodilator infusion. MYOCARDIAL PERFUSION IMAGING:  Imaging was performed at rest 30-45  minutes following the injection of 10 mCi of sestamibi. Approximately  10 seconds after Lexiscan injection, the patient was injected with 30  mCi of sestamibi. Gating post-stress tomographic imaging was performed  30-45 minutes after stress. STRESS ECG RESULTS:  The resting electrocardiogram demonstrated normal  sinus rhythm without significant ST-segment abnormalities that may  impair accurate ECG detection of stress induced cardiac ischemia. During vasodilator infusion and during recovery, the patient developed:    No significant ST segment changes suggestive of myocardial ischemia with  no premature atrial contractions (PACs) and no premature ventricular  contractions (PVCs). NUCLEAR IMAGING RESULTS:  The overall quality of the study is excellent. Mild/moderate attenuation artifact was seen. There is no evidence of  abnormal lung uptake. Additionally, the right ventricle appears normal.  The left ventricular cavity is noted to be normal in size on the stress  images. There is no evidence of transient ischemic dilatation (TID) of  the left ventricle. Gated SPECT imaging reveals normal myocardial thickening and wall motion  with a calculated left ventricular ejection fraction of 68%. The rest images demonstrated a small perfusion abnormality of mild  intensity in the inferolateral region, which is most likely due to  artifact. On stress imaging, a small perfusion abnormality of mild intensity in  the anteroseptal region, which is most likely due to artifact. IMPRESSION:  1. Largely normal myocardial perfusion imaging with soft tissue  artifact, but without significant evidence of myocardial ischemia or  infarction. 2.  Global left ventricular systolic function was normal without  regional wall motion abnormalities. 3.  No significant electrocardiographic evidence of myocardial ischemia  during EKG monitoring without significant associated arrhythmias. Overall, these results are most consistent with low risk for significant  coronary artery disease. The sensitivity for detecting ischemia on this test may have been  reduced due to the patient being on a beta blocker.       Annmarie Steele MD D: 12/23/2020 10:05:00       T: 12/23/2020 10:06:46     BANDAR/FAUSTO_EDIT  Job#: [id number]     Doc#: Unknown    CC:  Negrita Troy.  MD Jerry Benson, APRN - CNP

## 2021-01-04 PROBLEM — R07.9 CHEST PAIN IN ADULT: Status: ACTIVE | Noted: 2021-01-04

## 2021-01-13 ENCOUNTER — TELEPHONE (OUTPATIENT)
Dept: PHARMACY | Age: 86
End: 2021-01-13

## 2021-01-13 NOTE — TELEPHONE ENCOUNTER

## 2021-01-14 ENCOUNTER — HOSPITAL ENCOUNTER (OUTPATIENT)
Dept: PHARMACY | Age: 86
Setting detail: THERAPIES SERIES
Discharge: HOME | End: 2021-01-14
Payer: MEDICARE

## 2021-01-14 VITALS — SYSTOLIC BLOOD PRESSURE: 103 MMHG | DIASTOLIC BLOOD PRESSURE: 51 MMHG | HEART RATE: 76 BPM

## 2021-01-14 DIAGNOSIS — Z79.01 LONG TERM CURRENT USE OF ANTICOAGULANT THERAPY: ICD-10-CM

## 2021-01-14 LAB — INR BLD: 2.3

## 2021-01-14 PROCEDURE — 99211 OFF/OP EST MAY X REQ PHY/QHP: CPT

## 2021-01-14 PROCEDURE — 85610 PROTHROMBIN TIME: CPT

## 2021-02-24 ENCOUNTER — TELEPHONE (OUTPATIENT)
Dept: PHARMACY | Age: 86
End: 2021-02-24

## 2021-02-24 NOTE — TELEPHONE ENCOUNTER
COVID-19 phone screening     Call placed to screen patient prior to upcoming Medication Management visit for Anticoagulation on 2/25/21. Does patient have any of the following symptoms? [] Fever    [] Lower respiratory symptoms (SOB, difficulty breathing, cough)  [x] None    Travel Screening completed.    Sheree Pruitt R.Ph., 2/24/2021,1:51 PM

## 2021-02-25 ENCOUNTER — HOSPITAL ENCOUNTER (OUTPATIENT)
Dept: PHARMACY | Age: 86
Setting detail: THERAPIES SERIES
Discharge: HOME | End: 2021-02-25
Payer: MEDICARE

## 2021-02-25 VITALS
WEIGHT: 112 LBS | SYSTOLIC BLOOD PRESSURE: 111 MMHG | DIASTOLIC BLOOD PRESSURE: 73 MMHG | HEART RATE: 77 BPM | BODY MASS INDEX: 19.22 KG/M2 | TEMPERATURE: 97.3 F

## 2021-02-25 DIAGNOSIS — Z79.01 LONG TERM CURRENT USE OF ANTICOAGULANT THERAPY: ICD-10-CM

## 2021-02-25 LAB — INR BLD: 1.6

## 2021-02-25 PROCEDURE — 85610 PROTHROMBIN TIME: CPT

## 2021-02-25 PROCEDURE — 99212 OFFICE O/P EST SF 10 MIN: CPT

## 2021-02-25 NOTE — PATIENT INSTRUCTIONS
Continue to monitor urine and stool. Continue to monitor for signs of bleeding. Return to clinic in 4 weeks. Take warfarin 2 tablets for 5 mg today then continue warfarin 1.5 tablets for 3.75 mg MWF and whole 2.5 mg tablet all other days.

## 2021-03-20 NOTE — PATIENT INSTRUCTIONS
Please take warfarin 5 mg (2 x 2.5 mg tablets) everyday until Thursday. Return to clinic on Thursday, 12/21/17 at 12:45 pm to recheck INR. Continue to monitor urine and stool for signs and symptoms of bleeding. Please notify the clinic of any medication changes. Please remember to bring all medications (both prescription and OTC) to your next visit. Kindly notify the clinic if you are unable to make to your next appointment.
No

## 2021-03-24 ENCOUNTER — TELEPHONE (OUTPATIENT)
Dept: PHARMACY | Age: 86
End: 2021-03-24

## 2021-03-24 NOTE — TELEPHONE ENCOUNTER
Recent Travel Screening and Travel History documentation:     Travel Screening     Question   Response    In the last month, have you been in contact with someone who was confirmed or suspected to have Coronavirus / COVID-19? No / Unsure    Have you had a COVID-19 viral test in the last 14 days? No    Do you have any of the following new or worsening symptoms? None of these    Have you traveled internationally or domestically in the last month?   No      Travel History   Travel since 02/24/21     No documented travel since 02/24/21         Eliza Fernandez, PharmD 3/24/2021 11:16 AM

## 2021-03-25 ENCOUNTER — HOSPITAL ENCOUNTER (OUTPATIENT)
Dept: PHARMACY | Age: 86
Setting detail: THERAPIES SERIES
Discharge: HOME | End: 2021-03-25
Payer: MEDICARE

## 2021-03-25 VITALS — SYSTOLIC BLOOD PRESSURE: 116 MMHG | DIASTOLIC BLOOD PRESSURE: 70 MMHG | HEART RATE: 74 BPM

## 2021-03-25 DIAGNOSIS — I26.99 PULMONARY EMBOLISM, UNSPECIFIED CHRONICITY, UNSPECIFIED PULMONARY EMBOLISM TYPE, UNSPECIFIED WHETHER ACUTE COR PULMONALE PRESENT (HCC): ICD-10-CM

## 2021-03-25 DIAGNOSIS — Z79.01 LONG TERM CURRENT USE OF ANTICOAGULANT THERAPY: ICD-10-CM

## 2021-03-25 LAB — INR BLD: 1.6

## 2021-03-25 PROCEDURE — 99212 OFFICE O/P EST SF 10 MIN: CPT | Performed by: FAMILY MEDICINE

## 2021-03-25 PROCEDURE — 85610 PROTHROMBIN TIME: CPT | Performed by: FAMILY MEDICINE

## 2021-03-25 NOTE — PATIENT INSTRUCTIONS
Please take 2 tablets (5mg) warfarin today then increase your dosing to take 1 tablet (2.5mg) warfarin on Tuesdays, Thursdays and Saturdays and 1 1/2 tablets (3.75mg) all other days. Continue to monitor for signs of bleeding. Return to coumadin clinic in 3 weeks.

## 2021-04-14 ENCOUNTER — TELEPHONE (OUTPATIENT)
Dept: PHARMACY | Age: 86
End: 2021-04-14

## 2021-04-14 NOTE — TELEPHONE ENCOUNTER
Recent Travel Screening and Travel History documentation:     Travel Screening     Question   Response    In the last month, have you been in contact with someone who was confirmed or suspected to have Coronavirus / COVID-19? No / Unsure    Have you had a COVID-19 viral test in the last 14 days? No    Do you have any of the following new or worsening symptoms? None of these    Have you traveled internationally or domestically in the last month?   No      Travel History   Travel since 03/14/21     No documented travel since 03/14/21         Aniceto Hernandez, PharmD 4/14/2021 10:13 AM

## 2021-04-15 ENCOUNTER — HOSPITAL ENCOUNTER (OUTPATIENT)
Dept: PHARMACY | Age: 86
Setting detail: THERAPIES SERIES
Discharge: HOME | End: 2021-04-15
Payer: MEDICARE

## 2021-04-15 VITALS — HEART RATE: 73 BPM | SYSTOLIC BLOOD PRESSURE: 142 MMHG | DIASTOLIC BLOOD PRESSURE: 69 MMHG

## 2021-04-15 DIAGNOSIS — Z79.01 LONG TERM CURRENT USE OF ANTICOAGULANT THERAPY: ICD-10-CM

## 2021-04-15 LAB — INR BLD: 1.4

## 2021-04-15 PROCEDURE — 99212 OFFICE O/P EST SF 10 MIN: CPT

## 2021-04-15 PROCEDURE — 85610 PROTHROMBIN TIME: CPT

## 2021-04-15 NOTE — PATIENT INSTRUCTIONS
Continue to monitor urine and stool. Continue to monitor for signs of bleeding. Return to clinic in 3 weeks. Take warfarin 1.5 tablets for 3.75 mg today then increase to whole 2.5 mg on Tue, Thur and 1.5 tablets for 3.75 mg all other days.

## 2021-04-15 NOTE — PROGRESS NOTES
[facility]/[place]  Medication Management  ANTICOAGULATION    Referring Doctor: Cindi Lees INR: 2-3    TODAY'S INR: 1.4    WARFARIN Dosage: Take warfarin 1.5 tablets for 3.75 mg today then increase to whole 2.5 mg on Tue, Thur and 1.5 tablets for 3.75 mg all other days. INR (no units)   Date Value   04/15/2021 1.4   03/25/2021 1.6   02/25/2021 1.6   01/14/2021 2.3   12/22/2020 2.6   12/21/2020 2.8   12/03/2020 1.9     Medication changes:  None    Notes:    Fingerstick INR drawn per clinic protocol. Patient states no visible blood in urine and no black tarry stool. Denies any missed doses of warfarin. No change in other maintenance medications or in diet. Will recheck INR in 6 weeks (Patient refuses to come sooner). Patient acknowledges working in consult agreement with pharmacist as referred by his/her physician. Patient is losing weight. Patient hates cooking and loves planting flowers and painting.                 CLINICAL PHARMACY CONSULT: MED RECONCILIATION/REVIEW ADDENDUM    For Pharmacy Admin Tracking Only    PHSO: Yes  Total # of Interventions Recommended: 2  - Increased Dose #: 2  - Maintenance Safety Lab Monitoring #: 1  Total Interventions Accepted: 3  Time Spent (min): 30    Tyree Mccall, PharmD

## 2021-05-11 ENCOUNTER — HOSPITAL ENCOUNTER (OUTPATIENT)
Age: 86
Discharge: HOME | End: 2021-05-11
Payer: MEDICARE

## 2021-05-11 DIAGNOSIS — C18.3 MALIGNANT NEOPLASM OF HEPATIC FLEXURE (HCC): ICD-10-CM

## 2021-05-11 LAB
ABSOLUTE EOS #: 0.22 K/UL (ref 0–0.44)
ABSOLUTE IMMATURE GRANULOCYTE: 0.03 K/UL (ref 0–0.3)
ABSOLUTE LYMPH #: 1.43 K/UL (ref 1.1–3.7)
ABSOLUTE MONO #: 0.71 K/UL (ref 0.1–1.2)
ALBUMIN SERPL-MCNC: 3.6 G/DL (ref 3.5–5.2)
ALBUMIN/GLOBULIN RATIO: 1.5 (ref 1–2.5)
ALP BLD-CCNC: 79 U/L (ref 35–104)
ALT SERPL-CCNC: 14 U/L (ref 5–33)
ANION GAP SERPL CALCULATED.3IONS-SCNC: 8 MMOL/L (ref 9–17)
AST SERPL-CCNC: 23 U/L
BASOPHILS # BLD: 1 % (ref 0–2)
BASOPHILS ABSOLUTE: 0.03 K/UL (ref 0–0.2)
BILIRUB SERPL-MCNC: 0.31 MG/DL (ref 0.3–1.2)
BUN BLDV-MCNC: 28 MG/DL (ref 8–23)
BUN/CREAT BLD: 17 (ref 9–20)
CALCIUM SERPL-MCNC: 9.2 MG/DL (ref 8.6–10.4)
CARCINOEMBRYONIC ANTIGEN: 3.4 NG/ML
CHLORIDE BLD-SCNC: 109 MMOL/L (ref 98–107)
CO2: 25 MMOL/L (ref 20–31)
CREAT SERPL-MCNC: 1.66 MG/DL (ref 0.5–0.9)
DIFFERENTIAL TYPE: ABNORMAL
EOSINOPHILS RELATIVE PERCENT: 4 % (ref 1–4)
GFR AFRICAN AMERICAN: 36 ML/MIN
GFR NON-AFRICAN AMERICAN: 29 ML/MIN
GFR SERPL CREATININE-BSD FRML MDRD: ABNORMAL ML/MIN/{1.73_M2}
GFR SERPL CREATININE-BSD FRML MDRD: ABNORMAL ML/MIN/{1.73_M2}
GLUCOSE BLD-MCNC: 99 MG/DL (ref 70–99)
HCT VFR BLD CALC: 34.5 % (ref 36.3–47.1)
HEMOGLOBIN: 10.6 G/DL (ref 11.9–15.1)
IMMATURE GRANULOCYTES: 1 %
LYMPHOCYTES # BLD: 23 % (ref 24–43)
MCH RBC QN AUTO: 31.5 PG (ref 25.2–33.5)
MCHC RBC AUTO-ENTMCNC: 30.7 G/DL (ref 28.4–34.8)
MCV RBC AUTO: 102.4 FL (ref 82.6–102.9)
MONOCYTES # BLD: 12 % (ref 3–12)
NRBC AUTOMATED: 0 PER 100 WBC
PDW BLD-RTO: 13 % (ref 11.8–14.4)
PLATELET # BLD: 163 K/UL (ref 138–453)
PLATELET ESTIMATE: ABNORMAL
PMV BLD AUTO: 11.5 FL (ref 8.1–13.5)
POTASSIUM SERPL-SCNC: 4.9 MMOL/L (ref 3.7–5.3)
RBC # BLD: 3.37 M/UL (ref 3.95–5.11)
RBC # BLD: ABNORMAL 10*6/UL
SEG NEUTROPHILS: 59 % (ref 36–65)
SEGMENTED NEUTROPHILS ABSOLUTE COUNT: 3.69 K/UL (ref 1.5–8.1)
SODIUM BLD-SCNC: 142 MMOL/L (ref 135–144)
TOTAL PROTEIN: 6 G/DL (ref 6.4–8.3)
WBC # BLD: 6.1 K/UL (ref 3.5–11.3)
WBC # BLD: ABNORMAL 10*3/UL

## 2021-05-11 PROCEDURE — 36415 COLL VENOUS BLD VENIPUNCTURE: CPT

## 2021-05-11 PROCEDURE — 85025 COMPLETE CBC W/AUTO DIFF WBC: CPT

## 2021-05-11 PROCEDURE — 80053 COMPREHEN METABOLIC PANEL: CPT

## 2021-05-11 PROCEDURE — 82378 CARCINOEMBRYONIC ANTIGEN: CPT

## 2021-05-19 ENCOUNTER — OFFICE VISIT (OUTPATIENT)
Dept: ONCOLOGY | Age: 86
End: 2021-05-19
Payer: MEDICARE

## 2021-05-19 VITALS
BODY MASS INDEX: 18.27 KG/M2 | TEMPERATURE: 99.2 F | HEART RATE: 79 BPM | HEIGHT: 64 IN | WEIGHT: 107 LBS | SYSTOLIC BLOOD PRESSURE: 115 MMHG | RESPIRATION RATE: 18 BRPM | DIASTOLIC BLOOD PRESSURE: 68 MMHG

## 2021-05-19 DIAGNOSIS — C18.3 MALIGNANT NEOPLASM OF HEPATIC FLEXURE (HCC): Primary | ICD-10-CM

## 2021-05-19 DIAGNOSIS — C80.1 ADENOCARCINOMA (HCC): ICD-10-CM

## 2021-05-19 PROCEDURE — 99211 OFF/OP EST MAY X REQ PHY/QHP: CPT | Performed by: INTERNAL MEDICINE

## 2021-05-19 PROCEDURE — 99214 OFFICE O/P EST MOD 30 MIN: CPT | Performed by: INTERNAL MEDICINE

## 2021-05-19 PROCEDURE — 1036F TOBACCO NON-USER: CPT | Performed by: INTERNAL MEDICINE

## 2021-05-19 PROCEDURE — 1123F ACP DISCUSS/DSCN MKR DOCD: CPT | Performed by: INTERNAL MEDICINE

## 2021-05-19 PROCEDURE — 1090F PRES/ABSN URINE INCON ASSESS: CPT | Performed by: INTERNAL MEDICINE

## 2021-05-19 PROCEDURE — G8419 CALC BMI OUT NRM PARAM NOF/U: HCPCS | Performed by: INTERNAL MEDICINE

## 2021-05-19 PROCEDURE — G8400 PT W/DXA NO RESULTS DOC: HCPCS | Performed by: INTERNAL MEDICINE

## 2021-05-19 PROCEDURE — G8427 DOCREV CUR MEDS BY ELIG CLIN: HCPCS | Performed by: INTERNAL MEDICINE

## 2021-05-19 PROCEDURE — 4040F PNEUMOC VAC/ADMIN/RCVD: CPT | Performed by: INTERNAL MEDICINE

## 2021-05-19 NOTE — PROGRESS NOTES
Chief Complaint   Patient presents with    Follow-up     adenocarcinoma     DIAGNOSIS:       Invasive Adenocarcinoma of the colon/ hepatic flexure stage T4N0M0 in remission. CURRENT THERAPY:         S/p Rt hemicolectomy 6/20/16. Considering her age and comorbidity, she was not offered adjuvant chemotherapy and the decision was for surveillance/observation    BRIEF CASE HISTORY:      Ms. Sherie Crawford is a very pleasant 80 y.o. female with history of DVT status post IVC filter placement 1 year ago. She is maintained on Coumadin. No PE. Patient noticed rectal bleeding recently. She had blood mixed with the stool. This has been going on for the last few weeks. No abnormal pain. No nausea or vomiting. No constipation. No signs of obstruction. Patient had CT scan of the abdomen and pelvis on 5/24/2016 which was fine. She had colonoscopy on 5/25/2016 which showed a mass in the hepatic flexure with biopsy positive for invasive carcinoma. No weight loss or decreased appetite. No chest pain or shortness of breath. No headaches no back pain. No other complaints. Last colonoscopy was 5 years ago and was negative. Due to her age and comorbidity, after surgery, she did not receive adjuvant chemotherapy and she was observed    INTERIM HISTORY:   Seen for follow up colon cancer. She is feeling well without any symptoms. Specifically she denies any abdominal pain, no nausea or vomiting, no diarrhea. She is fairly active without any limitation.     PAST MEDICAL HISTORY: has a past medical history of Abnormal EMG, Cancer ([address] Chronic kidney disease, Colon polyp, Compression fracture of lumbar vertebra (HCC) / prednisone related to PMR, Diarrhea, DVT (deep venous thrombosis) ([address] Emphysema, Essential hypertension, Hyperlipidemia, Irritable bowel syndrome, Lumbago, Osteoarthritis, Polymyalgia rheumatica ([address] Rheumatism, and Seborrheic keratoses. PAST SURGICAL HISTORY: has a past surgical history that includes Cataract removal with implant; Glaucoma surgery; Nasal septum surgery; Dental surgery; Appendectomy (1949); Skin cancer excision; Hysterectomy (1973); Tonsillectomy (1941); Colonoscopy ([id number]); Colonoscopy (5/25/2016); colectomy (Right, 06/20/2016); Colonoscopy (12/13/2017); and pr colsc flx w/rmvl of tumor polyp lesion snare tq (N/A, 12/13/2017). CURRENT MEDICATIONS:  has a current medication list which includes the following prescription(s): carvedilol, amlodipine, atorvastatin, warfarin, calcium carbonate, meclizine, loperamide, vitamin b-12, handicap placard, and multiple vitamins-minerals. ALLERGIES:  is allergic to budesonide and hydrochlorothiazide. FAMILY HISTORY: Negative for any hematological or oncological conditions. SOCIAL HISTORY:  reports that she quit smoking about 40 years ago. She has a 30.00 pack-year smoking history. She has never used smokeless tobacco. She reports that she does not drink alcohol and does not use drugs. REVIEW OF SYSTEMS:     · General: No weakness or fatigue. No unanticipated weight loss or decreased appetite. No fever or chills. · Eyes: No blurred vision, eye pain or double vision. · Ears: No hearing problems or drainage. No tinnitus. · Throat: No sore throat, problems with swallowing or dysphagia. · Respiratory: No cough, sputum or hemoptysis. No shortness of breath. No pleuritic chest pain. · Cardiovascular: No chest pain, orthopnea or PND. No lower extremity edema. No palpitation. · Gastrointestinal: as above,   · Genitourinary: No dysuria, hematuria, frequency or urgency. · Musculoskeletal: chronic back pain and right lower extremity pain. · Dermatologic: No skin rashes or pruritus. No skin lesions or discolorations. · Psychiatric: No depression, anxiety, or stress or signs of schizophrenia. No change in mood or affect.     · Hematologic: No history of bleeding tendency. No bruises or ecchymosis. No history of clotting problems. · Infectious disease: No fever, chills or frequent infections. · Endocrine: No problems with opacity. No polydipsia or polyuria. No temperature intolerance. · Neurologic: No headaches or dizziness. No weakness or numbness of the extremities. No changes in balance, coordination,  memory, mentation, behavior. · Allergic/Immunologic: No nasal congestion or hives. No repeated infections. PHYSICAL EXAM:  The patient is not in acute distress. She walks with the help of a cane. Performance status ECOG 2 vital signs: Blood pressure 115/68, pulse 79, temperature 99.2 °F (37.3 °C), temperature source Temporal, resp. rate 18, height 5' 4\" (1.626 m), weight 107 lb (48.5 kg), not currently breastfeeding. HEENT:  Eyes are normal. Ears, nose and throat are normal.  Neck: Supple. No lymph node enlargement. No thyroid enlargement. Trachea is centrally located. Chest:  Clear to auscultation. No wheezes or crepitations. Heart: Regular sinus rhythm. Abdomen: Soft, nontender. No hepatosplenomegaly. No masses. Extremities:  With no edema. Lymph Nodes:  No cervical, axillary or inguinal lymph node enlargement. Neurologic:  Conscious and oriented. No focal neurological deficits. Psychosocial: No depression, anxiety or stress. Skin: No rashes, bruises or ecchymoses.       Review of Diagnostic data:   Lab Results   Component Value Date    WBC 6.1 05/11/2021    HGB 10.6 (L) 05/11/2021    HCT 34.5 (L) 05/11/2021    .4 05/11/2021     05/11/2021       Chemistry        Component Value Date/Time     05/11/2021 1103    K 4.9 05/11/2021 1103     (H) 05/11/2021 1103    CO2 25 05/11/2021 1103    BUN 28 (H) 05/11/2021 1103    CREATININE 1.66 (H) 05/11/2021 1103        Component Value Date/Time    CALCIUM 9.2 05/11/2021 1103    ALKPHOS 79 05/11/2021 1103    AST 23 05/11/2021 1103    ALT 14

## 2021-05-24 ENCOUNTER — TELEPHONE (OUTPATIENT)
Dept: PHARMACY | Age: 86
End: 2021-05-24

## 2021-05-24 NOTE — TELEPHONE ENCOUNTER
COVID-19 phone screening     Call placed to screen patient prior to upcoming Medication Management visit for Anticoagulation on 5/25/21. Does patient have any of the following symptoms? [] Fever    [] Lower respiratory symptoms (SOB, difficulty breathing, cough)  [x] None    Travel Screening completed.    RUSSEL Bruno.Phan., 5/24/2021,12:08 PM

## 2021-05-25 ENCOUNTER — HOSPITAL ENCOUNTER (OUTPATIENT)
Dept: PHARMACY | Age: 86
Setting detail: THERAPIES SERIES
Discharge: HOME | End: 2021-05-25
Payer: MEDICARE

## 2021-05-25 VITALS — SYSTOLIC BLOOD PRESSURE: 146 MMHG | HEART RATE: 73 BPM | DIASTOLIC BLOOD PRESSURE: 76 MMHG

## 2021-05-25 DIAGNOSIS — Z79.01 LONG TERM CURRENT USE OF ANTICOAGULANT THERAPY: Primary | ICD-10-CM

## 2021-05-25 DIAGNOSIS — I26.99 PULMONARY EMBOLISM, UNSPECIFIED CHRONICITY, UNSPECIFIED PULMONARY EMBOLISM TYPE, UNSPECIFIED WHETHER ACUTE COR PULMONALE PRESENT (HCC): ICD-10-CM

## 2021-05-25 LAB — INR BLD: 3

## 2021-05-25 PROCEDURE — 99211 OFF/OP EST MAY X REQ PHY/QHP: CPT | Performed by: FAMILY MEDICINE

## 2021-05-25 PROCEDURE — 85610 PROTHROMBIN TIME: CPT | Performed by: FAMILY MEDICINE

## 2021-05-25 NOTE — PROGRESS NOTES
[facility] [address] [place] [address]/[place]  Medication Management  ANTICOAGULATION    Referring Provider: Dr Blackwood [place] INR: 2.0-3.0    TODAY'S INR: 3.0    WARFARIN Dosage: continue 2.5mg TR, 3.75mg all other days    INR (no units)   Date Value   05/25/2021 3   04/15/2021 1.4   03/25/2021 1.6   02/25/2021 1.6   01/14/2021 2.3   12/22/2020 2.6   12/21/2020 2.8       Medication changes:  No changes  Notes:    Fingerstick INR drawn per clinic protocol. Patient states no visible blood in urine and no black tarry stool. Denies any missed doses of warfarin. No change in other maintenance medications or in diet. Will recheck INR in 6 weeks per patient request. Patient acknowledges working in consult agreement with pharmacist as referred by his/her physician.                   CLINICAL PHARMACY CONSULT: MED RECONCILIATION/REVIEW ADDENDUM    For Pharmacy Admin Tracking Only       Total # of Interventions Recommended: 2   Total # of Interventions Accepted: 2   Time Spent (min): 400 RUSSEL Newman.Ph., 5/25/2021,1:52 PM

## 2021-06-14 ENCOUNTER — HOSPITAL ENCOUNTER (OUTPATIENT)
Dept: GENERAL RADIOLOGY | Age: 86
Discharge: HOME | End: 2021-06-16
Payer: MEDICARE

## 2021-06-14 ENCOUNTER — HOSPITAL ENCOUNTER (OUTPATIENT)
Age: 86
Discharge: HOME | End: 2021-06-16
Payer: MEDICARE

## 2021-06-14 DIAGNOSIS — M25.551 ACUTE RIGHT HIP PAIN: ICD-10-CM

## 2021-06-14 PROCEDURE — 73502 X-RAY EXAM HIP UNI 2-3 VIEWS: CPT

## 2021-07-05 ENCOUNTER — TELEPHONE (OUTPATIENT)
Dept: PHARMACY | Age: 86
End: 2021-07-05

## 2021-07-06 ENCOUNTER — HOSPITAL ENCOUNTER (OUTPATIENT)
Dept: PHARMACY | Age: 86
Setting detail: THERAPIES SERIES
Discharge: HOME | End: 2021-07-06
Payer: MEDICARE

## 2021-07-06 VITALS
WEIGHT: 103.6 LBS | SYSTOLIC BLOOD PRESSURE: 109 MMHG | DIASTOLIC BLOOD PRESSURE: 72 MMHG | BODY MASS INDEX: 17.78 KG/M2 | HEART RATE: 76 BPM

## 2021-07-06 DIAGNOSIS — Z79.01 LONG TERM CURRENT USE OF ANTICOAGULANT THERAPY: Primary | ICD-10-CM

## 2021-07-06 DIAGNOSIS — I26.99 PULMONARY EMBOLISM, UNSPECIFIED CHRONICITY, UNSPECIFIED PULMONARY EMBOLISM TYPE, UNSPECIFIED WHETHER ACUTE COR PULMONALE PRESENT (HCC): ICD-10-CM

## 2021-07-06 LAB — INR BLD: 2.7

## 2021-07-06 PROCEDURE — 99211 OFF/OP EST MAY X REQ PHY/QHP: CPT | Performed by: FAMILY MEDICINE

## 2021-07-06 PROCEDURE — 85610 PROTHROMBIN TIME: CPT | Performed by: FAMILY MEDICINE

## 2021-07-06 RX ORDER — DICLOFENAC SODIUM 1 MG/ML
1 SOLUTION/ DROPS OPHTHALMIC 4 TIMES DAILY
COMMUNITY
End: 2021-07-06

## 2021-07-06 NOTE — PROGRESS NOTES
[facility] [address] [place] [address]/[place]  Medication Management  ANTICOAGULATION    Referring Provider: Dr Donna Hewitt INR: 2.0-3.0    TODAY'S INR: 2.7    WARFARIN Dosage: continue    INR (no units)   Date Value   07/06/2021 2.7   05/25/2021 3   04/15/2021 1.4   03/25/2021 1.6   02/25/2021 1.6   01/14/2021 2.3   12/22/2020 2.6       Medication changes:  Started voltaren gel nightly for hip pain about 2 weeks ago  Notes:    Fingerstick INR drawn per clinic protocol. Patient states no visible blood in urine and no black tarry stool. Denies any missed doses of warfarin. No change in other maintenance medications or in diet. Will recheck INR in 6 weeks. Patient is very stressed today. States she is trying to coordinate getting her  into a care facility and feels she will need to get rid of things and have an auction to pay for this. Also believes she will eventually have to move there \"even though I don't want to\". Patient states she is eating but smaller evening portions but snacks on cookies, cake, ice cream, etc but continues to lose weight. Patient acknowledges working in consult agreement with pharmacist as referred by his/her physician.                   CLINICAL PHARMACY CONSULT: MED RECONCILIATION/REVIEW ADDENDUM    For Pharmacy Admin Tracking Only     Total # of Interventions Recommended: 2   Total # of Interventions Accepted: 2   Time Spent (min): [address] [facility] Sinan Stanford

## 2021-07-09 ENCOUNTER — HOSPITAL ENCOUNTER (EMERGENCY)
Age: 86
Discharge: HOME | End: 2021-07-09
Attending: EMERGENCY MEDICINE
Payer: MEDICARE

## 2021-07-09 VITALS
TEMPERATURE: 98.2 F | DIASTOLIC BLOOD PRESSURE: 86 MMHG | RESPIRATION RATE: 18 BRPM | SYSTOLIC BLOOD PRESSURE: 148 MMHG | OXYGEN SATURATION: 96 % | HEART RATE: 78 BPM

## 2021-07-09 DIAGNOSIS — N30.01 ACUTE CYSTITIS WITH HEMATURIA: ICD-10-CM

## 2021-07-09 DIAGNOSIS — R31.0 GROSS HEMATURIA: Primary | ICD-10-CM

## 2021-07-09 LAB
-: ABNORMAL
ABSOLUTE EOS #: 0.18 K/UL (ref 0–0.44)
ABSOLUTE IMMATURE GRANULOCYTE: <0.03 K/UL (ref 0–0.3)
ABSOLUTE LYMPH #: 1.52 K/UL (ref 1.1–3.7)
ABSOLUTE MONO #: 0.82 K/UL (ref 0.1–1.2)
AMORPHOUS: ABNORMAL
ANION GAP SERPL CALCULATED.3IONS-SCNC: 9 MMOL/L (ref 9–17)
BACTERIA: ABNORMAL
BASOPHILS # BLD: 1 % (ref 0–2)
BASOPHILS ABSOLUTE: 0.03 K/UL (ref 0–0.2)
BILIRUBIN URINE: ABNORMAL
BUN BLDV-MCNC: 39 MG/DL (ref 8–23)
BUN/CREAT BLD: 23 (ref 9–20)
CALCIUM SERPL-MCNC: 8.9 MG/DL (ref 8.6–10.4)
CASTS UA: ABNORMAL /LPF
CHLORIDE BLD-SCNC: 109 MMOL/L (ref 98–107)
CO2: 18 MMOL/L (ref 20–31)
COLOR: ABNORMAL
COMMENT UA: ABNORMAL
CREAT SERPL-MCNC: 1.69 MG/DL (ref 0.5–0.9)
CRYSTALS, UA: ABNORMAL /HPF
DIFFERENTIAL TYPE: ABNORMAL
EOSINOPHILS RELATIVE PERCENT: 3 % (ref 1–4)
EPITHELIAL CELLS UA: ABNORMAL /HPF (ref 0–25)
GFR AFRICAN AMERICAN: 35 ML/MIN
GFR NON-AFRICAN AMERICAN: 29 ML/MIN
GFR SERPL CREATININE-BSD FRML MDRD: ABNORMAL ML/MIN/{1.73_M2}
GFR SERPL CREATININE-BSD FRML MDRD: ABNORMAL ML/MIN/{1.73_M2}
GLUCOSE BLD-MCNC: 100 MG/DL (ref 70–99)
GLUCOSE URINE: ABNORMAL
HCT VFR BLD CALC: 32.8 % (ref 36.3–47.1)
HEMOGLOBIN: 10.4 G/DL (ref 11.9–15.1)
IMMATURE GRANULOCYTES: 0 %
INR BLD: 2.9
KETONES, URINE: ABNORMAL
LEUKOCYTE ESTERASE, URINE: ABNORMAL
LYMPHOCYTES # BLD: 25 % (ref 24–43)
MCH RBC QN AUTO: 31.9 PG (ref 25.2–33.5)
MCHC RBC AUTO-ENTMCNC: 31.7 G/DL (ref 28.4–34.8)
MCV RBC AUTO: 100.6 FL (ref 82.6–102.9)
MONOCYTES # BLD: 13 % (ref 3–12)
MUCUS: ABNORMAL
NITRITE, URINE: ABNORMAL
NRBC AUTOMATED: 0 PER 100 WBC
OTHER OBSERVATIONS UA: ABNORMAL
PDW BLD-RTO: 13.2 % (ref 11.8–14.4)
PH UA: ABNORMAL (ref 5–9)
PLATELET # BLD: 155 K/UL (ref 138–453)
PLATELET ESTIMATE: ABNORMAL
PMV BLD AUTO: 11.4 FL (ref 8.1–13.5)
POTASSIUM SERPL-SCNC: 5.1 MMOL/L (ref 3.7–5.3)
PROTEIN UA: ABNORMAL
PROTHROMBIN TIME: 29.3 SEC (ref 11.5–14.2)
RBC # BLD: 3.26 M/UL (ref 3.95–5.11)
RBC # BLD: ABNORMAL 10*6/UL
RBC UA: ABNORMAL /HPF (ref 0–2)
RENAL EPITHELIAL, UA: ABNORMAL /HPF
SEG NEUTROPHILS: 58 % (ref 36–65)
SEGMENTED NEUTROPHILS ABSOLUTE COUNT: 3.62 K/UL (ref 1.5–8.1)
SODIUM BLD-SCNC: 136 MMOL/L (ref 135–144)
SPECIFIC GRAVITY UA: 1.01 (ref 1.01–1.02)
TRICHOMONAS: ABNORMAL
TURBIDITY: ABNORMAL
URINE HGB: ABNORMAL
UROBILINOGEN, URINE: ABNORMAL
WBC # BLD: 6.2 K/UL (ref 3.5–11.3)
WBC # BLD: ABNORMAL 10*3/UL
WBC UA: ABNORMAL /HPF (ref 0–5)
YEAST: ABNORMAL

## 2021-07-09 PROCEDURE — 80048 BASIC METABOLIC PNL TOTAL CA: CPT

## 2021-07-09 PROCEDURE — 85610 PROTHROMBIN TIME: CPT

## 2021-07-09 PROCEDURE — 85025 COMPLETE CBC W/AUTO DIFF WBC: CPT

## 2021-07-09 PROCEDURE — 6370000000 HC RX 637 (ALT 250 FOR IP): Performed by: EMERGENCY MEDICINE

## 2021-07-09 PROCEDURE — 99284 EMERGENCY DEPT VISIT MOD MDM: CPT

## 2021-07-09 PROCEDURE — 81001 URINALYSIS AUTO W/SCOPE: CPT

## 2021-07-09 RX ORDER — CEPHALEXIN 500 MG/1
500 CAPSULE ORAL 4 TIMES DAILY
Qty: 28 CAPSULE | Refills: 0 | Status: SHIPPED | OUTPATIENT
Start: 2021-07-09 | End: 2021-07-16

## 2021-07-09 RX ORDER — CEPHALEXIN 500 MG/1
500 CAPSULE ORAL ONCE
Status: COMPLETED | OUTPATIENT
Start: 2021-07-09 | End: 2021-07-09

## 2021-07-09 RX ADMIN — CEPHALEXIN 500 MG: 500 CAPSULE ORAL at 23:06

## 2021-07-10 ASSESSMENT — ENCOUNTER SYMPTOMS
VOMITING: 0
COLOR CHANGE: 0
SHORTNESS OF BREATH: 0
NAUSEA: 0
ABDOMINAL PAIN: 0
WHEEZING: 0

## 2021-07-10 NOTE — ED PROVIDER NOTES
[address] [place] [address] ED  Emergency Department Encounter  EmergencyMedicine Attending     Pt Kong Ledesma  MRN: [id number]  [place] 1935  Date of evaluation: 7/10/21  PCP:  Cinthya Martinez MD    [address] [place] [address]       Chief Complaint   Patient presents with    Hematuria     states while urinating today, noticed large amount blood in toilet       HISTORY OF PRESENT ILLNESS  (Location/Symptom, Timing/Onset, Context/Setting, Quality, Duration, Modifying Factors, Severity.)      Ashley Contreras is a 80 y.o. female who presents with hematuria. Started today when she noticed some blood in the toilet. Has absolutely no complaints, no fatigue, no shortness of breath, no lightheadedness, no nausea vomiting. Says that she feels pretty good actually. Is however on Coumadin secondary to a previous history of DVT. Says that her recent INR was 2.7 when she last had it checked. Patient believes that the blood is from her urine. Denies any blood in her stool. Denies any abdominal pain or chest pain. Denies any nausea vomiting or any other complaints. PAST MEDICAL / SURGICAL / SOCIAL / FAMILY HISTORY      has a past medical history of Abnormal EMG, Cancer ([address] Chronic kidney disease, Colon polyp, Compression fracture of lumbar vertebra (HCC) / prednisone related to PMR, Diarrhea, DVT (deep venous thrombosis) ([address] Emphysema, Essential hypertension, Hyperlipidemia, Irritable bowel syndrome, Lumbago, Osteoarthritis, Polymyalgia rheumatica ([address] Rheumatism, and Seborrheic keratoses. has a past surgical history that includes Cataract removal with implant; Glaucoma surgery; Nasal septum surgery; Dental surgery; Appendectomy (1949); Skin cancer excision; Hysterectomy, total abdominal (1973); Tonsillectomy (1941); Colonoscopy (10/17/2011); Colonoscopy (05/25/2016); colectomy (Right, 06/20/2016); Colonoscopy (12/13/2017); and pr colsc flx w/rmvl of tumor polyp lesion snare tq (N/A, 12/13/2017).     Social History Socioeconomic History    Marital status:      Spouse name: Not on file    Number of children: Not on file    Years of education: Not on file    Highest education level: Not on file   Occupational History    Not on file   Tobacco Use    Smoking status: Former Smoker     Packs/day: 1.00     Years: 30.00     Pack years: 30.00     Quit date: 1981     Years since quittin.4    Smokeless tobacco: Never Used   Vaping Use    Vaping Use: Never used   Substance and Sexual Activity    Alcohol use: No     Comment: rarely    Drug use: No    Sexual activity: Not on file   Other Topics Concern    Not on file   Social History Narrative    Not on file     Social Determinants of Health     Financial Resource Strain: Low Risk     Difficulty of Paying Living Expenses: Not hard at all   Food Insecurity: No Food Insecurity    Worried About Running Out of Food in the Last Year: Never true    [address] in the Last Year: Never true   Transportation Needs:     Lack of Transportation (Medical):      Lack of Transportation (Non-Medical):    Physical Activity:     Days of Exercise per Week:     Minutes of Exercise per Session:    Stress:     Feeling of Stress :    Social Connections: Socially Isolated    Frequency of Communication with Friends and Family: Once a week    Frequency of Social Gatherings with Friends and Family: Once a week    Attends Scientology Services: Never    Active Member of Clubs or Organizations: No    Attends Club or Organization Meetings: Never    Marital Status:    Intimate Partner Violence: Not At Risk    Fear of Current or Ex-Partner: No    Emotionally Abused: No    Physically Abused: No    Sexually Abused: No       Family History   Problem Relation Age of Onset    Colon Cancer Father     Heart Disease Brother     High Blood Pressure Brother     Stroke Brother        Allergies:  Budesonide and Hydrochlorothiazide    Home Medications:  Prior to Admission medications    Medication Sig Start Date End Date Taking? Authorizing Provider   cephALEXin (KEFLEX) 500 MG capsule Take 1 capsule by mouth 4 times daily for 7 days 7/9/21 7/16/21 Yes Emile Rahman MD   carvedilol (COREG) 25 MG tablet Take 1 tablet by mouth 2 times daily 5/3/21  Yes Benedict Diaz MD   amLODIPine [facility]) 5 MG tablet Take 1 tablet by mouth daily 4/27/21  Yes Benedict Diaz MD   atorvastatin (LIPITOR) 80 MG tablet Take 1 tablet by mouth every evening 12/21/20  Yes Benedict Diaz MD   warfarin (COUMADIN) 2.5 MG tablet Take 1 tablet by mouth daily Take 1 tablet (2.5 mg) Fridays  and take 1.5 tablets (3.75 mg) tab orally on remaining days 9/15/20  Yes Benedict Diaz MD   calcium carbonate (OSCAL) 500 MG TABS tablet Take 500 mg by mouth daily   Yes Historical Provider, MD   vitamin B-12 (CYANOCOBALAMIN) 1000 MCG tablet Take 1,000 mcg by mouth daily   Yes Historical Provider, MD   Handicap Placard [facility] by Does not apply route Expires in 5 yrs. 2/28/17  Yes Benedict Diaz MD   Multiple Vitamins-Minerals (CENTRUM SILVER PO) Take by mouth daily   Yes Historical Provider, MD   diclofenac sodium (VOLTAREN) 1 % GEL Apply 1 g topically nightly    Historical Provider, MD   meclizine (ANTIVERT) 12.5 MG tablet Take 12.5 mg by mouth 3 times daily as needed    Historical Provider, MD   loperamide (IMODIUM) 2 MG capsule Take 2 mg by mouth 4 times daily as needed for Diarrhea    Historical Provider, MD       REVIEW OF SYSTEMS    (2-9 systems for level 4, 10 or more for level 5)      Review of Systems   Constitutional: Negative for chills and fever. HENT: Negative for congestion. Respiratory: Negative for shortness of breath and wheezing. Cardiovascular: Negative for chest pain. Gastrointestinal: Negative for abdominal pain, nausea and vomiting. Genitourinary: Positive for hematuria. Negative for dysuria and flank pain. Skin: Negative for color change.    Neurological: Negative for weakness and headaches. Psychiatric/Behavioral: Negative for agitation. PHYSICAL EXAM   (up to 7 for level 4, 8 or more for level 5)      INITIAL VITALS:   BP (!) 148/86   Pulse 78   Temp 98.2 °F (36.8 °C) (Oral)   Resp 18   SpO2 96%     Physical Exam  Vitals reviewed. Constitutional:       General: She is not in acute distress. Appearance: She is well-developed. HENT:      Head: Normocephalic and atraumatic. Eyes:      Comments: No pale conjunctiva on exam   Cardiovascular:      Rate and Rhythm: Normal rate and regular rhythm. Heart sounds: Normal heart sounds. No murmur heard. No friction rub. No gallop. Pulmonary:      Effort: Pulmonary effort is normal. No respiratory distress. Breath sounds: Normal breath sounds. No wheezing or rales. Abdominal:      General: There is no distension. Palpations: Abdomen is soft. Tenderness: There is no abdominal tenderness. There is no guarding or rebound. Comments: No abdominal pain or tenderness, no flank tenderness on examination. Musculoskeletal:         General: No tenderness. Skin:     General: Skin is warm. Findings: No erythema. Neurological:      Mental Status: She is alert.          DIFFERENTIAL  DIAGNOSIS     PLAN (LABS / IMAGING / EKG):  Orders Placed This Encounter   Procedures    Urinalysis Reflex to Culture    CBC auto differential    Basic Metabolic Panel    Protime-INR    Microscopic Urinalysis       MEDICATIONS ORDERED:  Orders Placed This Encounter   Medications    cephALEXin (KEFLEX) capsule 500 mg     Order Specific Question:   Antimicrobial Indications     Answer:   Urinary Tract Infection    cephALEXin (KEFLEX) 500 MG capsule     Sig: Take 1 capsule by mouth 4 times daily for 7 days     Dispense:  28 capsule     Refill:  0       DDX: Hematuria versus supratherapeutic INR versus blood loss anemia versus UTI    DIAGNOSTIC RESULTS / EMERGENCY DEPARTMENT COURSE / MDM   :  Results for orders placed or performed during the hospital encounter of 07/09/21   Urinalysis Reflex to Culture    Specimen: Urine, clean catch   Result Value Ref Range    Color, UA RED (A) YELLOW    Turbidity UA TURBID (A) CLEAR    Glucose, Ur (A) NEGATIVE     INTERPRET WITH CAUTION DUE TO INTENSE COLOR OF URINE. Bilirubin Urine (A) NEGATIVE     INTERPRET WITH CAUTION DUE TO INTENSE COLOR OF URINE. Ketones, Urine (A) NEGATIVE     INTERPRET WITH CAUTION DUE TO INTENSE COLOR OF URINE. Specific Gravity, UA 1.015 1.010 - 1.020    Urine Hgb (A) NEGATIVE     INTERPRET WITH CAUTION DUE TO INTENSE COLOR OF URINE.    pH, UA  5.0 - 9.0     INTERPRET WITH CAUTION DUE TO INTENSE COLOR OF URINE. Protein, UA (A) NEGATIVE     INTERPRET WITH CAUTION DUE TO INTENSE COLOR OF URINE. Urobilinogen, Urine (A) Normal     INTERPRET WITH CAUTION DUE TO INTENSE COLOR OF URINE. Nitrite, Urine (A) NEGATIVE     INTERPRET WITH CAUTION DUE TO INTENSE COLOR OF URINE. Leukocyte Esterase, Urine (A) NEGATIVE     INTERPRET WITH CAUTION DUE TO INTENSE COLOR OF URINE.     Urinalysis Comments NOT REPORTED    CBC auto differential   Result Value Ref Range    WBC 6.2 3.5 - 11.3 k/uL    RBC 3.26 (L) 3.95 - 5.11 m/uL    Hemoglobin 10.4 (L) 11.9 - 15.1 g/dL    Hematocrit 32.8 (L) 36.3 - 47.1 %    .6 82.6 - 102.9 fL    MCH 31.9 25.2 - 33.5 pg    MCHC 31.7 28.4 - 34.8 g/dL    RDW 13.2 11.8 - 14.4 %    Platelets 332 317 - 032 k/uL    MPV 11.4 8.1 - 13.5 fL    NRBC Automated 0.0 0.0 per 100 WBC    Differential Type NOT REPORTED     Seg Neutrophils 58 36 - 65 %    Lymphocytes 25 24 - 43 %    Monocytes 13 (H) 3 - 12 %    Eosinophils % 3 1 - 4 %    Basophils 1 0 - 2 %    Immature Granulocytes 0 0 %    Segs Absolute 3.62 1.50 - 8.10 k/uL    Absolute Lymph # 1.52 1.10 - 3.70 k/uL    Absolute Mono # 0.82 0.10 - 1.20 k/uL    Absolute Eos # 0.18 0.00 - 0.44 k/uL    Basophils Absolute 0.03 0.00 - 0.20 k/uL    Absolute Immature Granulocyte <0.03 0.00 - 0.30 k/uL WBC Morphology NOT REPORTED     RBC Morphology NOT REPORTED     Platelet Estimate NOT REPORTED    Basic Metabolic Panel   Result Value Ref Range    Glucose 100 (H) 70 - 99 mg/dL    BUN 39 (H) 8 - 23 mg/dL    CREATININE 1.69 (H) 0.50 - 0.90 mg/dL    Bun/Cre Ratio 23 (H) 9 - 20    Calcium 8.9 8.6 - 10.4 mg/dL    Sodium 136 135 - 144 mmol/L    Potassium 5.1 3.7 - 5.3 mmol/L    Chloride 109 (H) 98 - 107 mmol/L    CO2 18 (L) 20 - 31 mmol/L    Anion Gap 9 9 - 17 mmol/L    GFR Non-African American 29 (L) >60 mL/min    GFR  35 (L) >60 mL/min    GFR Comment          GFR Staging         Protime-INR   Result Value Ref Range    Protime 29.3 (H) 11.5 - 14.2 sec    INR 2.9    Microscopic Urinalysis   Result Value Ref Range    -          WBC, UA 2 TO 5 0 - 5 /HPF    RBC, UA GREATER THAN 100 0 - 2 /HPF    Casts UA NOT REPORTED /LPF    Crystals, UA NOT REPORTED None /HPF    Epithelial Cells UA 0 TO 2 0 - 25 /HPF    Renal Epithelial, UA NOT REPORTED 0 /HPF    Bacteria, UA TRACE (A) None    Mucus, UA NOT REPORTED None    Trichomonas, UA NOT REPORTED None    Amorphous, UA NOT REPORTED None    Other Observations UA NOT REPORTED NOT REQ. Yeast, UA NOT REPORTED None       IMPRESSION: 72-year-old female who presents to the emergency department with hematuria. No lightheadedness, no fatigue, asymptomatic other than the hematuria. No tachycardia. Blood pressures are not low either. Patient feels pretty well otherwise. I did do blood work and confirmed that her hemoglobin was at baseline and not significantly changed. I also checked an INR given that patient was on Coumadin. INR was 2.9, she was not supratherapeutic. Urine did show small amount of bacteria and small amount of white cells in the, possibly early infection, will start the patient on Keflex and have patient follow-up with urology.  Patient advised that if she starts having worsening bleeding, fatigue, lightheadedness, sensation of passing out then she needs to come back to the emergency room immediately. Follow-up with urology    RADIOLOGY:  None    EKG  None    All EKG's are interpreted by the Emergency Department Physician who either signs or Co-signs this chart in the absence of a cardiologist.      PROCEDURES:  None    CONSULTS:  None    CRITICAL CARE:  None    FINAL IMPRESSION      1. Gross hematuria    2.  Acute cystitis with hematuria          DISPOSITION / PLAN     DISPOSITION Decision To Discharge 07/09/2021 10:56:19 PM      PATIENT REFERRED TO:  Lynda Murdock MD  [address] [place] [address]  [phone]    Call in 1 day        DISCHARGE MEDICATIONS:  Discharge Medication List as of 7/9/2021 10:57 PM      START taking these medications    Details   cephALEXin (KEFLEX) 500 MG capsule Take 1 capsule by mouth 4 times daily for 7 days, Disp-28 capsule, R-0Print             Chapito Bravo MD  Emergency Medicine Attending    (Please note that portions of thisnote were completed with a voice recognition program.  Efforts were made to edit the dictations but occasionally words are mis-transcribed.)        Chapito Bravo MD  07/10/21 [id number]

## 2021-07-12 ENCOUNTER — TELEPHONE (OUTPATIENT)
Dept: PHARMACY | Age: 86
End: 2021-07-12

## 2021-07-12 NOTE — TELEPHONE ENCOUNTER
Patient called and left a message. Called her back she states that she filled the toilet with blood on Friday evening so she came to the ED and was prescribed an antibiotic. INR=2.9. Emily Chaves states she is not sure if she took warfarin on Friday but knows that she did not take it on Saturday or Sunday and wants to know if she should take it tonight or not. She states she is scheduled to see Dr. Juanjose Crowell tomorrow. She is currently taking cephalexin 500 mg qid x 7days. She states she had blood in her urine throughout the weekend, but has had none today. I told her it was okay to restart warfarin tonight at her previously stable dosage.

## 2021-07-13 PROBLEM — N30.91 HEMORRHAGIC CYSTITIS: Status: ACTIVE | Noted: 2021-07-13

## 2021-07-27 ENCOUNTER — HOSPITAL ENCOUNTER (OUTPATIENT)
Age: 86
Discharge: HOME | End: 2021-07-27
Payer: MEDICARE

## 2021-07-27 DIAGNOSIS — N30.91 HEMORRHAGIC CYSTITIS: ICD-10-CM

## 2021-07-27 LAB
-: ABNORMAL
AMORPHOUS: ABNORMAL
BACTERIA: ABNORMAL
BILIRUBIN URINE: ABNORMAL
CASTS UA: ABNORMAL /LPF
COLOR: YELLOW
COMMENT UA: ABNORMAL
CRYSTALS, UA: ABNORMAL /HPF
EPITHELIAL CELLS UA: ABNORMAL /HPF (ref 0–25)
GLUCOSE URINE: NEGATIVE
KETONES, URINE: ABNORMAL
LEUKOCYTE ESTERASE, URINE: NEGATIVE
MUCUS: ABNORMAL
NITRITE, URINE: NEGATIVE
OTHER OBSERVATIONS UA: ABNORMAL
PH UA: 5.5 (ref 5–9)
PROTEIN UA: ABNORMAL
RBC UA: ABNORMAL /HPF (ref 0–2)
RENAL EPITHELIAL, UA: ABNORMAL /HPF
SPECIFIC GRAVITY UA: 1.02 (ref 1.01–1.02)
TRICHOMONAS: ABNORMAL
TURBIDITY: CLEAR
URINE HGB: NEGATIVE
UROBILINOGEN, URINE: NORMAL
WBC UA: ABNORMAL /HPF (ref 0–5)
YEAST: ABNORMAL

## 2021-07-27 PROCEDURE — 81001 URINALYSIS AUTO W/SCOPE: CPT

## 2021-08-16 ENCOUNTER — TELEPHONE (OUTPATIENT)
Dept: PHARMACY | Age: 86
End: 2021-08-16

## 2021-08-16 NOTE — TELEPHONE ENCOUNTER
Recent Travel Screening and Travel History documentation:     Travel Screening     Question   Response    In the last month, have you been in contact with someone who was confirmed or suspected to have Coronavirus / COVID-19? No / Unsure    Have you had a COVID-19 viral test in the last 14 days? No    Do you have any of the following new or worsening symptoms? None of these    Have you traveled internationally or domestically in the last month?   No      Travel History   Travel since 07/16/21     No documented travel since 07/16/21         Jacky Grant, PharmD 8/16/2021 3:36 PM

## 2021-08-17 ENCOUNTER — HOSPITAL ENCOUNTER (OUTPATIENT)
Dept: PHARMACY | Age: 86
Setting detail: THERAPIES SERIES
Discharge: HOME | End: 2021-08-17
Payer: MEDICARE

## 2021-08-17 VITALS — HEART RATE: 71 BPM | DIASTOLIC BLOOD PRESSURE: 58 MMHG | SYSTOLIC BLOOD PRESSURE: 104 MMHG

## 2021-08-17 DIAGNOSIS — Z79.01 LONG TERM CURRENT USE OF ANTICOAGULANT THERAPY: Primary | ICD-10-CM

## 2021-08-17 DIAGNOSIS — I27.82 CHRONIC SADDLE PULMONARY EMBOLISM WITHOUT ACUTE COR PULMONALE (HCC): ICD-10-CM

## 2021-08-17 DIAGNOSIS — I26.92 CHRONIC SADDLE PULMONARY EMBOLISM WITHOUT ACUTE COR PULMONALE (HCC): ICD-10-CM

## 2021-08-17 LAB — INR BLD: 1.7

## 2021-08-17 PROCEDURE — 85610 PROTHROMBIN TIME: CPT | Performed by: FAMILY MEDICINE

## 2021-08-17 PROCEDURE — 99212 OFFICE O/P EST SF 10 MIN: CPT | Performed by: FAMILY MEDICINE

## 2021-08-17 NOTE — PATIENT INSTRUCTIONS
Please take an extra 1/2 tablet today for a total of 3.75mg (1 1/2 tablets) then return to your regular dosing of 1 tablet (2.5mg) Tuesday and Thursday and 1 1/2 tablets (3.75mg) all other days. Continue to monitor for signs of bleeding. Return to coumadin clinic in 6 weeks.

## 2021-08-17 NOTE — PROGRESS NOTES
[facility]/[place]  Medication Management  ANTICOAGULATION    Referring Provider: Dr Eder Castro INR: 2.0-3.0    TODAY'S INR: 1.7    WARFARIN Dosage: 3.75mg today then resume 2.5mg TR, 3.75mg all other days    INR (no units)   Date Value   08/17/2021 1.7   07/09/2021 2.9   07/06/2021 2.7   05/25/2021 3   04/15/2021 1.4   03/25/2021 1.6   02/25/2021 1.6       Medication changes:  No changes  Notes:    Fingerstick INR drawn per clinic protocol. Patient states no visible blood in urine and no black tarry stool. Denies any missed doses of warfarin. No change in other maintenance medications or in diet. Will recheck INR in 6 weeks patient request. Patient acknowledges working in consult agreement with pharmacist as referred by his/her physician.                   For Pharmacy Admin Tracking Only     Intervention Detail: Dose adjustment: 1   Total # of Interventions Recommended: 2   Total # of Interventions Accepted: 3   Time Spent (min): 20      RUSSEL Barajas.Ph., 8/17/2021,1:18 PM

## 2021-08-20 ENCOUNTER — HOSPITAL ENCOUNTER (OUTPATIENT)
Age: 86
Discharge: HOME | End: 2021-08-20
Payer: MEDICARE

## 2021-08-20 DIAGNOSIS — R53.83 FATIGUE, UNSPECIFIED TYPE: ICD-10-CM

## 2021-08-20 DIAGNOSIS — R42 DIZZINESS: ICD-10-CM

## 2021-08-20 LAB
ABSOLUTE EOS #: 0.17 K/UL (ref 0–0.44)
ABSOLUTE IMMATURE GRANULOCYTE: <0.03 K/UL (ref 0–0.3)
ABSOLUTE LYMPH #: 1.15 K/UL (ref 1.1–3.7)
ABSOLUTE MONO #: 0.66 K/UL (ref 0.1–1.2)
ANION GAP SERPL CALCULATED.3IONS-SCNC: 11 MMOL/L (ref 9–17)
BASOPHILS # BLD: 1 % (ref 0–2)
BASOPHILS ABSOLUTE: 0.04 K/UL (ref 0–0.2)
BUN BLDV-MCNC: 28 MG/DL (ref 8–23)
BUN/CREAT BLD: 18 (ref 9–20)
CALCIUM SERPL-MCNC: 8.8 MG/DL (ref 8.6–10.4)
CHLORIDE BLD-SCNC: 107 MMOL/L (ref 98–107)
CO2: 20 MMOL/L (ref 20–31)
CREAT SERPL-MCNC: 1.6 MG/DL (ref 0.5–0.9)
DIFFERENTIAL TYPE: ABNORMAL
EOSINOPHILS RELATIVE PERCENT: 3 % (ref 1–4)
GFR AFRICAN AMERICAN: 37 ML/MIN
GFR NON-AFRICAN AMERICAN: 31 ML/MIN
GFR SERPL CREATININE-BSD FRML MDRD: ABNORMAL ML/MIN/{1.73_M2}
GFR SERPL CREATININE-BSD FRML MDRD: ABNORMAL ML/MIN/{1.73_M2}
GLUCOSE BLD-MCNC: 103 MG/DL (ref 70–99)
HCT VFR BLD CALC: 34.3 % (ref 36.3–47.1)
HEMOGLOBIN: 10.5 G/DL (ref 11.9–15.1)
IMMATURE GRANULOCYTES: 0 %
LYMPHOCYTES # BLD: 20 % (ref 24–43)
MCH RBC QN AUTO: 31.4 PG (ref 25.2–33.5)
MCHC RBC AUTO-ENTMCNC: 30.6 G/DL (ref 28.4–34.8)
MCV RBC AUTO: 102.7 FL (ref 82.6–102.9)
MONOCYTES # BLD: 11 % (ref 3–12)
NRBC AUTOMATED: 0 PER 100 WBC
PDW BLD-RTO: 13.2 % (ref 11.8–14.4)
PLATELET # BLD: 148 K/UL (ref 138–453)
PLATELET ESTIMATE: ABNORMAL
PMV BLD AUTO: 11.9 FL (ref 8.1–13.5)
POTASSIUM SERPL-SCNC: 5.1 MMOL/L (ref 3.7–5.3)
RBC # BLD: 3.34 M/UL (ref 3.95–5.11)
RBC # BLD: ABNORMAL 10*6/UL
SEG NEUTROPHILS: 65 % (ref 36–65)
SEGMENTED NEUTROPHILS ABSOLUTE COUNT: 3.86 K/UL (ref 1.5–8.1)
SODIUM BLD-SCNC: 138 MMOL/L (ref 135–144)
WBC # BLD: 5.9 K/UL (ref 3.5–11.3)
WBC # BLD: ABNORMAL 10*3/UL

## 2021-08-20 PROCEDURE — 36415 COLL VENOUS BLD VENIPUNCTURE: CPT

## 2021-08-20 PROCEDURE — 85025 COMPLETE CBC W/AUTO DIFF WBC: CPT

## 2021-08-20 PROCEDURE — 80048 BASIC METABOLIC PNL TOTAL CA: CPT

## 2021-09-27 ENCOUNTER — TELEPHONE (OUTPATIENT)
Dept: PHARMACY | Age: 86
End: 2021-09-27

## 2021-09-27 NOTE — TELEPHONE ENCOUNTER
COVID-19 phone screening     Call placed to screen patient prior to upcoming Medication Management visit for Anticoagulation on 9/28/21. Does patient have any of the following symptoms? [] Fever    [] Lower respiratory symptoms (SOB, difficulty breathing, cough)  [x] None    Travel Screening completed.

## 2021-09-28 ENCOUNTER — HOSPITAL ENCOUNTER (OUTPATIENT)
Dept: PHARMACY | Age: 86
Setting detail: THERAPIES SERIES
Discharge: HOME | End: 2021-09-28
Payer: MEDICARE

## 2021-09-28 VITALS
WEIGHT: 108 LBS | SYSTOLIC BLOOD PRESSURE: 118 MMHG | DIASTOLIC BLOOD PRESSURE: 56 MMHG | BODY MASS INDEX: 18.54 KG/M2 | HEART RATE: 79 BPM

## 2021-09-28 DIAGNOSIS — Z79.01 LONG TERM CURRENT USE OF ANTICOAGULANT THERAPY: Primary | ICD-10-CM

## 2021-09-28 DIAGNOSIS — I26.92 CHRONIC SADDLE PULMONARY EMBOLISM WITHOUT ACUTE COR PULMONALE (HCC): ICD-10-CM

## 2021-09-28 DIAGNOSIS — I27.82 CHRONIC SADDLE PULMONARY EMBOLISM WITHOUT ACUTE COR PULMONALE (HCC): ICD-10-CM

## 2021-09-28 LAB — INR BLD: 2.1

## 2021-09-28 PROCEDURE — 99211 OFF/OP EST MAY X REQ PHY/QHP: CPT

## 2021-09-28 PROCEDURE — 85610 PROTHROMBIN TIME: CPT

## 2021-09-28 NOTE — PROGRESS NOTES
[facility] [address] [place] [address]/[place]  Medication Management  ANTICOAGULATION    Referring Provider: Dr Michele Blunt INR: 2.0-3.0     TODAY'S INR: 2.1     WARFARIN Dosage: Continue warfarin whole 2.5 mg tablet Tue,Thur and 1.5 tablets for 3.75 mg all other days. INR (no units)   Date Value   2021 1.7   2021 2.9   2021 2.7   2021 3   04/15/2021 1.4   2021 1.6   2021 1.6     Medication changes:  None    Notes:    Fingerstick INR drawn per clinic protocol. Patient states no visible blood in urine and no black tarry stool. Denies any missed doses of warfarin. No change in other maintenance medications or in diet. Will recheck INR in 6 weeks. Patient acknowledges working in consult agreement with pharmacist as referred by his/her physician.                   For Pharmacy Admin Tracking Only     Intervention Detail: Adherence Monitorin   Total # of Interventions Recommended: 0   Total # of Interventions Accepted: 0   Time Spent (min):  Jonah Dupont, [facility], PharmD

## 2021-09-28 NOTE — PATIENT INSTRUCTIONS
Continue to monitor urine and stool. Continue to monitor for signs of bleeding. Return to clinic in 6 weeks. Continue warfarin whole 2.5 mg tablet Audie Draper and 1.5 tablets for 3.75 mg all other days.

## 2021-11-01 ENCOUNTER — HOSPITAL ENCOUNTER (OUTPATIENT)
Dept: PHARMACY | Age: 86
Setting detail: THERAPIES SERIES
Discharge: HOME | End: 2021-11-01
Payer: MEDICARE

## 2021-11-01 VITALS — SYSTOLIC BLOOD PRESSURE: 122 MMHG | HEART RATE: 73 BPM | DIASTOLIC BLOOD PRESSURE: 70 MMHG

## 2021-11-01 DIAGNOSIS — Z79.01 LONG TERM CURRENT USE OF ANTICOAGULANT THERAPY: Primary | ICD-10-CM

## 2021-11-01 DIAGNOSIS — I27.82 CHRONIC SADDLE PULMONARY EMBOLISM WITHOUT ACUTE COR PULMONALE (HCC): ICD-10-CM

## 2021-11-01 DIAGNOSIS — I26.92 CHRONIC SADDLE PULMONARY EMBOLISM WITHOUT ACUTE COR PULMONALE (HCC): ICD-10-CM

## 2021-11-01 LAB — INR BLD: 2.3

## 2021-11-01 PROCEDURE — 99211 OFF/OP EST MAY X REQ PHY/QHP: CPT

## 2021-11-01 PROCEDURE — 85610 PROTHROMBIN TIME: CPT

## 2021-11-01 NOTE — PROGRESS NOTES
[facility] [address] [place] [address]/[place]  Medication Management  ANTICOAGULATION    Referring Provider: Dr Cunha     GOAL INR: 2.0-3.0     TODAY'S INR: 2.1     WARFARIN Dosage: Continue warfarin whole 2.5 mg tablet Tue,Thur and 1.5 tablets for 3.75 mg all other days. INR (no units)   Date Value   2021 2.3   2021 2.1   2021 1.7   2021 2.9   2021 2.7   2021 3   04/15/2021 1.4     Medication changes:  None    Notes:    Fingerstick INR drawn per clinic protocol. Patient states no visible blood in urine and no black tarry stool. Denies any missed doses of warfarin. No change in other maintenance medications or in diet. Will recheck INR in 6 weeks. Patient acknowledges working in consult agreement with pharmacist as referred by his/her physician. Patient showed up 8 days early. I worked her into the schedule.             For Pharmacy Admin Tracking Only     Intervention Detail: Adherence Monitorin   Total # of Interventions Recommended: 0   Total # of Interventions Accepted: 0   Time Spent (min): [place], TAYLOR [facility], PharmD

## 2021-11-02 PROBLEM — E87.5 DRUG-INDUCED HYPERKALEMIA: Status: RESOLVED | Noted: 2018-07-27 | Resolved: 2021-11-02

## 2021-11-02 PROBLEM — T50.905A DRUG-INDUCED HYPERKALEMIA: Status: RESOLVED | Noted: 2018-07-27 | Resolved: 2021-11-02

## 2021-12-10 ENCOUNTER — TELEPHONE (OUTPATIENT)
Dept: PHARMACY | Age: 86
End: 2021-12-10

## 2021-12-13 ENCOUNTER — HOSPITAL ENCOUNTER (OUTPATIENT)
Dept: PHARMACY | Age: 86
Setting detail: THERAPIES SERIES
Discharge: HOME | End: 2021-12-13
Payer: MEDICARE

## 2021-12-13 VITALS — BODY MASS INDEX: 18.71 KG/M2 | WEIGHT: 109 LBS

## 2021-12-13 DIAGNOSIS — I27.82 CHRONIC SADDLE PULMONARY EMBOLISM WITHOUT ACUTE COR PULMONALE (HCC): ICD-10-CM

## 2021-12-13 DIAGNOSIS — I26.92 CHRONIC SADDLE PULMONARY EMBOLISM WITHOUT ACUTE COR PULMONALE (HCC): ICD-10-CM

## 2021-12-13 DIAGNOSIS — Z79.01 LONG TERM CURRENT USE OF ANTICOAGULANT THERAPY: Primary | ICD-10-CM

## 2021-12-13 LAB — INR BLD: 2.3

## 2021-12-13 PROCEDURE — 99211 OFF/OP EST MAY X REQ PHY/QHP: CPT | Performed by: FAMILY MEDICINE

## 2021-12-13 PROCEDURE — 85610 PROTHROMBIN TIME: CPT | Performed by: FAMILY MEDICINE

## 2021-12-13 NOTE — PATIENT INSTRUCTIONS
Continue taking 1 tablet (2.5mg) warfarin on Tuesdays and Thursdays and 1 1/2 tablets (3.75mg) all other days. Continue to monitor for signs of bleeding. Return to coumadin clinic in 6 weeks.

## 2021-12-13 NOTE — PROGRESS NOTES
[address]/[place]  Medication Management  ANTICOAGULATION    Referring Provider: Dr Princess Howard INR: 2.0-3.0    TODAY'S INR: 2.3    WARFARIN Dosage: continue 2.5mg TR, 3.75mg all other days    INR (no units)   Date Value   2021 2.3   2021 2.3   2021 2.1   2021 1.7   2021 2.9   2021 2.7   2021 3       Medication changes:  No changes  Notes:    Fingerstick INR drawn per clinic protocol. Patient states no visible blood in urine and no black tarry stool. Denies any missed doses of warfarin. No change in other maintenance medications or in diet. Will recheck INR in 6 weeks. Patient acknowledges working in consult agreement with pharmacist as referred by his/her physician.                   For Pharmacy Admin Tracking Only     Intervention Detail: Adherence Monitorin   Total # of Interventions Recommended: 2   Total # of Interventions Accepted: 2   Time Spent (min): 20      RUSSEL Olguin.Ph., 2021,1:21 PM

## 2022-02-01 ENCOUNTER — HOSPITAL ENCOUNTER (OUTPATIENT)
Dept: PHARMACY | Age: 87
Setting detail: THERAPIES SERIES
Discharge: HOME | End: 2022-02-01
Payer: MEDICARE

## 2022-02-01 VITALS — BODY MASS INDEX: 18.68 KG/M2 | WEIGHT: 108.8 LBS

## 2022-02-01 DIAGNOSIS — I27.82 CHRONIC SADDLE PULMONARY EMBOLISM WITHOUT ACUTE COR PULMONALE (HCC): ICD-10-CM

## 2022-02-01 DIAGNOSIS — I26.92 CHRONIC SADDLE PULMONARY EMBOLISM WITHOUT ACUTE COR PULMONALE (HCC): ICD-10-CM

## 2022-02-01 DIAGNOSIS — Z79.01 LONG TERM CURRENT USE OF ANTICOAGULANT THERAPY: Primary | ICD-10-CM

## 2022-02-01 LAB — INR BLD: 3.8

## 2022-02-01 PROCEDURE — 99211 OFF/OP EST MAY X REQ PHY/QHP: CPT

## 2022-02-01 PROCEDURE — 85610 PROTHROMBIN TIME: CPT

## 2022-02-01 NOTE — PROGRESS NOTES
[facility] [address] [facility]/[place]  Medication Management  ANTICOAGULATION    Referring Provider: Claude Purvis INR: 2-3    TODAY'S INR: 3.8    WARFARIN Dosage: 2.5 mg po every Tuesday and Thursday; 3.75 mg po all other days  Patient will hold warfarin today for 1 dose    INR (no units)   Date Value   02/01/2022 3.8   12/13/2021 2.3   11/01/2021 2.3   09/28/2021 2.1   08/17/2021 1.7   07/09/2021 2.9   07/06/2021 2.7       Medication changes:  No changes  Notes:    Fingerstick INR drawn per clinic protocol. Patient states no visible blood in urine and no black tarry stool. Denies any missed doses of warfarin. No change in other maintenance medications or in diet. Will recheck INR in 6 weeks per patient request. Patient acknowledges working in consult agreement with pharmacist as referred by his/her physician. For Pharmacy Admin Tracking Only     Intervention Detail: Dose Adjustment: 1, reason: Therapy Optimization   Total # of Interventions Recommended: 1   Total # of Interventions Accepted: 1   Time Spent (min): [address] [facility].  [place], [address] [facility]

## 2022-02-01 NOTE — PATIENT INSTRUCTIONS
Please hold warfarin dose today. Continue current dose of warfarin as instructed on dosing calendar provided. Continue to monitor urine and stool for signs and symptoms of bleeding. Please notify the clinic of any medication changes. Please remember to bring all medications (both prescription and OTC) to your next visit. Kindly notify the clinic if you are unable to make to your next appointment.

## 2022-02-07 ENCOUNTER — HOSPITAL ENCOUNTER (OUTPATIENT)
Dept: PHARMACY | Age: 87
Setting detail: THERAPIES SERIES
Discharge: HOME | End: 2022-02-07
Payer: MEDICARE

## 2022-02-07 DIAGNOSIS — I26.92 CHRONIC SADDLE PULMONARY EMBOLISM WITHOUT ACUTE COR PULMONALE (HCC): ICD-10-CM

## 2022-02-07 DIAGNOSIS — Z79.01 LONG TERM CURRENT USE OF ANTICOAGULANT THERAPY: Primary | ICD-10-CM

## 2022-02-07 DIAGNOSIS — I27.82 CHRONIC SADDLE PULMONARY EMBOLISM WITHOUT ACUTE COR PULMONALE (HCC): ICD-10-CM

## 2022-02-07 LAB — INR BLD: 2.3

## 2022-02-07 PROCEDURE — 85610 PROTHROMBIN TIME: CPT

## 2022-02-07 PROCEDURE — 99211 OFF/OP EST MAY X REQ PHY/QHP: CPT

## 2022-02-07 NOTE — PROGRESS NOTES
[facility]/[place]  Medication Management  ANTICOAGULATION    Referring Provider: Roseann Fitzgerald INR: 2-3    TODAY'S INR: 2.3    WARFARIN Dosage: Continue warfarin 2.5 mg po every Tuesday and Thursday; 3.75 mg po all other days    INR (no units)   Date Value   2022 2.3   2022 3.8   2021 2.3   2021 2.3   2021 2.1   2021 1.7   2021 2.9       Medication changes:  No changes  Notes:    Fingerstick INR drawn per clinic protocol. Patient states no visible blood in urine and no black tarry stool. Denies any missed doses of warfarin. No change in other maintenance medications or in diet. Will recheck INR in 5 weeks per patient request. Patient acknowledges working in consult agreement with pharmacist as referred by his/her physician. Patient has redness around her belly button and she is concerned it is related to warfarin - supra-therapeutic INR last week of 3.8. She called her PCP and he is out of office today. INR is therapeutic today at 2.3. Redness observed around belly button with slight clear discharge. Patient also reports that she has had some itching on her abdomen. I encouraged patient to follow up with PCP as this may be a fungal or yeast infection. For Pharmacy Admin Tracking Only     Intervention Detail: Adherence Monitorin   Total # of Interventions Recommended: 0   Total # of Interventions Accepted: 0   Time Spent (min): [address] [facility].  [place], [facility]

## 2022-02-15 PROBLEM — R22.43 LOCALIZED SWELLING OF BOTH LOWER LEGS: Status: ACTIVE | Noted: 2022-02-15

## 2022-03-15 ENCOUNTER — HOSPITAL ENCOUNTER (OUTPATIENT)
Dept: PHARMACY | Age: 87
Setting detail: THERAPIES SERIES
Discharge: HOME | End: 2022-03-15
Payer: MEDICARE

## 2022-03-15 VITALS
WEIGHT: 110 LBS | HEART RATE: 74 BPM | DIASTOLIC BLOOD PRESSURE: 85 MMHG | SYSTOLIC BLOOD PRESSURE: 153 MMHG | BODY MASS INDEX: 18.88 KG/M2

## 2022-03-15 DIAGNOSIS — I26.92 CHRONIC SADDLE PULMONARY EMBOLISM WITHOUT ACUTE COR PULMONALE (HCC): ICD-10-CM

## 2022-03-15 DIAGNOSIS — I27.82 CHRONIC SADDLE PULMONARY EMBOLISM WITHOUT ACUTE COR PULMONALE (HCC): ICD-10-CM

## 2022-03-15 DIAGNOSIS — Z79.01 LONG TERM CURRENT USE OF ANTICOAGULANT THERAPY: Primary | ICD-10-CM

## 2022-03-15 LAB — INR BLD: 3.1

## 2022-03-15 PROCEDURE — 85610 PROTHROMBIN TIME: CPT

## 2022-03-15 PROCEDURE — 99211 OFF/OP EST MAY X REQ PHY/QHP: CPT

## 2022-03-15 NOTE — PROGRESS NOTES
[employer] [place]  Medication Management  ANTICOAGULATION    Referring Provider: Rimma Carlson     GOAL INR: 2-3     TODAY'S INR: 3.1     WARFARIN Dosage: Hold warfarin today then continue warfarin 2.5 mg tablet every Tuesday and Thursday and 1.5 tablets for 3.75 mg all other days. INR (no units)   Date Value   2022 2.3   2022 3.8   2021 2.3   2021 2.3   2021 2.1   2021 1.7   2021 2.9       Medication changes:  Started Losartan 50 mg daily 2-15-22  Decreased amlodipine from 5 mg to 2.5 mg    Notes:    Fingerstick INR drawn per clinic protocol. Patient states no visible blood in urine and no black tarry stool. Denies any missed doses of warfarin. No change in other maintenance medications or in diet. Will recheck INR in 6 weeks. Patient acknowledges working in consult agreement with pharmacist as referred by his/her physician.                 For Pharmacy Admin Tracking Only     Intervention Detail: Adherence Monitorin, Dose Adjustment: 1, reason: Therapy Optimization and New Rx: 2, reason: Needs Additional Therapy   Total # of Interventions Recommended: 3   Total # of Interventions Accepted: 3   Time Spent (min): Richard Ortiz [address], [address] [facility], PharmD

## 2022-03-15 NOTE — PATIENT INSTRUCTIONS
Continue to monitor urine and stool. Continue to monitor for signs of bleeding. Return to clinic in 6 weeks. Hold warfarin today then continue warfarin 2.5 mg tablet every Tuesday and Thursday and 1.5 tablets for 3.75 mg all other days.

## 2022-04-26 ENCOUNTER — HOSPITAL ENCOUNTER (OUTPATIENT)
Dept: PHARMACY | Age: 87
Setting detail: THERAPIES SERIES
Discharge: HOME | End: 2022-04-26
Payer: MEDICARE

## 2022-04-26 VITALS
SYSTOLIC BLOOD PRESSURE: 160 MMHG | HEART RATE: 72 BPM | WEIGHT: 109 LBS | BODY MASS INDEX: 18.71 KG/M2 | DIASTOLIC BLOOD PRESSURE: 83 MMHG

## 2022-04-26 DIAGNOSIS — I27.82 CHRONIC SADDLE PULMONARY EMBOLISM WITHOUT ACUTE COR PULMONALE (HCC): ICD-10-CM

## 2022-04-26 DIAGNOSIS — I26.92 CHRONIC SADDLE PULMONARY EMBOLISM WITHOUT ACUTE COR PULMONALE (HCC): ICD-10-CM

## 2022-04-26 DIAGNOSIS — Z79.01 LONG TERM CURRENT USE OF ANTICOAGULANT THERAPY: Primary | ICD-10-CM

## 2022-04-26 LAB — INR BLD: 3.1

## 2022-04-26 PROCEDURE — 85610 PROTHROMBIN TIME: CPT

## 2022-04-26 PROCEDURE — 99212 OFFICE O/P EST SF 10 MIN: CPT

## 2022-04-26 NOTE — PROGRESS NOTES
[place]inda [address] [place] [address]/[place]  Medication Management  ANTICOAGULATION    Referring Provider: Guerline     GOAL INR: 2-3     TODAY'S INR: 3.1     WARFARIN Dosage: Decrease warfarin to 2.5 mg tablet MWF and 1.5 tablets for 3.75 mg all other days. INR (no units)   Date Value   03/15/2022 3.1   2022 2.3   2022 3.8   2021 2.3   2021 2.3   2021 2.1   2021 1.7       Medication changes:  Started amlodipine 5 mg daily    Notes:    Fingerstick INR drawn per clinic protocol. Patient states no visible blood in urine and no black tarry stool. Denies any missed doses of warfarin. No change in other maintenance medications or in diet. Will recheck INR in 4 weeks. Patient acknowledges working in consult agreement with pharmacist as referred by his/her physician.                   For Pharmacy Admin Tracking Only     Intervention Detail: Adherence Monitorin, Dose Adjustment: 1, reason: Therapy Optimization and New Rx: 1, reason: Needs Additional Therapy   Total # of Interventions Recommended: 2   Total # of Interventions Accepted: 2   Time Spent (min):  Jonah Dupont, [facility], PharmD

## 2022-04-26 NOTE — PATIENT INSTRUCTIONS
Continue to monitor urine and stool. Continue to monitor for signs of bleeding. Return to clinic in 4 weeks. Decrease warfarin to 2.5 mg tablet MWF and 1.5 tablets for 3.75 mg all other days.

## 2022-05-03 ENCOUNTER — HOSPITAL ENCOUNTER (OUTPATIENT)
Age: 87
Discharge: HOME | End: 2022-05-03
Payer: MEDICARE

## 2022-05-03 DIAGNOSIS — N18.31 STAGE 3A CHRONIC KIDNEY DISEASE (HCC): Chronic | ICD-10-CM

## 2022-05-03 DIAGNOSIS — C18.3 MALIGNANT NEOPLASM OF HEPATIC FLEXURE (HCC): ICD-10-CM

## 2022-05-03 DIAGNOSIS — I26.92 CHRONIC SADDLE PULMONARY EMBOLISM WITHOUT ACUTE COR PULMONALE (HCC): ICD-10-CM

## 2022-05-03 DIAGNOSIS — I27.82 CHRONIC SADDLE PULMONARY EMBOLISM WITHOUT ACUTE COR PULMONALE (HCC): ICD-10-CM

## 2022-05-03 DIAGNOSIS — E78.2 MIXED HYPERLIPIDEMIA: ICD-10-CM

## 2022-05-03 LAB
ABSOLUTE EOS #: 0.24 K/UL (ref 0–0.44)
ABSOLUTE IMMATURE GRANULOCYTE: <0.03 K/UL (ref 0–0.3)
ABSOLUTE LYMPH #: 1.26 K/UL (ref 1.1–3.7)
ABSOLUTE MONO #: 0.68 K/UL (ref 0.1–1.2)
ALBUMIN SERPL-MCNC: 3.7 G/DL (ref 3.5–5.2)
ALBUMIN/GLOBULIN RATIO: 1.3 (ref 1–2.5)
ALP BLD-CCNC: 88 U/L (ref 35–104)
ALT SERPL-CCNC: 14 U/L (ref 5–33)
ANION GAP SERPL CALCULATED.3IONS-SCNC: 9 MMOL/L (ref 9–17)
AST SERPL-CCNC: 25 U/L
BASOPHILS # BLD: 1 % (ref 0–2)
BASOPHILS ABSOLUTE: 0.05 K/UL (ref 0–0.2)
BILIRUB SERPL-MCNC: 0.52 MG/DL (ref 0.3–1.2)
BUN BLDV-MCNC: 25 MG/DL (ref 8–23)
BUN/CREAT BLD: 15 (ref 9–20)
CALCIUM SERPL-MCNC: 8.9 MG/DL (ref 8.6–10.4)
CHLORIDE BLD-SCNC: 109 MMOL/L (ref 98–107)
CO2: 22 MMOL/L (ref 20–31)
CREAT SERPL-MCNC: 1.72 MG/DL (ref 0.5–0.9)
EOSINOPHILS RELATIVE PERCENT: 4 % (ref 1–4)
GFR AFRICAN AMERICAN: 34 ML/MIN
GFR NON-AFRICAN AMERICAN: 28 ML/MIN
GFR SERPL CREATININE-BSD FRML MDRD: ABNORMAL ML/MIN/{1.73_M2}
GFR SERPL CREATININE-BSD FRML MDRD: ABNORMAL ML/MIN/{1.73_M2}
GLUCOSE BLD-MCNC: 96 MG/DL (ref 70–99)
HCT VFR BLD CALC: 35.4 % (ref 36.3–47.1)
HEMOGLOBIN: 11 G/DL (ref 11.9–15.1)
IMMATURE GRANULOCYTES: 0 %
LYMPHOCYTES # BLD: 23 % (ref 24–43)
MCH RBC QN AUTO: 31.4 PG (ref 25.2–33.5)
MCHC RBC AUTO-ENTMCNC: 31.1 G/DL (ref 28.4–34.8)
MCV RBC AUTO: 101.1 FL (ref 82.6–102.9)
MONOCYTES # BLD: 12 % (ref 3–12)
NRBC AUTOMATED: 0 PER 100 WBC
PDW BLD-RTO: 13.6 % (ref 11.8–14.4)
PLATELET # BLD: 138 K/UL (ref 138–453)
PMV BLD AUTO: 12.6 FL (ref 8.1–13.5)
POTASSIUM SERPL-SCNC: 5.3 MMOL/L (ref 3.7–5.3)
RBC # BLD: 3.5 M/UL (ref 3.95–5.11)
SEG NEUTROPHILS: 60 % (ref 36–65)
SEGMENTED NEUTROPHILS ABSOLUTE COUNT: 3.23 K/UL (ref 1.5–8.1)
SODIUM BLD-SCNC: 140 MMOL/L (ref 135–144)
TOTAL PROTEIN: 6.5 G/DL (ref 6.4–8.3)
WBC # BLD: 5.5 K/UL (ref 3.5–11.3)

## 2022-05-03 PROCEDURE — 80053 COMPREHEN METABOLIC PANEL: CPT

## 2022-05-03 PROCEDURE — 82378 CARCINOEMBRYONIC ANTIGEN: CPT

## 2022-05-03 PROCEDURE — 80061 LIPID PANEL: CPT

## 2022-05-03 PROCEDURE — 85025 COMPLETE CBC W/AUTO DIFF WBC: CPT

## 2022-05-03 PROCEDURE — 36415 COLL VENOUS BLD VENIPUNCTURE: CPT

## 2022-05-04 PROBLEM — N18.4 CKD (CHRONIC KIDNEY DISEASE) STAGE 4, GFR 15-29 ML/MIN (HCC): Status: ACTIVE | Noted: 2022-05-04

## 2022-05-04 LAB
CHOLESTEROL, FASTING: 122 MG/DL
CHOLESTEROL/HDL RATIO: 1.9
HDLC SERPL-MCNC: 64 MG/DL
LDL CHOLESTEROL: 43 MG/DL (ref 0–130)
TRIGLYCERIDE, FASTING: 73 MG/DL

## 2022-05-05 LAB — CARCINOEMBRYONIC ANTIGEN: 3.9 NG/ML

## 2022-05-24 ENCOUNTER — HOSPITAL ENCOUNTER (OUTPATIENT)
Dept: PHARMACY | Age: 87
Setting detail: THERAPIES SERIES
Discharge: HOME | End: 2022-05-24
Payer: MEDICARE

## 2022-05-24 VITALS
HEART RATE: 75 BPM | SYSTOLIC BLOOD PRESSURE: 140 MMHG | DIASTOLIC BLOOD PRESSURE: 80 MMHG | WEIGHT: 107 LBS | BODY MASS INDEX: 18.37 KG/M2

## 2022-05-24 DIAGNOSIS — I27.82 CHRONIC SADDLE PULMONARY EMBOLISM WITHOUT ACUTE COR PULMONALE (HCC): ICD-10-CM

## 2022-05-24 DIAGNOSIS — I26.92 CHRONIC SADDLE PULMONARY EMBOLISM WITHOUT ACUTE COR PULMONALE (HCC): ICD-10-CM

## 2022-05-24 DIAGNOSIS — Z79.01 LONG TERM CURRENT USE OF ANTICOAGULANT THERAPY: Primary | ICD-10-CM

## 2022-05-24 LAB — INR BLD: 4

## 2022-05-24 PROCEDURE — 85610 PROTHROMBIN TIME: CPT

## 2022-05-24 PROCEDURE — 99212 OFFICE O/P EST SF 10 MIN: CPT

## 2022-05-24 NOTE — PATIENT INSTRUCTIONS
Please hold warfarin today and tomorrow. Decrease current dose of warfarin as instructed on dosing calendar provided - 3.75 mg every Monday, Wednesday and Friday and 2.5 mg all other days. Return to clinic in 4 weeks. Continue to monitor urine and stool for signs and symptoms of bleeding. Please notify the clinic of any medication changes.

## 2022-05-24 NOTE — PROGRESS NOTES
[place] [address] [facility]/[place]  Medication Management  ANTICOAGULATION    Referring Provider: Gaudencio Way INR: 2-3    TODAY'S INR: 4    WARFARIN Dosage: Decrease warfarin to 3.75 mg every Monday, Wednesday, Friday and 2.5 mg all other days. Patient will hold warfarin today and tomorrow. INR (no units)   Date Value   2022 4   2022 3.1   03/15/2022 3.1   2022 2.3   2022 3.8   2021 2.3   2021 2.3       Medication changes:  No changes  Notes:    Fingerstick INR drawn per clinic protocol. Patient states no visible blood in urine and no black tarry stool. Denies any missed doses of warfarin. No change in other maintenance medications. Will recheck INR in 3 weeks. Patient acknowledges working in consult agreement with pharmacist as referred by his/her physician. Patient reports she is eating less due to decreased appetite and her lack of desire for cooking. For Pharmacy Admin Tracking Only     Intervention Detail: Adherence Monitorin and Dose Adjustment: 3, reason: Therapy Optimization   Total # of Interventions Recommended: 3   Total # of Interventions Accepted: 3   Time Spent (min): 280 [facility].  [place], [address] [facility]

## 2022-06-09 ENCOUNTER — HOSPITAL ENCOUNTER (OUTPATIENT)
Dept: PHARMACY | Age: 87
Setting detail: THERAPIES SERIES
Discharge: HOME | End: 2022-06-09
Payer: MEDICARE

## 2022-06-09 ENCOUNTER — OFFICE VISIT (OUTPATIENT)
Dept: ONCOLOGY | Age: 87
End: 2022-06-09
Payer: MEDICARE

## 2022-06-09 VITALS
HEIGHT: 64 IN | DIASTOLIC BLOOD PRESSURE: 66 MMHG | WEIGHT: 108 LBS | RESPIRATION RATE: 18 BRPM | TEMPERATURE: 98.6 F | SYSTOLIC BLOOD PRESSURE: 141 MMHG | BODY MASS INDEX: 18.44 KG/M2 | HEART RATE: 72 BPM

## 2022-06-09 VITALS
DIASTOLIC BLOOD PRESSURE: 81 MMHG | HEART RATE: 74 BPM | SYSTOLIC BLOOD PRESSURE: 147 MMHG | BODY MASS INDEX: 18.54 KG/M2 | WEIGHT: 108 LBS

## 2022-06-09 DIAGNOSIS — R97.0 ELEVATED CEA: ICD-10-CM

## 2022-06-09 DIAGNOSIS — R97.0 CARCINOEMBRYONIC ANTIGEN (CEA) ELEVATION: ICD-10-CM

## 2022-06-09 DIAGNOSIS — C18.3 MALIGNANT NEOPLASM OF HEPATIC FLEXURE (HCC): ICD-10-CM

## 2022-06-09 DIAGNOSIS — I27.82 CHRONIC SADDLE PULMONARY EMBOLISM WITHOUT ACUTE COR PULMONALE (HCC): ICD-10-CM

## 2022-06-09 DIAGNOSIS — Z79.01 LONG TERM CURRENT USE OF ANTICOAGULANT THERAPY: Primary | ICD-10-CM

## 2022-06-09 DIAGNOSIS — C18.3 MALIGNANT NEOPLASM OF HEPATIC FLEXURE (HCC): Primary | ICD-10-CM

## 2022-06-09 DIAGNOSIS — I26.92 CHRONIC SADDLE PULMONARY EMBOLISM WITHOUT ACUTE COR PULMONALE (HCC): ICD-10-CM

## 2022-06-09 DIAGNOSIS — Z90.49 STATUS POST RIGHT HEMICOLECTOMY: ICD-10-CM

## 2022-06-09 DIAGNOSIS — C80.1 ADENOCARCINOMA (HCC): Primary | ICD-10-CM

## 2022-06-09 LAB — INR BLD: 2.3

## 2022-06-09 PROCEDURE — 99214 OFFICE O/P EST MOD 30 MIN: CPT | Performed by: INTERNAL MEDICINE

## 2022-06-09 PROCEDURE — 85610 PROTHROMBIN TIME: CPT

## 2022-06-09 PROCEDURE — 1123F ACP DISCUSS/DSCN MKR DOCD: CPT | Performed by: INTERNAL MEDICINE

## 2022-06-09 PROCEDURE — G8427 DOCREV CUR MEDS BY ELIG CLIN: HCPCS | Performed by: INTERNAL MEDICINE

## 2022-06-09 PROCEDURE — 1090F PRES/ABSN URINE INCON ASSESS: CPT | Performed by: INTERNAL MEDICINE

## 2022-06-09 PROCEDURE — 99211 OFF/OP EST MAY X REQ PHY/QHP: CPT

## 2022-06-09 PROCEDURE — G8420 CALC BMI NORM PARAMETERS: HCPCS | Performed by: INTERNAL MEDICINE

## 2022-06-09 PROCEDURE — 1036F TOBACCO NON-USER: CPT | Performed by: INTERNAL MEDICINE

## 2022-06-09 NOTE — PROGRESS NOTES
[facility] [address] [place] [address]/[place]  Medication Management  ANTICOAGULATION    Referring Provider: Jorge Shipman     GOAL INR: 2-3     TODAY'S INR: 2.3     WARFARIN Dosage: Continue warfarin 1.5 tablets for 3.75 mg MWF and whole 2.5 mg tablet all other days. INR (no units)   Date Value   2022 4   2022 3.1   03/15/2022 3.1   2022 2.3   2022 3.8   2021 2.3   2021 2.3     Medication changes:  Decreased amlodipine to 2.5 mg daily   Started losartan 50 mg daily    Notes:    Fingerstick INR drawn per clinic protocol. Patient states no visible blood in urine and no black tarry stool. Denies any missed doses of warfarin. No change in other maintenance medications or in diet. Will recheck INR in 6 weeks. Patient acknowledges working in consult agreement with pharmacist as referred by his/her physician.                   For Pharmacy Admin Tracking Only     Intervention Detail: Adherence Monitorin   Total # of Interventions Recommended: 0   Total # of Interventions Accepted: 0   Time Spent (min):  Jonah Dupont, [facility] [place], PharmD

## 2022-06-09 NOTE — PATIENT INSTRUCTIONS
Continue to monitor urine and stool. Continue to monitor for signs of bleeding. Return to clinic in 6 weeks. Continue warfarin 1.5 tablets for 3.75 mg MWF and whole 2.5 mg tablet all other days.

## 2022-06-09 NOTE — PROGRESS NOTES
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 CURRENT THERAPY:         S/p Rt hemicolectomy 6/20/16. Considering her age and comorbidity, she was not offered adjuvant chemotherapy and the decision was for surveillance/observation    BRIEF CASE HISTORY:      Ms. Flori Cardozo is a very pleasant 80 y.o. female with history of DVT status post IVC filter placement 1 year ago. She is maintained on Coumadin. No PE. Patient noticed rectal bleeding recently. She had blood mixed with the stool. This has been going on for the last few weeks. No abnormal pain. No nausea or vomiting. No constipation. No signs of obstruction. Patient had CT scan of the abdomen and pelvis on 5/24/2016 which was fine. She had colonoscopy on 5/25/2016 which showed a mass in the hepatic flexure with biopsy positive for invasive carcinoma. No weight loss or decreased appetite. No chest pain or shortness of breath. No headaches no back pain. No other complaints. Last colonoscopy was 5 years ago and was negative. Due to her age and comorbidity, after surgery, she did not receive adjuvant chemotherapy and she was observed    INTERIM HISTORY:   Seen for follow up colon cancer. She is feeling well without any symptoms. Specifically she denies any abdominal pain, no nausea or vomiting, no diarrhea. She is fairly active without any limitation.     PAST MEDICAL HISTORY: has a past medical history of Abnormal EMG, Cancer ([address] Chronic kidney disease, Colon polyp, Compression fracture of lumbar vertebra (HCC) / prednisone related to PMR, Diarrhea, Drug-induced hyperkalemia from Lisinopril, DVT (deep venous thrombosis) ([address] Emphysema, Essential hypertension, Hyperlipidemia, Irritable bowel syndrome, Lumbago, Osteoarthritis, Polymyalgia rheumatica ([facility] [address] Rheumatism, and Seborrheic keratoses. PAST SURGICAL HISTORY: has a past surgical history that includes Cataract removal with implant; Glaucoma surgery; Nasal septum surgery; Dental surgery; Appendectomy (1949); Skin cancer excision; Hysterectomy, total abdominal (1973); Tonsillectomy (1941); Colonoscopy (10/17/2011); Colonoscopy (05/25/2016); colectomy (Right, 06/20/2016); Colonoscopy (12/13/2017); and pr colsc flx w/rmvl of tumor polyp lesion snare tq (N/A, 12/13/2017). CURRENT MEDICATIONS:  has a current medication list which includes the following prescription(s): carvedilol, losartan, amlodipine, atorvastatin, handicap placard, warfarin, diclofenac sodium, calcium carbonate, meclizine, vitamin b-12, and multiple vitamins-minerals. ALLERGIES:  is allergic to budesonide and hydrochlorothiazide. FAMILY HISTORY: Negative for any hematological or oncological conditions. SOCIAL HISTORY:  reports that she quit smoking about 41 years ago. She has a 30.00 pack-year smoking history. She has never used smokeless tobacco. She reports that she does not drink alcohol and does not use drugs. REVIEW OF SYSTEMS:     · General: No weakness or fatigue. No unanticipated weight loss or decreased appetite. No fever or chills. · Eyes: No blurred vision, eye pain or double vision. · Ears: No hearing problems or drainage. No tinnitus. · Throat: No sore throat, problems with swallowing or dysphagia. · Respiratory: No cough, sputum or hemoptysis. No shortness of breath. No pleuritic chest pain. · Cardiovascular: No chest pain, orthopnea or PND. No lower extremity edema. No palpitation. · Gastrointestinal: as above,   · Genitourinary: No dysuria, hematuria, frequency or urgency. · Musculoskeletal: chronic back pain and right lower extremity pain. · Dermatologic: No skin rashes or pruritus. No skin lesions or discolorations.    · Psychiatric: No depression, anxiety, or stress or signs of schizophrenia. No change in mood or affect. · Hematologic: No history of bleeding tendency. No bruises or ecchymosis. No history of clotting problems. · Infectious disease: No fever, chills or frequent infections. · Endocrine: No problems with opacity. No polydipsia or polyuria. No temperature intolerance. · Neurologic: No headaches or dizziness. No weakness or numbness of the extremities. No changes in balance, coordination,  memory, mentation, behavior. · Allergic/Immunologic: No nasal congestion or hives. No repeated infections. PHYSICAL EXAM:  The patient is not in acute distress. She walks with the help of a cane. Performance status ECOG 2 vital signs: Blood pressure (!) 141/66, pulse 72, temperature 98.6 °F (37 °C), temperature source Temporal, resp. rate 18, height 5' 4\" (1.626 m), weight 108 lb (49 kg), not currently breastfeeding. HEENT:  Eyes are normal. Ears, nose and throat are normal.  Neck: Supple. No lymph node enlargement. No thyroid enlargement. Trachea is centrally located. Chest:  Clear to auscultation. No wheezes or crepitations. Heart: Regular sinus rhythm. Abdomen: Soft, nontender. No hepatosplenomegaly. No masses. Extremities:  With no edema. Lymph Nodes:  No cervical, axillary or inguinal lymph node enlargement. Neurologic:  Conscious and oriented. No focal neurological deficits. Psychosocial: No depression, anxiety or stress. Skin: No rashes, bruises or ecchymoses.       Review of Diagnostic data:   Lab Results   Component Value Date    WBC 5.5 05/03/2022    HGB 11.0 (L) 05/03/2022    HCT 35.4 (L) 05/03/2022    .1 05/03/2022     05/03/2022       Chemistry        Component Value Date/Time     05/03/2022 1305    K 5.3 05/03/2022 1305     (H) 05/03/2022 1305    CO2 22 05/03/2022 1305    BUN 25 (H) 05/03/2022 1305    CREATININE 1.72 (H) 05/03/2022 1305        Component Value Date/Time    CALCIUM 8.9 05/03/2022 1305    ALKPHOS 88 05/03/2022 1305    AST 25 05/03/2022 1305    ALT 14 05/03/2022 1305    BILITOT 0.52 05/03/2022 1305        Lab Results   Component Value Date    CEA 3.9 (H) 05/03/2022     CT 7/2018  Impression   NO SIGNIFICANT CHANGE FROM THE STUDY OF 6/10/2016. SMALL PERICARDIAL EFFUSION. 2 AREAS OF SACCULAR DILATATION OF THE INFRARENAL ABDOMINAL AORTA. IVC FILTER. DIVERTICULAR DISEASE OF THE COLON WITHOUT PERICOLIC INFLAMMATORY CHANGE. NO    OBSTRUCTIVE UROPATHY. CHRONIC COMPRESSION FRACTURE OF T11. 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 Anemia secondary to bleeding. Resolved. History of DVT   status post IVC filter placement, maintained on Coumadin. Chronic back pain. CKD   ECOG performance score 2  Anemia of chronic renal insufficiency  CEA elevation      PLAN:   With mildly elevation of CEA will obtain follow-up in 3 months however patient is asymptomatic  We discussed about imaging and colonoscopy as previous discussion patient she is 80 and does not want further work-up/treatment unless she has symptoms   We will rediscuss scan and colonoscopy if CEA still rising  Agreed to come back in 3 months with repeated CEA CBC and CMP         I spent a total of 35 minutes on the date of the service which included preparing to see the patient, face-to-face patient care, completing clinical documentation, obtaining and/or reviewing separately obtained history, performing a medically appropriate examination, counseling and educating the patient/family/caregiver and ordering medications, tests, or procedures.                                      [facility]                            This note is created with the assistance of a speech recognition program.  While intending to generate a document that actually reflects the content of the visit, the document can still have some errors including those of syntax and sound a like substitutions which may escape proof reading. It such instances, actual meaning can be extrapolated by contextual diversion.

## 2022-07-21 ENCOUNTER — HOSPITAL ENCOUNTER (OUTPATIENT)
Dept: PHARMACY | Age: 87
Setting detail: THERAPIES SERIES
Discharge: HOME | End: 2022-07-21
Payer: MEDICARE

## 2022-07-21 VITALS
SYSTOLIC BLOOD PRESSURE: 132 MMHG | HEART RATE: 78 BPM | WEIGHT: 106.6 LBS | DIASTOLIC BLOOD PRESSURE: 80 MMHG | BODY MASS INDEX: 18.3 KG/M2

## 2022-07-21 DIAGNOSIS — I26.92 CHRONIC SADDLE PULMONARY EMBOLISM WITHOUT ACUTE COR PULMONALE (HCC): ICD-10-CM

## 2022-07-21 DIAGNOSIS — Z79.01 LONG TERM CURRENT USE OF ANTICOAGULANT THERAPY: Primary | ICD-10-CM

## 2022-07-21 DIAGNOSIS — I27.82 CHRONIC SADDLE PULMONARY EMBOLISM WITHOUT ACUTE COR PULMONALE (HCC): ICD-10-CM

## 2022-07-21 LAB — INR BLD: 2.2

## 2022-07-21 PROCEDURE — 99211 OFF/OP EST MAY X REQ PHY/QHP: CPT | Performed by: FAMILY MEDICINE

## 2022-07-21 PROCEDURE — 85610 PROTHROMBIN TIME: CPT | Performed by: FAMILY MEDICINE

## 2022-07-21 NOTE — PATIENT INSTRUCTIONS
Please take 3.75mg (1/2 tablet) warfarin on Mondays, Wednesdays and Fridays and 7.5mg (1 tablet) all other days. Continue to monitor for signs of bleeding. Return to coumadin clinic in 6 weeks.

## 2022-07-21 NOTE — PROGRESS NOTES
[facility]  [facility]  ANTICOAGULATION    Referring Provider: Dr Baumann Speaker INR: 2.0-3.0    TODAY'S INR: 2.2    WARFARIN Dosage: continue 3.75mg MWF, 2.5mg all other days    INR (no units)   Date Value   2022 2.2   2022 2.3   2022 4   2022 3.1   03/15/2022 3.1   2022 2.3   2022 3.8       Medication changes:  No changes  Notes:    Fingerstick INR drawn per clinic protocol. Patient states no visible blood in urine and no black tarry stool. Denies any missed doses of warfarin. No change in other maintenance medications or in diet. Will recheck INR in 6 weeks. Patient acknowledges working in consult agreement with pharmacist as referred by his/her physician.                   For Pharmacy Admin Tracking Only    Intervention Detail: Adherence Monitorin  Total # of Interventions Recommended: 2  Total # of Interventions Accepted: 2  Time Spent (min): 20      Ike White R.Ph., 2022,1:35 PM

## 2022-08-29 ENCOUNTER — HOSPITAL ENCOUNTER (OUTPATIENT)
Dept: PHARMACY | Age: 87
Setting detail: THERAPIES SERIES
Discharge: HOME | End: 2022-08-29
Payer: MEDICARE

## 2022-08-29 VITALS — SYSTOLIC BLOOD PRESSURE: 126 MMHG | HEART RATE: 74 BPM | DIASTOLIC BLOOD PRESSURE: 71 MMHG

## 2022-08-29 DIAGNOSIS — I27.82 CHRONIC SADDLE PULMONARY EMBOLISM WITHOUT ACUTE COR PULMONALE (HCC): ICD-10-CM

## 2022-08-29 DIAGNOSIS — I26.92 CHRONIC SADDLE PULMONARY EMBOLISM WITHOUT ACUTE COR PULMONALE (HCC): ICD-10-CM

## 2022-08-29 DIAGNOSIS — Z79.01 LONG TERM CURRENT USE OF ANTICOAGULANT THERAPY: Primary | ICD-10-CM

## 2022-08-29 LAB — INR BLD: 1.9

## 2022-08-29 PROCEDURE — 85610 PROTHROMBIN TIME: CPT | Performed by: FAMILY MEDICINE

## 2022-08-29 PROCEDURE — 99211 OFF/OP EST MAY X REQ PHY/QHP: CPT | Performed by: FAMILY MEDICINE

## 2022-08-29 NOTE — PROGRESS NOTES
[facility]  [facility]  [facility]ICO[facility]UL[facility]    Referring Provider: Dr Nirmal Walters INR: 2.0-3.0    TODAY'S INR: 1.9    WARFARIN Dosage: 5mg x1 then resume 3.75mg MWF, 2.5mg all other days    INR (no units)   Date Value   2022 1.9   2022 2.2   2022 2.3   2022 4   2022 3.1   03/15/2022 3.1   2022 2.3       Medication changes:  No changes  Notes:    Fingerstick INR drawn per clinic protocol. Patient states no visible blood in urine and no black tarry stool. Denies any missed doses of warfarin. No change in other maintenance medications or in diet. Will recheck INR in 6 weeks. Patient is struggling with caring for  and all of the household needs and yard care. Patient states she is worried if she were to need to go to the hospital what would happen to her . States son works and wouldn't be able to help much. Patient also states she continues to make 1 main meal a day but most foods just don't taste good anymore. Patient acknowledges working in consult agreement with pharmacist as referred by his/her physician.                   For Pharmacy Admin Tracking Only    Intervention Detail: Adherence Monitorin and Dose Adjustment: 1, reason: Therapy Optimization  Total # of Interventions Recommended: 3  Total # of Interventions Accepted: 3  Time Spent (min):  25      Jose Alfredo Hutton R.Ph., 2022,2:27 PM

## 2022-08-29 NOTE — PATIENT INSTRUCTIONS
Please take 2 tablets (5mg) warfarin today then return to your regular dosing of 3.75mg (1 1/2 tablets) on Mondays, Wednesdays and Fridays and 2.5mg (1 tablet) all other days. Continue watch for signs of bleeding. Return to coumadin clinic in 6 weeks.

## 2022-09-01 ENCOUNTER — HOSPITAL ENCOUNTER (OUTPATIENT)
Age: 87
Discharge: HOME | End: 2022-09-01
Payer: MEDICARE

## 2022-09-01 DIAGNOSIS — C18.3 MALIGNANT NEOPLASM OF HEPATIC FLEXURE (HCC): ICD-10-CM

## 2022-09-01 LAB
ABSOLUTE EOS #: 0.22 K/UL (ref 0–0.44)
ABSOLUTE IMMATURE GRANULOCYTE: <0.03 K/UL (ref 0–0.3)
ABSOLUTE LYMPH #: 1.09 K/UL (ref 1.1–3.7)
ABSOLUTE MONO #: 0.64 K/UL (ref 0.1–1.2)
ALBUMIN SERPL-MCNC: 3.6 G/DL (ref 3.5–5.2)
ALBUMIN/GLOBULIN RATIO: 1.5 (ref 1–2.5)
ALP BLD-CCNC: 87 U/L (ref 35–104)
ALT SERPL-CCNC: 16 U/L (ref 5–33)
ANION GAP SERPL CALCULATED.3IONS-SCNC: 8 MMOL/L (ref 9–17)
AST SERPL-CCNC: 23 U/L
BASOPHILS # BLD: 1 % (ref 0–2)
BASOPHILS ABSOLUTE: 0.05 K/UL (ref 0–0.2)
BILIRUB SERPL-MCNC: 0.4 MG/DL (ref 0.3–1.2)
BUN BLDV-MCNC: 29 MG/DL (ref 8–23)
BUN/CREAT BLD: 16 (ref 9–20)
CALCIUM SERPL-MCNC: 9.1 MG/DL (ref 8.6–10.4)
CHLORIDE BLD-SCNC: 110 MMOL/L (ref 98–107)
CO2: 24 MMOL/L (ref 20–31)
CREAT SERPL-MCNC: 1.83 MG/DL (ref 0.5–0.9)
EOSINOPHILS RELATIVE PERCENT: 4 % (ref 1–4)
GFR AFRICAN AMERICAN: 32 ML/MIN
GFR NON-AFRICAN AMERICAN: 26 ML/MIN
GFR SERPL CREATININE-BSD FRML MDRD: ABNORMAL ML/MIN/{1.73_M2}
GFR SERPL CREATININE-BSD FRML MDRD: ABNORMAL ML/MIN/{1.73_M2}
GLUCOSE BLD-MCNC: 109 MG/DL (ref 70–99)
HCT VFR BLD CALC: 30.7 % (ref 36.3–47.1)
HEMOGLOBIN: 9.4 G/DL (ref 11.9–15.1)
IMMATURE GRANULOCYTES: 0 %
LYMPHOCYTES # BLD: 20 % (ref 24–43)
MCH RBC QN AUTO: 31.2 PG (ref 25.2–33.5)
MCHC RBC AUTO-ENTMCNC: 30.6 G/DL (ref 28.4–34.8)
MCV RBC AUTO: 102 FL (ref 82.6–102.9)
MONOCYTES # BLD: 12 % (ref 3–12)
NRBC AUTOMATED: 0 PER 100 WBC
PDW BLD-RTO: 13.4 % (ref 11.8–14.4)
PLATELET # BLD: 138 K/UL (ref 138–453)
PMV BLD AUTO: 12.4 FL (ref 8.1–13.5)
POTASSIUM SERPL-SCNC: 5.3 MMOL/L (ref 3.7–5.3)
RBC # BLD: 3.01 M/UL (ref 3.95–5.11)
SEG NEUTROPHILS: 63 % (ref 36–65)
SEGMENTED NEUTROPHILS ABSOLUTE COUNT: 3.35 K/UL (ref 1.5–8.1)
SODIUM BLD-SCNC: 142 MMOL/L (ref 135–144)
TOTAL PROTEIN: 6 G/DL (ref 6.4–8.3)
WBC # BLD: 5.4 K/UL (ref 3.5–11.3)

## 2022-09-01 PROCEDURE — 36415 COLL VENOUS BLD VENIPUNCTURE: CPT

## 2022-09-01 PROCEDURE — 80053 COMPREHEN METABOLIC PANEL: CPT

## 2022-09-01 PROCEDURE — 82378 CARCINOEMBRYONIC ANTIGEN: CPT

## 2022-09-01 PROCEDURE — 85025 COMPLETE CBC W/AUTO DIFF WBC: CPT

## 2022-09-02 LAB — CARCINOEMBRYONIC ANTIGEN: 3.5 NG/ML

## 2022-09-08 ENCOUNTER — OFFICE VISIT (OUTPATIENT)
Dept: ONCOLOGY | Age: 87
End: 2022-09-08
Payer: MEDICARE

## 2022-09-08 VITALS
HEART RATE: 74 BPM | TEMPERATURE: 98.6 F | DIASTOLIC BLOOD PRESSURE: 75 MMHG | WEIGHT: 103 LBS | BODY MASS INDEX: 17.68 KG/M2 | SYSTOLIC BLOOD PRESSURE: 134 MMHG | RESPIRATION RATE: 18 BRPM

## 2022-09-08 DIAGNOSIS — D64.9 NORMOCYTIC ANEMIA: ICD-10-CM

## 2022-09-08 DIAGNOSIS — C18.3 MALIGNANT NEOPLASM OF HEPATIC FLEXURE (HCC): Primary | ICD-10-CM

## 2022-09-08 DIAGNOSIS — C80.1 ADENOCARCINOMA (HCC): ICD-10-CM

## 2022-09-08 PROCEDURE — 1090F PRES/ABSN URINE INCON ASSESS: CPT | Performed by: INTERNAL MEDICINE

## 2022-09-08 PROCEDURE — G8419 CALC BMI OUT NRM PARAM NOF/U: HCPCS | Performed by: INTERNAL MEDICINE

## 2022-09-08 PROCEDURE — 1123F ACP DISCUSS/DSCN MKR DOCD: CPT | Performed by: INTERNAL MEDICINE

## 2022-09-08 PROCEDURE — 99211 OFF/OP EST MAY X REQ PHY/QHP: CPT | Performed by: INTERNAL MEDICINE

## 2022-09-08 PROCEDURE — 1036F TOBACCO NON-USER: CPT | Performed by: INTERNAL MEDICINE

## 2022-09-08 PROCEDURE — G8427 DOCREV CUR MEDS BY ELIG CLIN: HCPCS | Performed by: INTERNAL MEDICINE

## 2022-09-08 PROCEDURE — 99214 OFFICE O/P EST MOD 30 MIN: CPT | Performed by: INTERNAL MEDICINE

## 2022-09-08 NOTE — PROGRESS NOTES
hypertension, Hyperlipidemia, Irritable bowel syndrome, Lumbago, Osteoarthritis, Polymyalgia rheumatica ([address] Rheumatism, and Seborrheic keratoses. PAST SURGICAL HISTORY: has a past surgical history that includes Cataract removal with implant; Glaucoma surgery; Nasal septum surgery; Dental surgery; Appendectomy (1949); Skin cancer excision; Hysterectomy, total abdominal (1973); Tonsillectomy (1941); Colonoscopy (10/17/2011); Colonoscopy (05/25/2016); colectomy (Right, 06/20/2016); Colonoscopy (12/13/2017); and pr colsc flx w/rmvl of tumor polyp lesion snare tq (N/A, 12/13/2017). CURRENT MEDICATIONS:  has a current medication list which includes the following prescription(s): carvedilol, losartan, amlodipine, atorvastatin, handicap placard, warfarin, diclofenac sodium, calcium carbonate, meclizine, vitamin b-12, and multiple vitamins-minerals. ALLERGIES:  is allergic to budesonide and hydrochlorothiazide. FAMILY HISTORY: Negative for any hematological or oncological conditions. SOCIAL HISTORY:  reports that she quit smoking about 41 years ago. Her smoking use included cigarettes. She has a 30.00 pack-year smoking history. She has never used smokeless tobacco. She reports that she does not drink alcohol and does not use drugs. REVIEW OF SYSTEMS:     General: No weakness or fatigue. No unanticipated weight loss or decreased appetite. No fever or chills. Eyes: No blurred vision, eye pain or double vision. Ears: No hearing problems or drainage. No tinnitus. Throat: No sore throat, problems with swallowing or dysphagia. Respiratory: No cough, sputum or hemoptysis. No shortness of breath. No pleuritic chest pain. Cardiovascular: No chest pain, orthopnea or PND. No lower extremity edema. No palpitation. Gastrointestinal: as above,   Genitourinary: No dysuria, hematuria, frequency or urgency. Musculoskeletal: chronic back pain and right lower extremity pain.   Dermatologic: No skin rashes or pruritus. No skin lesions or discolorations. Psychiatric: No depression, anxiety, or stress or signs of schizophrenia. No change in mood or affect. Hematologic: No history of bleeding tendency. No bruises or ecchymosis. No history of clotting problems. Infectious disease: No fever, chills or frequent infections. Endocrine: No problems with opacity. No polydipsia or polyuria. No temperature intolerance. Neurologic: No headaches or dizziness. No weakness or numbness of the extremities. No changes in balance, coordination,  memory, mentation, behavior. Allergic/Immunologic: No nasal congestion or hives. No repeated infections. PHYSICAL EXAM:  The patient is not in acute distress. She walks with the help of a cane. Performance status ECOG 2 vital signs: Blood pressure 134/75, pulse 74, temperature 98.6 °F (37 °C), temperature source Temporal, resp. rate 18, weight 103 lb (46.7 kg), not currently breastfeeding. HEENT:  Eyes are normal. Ears, nose and throat are normal.  Neck: Supple. No lymph node enlargement. No thyroid enlargement. Trachea is centrally located. Chest:  Clear to auscultation. No wheezes or crepitations. Heart: Regular sinus rhythm. Abdomen: Soft, nontender. No hepatosplenomegaly. No masses. Extremities:  With no edema. Lymph Nodes:  No cervical, axillary or inguinal lymph node enlargement. Neurologic:  Conscious and oriented. No focal neurological deficits. Psychosocial: No depression, anxiety or stress. Skin: No rashes, bruises or ecchymoses.       Review of Diagnostic data:   Lab Results   Component Value Date    WBC 5.4 09/01/2022    HGB 9.4 (L) 09/01/2022    HCT 30.7 (L) 09/01/2022    .0 09/01/2022     09/01/2022       Chemistry        Component Value Date/Time     09/01/2022 1146    K 5.3 09/01/2022 1146     (H) 09/01/2022 1146    CO2 24 09/01/2022 1146    BUN 29 (H) 09/01/2022 1146    CREATININE 1.83 (H) 09/01/2022 1146 program.  While intending to generate a document that actually reflects the content of the visit, the document can still have some errors including those of syntax and sound a like substitutions which may escape proof reading. It such instances, actual meaning can be extrapolated by contextual diversion.

## 2022-10-04 ENCOUNTER — HOSPITAL ENCOUNTER (OUTPATIENT)
Age: 87
Discharge: HOME | End: 2022-10-04
Payer: MEDICARE

## 2022-10-04 DIAGNOSIS — N18.4 CKD (CHRONIC KIDNEY DISEASE) STAGE 4, GFR 15-29 ML/MIN (HCC): ICD-10-CM

## 2022-10-04 LAB
ABSOLUTE EOS #: 0.22 K/UL (ref 0–0.44)
ABSOLUTE IMMATURE GRANULOCYTE: <0.03 K/UL (ref 0–0.3)
ABSOLUTE LYMPH #: 1.22 K/UL (ref 1.1–3.7)
ABSOLUTE MONO #: 0.76 K/UL (ref 0.1–1.2)
ANION GAP SERPL CALCULATED.3IONS-SCNC: 9 MMOL/L (ref 9–17)
BASOPHILS # BLD: 1 % (ref 0–2)
BASOPHILS ABSOLUTE: 0.04 K/UL (ref 0–0.2)
BUN BLDV-MCNC: 28 MG/DL (ref 8–23)
BUN/CREAT BLD: 16 (ref 9–20)
CALCIUM SERPL-MCNC: 9 MG/DL (ref 8.6–10.4)
CHLORIDE BLD-SCNC: 110 MMOL/L (ref 98–107)
CO2: 21 MMOL/L (ref 20–31)
CREAT SERPL-MCNC: 1.7 MG/DL (ref 0.5–0.9)
EOSINOPHILS RELATIVE PERCENT: 4 % (ref 1–4)
GFR SERPL CREATININE-BSD FRML MDRD: 29 ML/MIN/1.73M2
GLUCOSE BLD-MCNC: 115 MG/DL (ref 70–99)
HCT VFR BLD CALC: 32.3 % (ref 36.3–47.1)
HEMOGLOBIN: 10 G/DL (ref 11.9–15.1)
IMMATURE GRANULOCYTES: 0 %
LYMPHOCYTES # BLD: 19 % (ref 24–43)
MCH RBC QN AUTO: 32.1 PG (ref 25.2–33.5)
MCHC RBC AUTO-ENTMCNC: 31 G/DL (ref 28.4–34.8)
MCV RBC AUTO: 103.5 FL (ref 82.6–102.9)
MONOCYTES # BLD: 12 % (ref 3–12)
NRBC AUTOMATED: 0 PER 100 WBC
PDW BLD-RTO: 13.6 % (ref 11.8–14.4)
PLATELET # BLD: 136 K/UL (ref 138–453)
PMV BLD AUTO: 11.9 FL (ref 8.1–13.5)
POTASSIUM SERPL-SCNC: 5.3 MMOL/L (ref 3.7–5.3)
RBC # BLD: 3.12 M/UL (ref 3.95–5.11)
SEG NEUTROPHILS: 64 % (ref 36–65)
SEGMENTED NEUTROPHILS ABSOLUTE COUNT: 4.03 K/UL (ref 1.5–8.1)
SODIUM BLD-SCNC: 140 MMOL/L (ref 135–144)
WBC # BLD: 6.3 K/UL (ref 3.5–11.3)

## 2022-10-04 PROCEDURE — 80048 BASIC METABOLIC PNL TOTAL CA: CPT

## 2022-10-04 PROCEDURE — 36415 COLL VENOUS BLD VENIPUNCTURE: CPT

## 2022-10-04 PROCEDURE — 85025 COMPLETE CBC W/AUTO DIFF WBC: CPT

## 2022-10-10 ENCOUNTER — HOSPITAL ENCOUNTER (OUTPATIENT)
Dept: PHARMACY | Age: 87
Setting detail: THERAPIES SERIES
Discharge: HOME | End: 2022-10-10
Payer: MEDICARE

## 2022-10-10 VITALS
BODY MASS INDEX: 18.54 KG/M2 | HEART RATE: 80 BPM | DIASTOLIC BLOOD PRESSURE: 93 MMHG | WEIGHT: 108 LBS | SYSTOLIC BLOOD PRESSURE: 157 MMHG

## 2022-10-10 DIAGNOSIS — I27.82 CHRONIC SADDLE PULMONARY EMBOLISM WITHOUT ACUTE COR PULMONALE (HCC): ICD-10-CM

## 2022-10-10 DIAGNOSIS — I26.92 CHRONIC SADDLE PULMONARY EMBOLISM WITHOUT ACUTE COR PULMONALE (HCC): ICD-10-CM

## 2022-10-10 DIAGNOSIS — Z79.01 LONG TERM CURRENT USE OF ANTICOAGULANT THERAPY: Primary | ICD-10-CM

## 2022-10-10 LAB — INR BLD: 1.9

## 2022-10-10 PROCEDURE — 85610 PROTHROMBIN TIME: CPT

## 2022-10-10 PROCEDURE — 99211 OFF/OP EST MAY X REQ PHY/QHP: CPT

## 2022-11-04 PROBLEM — R22.43 LOCALIZED SWELLING OF BOTH LOWER LEGS: Status: RESOLVED | Noted: 2022-02-15 | Resolved: 2022-11-04

## 2022-11-29 ENCOUNTER — HOSPITAL ENCOUNTER (OUTPATIENT)
Dept: PHARMACY | Age: 87
Setting detail: THERAPIES SERIES
Discharge: HOME | End: 2022-11-29
Payer: MEDICARE

## 2022-11-29 VITALS
HEART RATE: 76 BPM | SYSTOLIC BLOOD PRESSURE: 166 MMHG | DIASTOLIC BLOOD PRESSURE: 81 MMHG | BODY MASS INDEX: 18.37 KG/M2 | WEIGHT: 107 LBS

## 2022-11-29 DIAGNOSIS — Z79.01 LONG TERM CURRENT USE OF ANTICOAGULANT THERAPY: Primary | ICD-10-CM

## 2022-11-29 DIAGNOSIS — I27.82 CHRONIC SADDLE PULMONARY EMBOLISM WITHOUT ACUTE COR PULMONALE (HCC): ICD-10-CM

## 2022-11-29 DIAGNOSIS — I26.92 CHRONIC SADDLE PULMONARY EMBOLISM WITHOUT ACUTE COR PULMONALE (HCC): ICD-10-CM

## 2022-11-29 LAB — INR BLD: 2.7

## 2022-11-29 PROCEDURE — 85610 PROTHROMBIN TIME: CPT

## 2022-11-29 PROCEDURE — 99211 OFF/OP EST MAY X REQ PHY/QHP: CPT

## 2022-11-29 NOTE — PROGRESS NOTES
[address]/[place]  Medication Management  ANTICOAGULATION    Referring Provider: Dr. Evelina Morris INR: 2-3    TODAY'S INR: 2.7    WARFARIN Dosage: Continue warfarin 3.75 mg po every MWF; 2.5 mg po all other days    INR (no units)   Date Value   2022 2.7   10/10/2022 1.9   2022 1.9   2022 2.2   2022 2.3   2022 4   2022 3.1       Medication changes:  No changes  Notes:    Fingerstick INR drawn per clinic protocol. Patient states no visible blood in urine and no black tarry stool. Denies any missed doses of warfarin. No change in other maintenance medications or in diet. Will recheck INR in 6 weeks. Patient acknowledges working in consult agreement with pharmacist as referred by his/her physician. For Pharmacy Admin Tracking Only    Intervention Detail: Adherence Monitorin  Total # of Interventions Recommended: 1  Total # of Interventions Accepted: 1  Time Spent (min): BLAYNE[address]  Patrick Lyle, [facility]

## 2023-02-09 ENCOUNTER — HOSPITAL ENCOUNTER (OUTPATIENT)
Dept: PHARMACY | Age: 88
Setting detail: THERAPIES SERIES
Discharge: HOME | End: 2023-02-09
Payer: MEDICARE

## 2023-02-09 VITALS
HEART RATE: 79 BPM | BODY MASS INDEX: 17.73 KG/M2 | SYSTOLIC BLOOD PRESSURE: 138 MMHG | DIASTOLIC BLOOD PRESSURE: 84 MMHG | WEIGHT: 103.3 LBS

## 2023-02-09 DIAGNOSIS — Z79.01 LONG TERM CURRENT USE OF ANTICOAGULANT THERAPY: Primary | ICD-10-CM

## 2023-02-09 DIAGNOSIS — I27.82 CHRONIC SADDLE PULMONARY EMBOLISM WITHOUT ACUTE COR PULMONALE (HCC): ICD-10-CM

## 2023-02-09 DIAGNOSIS — I26.92 CHRONIC SADDLE PULMONARY EMBOLISM WITHOUT ACUTE COR PULMONALE (HCC): ICD-10-CM

## 2023-02-09 LAB — INR BLD: 1.9

## 2023-02-09 PROCEDURE — 99211 OFF/OP EST MAY X REQ PHY/QHP: CPT | Performed by: FAMILY MEDICINE

## 2023-02-09 PROCEDURE — 85610 PROTHROMBIN TIME: CPT | Performed by: FAMILY MEDICINE

## 2023-02-09 NOTE — PATIENT INSTRUCTIONS
Please take 1 1/2 tablets (3.75mg) today then continue to take warfarin 3.75mg (1 1/2 tablets) on Monday, Wednesday and Friday and 2.5mg (1 tablet) all other days. Continue to monitor for signs of bleeding. Return to coumadin clinic in 6 weeks.

## 2023-03-23 ENCOUNTER — HOSPITAL ENCOUNTER (OUTPATIENT)
Dept: PHARMACY | Age: 88
Setting detail: THERAPIES SERIES
Discharge: HOME | End: 2023-03-23
Payer: MEDICARE

## 2023-03-23 VITALS
WEIGHT: 103 LBS | SYSTOLIC BLOOD PRESSURE: 150 MMHG | BODY MASS INDEX: 17.68 KG/M2 | DIASTOLIC BLOOD PRESSURE: 79 MMHG | HEART RATE: 72 BPM

## 2023-03-23 DIAGNOSIS — Z79.01 LONG TERM CURRENT USE OF ANTICOAGULANT THERAPY: Primary | ICD-10-CM

## 2023-03-23 DIAGNOSIS — I27.82 CHRONIC SADDLE PULMONARY EMBOLISM WITHOUT ACUTE COR PULMONALE (HCC): ICD-10-CM

## 2023-03-23 DIAGNOSIS — I26.92 CHRONIC SADDLE PULMONARY EMBOLISM WITHOUT ACUTE COR PULMONALE (HCC): ICD-10-CM

## 2023-03-23 LAB — INR BLD: 1.4

## 2023-03-23 PROCEDURE — 99212 OFFICE O/P EST SF 10 MIN: CPT

## 2023-03-23 PROCEDURE — 85610 PROTHROMBIN TIME: CPT

## 2023-03-23 PROCEDURE — 99211 OFF/OP EST MAY X REQ PHY/QHP: CPT

## 2023-03-23 NOTE — PROGRESS NOTES
[facility]  Medication Management  ANTICOAGULATION    Referring Provider: Dr Leopoldo Lory INR: 2-3     TODAY'S INR: 1.4     WARFARIN Dosage: Take warfarin 1.5 tablets for 3.75 mg tomorrow then increase warfarin to whole 2.5 mg tablet MWF and 1.5 tablets for 3.75 mg all other days. INR (no units)   Date Value   2023 1.9   2022 2.7   10/10/2022 1.9   2022 1.9   2022 2.2   2022 2.3   2022 4     Medication changes:  None    Notes:    Fingerstick INR drawn per clinic protocol. Patient states no visible blood in urine and no black tarry stool. Denies any missed doses of warfarin. No change in other maintenance medications or in diet. Will recheck INR in 3 weeks. Patient acknowledges working in consult agreement with pharmacist as referred by his/her physician. Patient is having a lot of trouble sleeping. I encouraged patient to talk to Dr Willie Anaya and not wait until her next appointment in 2 months. Patient is very overwhelmed with responsibilities after her 's death. Patient is taking her warfarin in the morning to help her remember. I offered to help her multiple times.     For Pharmacy Admin Tracking Only    Intervention Detail: Adherence Monitorin and Dose Adjustment: 2, reason: Therapy Optimization  Total # of Interventions Recommended: 2  Total # of Interventions Accepted: 2  Time Spent (min): 601 [facility], [address] [place] [address], Ana

## 2023-04-18 ENCOUNTER — HOSPITAL ENCOUNTER (OUTPATIENT)
Age: 88
Discharge: HOME | End: 2023-04-18
Payer: MEDICARE

## 2023-04-18 DIAGNOSIS — N18.4 CKD (CHRONIC KIDNEY DISEASE) STAGE 4, GFR 15-29 ML/MIN (HCC): ICD-10-CM

## 2023-04-18 LAB
ABSOLUTE EOS #: 0.2 K/UL (ref 0–0.44)
ABSOLUTE IMMATURE GRANULOCYTE: <0.03 K/UL (ref 0–0.3)
ABSOLUTE LYMPH #: 1 K/UL (ref 1.1–3.7)
ABSOLUTE MONO #: 0.6 K/UL (ref 0.1–1.2)
ANION GAP SERPL CALCULATED.3IONS-SCNC: 6 MMOL/L (ref 9–17)
BASOPHILS # BLD: 1 % (ref 0–2)
BASOPHILS ABSOLUTE: 0.03 K/UL (ref 0–0.2)
BUN SERPL-MCNC: 24 MG/DL (ref 8–23)
BUN/CREAT BLD: 15 (ref 9–20)
CALCIUM SERPL-MCNC: 9.1 MG/DL (ref 8.6–10.4)
CHLORIDE SERPL-SCNC: 113 MMOL/L (ref 98–107)
CO2 SERPL-SCNC: 22 MMOL/L (ref 20–31)
CREAT SERPL-MCNC: 1.63 MG/DL (ref 0.5–0.9)
EOSINOPHILS RELATIVE PERCENT: 4 % (ref 1–4)
GFR SERPL CREATININE-BSD FRML MDRD: 30 ML/MIN/1.73M2
GLUCOSE SERPL-MCNC: 100 MG/DL (ref 70–99)
HCT VFR BLD AUTO: 36.1 % (ref 36.3–47.1)
HGB BLD-MCNC: 11.4 G/DL (ref 11.9–15.1)
IMMATURE GRANULOCYTES: 0 %
LYMPHOCYTES # BLD: 18 % (ref 24–43)
MCH RBC QN AUTO: 32.1 PG (ref 25.2–33.5)
MCHC RBC AUTO-ENTMCNC: 31.6 G/DL (ref 28.4–34.8)
MCV RBC AUTO: 101.7 FL (ref 82.6–102.9)
MONOCYTES # BLD: 11 % (ref 3–12)
NRBC AUTOMATED: 0 PER 100 WBC
PDW BLD-RTO: 13.2 % (ref 11.8–14.4)
PLATELET # BLD AUTO: 138 K/UL (ref 138–453)
PMV BLD AUTO: 12.4 FL (ref 8.1–13.5)
POTASSIUM SERPL-SCNC: 5.1 MMOL/L (ref 3.7–5.3)
RBC # BLD: 3.55 M/UL (ref 3.95–5.11)
SEG NEUTROPHILS: 66 % (ref 36–65)
SEGMENTED NEUTROPHILS ABSOLUTE COUNT: 3.63 K/UL (ref 1.5–8.1)
SODIUM SERPL-SCNC: 141 MMOL/L (ref 135–144)
WBC # BLD AUTO: 5.5 K/UL (ref 3.5–11.3)

## 2023-04-18 PROCEDURE — 85025 COMPLETE CBC W/AUTO DIFF WBC: CPT

## 2023-04-18 PROCEDURE — 80048 BASIC METABOLIC PNL TOTAL CA: CPT

## 2023-04-18 PROCEDURE — 36415 COLL VENOUS BLD VENIPUNCTURE: CPT

## 2023-05-18 ENCOUNTER — HOSPITAL ENCOUNTER (OUTPATIENT)
Dept: PHARMACY | Age: 88
Setting detail: THERAPIES SERIES
Discharge: HOME | End: 2023-05-18
Payer: MEDICARE

## 2023-05-18 VITALS
DIASTOLIC BLOOD PRESSURE: 87 MMHG | HEART RATE: 77 BPM | WEIGHT: 102.3 LBS | BODY MASS INDEX: 17.56 KG/M2 | SYSTOLIC BLOOD PRESSURE: 153 MMHG

## 2023-05-18 DIAGNOSIS — Z79.01 LONG TERM CURRENT USE OF ANTICOAGULANT THERAPY: Primary | ICD-10-CM

## 2023-05-18 DIAGNOSIS — I27.82 CHRONIC SADDLE PULMONARY EMBOLISM WITHOUT ACUTE COR PULMONALE (HCC): ICD-10-CM

## 2023-05-18 DIAGNOSIS — I26.92 CHRONIC SADDLE PULMONARY EMBOLISM WITHOUT ACUTE COR PULMONALE (HCC): ICD-10-CM

## 2023-05-18 LAB — INR BLD: 2.3

## 2023-05-18 PROCEDURE — 99211 OFF/OP EST MAY X REQ PHY/QHP: CPT | Performed by: FAMILY MEDICINE

## 2023-05-18 PROCEDURE — 85610 PROTHROMBIN TIME: CPT | Performed by: FAMILY MEDICINE

## 2023-05-18 NOTE — PATIENT INSTRUCTIONS
Continue to take warfarin 2.5mg (1 tablet) on Monday Wednesday and Friday and 3.75mg (1 1/2 tablets) all other days. Continue to monitor urine and stool for signs and symptoms of bleeding. Please notify the clinic of any medication changes. Please remember to bring all medications (both prescription and OTC) to your next visit. Kindly notify the clinic if you are unable to make to your next appointment. Continue current dose of warfarin as instructed on dosing calendar provided.

## 2023-05-18 NOTE — PROGRESS NOTES
[address]/[place]  Medication Management  ANTICOAGULATION    Referring Provider: Dr Josiane Garcia INR: 2.0-3.0    TODAY'S INR: 2.3    WARFARIN Dosage: continue 2.5mg MWF, 5mg all other days    INR (no units)   Date Value   2023 2.3   2023 2.7   2023 1.4   2023 1.9   2022 2.7   10/10/2022 1.9   2022 1.9       Hemoglobin   Date Value Ref Range Status   2023 11.4 (L) 11.9 - 15.1 g/dL Final     Hematocrit   Date Value Ref Range Status   2023 36.1 (L) 36.3 - 47.1 % Final     ALT   Date Value Ref Range Status   2022 16 5 - 33 U/L Final     AST   Date Value Ref Range Status   2022 23 <32 U/L Final       Medication changes:  No changes    Notes:    Fingerstick INR drawn per clinic protocol. Patient states no visible blood in urine and no black tarry stool. Denies any missed doses of warfarin. No change in other maintenance medications or in diet. Will recheck INR in 6 weeks. Patient acknowledges working in consult agreement with pharmacist as referred by his/her physician. Updated referral faxed to HIM at this time.           For Pharmacy Admin Tracking Only    Intervention Detail: Adherence Monitorin  Total # of Interventions Recommended: 2  Total # of Interventions Accepted: 2  Time Spent (min): 20      Rock Smalls R.Ph., 2023,2:55 PM

## 2023-06-09 ENCOUNTER — HOSPITAL ENCOUNTER (EMERGENCY)
Age: 88
Discharge: HOME | End: 2023-06-09

## 2023-06-09 VITALS
HEART RATE: 73 BPM | OXYGEN SATURATION: 95 % | RESPIRATION RATE: 18 BRPM | SYSTOLIC BLOOD PRESSURE: 152 MMHG | DIASTOLIC BLOOD PRESSURE: 73 MMHG | TEMPERATURE: 97.5 F

## 2023-06-09 DIAGNOSIS — L03.032 CELLULITIS OF GREAT TOE OF LEFT FOOT: Primary | ICD-10-CM

## 2023-06-09 RX ORDER — CEPHALEXIN 500 MG/1
500 CAPSULE ORAL 4 TIMES DAILY
Qty: 28 CAPSULE | Refills: 0 | Status: SHIPPED | OUTPATIENT
Start: 2023-06-09 | End: 2023-06-19

## 2023-06-09 ASSESSMENT — PAIN DESCRIPTION - PAIN TYPE: TYPE: ACUTE PAIN

## 2023-06-09 ASSESSMENT — PAIN DESCRIPTION - ORIENTATION: ORIENTATION: LEFT

## 2023-06-09 ASSESSMENT — PAIN - FUNCTIONAL ASSESSMENT: PAIN_FUNCTIONAL_ASSESSMENT: 0-10

## 2023-06-09 ASSESSMENT — PAIN DESCRIPTION - LOCATION: LOCATION: TOE (COMMENT WHICH ONE)

## 2023-06-09 ASSESSMENT — PAIN SCALES - GENERAL: PAINLEVEL_OUTOF10: 4

## 2023-06-09 NOTE — ED PROVIDER NOTES
mis-transcribed.)    Electronically signed by GARRY Britton CNP on 6/9/2023 at 5:14 PM               GARRY Britton CNP  06/09/23 [id number]

## 2023-06-09 NOTE — DISCHARGE INSTRUCTIONS
Antibiotics as directed, warm soaks several times per day, follow-up with podiatry if symptoms do not improve, return for worsening symptoms

## 2023-06-13 PROBLEM — L60.9 NAIL ABNORMALITY: Status: ACTIVE | Noted: 2023-06-13

## 2023-06-13 PROBLEM — L03.032 CELLULITIS OF GREAT TOE OF LEFT FOOT: Status: ACTIVE | Noted: 2023-06-13

## 2023-06-20 ENCOUNTER — APPOINTMENT (OUTPATIENT)
Dept: CT IMAGING | Age: 88
End: 2023-06-20
Payer: MEDICARE

## 2023-06-20 ENCOUNTER — HOSPITAL ENCOUNTER (EMERGENCY)
Age: 88
Discharge: HOME | End: 2023-06-20
Attending: STUDENT IN AN ORGANIZED HEALTH CARE EDUCATION/TRAINING PROGRAM
Payer: MEDICARE

## 2023-06-20 ENCOUNTER — APPOINTMENT (OUTPATIENT)
Dept: GENERAL RADIOLOGY | Age: 88
End: 2023-06-20
Payer: MEDICARE

## 2023-06-20 VITALS
DIASTOLIC BLOOD PRESSURE: 72 MMHG | SYSTOLIC BLOOD PRESSURE: 178 MMHG | OXYGEN SATURATION: 97 % | RESPIRATION RATE: 19 BRPM | HEART RATE: 64 BPM | TEMPERATURE: 98.9 F

## 2023-06-20 DIAGNOSIS — E86.0 DEHYDRATION: Primary | ICD-10-CM

## 2023-06-20 LAB
ALBUMIN SERPL-MCNC: 3.6 G/DL (ref 3.5–5.2)
ALBUMIN/GLOB SERPL: 1.3 {RATIO} (ref 1–2.5)
ALP SERPL-CCNC: 87 U/L (ref 35–104)
ALT SERPL-CCNC: 16 U/L (ref 5–33)
ANION GAP SERPL CALCULATED.3IONS-SCNC: 8 MMOL/L (ref 9–17)
AST SERPL-CCNC: 26 U/L
BACTERIA URNS QL MICRO: ABNORMAL
BASOPHILS # BLD: 0.03 K/UL (ref 0–0.2)
BASOPHILS NFR BLD: 1 % (ref 0–2)
BILIRUB SERPL-MCNC: 0.4 MG/DL (ref 0.3–1.2)
BILIRUB UR QL STRIP: NEGATIVE
BUN SERPL-MCNC: 25 MG/DL (ref 8–23)
BUN/CREAT SERPL: 14 (ref 9–20)
CALCIUM SERPL-MCNC: 9 MG/DL (ref 8.6–10.4)
CHLORIDE SERPL-SCNC: 111 MMOL/L (ref 98–107)
CLARITY UR: CLEAR
CO2 SERPL-SCNC: 22 MMOL/L (ref 20–31)
COLOR UR: YELLOW
CREAT SERPL-MCNC: 1.73 MG/DL (ref 0.5–0.9)
EKG ATRIAL RATE: 61 BPM
EKG P AXIS: 6 DEGREES
EKG P-R INTERVAL: 132 MS
EKG Q-T INTERVAL: 406 MS
EKG QRS DURATION: 72 MS
EKG QTC CALCULATION (BAZETT): 408 MS
EKG R AXIS: -29 DEGREES
EKG T AXIS: 4 DEGREES
EKG VENTRICULAR RATE: 61 BPM
EOSINOPHIL # BLD: 0.15 K/UL (ref 0–0.44)
EOSINOPHILS RELATIVE PERCENT: 3 % (ref 1–4)
EPI CELLS #/AREA URNS HPF: ABNORMAL /HPF (ref 0–25)
ERYTHROCYTE [DISTWIDTH] IN BLOOD BY AUTOMATED COUNT: 13.2 % (ref 11.8–14.4)
GFR SERPL CREATININE-BSD FRML MDRD: 28 ML/MIN/1.73M2
GLUCOSE SERPL-MCNC: 107 MG/DL (ref 70–99)
GLUCOSE UR STRIP-MCNC: NEGATIVE MG/DL
HCT VFR BLD AUTO: 33 % (ref 36.3–47.1)
HGB BLD-MCNC: 10.6 G/DL (ref 11.9–15.1)
HGB UR QL STRIP.AUTO: ABNORMAL
IMM GRANULOCYTES # BLD AUTO: <0.03 K/UL (ref 0–0.3)
IMM GRANULOCYTES NFR BLD: 0 %
INR PPP: 2.3
KETONES UR STRIP-MCNC: NEGATIVE MG/DL
LEUKOCYTE ESTERASE UR QL STRIP: ABNORMAL
LYMPHOCYTES # BLD: 18 % (ref 24–43)
LYMPHOCYTES NFR BLD: 0.89 K/UL (ref 1.1–3.7)
MAGNESIUM SERPL-MCNC: 1.9 MG/DL (ref 1.6–2.6)
MCH RBC QN AUTO: 32.1 PG (ref 25.2–33.5)
MCHC RBC AUTO-ENTMCNC: 32.1 G/DL (ref 28.4–34.8)
MCV RBC AUTO: 100 FL (ref 82.6–102.9)
MONOCYTES NFR BLD: 0.65 K/UL (ref 0.1–1.2)
MONOCYTES NFR BLD: 13 % (ref 3–12)
NEUTROPHILS NFR BLD: 65 % (ref 36–65)
NEUTS SEG NFR BLD: 3.34 K/UL (ref 1.5–8.1)
NITRITE UR QL STRIP: NEGATIVE
NRBC AUTOMATED: 0 PER 100 WBC
PH UR STRIP: 7 [PH] (ref 5–9)
PLATELET # BLD AUTO: 116 K/UL (ref 138–453)
PMV BLD AUTO: 12.2 FL (ref 8.1–13.5)
POTASSIUM SERPL-SCNC: 5.4 MMOL/L (ref 3.7–5.3)
PROT SERPL-MCNC: 6.3 G/DL (ref 6.4–8.3)
PROT UR STRIP-MCNC: NEGATIVE MG/DL
PROTHROMBIN TIME: 25.2 SEC (ref 11.9–14.8)
RBC # BLD AUTO: 3.3 M/UL (ref 3.95–5.11)
RBC #/AREA URNS HPF: ABNORMAL /HPF (ref 0–2)
SODIUM SERPL-SCNC: 141 MMOL/L (ref 135–144)
SP GR UR STRIP: 1.01 (ref 1.01–1.02)
UROBILINOGEN UR STRIP-ACNC: NORMAL
WBC #/AREA URNS HPF: ABNORMAL /HPF (ref 0–5)
WBC OTHER # BLD: 5.1 K/UL (ref 3.5–11.3)

## 2023-06-20 PROCEDURE — 85027 COMPLETE CBC AUTOMATED: CPT

## 2023-06-20 PROCEDURE — 99285 EMERGENCY DEPT VISIT HI MDM: CPT

## 2023-06-20 PROCEDURE — 93010 ELECTROCARDIOGRAM REPORT: CPT | Performed by: INTERNAL MEDICINE

## 2023-06-20 PROCEDURE — 80053 COMPREHEN METABOLIC PANEL: CPT

## 2023-06-20 PROCEDURE — 70450 CT HEAD/BRAIN W/O DYE: CPT

## 2023-06-20 PROCEDURE — 96360 HYDRATION IV INFUSION INIT: CPT

## 2023-06-20 PROCEDURE — 71045 X-RAY EXAM CHEST 1 VIEW: CPT

## 2023-06-20 PROCEDURE — 2580000003 HC RX 258: Performed by: STUDENT IN AN ORGANIZED HEALTH CARE EDUCATION/TRAINING PROGRAM

## 2023-06-20 PROCEDURE — 83735 ASSAY OF MAGNESIUM: CPT

## 2023-06-20 PROCEDURE — 36415 COLL VENOUS BLD VENIPUNCTURE: CPT

## 2023-06-20 PROCEDURE — 93005 ELECTROCARDIOGRAM TRACING: CPT | Performed by: STUDENT IN AN ORGANIZED HEALTH CARE EDUCATION/TRAINING PROGRAM

## 2023-06-20 PROCEDURE — 85610 PROTHROMBIN TIME: CPT

## 2023-06-20 PROCEDURE — 6360000002 HC RX W HCPCS: Performed by: STUDENT IN AN ORGANIZED HEALTH CARE EDUCATION/TRAINING PROGRAM

## 2023-06-20 PROCEDURE — 81001 URINALYSIS AUTO W/SCOPE: CPT

## 2023-06-20 RX ORDER — 0.9 % SODIUM CHLORIDE 0.9 %
1000 INTRAVENOUS SOLUTION INTRAVENOUS ONCE
Status: COMPLETED | OUTPATIENT
Start: 2023-06-20 | End: 2023-06-20

## 2023-06-20 RX ORDER — ONDANSETRON 2 MG/ML
4 INJECTION INTRAMUSCULAR; INTRAVENOUS ONCE
Status: COMPLETED | OUTPATIENT
Start: 2023-06-20 | End: 2023-06-20

## 2023-06-20 RX ADMIN — SODIUM CHLORIDE 1000 ML: 9 INJECTION, SOLUTION INTRAVENOUS at 12:05

## 2023-06-20 RX ADMIN — ONDANSETRON 4 MG: 2 INJECTION INTRAMUSCULAR; INTRAVENOUS at 12:02

## 2023-06-20 ASSESSMENT — PAIN - FUNCTIONAL ASSESSMENT: PAIN_FUNCTIONAL_ASSESSMENT: NONE - DENIES PAIN

## 2023-06-20 NOTE — ED PROVIDER NOTES
Abused: No    Sexually Abused: No   Housing Stability: Unknown    Unable to Pay for Housing in the Last Year: Not on file    Number of Places Lived in the Last Year: Not on file    Unstable Housing in the Last Year: No       Family History   Problem Relation Age of Onset    Colon Cancer Father     Heart Disease Brother     High Blood Pressure Brother     Stroke Brother        Allergies:  Budesonide and Hydrochlorothiazide    Home Medications:  Prior to Admission medications    Medication Sig Start Date End Date Taking?  Authorizing Provider   carvedilol (COREG) 25 MG tablet TAKE ONE TABLET BY MOUTH TWICE A DAY 6/19/23   Julene Oppenheim, MD   calcium carbonate (OYSTER SHELL CALCIUM 500 MG) 1250 (500 Ca) MG tablet Take 500 mg by mouth    Historical Provider, MD   losartan (COZAAR) 50 MG tablet Take 1 tablet by mouth daily 5/5/23   Julene Oppenheim, MD   warfarin (COUMADIN) 2.5 MG tablet TAKE 1 AND 1/2 TABLET BY MOUTH ON MONDAY, [facility] AND TAKE 1 TABLET ON ALL OTHER DAYS  Patient taking differently: Take by mouth daily 2.5mg MWF, 3.75mg all other days  COUMADIN CLINIC 4/24/23   Julene Oppenheim, MD   amLODIPine (NORVASC) 5 MG tablet Take 1 tablet by mouth daily  Patient taking differently: Take 0.5 tablets by mouth daily 3/28/23   Julene Oppenheim, MD   atorvastatin (LIPITOR) 80 MG tablet Take 1 tablet by mouth daily 1/16/23   Julene Oppenheim, MD   Handicap Placard MISC by Does not apply route Duration; 4yrs 11/2/21   Julene Oppenheim, MD   calcium carbonate (OSCAL) 500 MG TABS tablet Take 1 tablet by mouth daily    Historical Provider, MD   vitamin B-12 (CYANOCOBALAMIN) 1000 MCG tablet Take 1 tablet by mouth daily    Historical Provider, MD   Multiple Vitamins-Minerals (CENTRUM SILVER PO) Take by mouth daily    Historical Provider, MD       REVIEW OF SYSTEMS    (2-9 systems for level 4, 10 or more for level 5)      Review of Systems    PHYSICAL EXAM   (up to 7 for level 4, 8 or more for level 5)

## 2023-06-29 ENCOUNTER — HOSPITAL ENCOUNTER (OUTPATIENT)
Dept: PHARMACY | Age: 88
Setting detail: THERAPIES SERIES
Discharge: HOME | End: 2023-06-29
Payer: MEDICARE

## 2023-06-29 VITALS
DIASTOLIC BLOOD PRESSURE: 88 MMHG | HEART RATE: 73 BPM | BODY MASS INDEX: 17.51 KG/M2 | SYSTOLIC BLOOD PRESSURE: 162 MMHG | WEIGHT: 102 LBS

## 2023-06-29 DIAGNOSIS — I27.82 CHRONIC SADDLE PULMONARY EMBOLISM WITHOUT ACUTE COR PULMONALE (HCC): ICD-10-CM

## 2023-06-29 DIAGNOSIS — I26.92 CHRONIC SADDLE PULMONARY EMBOLISM WITHOUT ACUTE COR PULMONALE (HCC): ICD-10-CM

## 2023-06-29 DIAGNOSIS — Z79.01 LONG TERM CURRENT USE OF ANTICOAGULANT THERAPY: Primary | ICD-10-CM

## 2023-06-29 LAB — INR BLD: 1.8

## 2023-06-29 PROCEDURE — 85610 PROTHROMBIN TIME: CPT

## 2023-06-29 PROCEDURE — 99212 OFFICE O/P EST SF 10 MIN: CPT

## 2023-08-01 ENCOUNTER — HOSPITAL ENCOUNTER (OUTPATIENT)
Dept: PHARMACY | Age: 88
Setting detail: THERAPIES SERIES
Discharge: HOME | End: 2023-08-01
Payer: MEDICARE

## 2023-08-01 VITALS
SYSTOLIC BLOOD PRESSURE: 160 MMHG | DIASTOLIC BLOOD PRESSURE: 77 MMHG | BODY MASS INDEX: 16.96 KG/M2 | WEIGHT: 98.8 LBS | HEART RATE: 69 BPM

## 2023-08-01 DIAGNOSIS — Z79.01 LONG TERM CURRENT USE OF ANTICOAGULANT THERAPY: Primary | ICD-10-CM

## 2023-08-01 DIAGNOSIS — I27.82 CHRONIC SADDLE PULMONARY EMBOLISM WITHOUT ACUTE COR PULMONALE (HCC): ICD-10-CM

## 2023-08-01 DIAGNOSIS — I26.92 CHRONIC SADDLE PULMONARY EMBOLISM WITHOUT ACUTE COR PULMONALE (HCC): ICD-10-CM

## 2023-08-01 LAB — INR BLD: 2.5

## 2023-08-01 PROCEDURE — 99211 OFF/OP EST MAY X REQ PHY/QHP: CPT

## 2023-08-01 PROCEDURE — 85610 PROTHROMBIN TIME: CPT

## 2023-08-01 NOTE — PROGRESS NOTES
[facility]  Medication Management  ANTICOAGULATION    Referring Provider: Dr. Kevin Banuelos INR: 2-3    TODAY'S INR: 2.5    WARFARIN Dosage: Continue warfarin 2.5 mg po every MWF; 3.75 mg po all other days    INR (no units)   Date Value   2023 2.5   2023 1.8   2023 2.3   2023 2.3   2023 2.7   2023 1.4   2023 1.9       Hemoglobin   Date Value Ref Range Status   2023 10.6 (L) 11.9 - 15.1 g/dL Final     Hematocrit   Date Value Ref Range Status   2023 33.0 (L) 36.3 - 47.1 % Final     ALT   Date Value Ref Range Status   2023 16 5 - 33 U/L Final     AST   Date Value Ref Range Status   2023 26 <32 U/L Final       Medication changes:  Stopped taking escitalopram - makes her feel \"whoozy\"    Notes:    Fingerstick INR drawn per clinic protocol. Patient states no visible blood in urine and no black tarry stool. Denies any missed doses of warfarin. No change in other maintenance medications or in diet. Will recheck INR in 6 weeks. Patient acknowledges working in consult agreement with pharmacist as referred by his/her physician. Patient complains of weakness and dizziness. She states it is difficult to make it to these appointments. She does admit to eating less - typically 1 meal per day. I encouraged her to follow up with PCP. For Pharmacy Admin Tracking Only    Intervention Detail: Adherence Monitorin  Total # of Interventions Recommended: 1  Total # of Interventions Accepted: 1  Time Spent (min): [address].  BRANDON CARNES [facility]

## 2023-09-02 ENCOUNTER — APPOINTMENT (OUTPATIENT)
Dept: GENERAL RADIOLOGY | Age: 88
DRG: 543 | End: 2023-09-02
Payer: MEDICARE

## 2023-09-02 ENCOUNTER — APPOINTMENT (OUTPATIENT)
Dept: CT IMAGING | Age: 88
DRG: 543 | End: 2023-09-02
Payer: MEDICARE

## 2023-09-02 ENCOUNTER — HOSPITAL ENCOUNTER (EMERGENCY)
Age: 88
Discharge: HOME | DRG: 543 | End: 2023-09-02
Payer: MEDICARE

## 2023-09-02 VITALS
HEART RATE: 82 BPM | RESPIRATION RATE: 15 BRPM | SYSTOLIC BLOOD PRESSURE: 206 MMHG | TEMPERATURE: 98.5 F | DIASTOLIC BLOOD PRESSURE: 73 MMHG | OXYGEN SATURATION: 97 %

## 2023-09-02 DIAGNOSIS — S42.291A CLOSED FRACTURE OF HEAD OF RIGHT HUMERUS, INITIAL ENCOUNTER: Primary | ICD-10-CM

## 2023-09-02 PROCEDURE — 99284 EMERGENCY DEPT VISIT MOD MDM: CPT

## 2023-09-02 PROCEDURE — 72125 CT NECK SPINE W/O DYE: CPT

## 2023-09-02 PROCEDURE — 6370000000 HC RX 637 (ALT 250 FOR IP): Performed by: PHYSICIAN ASSISTANT

## 2023-09-02 PROCEDURE — 70450 CT HEAD/BRAIN W/O DYE: CPT

## 2023-09-02 PROCEDURE — 73030 X-RAY EXAM OF SHOULDER: CPT

## 2023-09-02 RX ORDER — HYDROCODONE BITARTRATE AND ACETAMINOPHEN 5; 325 MG/1; MG/1
1 TABLET ORAL EVERY 4 HOURS PRN
Qty: 42 TABLET | Refills: 0 | Status: ON HOLD
Start: 2023-09-02 | End: 2023-09-08 | Stop reason: HOSPADM

## 2023-09-02 RX ORDER — HYDROCODONE BITARTRATE AND ACETAMINOPHEN 5; 325 MG/1; MG/1
1 TABLET ORAL ONCE
Status: COMPLETED | OUTPATIENT
Start: 2023-09-02 | End: 2023-09-02

## 2023-09-02 RX ADMIN — HYDROCODONE BITARTRATE AND ACETAMINOPHEN 1 TABLET: 5; 325 TABLET ORAL at 15:36

## 2023-09-02 ASSESSMENT — ENCOUNTER SYMPTOMS
BACK PAIN: 0
COUGH: 0
SHORTNESS OF BREATH: 0

## 2023-09-02 ASSESSMENT — PAIN DESCRIPTION - ORIENTATION: ORIENTATION: RIGHT

## 2023-09-02 ASSESSMENT — PAIN SCALES - GENERAL: PAINLEVEL_OUTOF10: 10

## 2023-09-02 ASSESSMENT — PAIN - FUNCTIONAL ASSESSMENT: PAIN_FUNCTIONAL_ASSESSMENT: 0-10

## 2023-09-02 ASSESSMENT — PAIN DESCRIPTION - LOCATION: LOCATION: SHOULDER

## 2023-09-02 NOTE — ED PROVIDER NOTES
[facility]      Pt Name: Yenny Schmitz  MRN: [id number]  [address] [facility] 1935  Date of evaluation: 9/2/2023  Provider: Princess Durand PA-C    CHIEF COMPLAINT       Chief Complaint   Patient presents with    Fall     Patient tripped on carpet and complains of right shoulder pain. No LOC. HISTORY OF PRESENT ILLNESS      Yenny Schmitz is a 80 y.o. female who presents to the emergency department with a right shoulder injury. Patient was at home when she tripped on carpet and fell full force onto her right shoulder. She has pain and deformity present. She denies hitting her head, blacking out, or losing consciousness. She denies any other pain. Pain is severe with any movement. There are no other complaints or concerns. REVIEW OF SYSTEMS       Review of Systems   Constitutional:  Negative for fever. Respiratory:  Negative for cough and shortness of breath. Cardiovascular:  Negative for chest pain. Musculoskeletal:  Negative for back pain, gait problem and neck pain.        PAST MEDICAL HISTORY     Past Medical History:   Diagnosis Date    Abnormal EMG 2016    Right Carpal Tunnel & C5 Radiculopathy    Cancer ([address])     colon    Chronic kidney disease     Colon polyp 2006, 2009    Compression fracture of lumbar vertebra (HCC) / prednisone related to PMR 07/15/2015    Diarrhea     Drug-induced hyperkalemia from Lisinopril 7/27/2018    DVT (deep venous thrombosis) ([address]) 2015    Emphysema     Essential hypertension     Hyperlipidemia     Irritable bowel syndrome     Lumbago     Osteoarthritis     Polymyalgia rheumatica ([address]) 2015    Rheumatism     Seborrheic keratoses     on back         SURGICAL HISTORY       Past Surgical History:   Procedure Laterality Date    APPENDECTOMY  1949    CATARACT REMOVAL WITH IMPLANT      COLECTOMY Right 06/20/2016    open right hemicolectomy bowel resection    COLONOSCOPY  10/17/2011    Dr. Effie Prieto    COLONOSCOPY  05/25/2016 atrophy and chronic microvascular change. CT cervical spine: Osteopenia. Levo scoliotic curvature. No acute fracture   or subluxation. LABS:  Labs Reviewed - No data to display    All other labs were within normal range or not returned as of this dictation. EMERGENCY DEPARTMENT COURSE and DIFFERENTIAL DIAGNOSIS/MDM:     Patient seen and evaluated in the emergency department after trip and fall onto her right humerus. She did sustain a humeral head fracture. She was placed in a sling and swath and pain control. CT scan head and cervical spine were clear. Patient has no other obvious injury or trauma. We discussed with the patient desired to be discharged home in her home safety situation. At this time plan is discharge home. Patient will seek emergency medicine care with any worsening, failure to improve, or unable to care for self. FINAL IMPRESSION      1. Closed fracture of head of right humerus, initial encounter          DISPOSITION/PLAN     DISPOSITION Decision To Discharge 09/02/2023 04:02:09 PM      PATIENT REFERRED TO:  Artemio Ornelas MD  [address] [place] [address] Dr Rolon [address] [facility]  [phone]    Schedule an appointment as soon as possible for a visit         DISCHARGE MEDICATIONS:  Discharge Medication List as of 9/2/2023  4:47 PM        START taking these medications    Details   HYDROcodone-acetaminophen (NORCO) 5-325 MG per tablet Take 1 tablet by mouth every 4 hours as needed for Pain for up to 7 days. Intended supply: 7 days.  Take lowest dose possible to manage pain Max Daily Amount: 6 tablets, Disp-42 tablet, R-0Normal             (Please note that portions of this note were completed with a voice recognition program.  Efforts were made to edit the dictations but occasionally words are mis-transcribed.)    Princess Vaz PA-C (electronically signed)  Attending Emergency Physician           Princess Vaz PA-C  09/02/23 [id number]

## 2023-09-02 NOTE — DISCHARGE INSTRUCTIONS
You need to wear the sling and wrap for your broken shoulder. Use pain medicine as needed. If it is too strong you can break it in half or take Tylenol only. Follow-up with orthopedics to monitor the healing of your broken arm.

## 2023-09-05 ENCOUNTER — HOSPITAL ENCOUNTER (INPATIENT)
Age: 88
LOS: 3 days | Discharge: NURSING, REHAB OR LONG-TERM CARE | DRG: 543 | End: 2023-09-08
Attending: INTERNAL MEDICINE | Admitting: INTERNAL MEDICINE
Payer: MEDICARE

## 2023-09-05 ENCOUNTER — APPOINTMENT (OUTPATIENT)
Dept: GENERAL RADIOLOGY | Age: 88
DRG: 543 | End: 2023-09-05
Payer: MEDICARE

## 2023-09-05 DIAGNOSIS — Z87.81 HISTORY OF RIGHT SHOULDER FRACTURE: Primary | ICD-10-CM

## 2023-09-05 DIAGNOSIS — N18.4 CKD (CHRONIC KIDNEY DISEASE) STAGE 4, GFR 15-29 ML/MIN (HCC): ICD-10-CM

## 2023-09-05 DIAGNOSIS — Z79.01 LONG TERM CURRENT USE OF ANTICOAGULANT THERAPY: ICD-10-CM

## 2023-09-05 PROBLEM — S42.201K CLOSED FRACTURE OF PROXIMAL END OF RIGHT HUMERUS WITH NONUNION: Status: ACTIVE | Noted: 2023-09-05

## 2023-09-05 PROBLEM — S42.291K: Status: ACTIVE | Noted: 2023-09-05

## 2023-09-05 LAB
ALBUMIN SERPL-MCNC: 3.8 G/DL (ref 3.5–5.2)
ALBUMIN/GLOB SERPL: 1.2 {RATIO} (ref 1–2.5)
ALP SERPL-CCNC: 76 U/L (ref 35–104)
ALT SERPL-CCNC: 16 U/L (ref 5–33)
ANION GAP SERPL CALCULATED.3IONS-SCNC: 11 MMOL/L (ref 9–17)
AST SERPL-CCNC: 27 U/L
BASOPHILS # BLD: 0.05 K/UL (ref 0–0.2)
BASOPHILS NFR BLD: 1 % (ref 0–2)
BILIRUB SERPL-MCNC: 0.5 MG/DL (ref 0.3–1.2)
BUN SERPL-MCNC: 39 MG/DL (ref 8–23)
BUN/CREAT SERPL: 20 (ref 9–20)
CALCIUM SERPL-MCNC: 9.4 MG/DL (ref 8.6–10.4)
CHLORIDE SERPL-SCNC: 105 MMOL/L (ref 98–107)
CO2 SERPL-SCNC: 21 MMOL/L (ref 20–31)
CREAT SERPL-MCNC: 2 MG/DL (ref 0.5–0.9)
EOSINOPHIL # BLD: 0.08 K/UL (ref 0–0.44)
EOSINOPHILS RELATIVE PERCENT: 1 % (ref 1–4)
ERYTHROCYTE [DISTWIDTH] IN BLOOD BY AUTOMATED COUNT: 13.8 % (ref 11.8–14.4)
GFR SERPL CREATININE-BSD FRML MDRD: 24 ML/MIN/1.73M2
GLUCOSE SERPL-MCNC: 145 MG/DL (ref 70–99)
HCT VFR BLD AUTO: 31.6 % (ref 36.3–47.1)
HGB BLD-MCNC: 10.2 G/DL (ref 11.9–15.1)
IMM GRANULOCYTES # BLD AUTO: 0.05 K/UL (ref 0–0.3)
IMM GRANULOCYTES NFR BLD: 1 %
LYMPHOCYTES NFR BLD: 0.94 K/UL (ref 1.1–3.7)
LYMPHOCYTES RELATIVE PERCENT: 9 % (ref 24–43)
MCH RBC QN AUTO: 32.7 PG (ref 25.2–33.5)
MCHC RBC AUTO-ENTMCNC: 32.3 G/DL (ref 28.4–34.8)
MCV RBC AUTO: 101.3 FL (ref 82.6–102.9)
MONOCYTES NFR BLD: 0.95 K/UL (ref 0.1–1.2)
MONOCYTES NFR BLD: 9 % (ref 3–12)
NEUTROPHILS NFR BLD: 79 % (ref 36–65)
NEUTS SEG NFR BLD: 8.33 K/UL (ref 1.5–8.1)
NRBC BLD-RTO: 0 PER 100 WBC
PLATELET # BLD AUTO: 171 K/UL (ref 138–453)
PMV BLD AUTO: 12 FL (ref 8.1–13.5)
POTASSIUM SERPL-SCNC: 5.4 MMOL/L (ref 3.7–5.3)
PROT SERPL-MCNC: 7.1 G/DL (ref 6.4–8.3)
RBC # BLD AUTO: 3.12 M/UL (ref 3.95–5.11)
SODIUM SERPL-SCNC: 137 MMOL/L (ref 135–144)
WBC OTHER # BLD: 10.4 K/UL (ref 3.5–11.3)

## 2023-09-05 PROCEDURE — 1200000000 HC SEMI PRIVATE

## 2023-09-05 PROCEDURE — 80053 COMPREHEN METABOLIC PANEL: CPT

## 2023-09-05 PROCEDURE — 36415 COLL VENOUS BLD VENIPUNCTURE: CPT

## 2023-09-05 PROCEDURE — 6370000000 HC RX 637 (ALT 250 FOR IP): Performed by: NURSE PRACTITIONER

## 2023-09-05 PROCEDURE — 94761 N-INVAS EAR/PLS OXIMETRY MLT: CPT

## 2023-09-05 PROCEDURE — 2580000003 HC RX 258: Performed by: NURSE PRACTITIONER

## 2023-09-05 PROCEDURE — 93005 ELECTROCARDIOGRAM TRACING: CPT | Performed by: NURSE PRACTITIONER

## 2023-09-05 PROCEDURE — 6370000000 HC RX 637 (ALT 250 FOR IP): Performed by: PHYSICIAN ASSISTANT

## 2023-09-05 PROCEDURE — 99285 EMERGENCY DEPT VISIT HI MDM: CPT

## 2023-09-05 PROCEDURE — 73030 X-RAY EXAM OF SHOULDER: CPT

## 2023-09-05 PROCEDURE — 85025 COMPLETE CBC W/AUTO DIFF WBC: CPT

## 2023-09-05 RX ORDER — POLYETHYLENE GLYCOL 3350 17 G/17G
17 POWDER, FOR SOLUTION ORAL DAILY PRN
Status: DISCONTINUED | OUTPATIENT
Start: 2023-09-05 | End: 2023-09-08 | Stop reason: HOSPADM

## 2023-09-05 RX ORDER — ESCITALOPRAM OXALATE 5 MG/1
5 TABLET ORAL DAILY
COMMUNITY

## 2023-09-05 RX ORDER — WARFARIN SODIUM 2.5 MG/1
3.75 TABLET ORAL SEE ADMIN INSTRUCTIONS
COMMUNITY

## 2023-09-05 RX ORDER — OXYCODONE HYDROCHLORIDE AND ACETAMINOPHEN 5; 325 MG/1; MG/1
1 TABLET ORAL ONCE
Status: COMPLETED | OUTPATIENT
Start: 2023-09-05 | End: 2023-09-05

## 2023-09-05 RX ORDER — AMLODIPINE BESYLATE 2.5 MG/1
2.5 TABLET ORAL NIGHTLY
Status: DISCONTINUED | OUTPATIENT
Start: 2023-09-05 | End: 2023-09-08 | Stop reason: HOSPADM

## 2023-09-05 RX ORDER — DOCUSATE SODIUM 100 MG/1
100 CAPSULE, LIQUID FILLED ORAL 2 TIMES DAILY
Status: DISCONTINUED | OUTPATIENT
Start: 2023-09-05 | End: 2023-09-08 | Stop reason: HOSPADM

## 2023-09-05 RX ORDER — MULTIVITAMIN WITH IRON
1 TABLET ORAL DAILY
Status: DISCONTINUED | OUTPATIENT
Start: 2023-09-06 | End: 2023-09-08 | Stop reason: HOSPADM

## 2023-09-05 RX ORDER — ONDANSETRON 4 MG/1
4 TABLET, ORALLY DISINTEGRATING ORAL EVERY 8 HOURS PRN
Status: DISCONTINUED | OUTPATIENT
Start: 2023-09-05 | End: 2023-09-08 | Stop reason: HOSPADM

## 2023-09-05 RX ORDER — LANOLIN ALCOHOL/MO/W.PET/CERES
1000 CREAM (GRAM) TOPICAL DAILY
Status: DISCONTINUED | OUTPATIENT
Start: 2023-09-06 | End: 2023-09-08 | Stop reason: HOSPADM

## 2023-09-05 RX ORDER — ONDANSETRON 2 MG/ML
4 INJECTION INTRAMUSCULAR; INTRAVENOUS EVERY 6 HOURS PRN
Status: DISCONTINUED | OUTPATIENT
Start: 2023-09-05 | End: 2023-09-08 | Stop reason: HOSPADM

## 2023-09-05 RX ORDER — FAMOTIDINE 20 MG/1
10 TABLET, FILM COATED ORAL DAILY
Status: DISCONTINUED | OUTPATIENT
Start: 2023-09-05 | End: 2023-09-08 | Stop reason: HOSPADM

## 2023-09-05 RX ORDER — LOSARTAN POTASSIUM 50 MG/1
100 TABLET ORAL DAILY
Status: DISCONTINUED | OUTPATIENT
Start: 2023-09-05 | End: 2023-09-08 | Stop reason: HOSPADM

## 2023-09-05 RX ORDER — OXYCODONE HYDROCHLORIDE AND ACETAMINOPHEN 5; 325 MG/1; MG/1
1 TABLET ORAL EVERY 4 HOURS PRN
Status: DISCONTINUED | OUTPATIENT
Start: 2023-09-05 | End: 2023-09-06

## 2023-09-05 RX ORDER — SODIUM CHLORIDE 9 MG/ML
INJECTION, SOLUTION INTRAVENOUS PRN
Status: DISCONTINUED | OUTPATIENT
Start: 2023-09-05 | End: 2023-09-08 | Stop reason: HOSPADM

## 2023-09-05 RX ORDER — CALCIUM CARBONATE 500 MG/1
500 TABLET, CHEWABLE ORAL DAILY
Status: DISCONTINUED | OUTPATIENT
Start: 2023-09-06 | End: 2023-09-08 | Stop reason: HOSPADM

## 2023-09-05 RX ORDER — ACETAMINOPHEN 650 MG/1
650 SUPPOSITORY RECTAL EVERY 6 HOURS PRN
Status: DISCONTINUED | OUTPATIENT
Start: 2023-09-05 | End: 2023-09-08 | Stop reason: HOSPADM

## 2023-09-05 RX ORDER — ATORVASTATIN CALCIUM 40 MG/1
80 TABLET, FILM COATED ORAL DAILY
Status: DISCONTINUED | OUTPATIENT
Start: 2023-09-06 | End: 2023-09-08 | Stop reason: HOSPADM

## 2023-09-05 RX ORDER — SODIUM CHLORIDE 0.9 % (FLUSH) 0.9 %
10 SYRINGE (ML) INJECTION PRN
Status: DISCONTINUED | OUTPATIENT
Start: 2023-09-05 | End: 2023-09-08 | Stop reason: HOSPADM

## 2023-09-05 RX ORDER — CARVEDILOL 12.5 MG/1
12.5 TABLET ORAL 2 TIMES DAILY
Status: DISCONTINUED | OUTPATIENT
Start: 2023-09-05 | End: 2023-09-08 | Stop reason: HOSPADM

## 2023-09-05 RX ORDER — SODIUM CHLORIDE 9 MG/ML
INJECTION, SOLUTION INTRAVENOUS CONTINUOUS
Status: DISCONTINUED | OUTPATIENT
Start: 2023-09-05 | End: 2023-09-07

## 2023-09-05 RX ORDER — SODIUM CHLORIDE 0.9 % (FLUSH) 0.9 %
10 SYRINGE (ML) INJECTION EVERY 12 HOURS SCHEDULED
Status: DISCONTINUED | OUTPATIENT
Start: 2023-09-05 | End: 2023-09-08 | Stop reason: HOSPADM

## 2023-09-05 RX ORDER — HYDROCODONE BITARTRATE AND ACETAMINOPHEN 5; 325 MG/1; MG/1
1 TABLET ORAL EVERY 4 HOURS PRN
Status: DISCONTINUED | OUTPATIENT
Start: 2023-09-05 | End: 2023-09-05

## 2023-09-05 RX ORDER — ACETAMINOPHEN 325 MG/1
650 TABLET ORAL EVERY 6 HOURS PRN
Status: DISCONTINUED | OUTPATIENT
Start: 2023-09-05 | End: 2023-09-08 | Stop reason: HOSPADM

## 2023-09-05 RX ORDER — ENOXAPARIN SODIUM 100 MG/ML
30 INJECTION SUBCUTANEOUS DAILY
Status: DISCONTINUED | OUTPATIENT
Start: 2023-09-05 | End: 2023-09-06

## 2023-09-05 RX ADMIN — CARVEDILOL 12.5 MG: 12.5 TABLET, FILM COATED ORAL at 19:55

## 2023-09-05 RX ADMIN — OXYCODONE HYDROCHLORIDE AND ACETAMINOPHEN 1 TABLET: 5; 325 TABLET ORAL at 16:05

## 2023-09-05 RX ADMIN — AMLODIPINE BESYLATE 2.5 MG: 2.5 TABLET ORAL at 19:55

## 2023-09-05 RX ADMIN — OXYCODONE HYDROCHLORIDE AND ACETAMINOPHEN 1 TABLET: 5; 325 TABLET ORAL at 19:55

## 2023-09-05 RX ADMIN — DOCUSATE SODIUM 100 MG: 100 CAPSULE, LIQUID FILLED ORAL at 19:55

## 2023-09-05 RX ADMIN — FAMOTIDINE 10 MG: 20 TABLET, FILM COATED ORAL at 16:06

## 2023-09-05 RX ADMIN — OXYCODONE HYDROCHLORIDE AND ACETAMINOPHEN 1 TABLET: 5; 325 TABLET ORAL at 13:14

## 2023-09-05 RX ADMIN — SODIUM CHLORIDE: 9 INJECTION, SOLUTION INTRAVENOUS at 16:18

## 2023-09-05 RX ADMIN — LOSARTAN POTASSIUM 100 MG: 50 TABLET, FILM COATED ORAL at 17:44

## 2023-09-05 ASSESSMENT — PAIN DESCRIPTION - FREQUENCY
FREQUENCY: INTERMITTENT
FREQUENCY: CONTINUOUS
FREQUENCY: INTERMITTENT
FREQUENCY: INTERMITTENT

## 2023-09-05 ASSESSMENT — PAIN DESCRIPTION - LOCATION
LOCATION: ARM
LOCATION: SHOULDER
LOCATION: ARM

## 2023-09-05 ASSESSMENT — PAIN DESCRIPTION - DESCRIPTORS
DESCRIPTORS: SHARP
DESCRIPTORS: SHARP;SHOOTING
DESCRIPTORS: SORE
DESCRIPTORS: ACHING

## 2023-09-05 ASSESSMENT — PAIN DESCRIPTION - ORIENTATION
ORIENTATION: RIGHT

## 2023-09-05 ASSESSMENT — PAIN SCALES - GENERAL
PAINLEVEL_OUTOF10: 8
PAINLEVEL_OUTOF10: 7
PAINLEVEL_OUTOF10: 8
PAINLEVEL_OUTOF10: 7
PAINLEVEL_OUTOF10: 10
PAINLEVEL_OUTOF10: 10
PAINLEVEL_OUTOF10: 5
PAINLEVEL_OUTOF10: 7

## 2023-09-05 ASSESSMENT — PAIN - FUNCTIONAL ASSESSMENT
PAIN_FUNCTIONAL_ASSESSMENT: 0-10
PAIN_FUNCTIONAL_ASSESSMENT: PREVENTS OR INTERFERES SOME ACTIVE ACTIVITIES AND ADLS
PAIN_FUNCTIONAL_ASSESSMENT: PREVENTS OR INTERFERES SOME ACTIVE ACTIVITIES AND ADLS
PAIN_FUNCTIONAL_ASSESSMENT: PREVENTS OR INTERFERES WITH ALL ACTIVE AND SOME PASSIVE ACTIVITIES
PAIN_FUNCTIONAL_ASSESSMENT: ACTIVITIES ARE NOT PREVENTED

## 2023-09-05 ASSESSMENT — ENCOUNTER SYMPTOMS
RESPIRATORY NEGATIVE: 1
GASTROINTESTINAL NEGATIVE: 1

## 2023-09-05 ASSESSMENT — PAIN DESCRIPTION - PAIN TYPE
TYPE: ACUTE PAIN

## 2023-09-05 ASSESSMENT — PAIN DESCRIPTION - ONSET
ONSET: SUDDEN
ONSET: SUDDEN
ONSET: ON-GOING
ONSET: ON-GOING

## 2023-09-05 NOTE — CONSULTS
[place], [place] [address]                                  CONSULTATION    PATIENT NAME: Jeanna Pinzon                       :        1935  MED REC NO:   [id number]                              ROOM:       [id number]  ACCOUNT NO:   [de-identified]                           ADMIT DATE: 2023  PROVIDER:     Yash Ni    CONSULT DATE:  2023    SUBJECTIVE:  The patient is an 69-year-old female who slipped and fell  while home on Saturday. She apparently was on the phone and was  reaching for something to write with, when she fell. The patient  injured her right shoulder. She was brought to the emergency room and  was found to have fracture of the surgical neck and tuberosity. This  was a minimally displaced three-part fracture. She was placed initially  in a sling and swath. The patient was discharged home. She has had  marked difficulty. She is unable to get up without assistance. She  lives at home alone. She came into the office earlier today in a  wheelchair. OBJECTIVE:  Circulation and sensation of the right upper extremity is  intact. She had significant bruising noted in the right upper arm. Tender to palpation around the right shoulder. She is reluctant to do  any shoulder pendulum exercises. IMPRESSION:  1. Minimally displaced three-part fracture of right proximal humerus. 2.  Significant osteoporosis. PLAN:  The plan is to have patient to use a sling and swath. We will  have gently work into shoulder pendulum exercises. We will see her in  the office in three weeks for a repeat x-ray of the right shoulder. LEE ANN NI    D: 2023 16:38:48       T: 2023 16:43:06     [id number]  Job#: [id number]     Doc#: [id number]    CC:

## 2023-09-05 NOTE — H&P
History and Physical    Patient:  Prashanth Engel  MRN: [id number]    Chief Complaint: Right shoulder pain    History Obtained From:  patient, electronic medical record    PCP: Saad Beavers MD    History of Present Illness: The patient is a 80 y.o. female who presented to the emergency room with complaints of right shoulder pain. Patient underwent a fall at home with a trip falling onto her right shoulder. Patient stated she came to the emergency room and was evaluated and found to have a humerus fracture and was discharged home. Patient does live alone. She states her sister lives across the street and her son checks on her on the weekends. Patient stated normally she is very independent and goes outside some weeds her flower gardens. Patient stated her pain has been uncontrolled causing constipation and drowsiness as well. She denied any further falls since Saturday. Patient stated since that time her back hurts as well as other muscles.     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    Colon polyp 2006, 2009    Compression fracture of lumbar vertebra (HCC) / prednisone related to PMR 07/15/2015    Diarrhea     Drug-induced hyperkalemia from Lisinopril 7/27/2018    DVT (deep venous thrombosis) ([address]) 2015    Emphysema     Essential hypertension     Hyperlipidemia     Irritable bowel syndrome     Lumbago     Osteoarthritis     Polymyalgia rheumatica ([address]) 2015    Rheumatism     Seborrheic keratoses     on back       Past Surgical History:        Procedure Laterality Date    APPENDECTOMY  1949    CATARACT REMOVAL WITH IMPLANT      COLECTOMY Right 06/20/2016    open right hemicolectomy bowel resection    COLONOSCOPY  10/17/2011    Dr. Francisco Cohen    COLONOSCOPY  05/25/2016    -bx mass hepatic flexure,tattoo initiated    Hospital Prophylaxis:   DVT: Lovenox   Stress Ulcer: H2 Blocker     MDM Data:   Test interpretation:  My independent EKG interpretation: normal sinus rhythm  My independent X-ray interpretation:  not applicable  Management and/or test interpretation discussed with ER MD at time of admission  Consults and Nursing notes were personally reviewed, all current labs and imaging were personally reviewed, tests ordered: CBC, BMP, and history obtained by independent historian       Disposition:  Shared decision making: All test results, treatment options and disposition options were discussed with the patient today  Social determinants of health that may impact management: none  Code status: Full Code   Disposition: Discharge plan is [address] Time:  Total critical care time caring for this patient with life threatening, unstable organ failure, including direct patient contact, management of life support systems, review of data including imaging and labs, discussions with other team members and physicians at least 0 minutes so far today, excluding separately billable procedures. [facility] Medication Reconciliation documentation:    [x] I have utilized all available immediate resources to obtain, update, or review the patient's current medications (including all prescriptions, over-the-counter products, herbals, cannabinoid products and bitamin/mineral/dietary/nutritional supplements. [place].Judaism 'yes\", Frutoso Balbina     []  The patient is not eligible for medication reconciliation; the patient is in an emergent medical situation where delaying treatment would jeopardize the patient's health    []  I did NOT confirm, update or review the patient's current list of medications today.   [DOES NOT SATISFY [facility] PERFORMANCE]        M[]IPS Advanced Care Planning documentation:   I have confirmed that the patient's Advance Care Plan is present, Code Status is documented, or surrogate decision

## 2023-09-05 NOTE — ED PROVIDER NOTES
[address] [facility] ED  EMERGENCY DEPARTMENT ENCOUNTER      Pt Name: Carlos Dash  MRN: [id number]  [address] [facility] 1935  Date of evaluation: 9/5/2023  Provider: Chyna Dietrich       Chief Complaint   Patient presents with    Pain     Patient here post fall and humeral head fracture. Patient looking for assistance with pain control. HISTORY OF PRESENT ILLNESS   (Location/Symptom, Timing/Onset, Context/Setting, Quality, Duration, Modifying Factors, Severity)  Note limiting factors. Carlos Dash is a 80 y.o. female who presents to the emergency department with sister at bedside status post ground-level fall happening 3 days ago at home mechanical in nature per family. Patient was evaluated here at the emergency department on Saturday, head and neck CTs were unremarkable but x-rays of right shoulder uncovered a comminuted surgical neck fracture. Patient was given hydrocodone put in a sling and discharged home. Patient was just evaluated by Dr. Jessica Older orthopedist and redirected to the emergency department for pain control as hydrocodone is not helping. Family is concerned patient is not managing her pain well at home and is at risk for future fall as she lives alone. Patient denies new fall any new pain sites or other complaints. Patient is followed by primary care Dr. Yomaira Grant. Family is requesting patient be admitted. HPI    Nursing Notes were reviewed. REVIEW OF SYSTEMS    (2-9 systems for level 4, 10 or more for level 5)     Review of Systems   Constitutional: Negative. Respiratory: Negative. Cardiovascular: Negative. Gastrointestinal: Negative. Genitourinary: Negative. Musculoskeletal:         See HPI   Neurological: Negative. Except as noted above the remainder of the review of systems was reviewed and negative.        PAST MEDICAL HISTORY     Past Medical History:   Diagnosis Date    Abnormal EMG 2016    Right Carpal Tunnel & C5 Radiculopathy    Cancer [facility])     colon    Chronic kidney disease     Colon polyp 2006, 2009    Compression fracture of lumbar vertebra ([address]) / prednisone related to PMR 07/15/2015    Diarrhea     Drug-induced hyperkalemia from Lisinopril 7/27/2018    DVT (deep venous thrombosis) ([address]) 2015    Emphysema     Essential hypertension     Hyperlipidemia     Irritable bowel syndrome     Lumbago     Osteoarthritis     Polymyalgia rheumatica ([address]) 2015    Rheumatism     Seborrheic keratoses     on back         SURGICAL HISTORY       Past Surgical History:   Procedure Laterality Date    APPENDECTOMY  1949    CATARACT REMOVAL WITH IMPLANT      COLECTOMY Right 06/20/2016    open right hemicolectomy bowel resection    COLONOSCOPY  10/17/2011    Dr. Fishman Epp    COLONOSCOPY  05/25/2016    -bx mass hepatic flexure,tattoo    COLONOSCOPY  12/13/2017    Dr Morales-polyp(adenomatous)hemorrhoids    DENTAL SURGERY      Implants    GLAUCOMA SURGERY      preventative surg    HYSTERECTOMY, TOTAL ABDOMINAL (CERVIX REMOVED)  1973    NASAL SEPTUM SURGERY      Deriated Septum Surgery    NM COLSC FLX W/RMVL OF TUMOR POLYP LESION SNARE TQ N/A 12/13/2017    COLONOSCOPY POLYPECTOMY SNARE/COLD BIOPSY performed by Joanna Hernandez MD at [address] cell on back and leg    TONSILLECTOMY  1941         CURRENT MEDICATIONS       Previous Medications    AMLODIPINE (NORVASC) 5 MG TABLET    Take 0.5 tablets by mouth at bedtime    ATORVASTATIN (LIPITOR) 80 MG TABLET    Take 1 tablet by mouth daily    CALCIUM CARBONATE (OSCAL) 500 MG TABS TABLET    Take 1 tablet by mouth daily    CARVEDILOL (COREG) 25 MG TABLET    Take 0.5 tablets by mouth 2 times daily    ESCITALOPRAM (LEXAPRO) 5 MG TABLET    Take 1 tablet by mouth daily    HANDICAP PLACARD MISC    by Does not apply route Duration; 4yrs    HYDROCODONE-ACETAMINOPHEN (NORCO) 5-325 MG PER TABLET    Take 1 tablet by mouth every 4 hours as needed for Pain for up to 7 days. Intended supply: 7 days.

## 2023-09-05 NOTE — PROGRESS NOTES
Writer spoke with Chris SARKAR on the phone for consult for Dr Ashwin Aponte.  He will come and see her later today

## 2023-09-05 NOTE — PROGRESS NOTES
[facility]  Inpatient/Observation/Outpatient Rehabilitation    Date: 2023  Patient Name: Alfredo Preciado       [x] Inpatient Acute/Observation       []  Outpatient  : 1935         [] Pt no showed for scheduled appointment    [] Pt refused/declined therapy at this time due to:           [x] Pt cancelled due to:  [] No Reason Given   [] Sick/ill   [] Other:eval attempted, pt in with nurse and x-ray    Therapist/Assistant will attempt to see this patient, at our earliest opportunity.        Mireya Wen, PT Date: 2023

## 2023-09-05 NOTE — PROGRESS NOTES
Pt in bed at this time. VS and assessment as charted. Pt is A&Ox4. Pain noted and patient aware of next prn medication available. Pt provided with container to store hearing aides. Denies any other needs at this time and has call light and bedside table within reach, will continue to monitor.

## 2023-09-05 NOTE — PLAN OF CARE
Problem: Pain  Goal: Verbalizes/displays adequate comfort level or baseline comfort level  Outcome: Progressing  Flowsheets (Taken 9/5/2023 1718)  Verbalizes/displays adequate comfort level or baseline comfort level:   Assess pain using appropriate pain scale   Administer analgesics based on type and severity of pain and evaluate response   Implement non-pharmacological measures as appropriate and evaluate response     Problem: Safety - Adult  Goal: Free from fall injury  Outcome: Progressing     Problem: ABCDS Injury Assessment  Goal: Absence of physical injury  Outcome: Progressing  Flowsheets (Taken 9/5/2023 1718)  Absence of Physical Injury: Implement safety measures based on patient assessment     Problem: Skin/Tissue Integrity  Goal: Absence of new skin breakdown  Description: 1. Monitor for areas of redness and/or skin breakdown  2. Assess vascular access sites hourly  3. Every 4-6 hours minimum:  Change oxygen saturation probe site  4. Every 4-6 hours:  If on nasal continuous positive airway pressure, respiratory therapy assess nares and determine need for appliance change or resting period.   Outcome: Progressing  Note: Hourly turns

## 2023-09-05 NOTE — PLAN OF CARE
Problem: Skin/Tissue Integrity  Goal: Absence of new skin breakdown  Description: 1. Monitor for areas of redness and/or skin breakdown  2. Assess vascular access sites hourly  3. Every 4-6 hours minimum:  Change oxygen saturation probe site  4. Every 4-6 hours:  If on nasal continuous positive airway pressure, respiratory therapy assess nares and determine need for appliance change or resting period.   Outcome: Progressing  Note: Hourly turns      Problem: Pain  Goal: Verbalizes/displays adequate comfort level or baseline comfort level  Outcome: Progressing  Flowsheets (Taken 9/5/2023 1718)  Verbalizes/displays adequate comfort level or baseline comfort level:   Assess pain using appropriate pain scale   Administer analgesics based on type and severity of pain and evaluate response   Implement non-pharmacological measures as appropriate and evaluate response     Problem: Safety - Adult  Goal: Free from fall injury  Outcome: Progressing     Problem: ABCDS Injury Assessment  Goal: Absence of physical injury  Outcome: Progressing  Flowsheets (Taken 9/5/2023 1718)  Absence of Physical Injury: Implement safety measures based on patient assessment

## 2023-09-05 NOTE — ED NOTES
Called Dr Garrison Tan at office and left message to call the ER     Gavin Gonzalez  09/05/23 7719
Dr Maynor Del Cid called back and spoke to Dr Alok Dominguez  09/05/23 [phone]
Patient and family in room, family concerned that pain medication only makes patient sleepy and doesn't help with pain. Patient was taking medication as prescribed but was unable to care for self sufficiently so family took her off the Lannis Aitkin prescribed on 09/02/23 and has been giving her tylenol since. Last dose of Norco given 0830 this AM before Dr. Alfonso Paris follow up visit. Patient family's states pain is uncontrolled and are concerned for patient's well-fare.       Zeyad Elizabeth RN  09/05/23 [id number]
[facility]  09/05/23 4580
No action was needed

## 2023-09-06 PROBLEM — E44.0 MODERATE MALNUTRITION (HCC): Status: ACTIVE | Noted: 2023-09-06

## 2023-09-06 LAB
ANION GAP SERPL CALCULATED.3IONS-SCNC: 9 MMOL/L (ref 9–17)
BASOPHILS # BLD: 0.05 K/UL (ref 0–0.2)
BASOPHILS NFR BLD: 1 % (ref 0–2)
BUN SERPL-MCNC: 37 MG/DL (ref 8–23)
BUN/CREAT SERPL: 21 (ref 9–20)
CALCIUM SERPL-MCNC: 8.4 MG/DL (ref 8.6–10.4)
CHLORIDE SERPL-SCNC: 112 MMOL/L (ref 98–107)
CO2 SERPL-SCNC: 19 MMOL/L (ref 20–31)
CREAT SERPL-MCNC: 1.8 MG/DL (ref 0.5–0.9)
EKG ATRIAL RATE: 75 BPM
EKG P AXIS: -15 DEGREES
EKG P-R INTERVAL: 122 MS
EKG Q-T INTERVAL: 386 MS
EKG QRS DURATION: 74 MS
EKG QTC CALCULATION (BAZETT): 431 MS
EKG R AXIS: -30 DEGREES
EKG T AXIS: 17 DEGREES
EKG VENTRICULAR RATE: 75 BPM
EOSINOPHIL # BLD: 0.18 K/UL (ref 0–0.44)
EOSINOPHILS RELATIVE PERCENT: 2 % (ref 1–4)
ERYTHROCYTE [DISTWIDTH] IN BLOOD BY AUTOMATED COUNT: 14.1 % (ref 11.8–14.4)
GFR SERPL CREATININE-BSD FRML MDRD: 27 ML/MIN/1.73M2
GLUCOSE SERPL-MCNC: 111 MG/DL (ref 70–99)
HCT VFR BLD AUTO: 26.4 % (ref 36.3–47.1)
HGB BLD-MCNC: 8.4 G/DL (ref 11.9–15.1)
IMM GRANULOCYTES # BLD AUTO: 0.05 K/UL (ref 0–0.3)
IMM GRANULOCYTES NFR BLD: 1 %
INR PPP: 4.8
LYMPHOCYTES NFR BLD: 0.91 K/UL (ref 1.1–3.7)
LYMPHOCYTES RELATIVE PERCENT: 10 % (ref 24–43)
MCH RBC QN AUTO: 31.8 PG (ref 25.2–33.5)
MCHC RBC AUTO-ENTMCNC: 31.8 G/DL (ref 28.4–34.8)
MCV RBC AUTO: 100 FL (ref 82.6–102.9)
MONOCYTES NFR BLD: 0.98 K/UL (ref 0.1–1.2)
MONOCYTES NFR BLD: 11 % (ref 3–12)
NEUTROPHILS NFR BLD: 75 % (ref 36–65)
NEUTS SEG NFR BLD: 7.06 K/UL (ref 1.5–8.1)
NRBC BLD-RTO: 0 PER 100 WBC
PLATELET # BLD AUTO: 119 K/UL (ref 138–453)
PMV BLD AUTO: 12.1 FL (ref 8.1–13.5)
POTASSIUM SERPL-SCNC: 5 MMOL/L (ref 3.7–5.3)
PROTHROMBIN TIME: 45.5 SEC (ref 11.9–14.8)
RBC # BLD AUTO: 2.64 M/UL (ref 3.95–5.11)
SODIUM SERPL-SCNC: 140 MMOL/L (ref 135–144)
WBC OTHER # BLD: 9.2 K/UL (ref 3.5–11.3)

## 2023-09-06 PROCEDURE — 1200000000 HC SEMI PRIVATE

## 2023-09-06 PROCEDURE — 2580000003 HC RX 258: Performed by: NURSE PRACTITIONER

## 2023-09-06 PROCEDURE — 97162 PT EVAL MOD COMPLEX 30 MIN: CPT

## 2023-09-06 PROCEDURE — 85025 COMPLETE CBC W/AUTO DIFF WBC: CPT

## 2023-09-06 PROCEDURE — 85610 PROTHROMBIN TIME: CPT

## 2023-09-06 PROCEDURE — 6360000002 HC RX W HCPCS: Performed by: NURSE PRACTITIONER

## 2023-09-06 PROCEDURE — 36415 COLL VENOUS BLD VENIPUNCTURE: CPT

## 2023-09-06 PROCEDURE — 94761 N-INVAS EAR/PLS OXIMETRY MLT: CPT

## 2023-09-06 PROCEDURE — 80048 BASIC METABOLIC PNL TOTAL CA: CPT

## 2023-09-06 PROCEDURE — 93010 ELECTROCARDIOGRAM REPORT: CPT | Performed by: INTERNAL MEDICINE

## 2023-09-06 PROCEDURE — 97166 OT EVAL MOD COMPLEX 45 MIN: CPT

## 2023-09-06 PROCEDURE — 6370000000 HC RX 637 (ALT 250 FOR IP): Performed by: NURSE PRACTITIONER

## 2023-09-06 RX ORDER — OXYCODONE HYDROCHLORIDE AND ACETAMINOPHEN 5; 325 MG/1; MG/1
1 TABLET ORAL ONCE
Status: COMPLETED | OUTPATIENT
Start: 2023-09-06 | End: 2023-09-06

## 2023-09-06 RX ORDER — OXYCODONE HYDROCHLORIDE AND ACETAMINOPHEN 5; 325 MG/1; MG/1
2 TABLET ORAL EVERY 4 HOURS PRN
Status: DISCONTINUED | OUTPATIENT
Start: 2023-09-06 | End: 2023-09-08

## 2023-09-06 RX ORDER — OXYCODONE HYDROCHLORIDE AND ACETAMINOPHEN 5; 325 MG/1; MG/1
1 TABLET ORAL EVERY 4 HOURS PRN
Status: DISCONTINUED | OUTPATIENT
Start: 2023-09-06 | End: 2023-09-08 | Stop reason: HOSPADM

## 2023-09-06 RX ADMIN — DOCUSATE SODIUM 100 MG: 100 CAPSULE, LIQUID FILLED ORAL at 20:01

## 2023-09-06 RX ADMIN — ATORVASTATIN CALCIUM 80 MG: 40 TABLET, FILM COATED ORAL at 08:43

## 2023-09-06 RX ADMIN — FAMOTIDINE 10 MG: 20 TABLET, FILM COATED ORAL at 08:43

## 2023-09-06 RX ADMIN — DOCUSATE SODIUM 100 MG: 100 CAPSULE, LIQUID FILLED ORAL at 08:43

## 2023-09-06 RX ADMIN — OXYCODONE HYDROCHLORIDE AND ACETAMINOPHEN 1 TABLET: 5; 325 TABLET ORAL at 01:49

## 2023-09-06 RX ADMIN — SODIUM CHLORIDE: 9 INJECTION, SOLUTION INTRAVENOUS at 06:14

## 2023-09-06 RX ADMIN — OXYCODONE HYDROCHLORIDE AND ACETAMINOPHEN 1 TABLET: 5; 325 TABLET ORAL at 08:46

## 2023-09-06 RX ADMIN — LOSARTAN POTASSIUM 100 MG: 50 TABLET, FILM COATED ORAL at 08:43

## 2023-09-06 RX ADMIN — MULTIVITAMIN TABLET 1 TABLET: TABLET at 08:43

## 2023-09-06 RX ADMIN — ENOXAPARIN SODIUM 30 MG: 100 INJECTION SUBCUTANEOUS at 08:43

## 2023-09-06 RX ADMIN — SODIUM CHLORIDE: 9 INJECTION, SOLUTION INTRAVENOUS at 19:53

## 2023-09-06 RX ADMIN — OXYCODONE HYDROCHLORIDE AND ACETAMINOPHEN 2 TABLET: 5; 325 TABLET ORAL at 20:01

## 2023-09-06 RX ADMIN — CARVEDILOL 12.5 MG: 12.5 TABLET, FILM COATED ORAL at 20:01

## 2023-09-06 RX ADMIN — CARVEDILOL 12.5 MG: 12.5 TABLET, FILM COATED ORAL at 08:43

## 2023-09-06 RX ADMIN — ANTACID TABLETS 500 MG: 500 TABLET, CHEWABLE ORAL at 08:46

## 2023-09-06 RX ADMIN — OXYCODONE HYDROCHLORIDE AND ACETAMINOPHEN 1 TABLET: 5; 325 TABLET ORAL at 10:28

## 2023-09-06 RX ADMIN — CYANOCOBALAMIN TAB 1000 MCG 1000 MCG: 1000 TAB at 08:43

## 2023-09-06 RX ADMIN — AMLODIPINE BESYLATE 2.5 MG: 2.5 TABLET ORAL at 20:01

## 2023-09-06 ASSESSMENT — PAIN DESCRIPTION - LOCATION
LOCATION: ARM;SHOULDER
LOCATION: ARM;SHOULDER
LOCATION: SHOULDER;ARM
LOCATION: ARM
LOCATION: SHOULDER;ARM
LOCATION: SHOULDER;ARM

## 2023-09-06 ASSESSMENT — PAIN DESCRIPTION - DESCRIPTORS
DESCRIPTORS: ACHING
DESCRIPTORS: ACHING;STABBING
DESCRIPTORS: ACHING;DISCOMFORT
DESCRIPTORS: ACHING;DISCOMFORT
DESCRIPTORS: ACHING
DESCRIPTORS: ACHING;DISCOMFORT

## 2023-09-06 ASSESSMENT — PAIN DESCRIPTION - ORIENTATION
ORIENTATION: RIGHT

## 2023-09-06 ASSESSMENT — PAIN SCALES - GENERAL
PAINLEVEL_OUTOF10: 10
PAINLEVEL_OUTOF10: 8
PAINLEVEL_OUTOF10: 8
PAINLEVEL_OUTOF10: 7
PAINLEVEL_OUTOF10: 2
PAINLEVEL_OUTOF10: 8
PAINLEVEL_OUTOF10: 5
PAINLEVEL_OUTOF10: 4
PAINLEVEL_OUTOF10: 8

## 2023-09-06 ASSESSMENT — PAIN - FUNCTIONAL ASSESSMENT
PAIN_FUNCTIONAL_ASSESSMENT: PREVENTS OR INTERFERES SOME ACTIVE ACTIVITIES AND ADLS
PAIN_FUNCTIONAL_ASSESSMENT: ACTIVITIES ARE NOT PREVENTED
PAIN_FUNCTIONAL_ASSESSMENT: ACTIVITIES ARE NOT PREVENTED
PAIN_FUNCTIONAL_ASSESSMENT: PREVENTS OR INTERFERES SOME ACTIVE ACTIVITIES AND ADLS
PAIN_FUNCTIONAL_ASSESSMENT: PREVENTS OR INTERFERES SOME ACTIVE ACTIVITIES AND ADLS

## 2023-09-06 ASSESSMENT — PAIN DESCRIPTION - ONSET
ONSET: ON-GOING

## 2023-09-06 ASSESSMENT — PAIN DESCRIPTION - FREQUENCY
FREQUENCY: CONTINUOUS

## 2023-09-06 ASSESSMENT — PAIN DESCRIPTION - PAIN TYPE
TYPE: ACUTE PAIN
TYPE: ACUTE PAIN

## 2023-09-06 NOTE — PROGRESS NOTES
Comprehensive Nutrition Assessment    Type and Reason for Visit:  Initial, Positive Nutrition Screen (MST 2)    Nutrition Recommendations/Plan:   Continue current diet. Continue current ONS. Encourage protein and calcium foods for healing needs. Malnutrition Assessment:  Malnutrition Status: Moderate malnutrition (09/06/23 1219)    Context:  Acute Illness     Findings of the 6 clinical characteristics of malnutrition:  Energy Intake:  50% or less of estimated energy requirements for 5 or more days  Weight Loss:  No significant weight loss     Body Fat Loss:  Mild body fat loss Orbital, Fat Overlying Ribs   Muscle Mass Loss:  Mild muscle mass loss Temples (temporalis), Hand (interosseous)  Fluid Accumulation:  Mild Extremities (trace RUE)   Strength:  Not Performed    Nutrition Assessment:    Moderate malnutrition r/t inadequate oral intakes aeb Poor PO prior to admission, mild fat/muscle losses. Increased nutrient needs r/t acute injury/trauma aeb healing needs from humerous Fx. Glucose 111, renal indices elevated, corrected calcium 8.56. H/H are low. Pt states she was \"not eating very much. \" breakfast was cheerio's, lunch a peanut butter sandwich, and supper was a TV dinner. States \"I hate to cook. \" Her sister lives across the street and does bring her food at times. Pt does not like to drink milk, but is ok with chocolate ensure. States \"teeth are worn down, can't chew beef or pork unless ground. \" Will add ground meat to diet. Pt encouraged to eat protein and calcium containing foods for healing needs. Nutrition Related Findings:    mild fat/muscle losses Wound Type: None       Current Nutrition Intake & Therapies:    Average Meal Intake: 26-50%  Average Supplements Intake: 51-75%  ADULT DIET;  Regular  ADULT ORAL NUTRITION SUPPLEMENT; Breakfast, Lunch, Dinner; Standard 4 oz Oral Supplement    Anthropometric Measures:  Height: 5' (152.4 cm)  Ideal Body Weight (IBW): 100 lbs (45 kg)    Admission LEWIS, CYNDI  Contact: [id number]

## 2023-09-06 NOTE — PROGRESS NOTES
[facility]  Inpatient/Observation/Outpatient Rehabilitation    Date: 2023  Patient Name: Darlene Jc       [x] Inpatient Acute/Observation       []  Outpatient  : 1935         [] Pt no showed for scheduled appointment    [x] Pt refused/declined therapy at this time due to:  Pt very upset and stating that she does not want to get out of the bed and she hurts too bad. It was explained that for placement, she will require mobility notes and participation. Pt also informed that if she ends up appropriate for home, she will get extremely weak and be unable to care for herself. [id number] notified and will check on in p.m. [] Pt cancelled due to:  [] No Reason Given   [] Sick/ill   [] Other:    Therapist/Assistant will attempt to see this patient, at our earliest opportunity.        Leatha Alcazar, PT, DPT  Date: 2023

## 2023-09-06 NOTE — PROGRESS NOTES
Physician Progress Note      Liliana Perez  [facility] #:                  [id number]  :                       1935  ADMIT DATE:       2023 12:00 PM  [address] [facility] DATE:  RESPONDING  PROVIDER #:        Dickson Garcia MD          QUERY TEXT:    Pt admitted with fracture right proximal humerus. Pt had trip/fall at home. Per Ortho consult: significant osteoporosis, per right shoulder xray: diffuse   osteopenia    If possible, please document in progress notes and discharge summary if you   are evaluating and/or treating any of the following: The medical record reflects the following:  Risk Factors: advanced age 81 y/o female, BMI 20  Clinical Indicators: trip/ fall at home;   23 right shoulder xray: Diffuse   osteopenia, transverse fracture involving the surgical neck of the humerus;    per Ortho consult: significant osteoporosis  Treatment: ortho consult, swing and swath; PT, OT, pain control, calcium   carbonate oral daily home and hospital    Surya Lerner, MSN, RN, CCDS, CRCR  Clinical   .  Options provided:  -- Pathological right proximal humerus fracture due to osteopenia following   fall which would not usually break a normal, healthy bone  -- Osteoporotic right proximal humerus fracture following fall which would not   usually break a normal, healthy bone  -- Traumatic right proximal humerus fracture  -- Other - I will add my own diagnosis  -- Disagree - Not applicable / Not valid  -- Disagree - Clinically unable to determine / Unknown  -- Refer to Clinical Documentation Reviewer    PROVIDER RESPONSE TEXT:    This patient has a pathological right proximal humerus fracture due to   osteopenia following fall which would not usually break a normal, healthy   bone.     Query created by: Surya Lerner on 2023 11:18 AM      Electronically signed by:  Dickson Garcia MD 2023 11:35 AM

## 2023-09-06 NOTE — PLAN OF CARE
Problem: Discharge Planning  Goal: Discharge to home or other facility with appropriate resources  Outcome: Progressing     Problem: Pain  Goal: Verbalizes/displays adequate comfort level or baseline comfort level  9/6/2023 0013 by Roxana Koyanagi, RN  Outcome: Progressing  Flowsheets (Taken 9/6/2023 0013)  Verbalizes/displays adequate comfort level or baseline comfort level:   Encourage patient to monitor pain and request assistance   Assess pain using appropriate pain scale   Administer analgesics based on type and severity of pain and evaluate response   Implement non-pharmacological measures as appropriate and evaluate response     Problem: Safety - Adult  Goal: Free from fall injury  9/6/2023 0013 by Roxana Koyanagi, RN  Outcome: Progressing  Flowsheets (Taken 9/6/2023 0013)  Free From Fall Injury: Instruct family/caregiver on patient safety     Problem: ABCDS Injury Assessment  Goal: Absence of physical injury  9/6/2023 0013 by Roxana Koyanagi, RN  Outcome: Progressing     Problem: Skin/Tissue Integrity  Goal: Absence of new skin breakdown  Description: 1. Monitor for areas of redness and/or skin breakdown  2. Assess vascular access sites hourly  3. Every 4-6 hours minimum:  Change oxygen saturation probe site  4. Every 4-6 hours:  If on nasal continuous positive airway pressure, respiratory therapy assess nares and determine need for appliance change or resting period.   9/6/2023 0013 by Roxana Koyanagi, RN  Outcome: Progressing

## 2023-09-06 NOTE — DISCHARGE INSTR - COC
Continuity of Care Form    Patient Name: Radha Cannon   :  1935  MRN:  [id number]    Admit date:  2023  Discharge date:  2023    Code Status Order: Full Code   Advance Directives:     Admitting Physician:  Vani Foster MD  PCP: Vani Foster MD    Discharging Nurse: Fallon Lao RN  [facility] Unit/Room#: 5955/5252-57  Discharging Unit Phone Number: [phone]    Emergency Contact:   Extended Emergency Contact Information  Primary Emergency Contact: [facility] of [address] Phone: [phone]  Relation: Child  Hearing or visual needs: None  Other needs: None  Preferred language: [place]   needed?  No  Secondary Emergency Contact: [address]  Phone: [phone]  Mobile Phone: [phone]  Relation: Brother/Sister    Past Surgical History:  Past Surgical History:   Procedure Laterality Date    APPENDECTOMY      CATARACT REMOVAL WITH IMPLANT      COLECTOMY Right 2016    open right hemicolectomy bowel resection    COLONOSCOPY  10/17/2011    Dr. Choe Elan    COLONOSCOPY  2016    -bx mass hepatic flexure,tattoo    COLONOSCOPY  2017    Dr Morales-polyp(adenomatous)hemorrhoids    DENTAL SURGERY      Implants    GLAUCOMA SURGERY      preventative surg    HYSTERECTOMY, TOTAL ABDOMINAL (CERVIX REMOVED)      NASAL SEPTUM SURGERY      Deriated Septum Surgery    IL COLSC FLX W/RMVL OF TUMOR POLYP LESION SNARE TQ N/A 2017    COLONOSCOPY POLYPECTOMY SNARE/COLD BIOPSY performed by Anai Ash MD at [facility] cell on back and leg    TONSILLECTOMY         Immunization History:   Immunization History   Administered Date(s) Administered    COVID-19, PFIZER PURPLE top, DILUTE for use, (age 15 y+), 30mcg/0.3mL 2021, 2021    Influenza Vaccine, unspecified formulation 10/15/2017, 2018    Influenza Virus Vaccine 2015, 10/03/2016    Influenza Whole 10/14/2010

## 2023-09-06 NOTE — PROGRESS NOTES
Met with Patient and her son this p.m. to discuss discharge planning. Patient is an 80year old , white female, admitted with a diagnosis of Fracture of her Right Shoulder. Patient is alert and oriented, polite and cooperative with this assessment. Patient wants to go to rehab and chooses [facility] from list of choices provided. Referral made to [facility] at this time. Patient lives alone in [place]. She has a walker, rollator, cane, shower chair and grab bars at home for assistance. Patient currently utilizing no outside resources or services. Patient ordinarily is independent with her ADL's. She drives herself and provides for her own transportation needs and her son also assists as needed. PCP is Dr. Maynor Del Cid. Patient has medical insurance and reports having no difficulty with regards to affording her prescription medications at this point. Discharge plan will be [facility] and then return home. Patient remains a 'Full Code' status and does have Advanced Directives on file. Patient appears anxious. Much reassurance and support provided to her at this point. W to monitor for needs and assist with discharge placement as appropriate.     [address] [place], [place]  9/6/2023

## 2023-09-06 NOTE — PROGRESS NOTES
Occupational Therapy  Facility/Department: Count includes the [facility] AT THE [facility] MED SURG  Occupational Therapy Initial Assessment    Name: Kvng Recinos  : 1935  MRN: [id number]  Date of Service: 2023    Discharge Recommendations:  Continue to assess pending progress, Subacute/Skilled Nursing Facility          Patient Diagnosis(es): The encounter diagnosis was History of right shoulder fracture. 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 Past Surgical History:  has a past surgical history that includes Cataract removal with implant; Glaucoma surgery; Nasal septum surgery; Dental surgery; Appendectomy (); Skin cancer excision; Hysterectomy, total abdominal (); Tonsillectomy (); Colonoscopy (10/17/2011); Colonoscopy (2016); colectomy (Right, 2016); Colonoscopy (2017); and pr colsc flx w/rmvl of tumor polyp lesion snare tq (N/A, 2017). Treatment Diagnosis: Weakness      Assessment   Performance deficits / Impairments: Decreased functional mobility ; Decreased endurance;Decreased ADL status; Decreased balance;Decreased ROM; Decreased strength;Decreased high-level IADLs;Decreased posture;Decreased coordination  Assessment: 79 y/o F admitted to T for R humerus fx. Patient being tx conservatively with sling and swathe. patient R hand dominant and currently requires extensive assist during ADL. Patient would benefit from OT services to improve ADL independence.   Treatment Diagnosis: Weakness  Prognosis: Good  Decision Making: Medium

## 2023-09-06 NOTE — PLAN OF CARE
Problem: Discharge Planning  Goal: Discharge to home or other facility with appropriate resources  9/6/2023 1040 by Elgin Bolaños  Outcome: Progressing     Problem: Pain  Goal: Verbalizes/displays adequate comfort level or baseline comfort level  9/6/2023 1040 by Elgin Bolaños  Outcome: Progressing  Note: Pain assessed every 4 hours. Patient is able to communicate with staff the need for pain interventions. Patient currently denies. Will continue to monitor. Problem: Safety - Adult  Goal: Free from fall injury  9/6/2023 1040 by Elgin Bolaños  Outcome: Progressing  Note: Call light in reach at all times, frequent checks, bed in lowest position, wheels of bed and chair locked, non skid footwear on, appropriate transfer techniques, personal items within reach, walkways free of obstructions, fall risk armband and sign displayed, West fall risk score per protocol. No falls this shift, care ongoing.

## 2023-09-06 NOTE — PROGRESS NOTES
Patient resting in bed reports pain, pain medication given. Vitals and assessment.  Call light in reach bed alarm is on

## 2023-09-06 NOTE — CONSULTS
[facility] Department    Clinical Pharmacy Note-Warfarin Consult    Melodie Carter is a 80 y.o. female for whom pharmacy has been asked to manage warfarin therapy. 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 9/5/2023    Colon polyp 2006, 2009    Compression fracture of lumbar vertebra (HCC) / prednisone related to PMR 07/15/2015    Diarrhea     Drug-induced hyperkalemia from Lisinopril 7/27/2018    DVT (deep venous thrombosis) ([address]) 2015    Emphysema     Essential hypertension     Hyperlipidemia     Irritable bowel syndrome     Lumbago     Osteoarthritis     Polymyalgia rheumatica ([address]) 2015    Rheumatism     Seborrheic keratoses     on back                  INR (no units)   Date Value   09/06/2023 4.8 (HH)   08/01/2023 2.5   06/29/2023 1.8   06/20/2023 2.3   05/18/2023 2.3   04/13/2023 2.7   03/23/2023 1.4       Hemoglobin (g/dL)   Date Value   09/06/2023 8.4 (L)   09/05/2023 10.2 (L)   06/20/2023 10.6 (L)     Hematocrit (%)   Date Value   09/06/2023 26.4 (L)   09/05/2023 31.6 (L)   06/20/2023 33.0 (L)     Platelets (k/uL)   Date Value   09/06/2023 119 (L)   09/05/2023 171   06/20/2023 116 (L)       Current warfarin drug-drug interactions: atorvastatin (home), norco (home), percocet, famotidine    DISCONTINUE ENOXAPARIN AT THIS TIME. Date             INR        Dose   9/6/2023          4.8     hold    Daily PT/INR until stable within therapeutic range. Thank you for the consult.    Venancio Harris, R.Ph., 9/6/2023,3:02 PM        [facility]vis Marylin Galeana, [facility],  9/6/2023, 2:51 PM

## 2023-09-07 LAB
ANION GAP SERPL CALCULATED.3IONS-SCNC: 7 MMOL/L (ref 9–17)
BASOPHILS # BLD: 0.03 K/UL (ref 0–0.2)
BASOPHILS NFR BLD: 0 % (ref 0–2)
BUN SERPL-MCNC: 33 MG/DL (ref 8–23)
BUN/CREAT SERPL: 22 (ref 9–20)
CALCIUM SERPL-MCNC: 8.1 MG/DL (ref 8.6–10.4)
CHLORIDE SERPL-SCNC: 114 MMOL/L (ref 98–107)
CO2 SERPL-SCNC: 19 MMOL/L (ref 20–31)
CREAT SERPL-MCNC: 1.5 MG/DL (ref 0.5–0.9)
EOSINOPHIL # BLD: 0.34 K/UL (ref 0–0.44)
EOSINOPHILS RELATIVE PERCENT: 5 % (ref 1–4)
ERYTHROCYTE [DISTWIDTH] IN BLOOD BY AUTOMATED COUNT: 14.1 % (ref 11.8–14.4)
GFR SERPL CREATININE-BSD FRML MDRD: 33 ML/MIN/1.73M2
GLUCOSE SERPL-MCNC: 128 MG/DL (ref 70–99)
HCT VFR BLD AUTO: 28.3 % (ref 36.3–47.1)
HGB BLD-MCNC: 9 G/DL (ref 11.9–15.1)
IMM GRANULOCYTES # BLD AUTO: <0.03 K/UL (ref 0–0.3)
IMM GRANULOCYTES NFR BLD: 0 %
INR PPP: 3.8
LYMPHOCYTES NFR BLD: 1.11 K/UL (ref 1.1–3.7)
LYMPHOCYTES RELATIVE PERCENT: 15 % (ref 24–43)
MCH RBC QN AUTO: 32.6 PG (ref 25.2–33.5)
MCHC RBC AUTO-ENTMCNC: 31.8 G/DL (ref 28.4–34.8)
MCV RBC AUTO: 102.5 FL (ref 82.6–102.9)
MONOCYTES NFR BLD: 0.86 K/UL (ref 0.1–1.2)
MONOCYTES NFR BLD: 12 % (ref 3–12)
NEUTROPHILS NFR BLD: 68 % (ref 36–65)
NEUTS SEG NFR BLD: 4.92 K/UL (ref 1.5–8.1)
NRBC BLD-RTO: 0 PER 100 WBC
PLATELET # BLD AUTO: 127 K/UL (ref 138–453)
PMV BLD AUTO: 12.7 FL (ref 8.1–13.5)
POTASSIUM SERPL-SCNC: 4.7 MMOL/L (ref 3.7–5.3)
PROTHROMBIN TIME: 38.1 SEC (ref 11.9–14.8)
RBC # BLD AUTO: 2.76 M/UL (ref 3.95–5.11)
SODIUM SERPL-SCNC: 140 MMOL/L (ref 135–144)
WBC OTHER # BLD: 7.3 K/UL (ref 3.5–11.3)

## 2023-09-07 PROCEDURE — 6370000000 HC RX 637 (ALT 250 FOR IP): Performed by: NURSE PRACTITIONER

## 2023-09-07 PROCEDURE — 97110 THERAPEUTIC EXERCISES: CPT

## 2023-09-07 PROCEDURE — 2580000003 HC RX 258: Performed by: NURSE PRACTITIONER

## 2023-09-07 PROCEDURE — 1200000000 HC SEMI PRIVATE

## 2023-09-07 PROCEDURE — 97535 SELF CARE MNGMENT TRAINING: CPT

## 2023-09-07 PROCEDURE — 80048 BASIC METABOLIC PNL TOTAL CA: CPT

## 2023-09-07 PROCEDURE — 36415 COLL VENOUS BLD VENIPUNCTURE: CPT

## 2023-09-07 PROCEDURE — 94761 N-INVAS EAR/PLS OXIMETRY MLT: CPT

## 2023-09-07 PROCEDURE — 85025 COMPLETE CBC W/AUTO DIFF WBC: CPT

## 2023-09-07 PROCEDURE — 97116 GAIT TRAINING THERAPY: CPT

## 2023-09-07 PROCEDURE — 85610 PROTHROMBIN TIME: CPT

## 2023-09-07 PROCEDURE — 6360000002 HC RX W HCPCS: Performed by: NURSE PRACTITIONER

## 2023-09-07 RX ORDER — POLYETHYLENE GLYCOL 3350 17 G/17G
17 POWDER, FOR SOLUTION ORAL DAILY
Status: DISCONTINUED | OUTPATIENT
Start: 2023-09-07 | End: 2023-09-08 | Stop reason: HOSPADM

## 2023-09-07 RX ADMIN — ACETAMINOPHEN 650 MG: 325 TABLET ORAL at 09:56

## 2023-09-07 RX ADMIN — OXYCODONE HYDROCHLORIDE AND ACETAMINOPHEN 2 TABLET: 5; 325 TABLET ORAL at 16:09

## 2023-09-07 RX ADMIN — AMLODIPINE BESYLATE 2.5 MG: 2.5 TABLET ORAL at 20:16

## 2023-09-07 RX ADMIN — DOCUSATE SODIUM 100 MG: 100 CAPSULE, LIQUID FILLED ORAL at 09:42

## 2023-09-07 RX ADMIN — MULTIVITAMIN TABLET 1 TABLET: TABLET at 09:36

## 2023-09-07 RX ADMIN — LOSARTAN POTASSIUM 100 MG: 50 TABLET, FILM COATED ORAL at 09:36

## 2023-09-07 RX ADMIN — CARVEDILOL 12.5 MG: 12.5 TABLET, FILM COATED ORAL at 20:16

## 2023-09-07 RX ADMIN — CARVEDILOL 12.5 MG: 12.5 TABLET, FILM COATED ORAL at 09:42

## 2023-09-07 RX ADMIN — ATORVASTATIN CALCIUM 80 MG: 40 TABLET, FILM COATED ORAL at 09:36

## 2023-09-07 RX ADMIN — SODIUM CHLORIDE, PRESERVATIVE FREE 10 ML: 5 INJECTION INTRAVENOUS at 09:40

## 2023-09-07 RX ADMIN — OXYCODONE HYDROCHLORIDE AND ACETAMINOPHEN 2 TABLET: 5; 325 TABLET ORAL at 03:58

## 2023-09-07 RX ADMIN — OXYCODONE HYDROCHLORIDE AND ACETAMINOPHEN 1 TABLET: 5; 325 TABLET ORAL at 20:16

## 2023-09-07 RX ADMIN — CYANOCOBALAMIN TAB 1000 MCG 1000 MCG: 1000 TAB at 09:36

## 2023-09-07 RX ADMIN — POLYETHYLENE GLYCOL 3350 17 G: 17 POWDER, FOR SOLUTION ORAL at 09:56

## 2023-09-07 RX ADMIN — ONDANSETRON 4 MG: 2 INJECTION INTRAMUSCULAR; INTRAVENOUS at 09:40

## 2023-09-07 RX ADMIN — ANTACID TABLETS 500 MG: 500 TABLET, CHEWABLE ORAL at 09:42

## 2023-09-07 RX ADMIN — SODIUM CHLORIDE, PRESERVATIVE FREE 10 ML: 5 INJECTION INTRAVENOUS at 20:21

## 2023-09-07 RX ADMIN — FAMOTIDINE 10 MG: 20 TABLET, FILM COATED ORAL at 09:42

## 2023-09-07 RX ADMIN — DOCUSATE SODIUM 100 MG: 100 CAPSULE, LIQUID FILLED ORAL at 20:16

## 2023-09-07 ASSESSMENT — PAIN DESCRIPTION - DESCRIPTORS
DESCRIPTORS: SHARP;THROBBING;ACHING
DESCRIPTORS: ACHING
DESCRIPTORS: ACHING;SORE

## 2023-09-07 ASSESSMENT — PAIN DESCRIPTION - PAIN TYPE: TYPE: ACUTE PAIN

## 2023-09-07 ASSESSMENT — PAIN SCALES - GENERAL
PAINLEVEL_OUTOF10: 10
PAINLEVEL_OUTOF10: 2
PAINLEVEL_OUTOF10: 5
PAINLEVEL_OUTOF10: 2
PAINLEVEL_OUTOF10: 10
PAINLEVEL_OUTOF10: 3

## 2023-09-07 ASSESSMENT — PAIN - FUNCTIONAL ASSESSMENT
PAIN_FUNCTIONAL_ASSESSMENT: PREVENTS OR INTERFERES SOME ACTIVE ACTIVITIES AND ADLS
PAIN_FUNCTIONAL_ASSESSMENT: PREVENTS OR INTERFERES SOME ACTIVE ACTIVITIES AND ADLS

## 2023-09-07 ASSESSMENT — PAIN DESCRIPTION - ORIENTATION
ORIENTATION: RIGHT

## 2023-09-07 ASSESSMENT — PAIN DESCRIPTION - LOCATION
LOCATION: SHOULDER;ARM
LOCATION: SHOULDER
LOCATION: SHOULDER
LOCATION: ARM;SHOULDER
LOCATION: ARM;SHOULDER

## 2023-09-07 ASSESSMENT — PAIN DESCRIPTION - ONSET: ONSET: ON-GOING

## 2023-09-07 ASSESSMENT — PAIN DESCRIPTION - FREQUENCY: FREQUENCY: CONTINUOUS

## 2023-09-07 NOTE — ACP (ADVANCE CARE PLANNING)
Advance Care Planning     Advance Care Planning Activator (Inpatient)  Conversation Note      Date of ACP Conversation: 9/7/2023     Conversation Conducted with: Patient with Decision Making Capacity    ACP Activator: Mary Dugan RN    Health Care Decision Maker:     Current Designated Health Care Decision Maker:     Primary Decision Maker: Graham Zarate - [phone]    Today we documented Decision Maker(s) consistent with ACP documents on file. Care Preferences    Ventilation: \"If you were in your present state of health and suddenly became very ill and were unable to breathe on your own, what would your preference be about the use of a ventilator (breathing machine) if it were available to you? \"      Would the patient desire the use of ventilator (breathing machine)?: no    \"If your health worsens and it becomes clear that your chance of recovery is unlikely, what would your preference be about the use of a ventilator (breathing machine) if it were available to you? \"     Would the patient desire the use of ventilator (breathing machine)?: No      Resuscitation  \"CPR works best to restart the heart when there is a sudden event, like a heart attack, in someone who is otherwise healthy. Unfortunately, CPR does not typically restart the heart for people who have serious health conditions or who are very sick. \"    \"In the event your heart stopped as a result of an underlying serious health condition, would you want attempts to be made to restart your heart (answer \"yes\" for attempt to resuscitate) or would you prefer a natural death (answer \"no\" for do not attempt to resuscitate)? \" no       [x] Yes   [] No   Educated Patient / Helena Cortes regarding differences between Advance Directives and portable DNR orders.     Length of ACP Conversation in minutes:  15    Conversation Outcomes:  ACP discussion completed and Existing advance directive reviewed with patient; no changes to patient's previously

## 2023-09-07 NOTE — PROGRESS NOTES
the proximal humeral diaphysis when   compared to the prior exam.  Stable transverse fracture involving the   surgical neck of the humerus with extension to the greater tuberosity of the   humerus. 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 Nena Ibarra services to assist with skilled placement  Sling and Swath  Imaging: Xray right shoulder ordered  Medications:   Percocet for pain   Continue Colace twice daily  Stop IV fluids  Medication Monitoring / High Risk Medications: none     CKD stage IV  Condition is at baseline  Treatment plan: monitor labs  BUN baseline 23-39  Currently 33  Creatinine baseline 1.49-1.72  Currently 1.5  Imaging: no further imaging studies ordered today  Medications:   Stop IV fluids    Essential hypertension  Condition is a chronic stable condition  Treatment plan: Continue current treatment  Imaging: no further imaging studies ordered today  Medications:   Continue Norvasc, Coreg, losartan    Nutrition status:   at risk for malnutrition  Dietician consult initiated     Hospital Prophylaxis:   DVT: Lovenox   Stress Ulcer: H2 Blocker      Disposition:  Shared decision making:  All test results, treatment options and disposition options were discussed with the patient today  Social determinants of health that may impact management: none  Code status: Full Code   Disposition: Discharge plan is [facility] tomorrow    [facility] Advanced Care Planning documentation:  [x] I have confirmed that the patient's Advance Care Plan is present, Code Status is documented, or surrogate decision maker is listed in the patient's medical record  [If \"yes\", STOP HERE]     [] The patient's Advance Care Plan is NOT present because:    []  I confirmed

## 2023-09-07 NOTE — PROGRESS NOTES
Assessment and vitals obtained at this time as charted. Pt is A&O x4 and states 7/10 arm/shoulder pain but refuses pain meds at this time. Pt is resting in bed and denies any further needs at this time. Call light within reach, care ongoing.

## 2023-09-07 NOTE — PROGRESS NOTES
Ortho    Up in chair  Up to bathroom with assistance  CNS right hand ok  Has been switched to sling and swath  P - NH placement

## 2023-09-07 NOTE — PROGRESS NOTES
Physical Therapy  Facility/Department: [facility] AT THE [facility] MED SURG  Daily Treatment Note  NAME: Waylon Rendon  : 1935  MRN: [id number]    Date of Service: 2023    Discharge Recommendations:  Continue to assess pending progress, Patient would benefit from continued therapy after discharge, [address]     Patient Diagnosis(es): The encounter diagnosis was History of right shoulder fracture. Assessment   Assessment: Pt. able to ambulate 15ftx2 with quad cane to and from bathroom, CGA/Min A, steady clare with v/c for safety awareness. Transfers:CGA/Min A. Bed mobility:CGA. Reclined exercises B LE x15  Activity Tolerance: Patient limited by endurance; Patient tolerated treatment well     Plan    Physcial Therapy Plan  General Plan: 2 times a day 7 days a week  Current Treatment Recommendations: Strengthening;Balance training;Functional mobility training;Transfer training; Endurance training;Gait training;Stair training;Neuromuscular re-education;Home exercise program;Safety education & training; Therapeutic activities; Positioning     Restrictions  Restrictions/Precautions  Restrictions/Precautions: Fall Risk, General Precautions, Weight Bearing  Required Braces or Orthoses?: Yes  Upper Extremity Weight Bearing Restrictions  Right Upper Extremity Weight Bearing: Non Weight Bearing  Required Braces or Orthoses  Right Upper Extremity Brace/Splint: Sling  Right Upper Extremity Brace/Splint: sling and swathe     Subjective    Subjective  Subjective: Pt. up in chair upon arrival, agreeable to work with therapy at this time.   Pain: always has pain has been decreaseing each day  Orientation  Overall Orientation Status: Within Functional Limits     Objective   Bed Mobility Training  Bed Mobility Training: Yes  Overall Level of Assistance: Contact-guard assistance;Assist X1  Interventions: Verbal cues  Rolling: Contact-guard assistance;Assist X1  Supine to Sit: Maximum assistance;Assist X1;Additional

## 2023-09-07 NOTE — PROGRESS NOTES
Physical Therapy  Facility/Department: [facility] AT THE [facility] MED SURG  Physical Therapy Initial Assessment    Name: Sandeep Calero  : 1935  MRN: [id number]  Date of Service: 2023    Discharge Recommendations:  Continue to assess pending progress, Patient would benefit from continued therapy after discharge, [address]          Patient Diagnosis(es): The encounter diagnosis was History of right shoulder fracture. 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 Past Surgical History:  has a past surgical history that includes Cataract removal with implant; Glaucoma surgery; Nasal septum surgery; Dental surgery; Appendectomy (); Skin cancer excision; Hysterectomy, total abdominal (); Tonsillectomy (); Colonoscopy (10/17/2011); Colonoscopy (2016); colectomy (Right, 2016); Colonoscopy (2017); and pr colsc flx w/rmvl of tumor polyp lesion snare tq (N/A, 2017). Assessment   Pt referred for difficulty walking and general weakness. Pt is unable to get up from supine without having the support and full incline of the bed with use of rails. Pt required min assist coming from sit to stand. Pt also required verbal cues for safety with use of quad cane and may benefit from use of karmen-walker.     Plan   Physcial Therapy Plan  General Plan: 2 times a day 7 days a week (with exception of weekends, daily)  Current Treatment Recommendations: Strengthening, Balance training, Functional mobility training, Transfer

## 2023-09-07 NOTE — PROGRESS NOTES
Occupational Therapy  Facility/Department: [facility] AT THE [facility] MED SURG  Daily Treatment Note  NAME: Le Porter  : 1935  MRN: [id number]    Date of Service: 2023    Discharge Recommendations:  Continue to assess pending progress, Subacute/Skilled Nursing Facility         Patient Diagnosis(es): The encounter diagnosis was History of right shoulder fracture. Assessment    Activity Tolerance: Patient limited by fatigue;Patient limited by endurance; Patient limited by pain  Discharge Recommendations: Continue to assess pending progress;Subacute/Skilled [facility] [address]  Times Per Day: Once a day  Days Per Week: 7 Days  Current Treatment Recommendations: Strengthening;ROM;Balance training;Functional mobility training; Endurance training; Safety education & training;Patient/Caregiver education & training;Equipment evaluation, education, & procurement;Self-Care / ADL; Coordination training     Restrictions   Restrictions/Precautions  Restrictions/Precautions: Fall Risk, General Precautions, Weight Bearing  Required Braces or Orthoses?: Yes  Upper Extremity Weight Bearing Restrictions  Right Upper Extremity Weight Bearing: Non Weight Bearing  Required Braces or Orthoses  Right Upper Extremity Brace/Splint: Sling  Right Upper Extremity Brace/Splint: sling and swathe    Subjective   Subjective  Subjective: Pt in recliner, reluctantly agreed to washing up and fxl mob training. \"Everything hurts. \"  Pain: \"a lot of all over body pain, mostly in my R arm. \"           Objective    Vitals           ADL  Grooming: Moderate assistance  UE Bathing: Maximum assistance  LE Bathing: Maximum assistance  UE Dressing: Maximum assistance  LE Dressing: Maximum assistance  Toileting: Maximum assistance  Additional Comments: Patient CGA/min A ADL transfers and mobility using QC, slow pace > normal household distance. Pt limited by fatigue/endurance. Safety Devices  Type of Devices:  All fall risk precautions in place;Call light within reach; Chair alarm in place; Left in chair;Nurse notified     Patient Education  Education Given To: Patient  Education Provided: Role of Therapy;Plan of Care;Transfer Training; Fall Prevention Strategies; ADL Adaptive Strategies  Education Provided Comments: one armed techs \"I am R handed, I cant do anything with my Left. \"  Education Method: Verbal;Demonstration  Barriers to Learning: None  Education Outcome: Continued education needed    Goals  Short Term Goals  Time Frame for Short Term Goals: 21 visits  Short Term Goal 1: Patient to be educated on and complete pendulums RUE to prevent tightness/contracutres. Short Term Goal 2: Patient to complete ADL routine c min A c use of compensatory techniques vs. one handed techniques to improve self care and independence. Short Term Goal 3: Patient to engage in LUE strengthening and RUE elbow, forearm, wrist and digit ROM. Short Term Goal 4: Patient to be educated on d/c folder, AE/DME and basic home safety to ensure safe and indep return home.        Therapy Time   Individual Concurrent Group Co-treatment   Time In 0925         Time Out 1016         Minutes 25                 ROBERT Kelly

## 2023-09-07 NOTE — PROGRESS NOTES
Physical Therapy  Facility/Department: [facility] AT THE [facility] MED SURG  Daily Treatment Note  NAME: Chelsey Hunter  : 1935  MRN: [id number]    Date of Service: 2023    Discharge Recommendations:  Continue to assess pending progress, Patient would benefit from continued therapy after discharge, [address]     Patient Diagnosis(es): The encounter diagnosis was History of right shoulder fracture. Assessment   Assessment: Pt. required increased encouragagment to work with therapy and attempt BSC use. Bed mobility:MAx A with HOB elevated and HR use. Transfers:Min A x1. Gait 10ftx1 with quad cane Min A x1, decreased safety awareness and impulsive. Activity Tolerance: Patient limited by fatigue;Patient limited by endurance; Patient limited by pain     Plan    Physcial Therapy Plan  General Plan: 2 times a day 7 days a week  Current Treatment Recommendations: Strengthening;Balance training;Functional mobility training;Transfer training; Endurance training;Gait training;Stair training;Neuromuscular re-education;Home exercise program;Safety education & training; Therapeutic activities; Positioning     Restrictions  Restrictions/Precautions  Restrictions/Precautions: Fall Risk, General Precautions, Weight Bearing  Required Braces or Orthoses?: Yes  Upper Extremity Weight Bearing Restrictions  Right Upper Extremity Weight Bearing: Non Weight Bearing  Required Braces or Orthoses  Right Upper Extremity Brace/Splint: Sling  Right Upper Extremity Brace/Splint: sling and swathe     Subjective    Subjective  Subjective: Pt. in bed upon arrival, agreeable to get up to chair. Pt. staed she needed to use the bathroom but riginally declined BSC or toilet use. Education provided by RN and PTA and pt. agreeable to use BSC. Pain: denies at rest, 8/10 up and moving.   Orientation  Overall Orientation Status: Within Functional Limits     Objective   Bed Mobility Training  Bed Mobility Training: Yes  Overall Level of

## 2023-09-07 NOTE — PROGRESS NOTES
Pt alert and oriented x4 resting comfortably in bed at this time. Vitals and assessment completed as charted. Pt denies any current needs at this time. Call light is within reach, bed alarm on. Care ongoing.

## 2023-09-07 NOTE — PROGRESS NOTES
[facility] Note-Warfarin Follow-up       Patient:  Robert Coleman  MRN: [id number]    Warfarin consult follow-up:    INR (no units)   Date Value   09/07/2023 3.8   09/06/2023 4.8 (HH)   08/01/2023 2.5   06/29/2023 1.8   06/20/2023 2.3   05/18/2023 2.3   04/13/2023 2.7       Hemoglobin (g/dL)   Date Value   09/07/2023 9.0 (L)   09/06/2023 8.4 (L)   09/05/2023 10.2 (L)     Hematocrit (%)   Date Value   09/07/2023 28.3 (L)   09/06/2023 26.4 (L)   09/05/2023 31.6 (L)     Platelets (k/uL)   Date Value   09/07/2023 127 (L)   09/06/2023 119 (L)   09/05/2023 171       Target INR Range: 2-3    Significant Drug-Drug Interactions:  New warfarin drug-drug interactions: none  Discontinued drug-drug interactions: lovenox      Notes:                       HOLD warfarin today for INR 3.8. Daily PT/INR until stable within therapeutic range.      Thank you,  Arthur Grant, PharmD, 9/7/2023 7:56 AM

## 2023-09-07 NOTE — PLAN OF CARE
Problem: Discharge Planning  Goal: Discharge to home or other facility with appropriate resources  Outcome: Progressing     Problem: Pain  Goal: Verbalizes/displays adequate comfort level or baseline comfort level  Outcome: Progressing     Problem: Safety - Adult  Goal: Free from fall injury  Outcome: Progressing     Problem: ABCDS Injury Assessment  Goal: Absence of physical injury  Outcome: Progressing     Problem: Skin/Tissue Integrity  Goal: Absence of new skin breakdown  Description: 1. Monitor for areas of redness and/or skin breakdown  2. Assess vascular access sites hourly  3. Every 4-6 hours minimum:  Change oxygen saturation probe site  4. Every 4-6 hours:  If on nasal continuous positive airway pressure, respiratory therapy assess nares and determine need for appliance change or resting period.   Outcome: Progressing     Problem: Nutrition Deficit:  Goal: Optimize nutritional status  9/7/2023 0319 by Wade Flores RN  Outcome: Progressing  9/6/2023 1421 by Nina Anguol RD, LD  Flowsheets (Taken 9/6/2023 1421)  Nutrient intake appropriate for improving, restoring, or maintaining nutritional needs:   Monitor oral intake, labs, and treatment plans   Recommend appropriate diets, oral nutritional supplements, and vitamin/mineral supplements  Note: Nutrition Problem #1: Moderate malnutrition  Intervention: Food and/or Nutrient Delivery: Continue Current Diet, Continue Oral Nutrition Supplement

## 2023-09-08 VITALS
HEIGHT: 60 IN | DIASTOLIC BLOOD PRESSURE: 53 MMHG | HEART RATE: 64 BPM | WEIGHT: 103.6 LBS | SYSTOLIC BLOOD PRESSURE: 115 MMHG | OXYGEN SATURATION: 94 % | TEMPERATURE: 98.2 F | BODY MASS INDEX: 20.34 KG/M2 | RESPIRATION RATE: 16 BRPM

## 2023-09-08 LAB
ANION GAP SERPL CALCULATED.3IONS-SCNC: 9 MMOL/L (ref 9–17)
BASOPHILS # BLD: 0.03 K/UL (ref 0–0.2)
BASOPHILS NFR BLD: 1 % (ref 0–2)
BUN SERPL-MCNC: 35 MG/DL (ref 8–23)
BUN/CREAT SERPL: 19 (ref 9–20)
CALCIUM SERPL-MCNC: 8.7 MG/DL (ref 8.6–10.4)
CHLORIDE SERPL-SCNC: 110 MMOL/L (ref 98–107)
CO2 SERPL-SCNC: 20 MMOL/L (ref 20–31)
CREAT SERPL-MCNC: 1.8 MG/DL (ref 0.5–0.9)
EOSINOPHIL # BLD: 0.26 K/UL (ref 0–0.44)
EOSINOPHILS RELATIVE PERCENT: 4 % (ref 1–4)
ERYTHROCYTE [DISTWIDTH] IN BLOOD BY AUTOMATED COUNT: 14.1 % (ref 11.8–14.4)
GFR SERPL CREATININE-BSD FRML MDRD: 27 ML/MIN/1.73M2
GLUCOSE SERPL-MCNC: 102 MG/DL (ref 70–99)
HCT VFR BLD AUTO: 27.7 % (ref 36.3–47.1)
HGB BLD-MCNC: 8.8 G/DL (ref 11.9–15.1)
IMM GRANULOCYTES # BLD AUTO: 0.04 K/UL (ref 0–0.3)
IMM GRANULOCYTES NFR BLD: 1 %
INR PPP: 3.7
LYMPHOCYTES NFR BLD: 0.94 K/UL (ref 1.1–3.7)
LYMPHOCYTES RELATIVE PERCENT: 14 % (ref 24–43)
MCH RBC QN AUTO: 32.6 PG (ref 25.2–33.5)
MCHC RBC AUTO-ENTMCNC: 31.8 G/DL (ref 28.4–34.8)
MCV RBC AUTO: 102.6 FL (ref 82.6–102.9)
MONOCYTES NFR BLD: 0.69 K/UL (ref 0.1–1.2)
MONOCYTES NFR BLD: 10 % (ref 3–12)
NEUTROPHILS NFR BLD: 70 % (ref 36–65)
NEUTS SEG NFR BLD: 4.68 K/UL (ref 1.5–8.1)
NRBC BLD-RTO: 0 PER 100 WBC
PLATELET # BLD AUTO: 131 K/UL (ref 138–453)
PMV BLD AUTO: 12.3 FL (ref 8.1–13.5)
POTASSIUM SERPL-SCNC: 5 MMOL/L (ref 3.7–5.3)
PROTHROMBIN TIME: 36.5 SEC (ref 11.9–14.8)
RBC # BLD AUTO: 2.7 M/UL (ref 3.95–5.11)
SODIUM SERPL-SCNC: 139 MMOL/L (ref 135–144)
WBC OTHER # BLD: 6.6 K/UL (ref 3.5–11.3)

## 2023-09-08 PROCEDURE — 94761 N-INVAS EAR/PLS OXIMETRY MLT: CPT

## 2023-09-08 PROCEDURE — 80048 BASIC METABOLIC PNL TOTAL CA: CPT

## 2023-09-08 PROCEDURE — 97110 THERAPEUTIC EXERCISES: CPT

## 2023-09-08 PROCEDURE — 36415 COLL VENOUS BLD VENIPUNCTURE: CPT

## 2023-09-08 PROCEDURE — 6370000000 HC RX 637 (ALT 250 FOR IP): Performed by: NURSE PRACTITIONER

## 2023-09-08 PROCEDURE — 2580000003 HC RX 258: Performed by: NURSE PRACTITIONER

## 2023-09-08 PROCEDURE — 85025 COMPLETE CBC W/AUTO DIFF WBC: CPT

## 2023-09-08 PROCEDURE — 85610 PROTHROMBIN TIME: CPT

## 2023-09-08 PROCEDURE — 97116 GAIT TRAINING THERAPY: CPT

## 2023-09-08 RX ORDER — POLYETHYLENE GLYCOL 3350 17 G/17G
17 POWDER, FOR SOLUTION ORAL DAILY PRN
Qty: 527 G | Refills: 1 | DISCHARGE
Start: 2023-09-08 | End: 2023-10-08

## 2023-09-08 RX ORDER — POLYETHYLENE GLYCOL 3350 17 G/17G
17 POWDER, FOR SOLUTION ORAL DAILY
Qty: 527 G | Refills: 1 | DISCHARGE
Start: 2023-09-09 | End: 2023-10-09

## 2023-09-08 RX ORDER — BISACODYL 10 MG
10 SUPPOSITORY, RECTAL RECTAL ONCE
Status: COMPLETED | OUTPATIENT
Start: 2023-09-08 | End: 2023-09-08

## 2023-09-08 RX ORDER — FAMOTIDINE 10 MG
10 TABLET ORAL DAILY
Qty: 60 TABLET | Refills: 3 | DISCHARGE
Start: 2023-09-09

## 2023-09-08 RX ORDER — ONDANSETRON 4 MG/1
4 TABLET, ORALLY DISINTEGRATING ORAL EVERY 8 HOURS PRN
DISCHARGE
Start: 2023-09-08

## 2023-09-08 RX ORDER — OXYCODONE HYDROCHLORIDE AND ACETAMINOPHEN 5; 325 MG/1; MG/1
1 TABLET ORAL EVERY 4 HOURS PRN
Qty: 18 TABLET | Refills: 0 | Status: SHIPPED | OUTPATIENT
Start: 2023-09-08 | End: 2023-09-11

## 2023-09-08 RX ORDER — 0.9 % SODIUM CHLORIDE 0.9 %
500 INTRAVENOUS SOLUTION INTRAVENOUS ONCE
Status: COMPLETED | OUTPATIENT
Start: 2023-09-08 | End: 2023-09-08

## 2023-09-08 RX ORDER — PSEUDOEPHEDRINE HCL 30 MG
100 TABLET ORAL 2 TIMES DAILY
DISCHARGE
Start: 2023-09-08

## 2023-09-08 RX ADMIN — FAMOTIDINE 10 MG: 20 TABLET, FILM COATED ORAL at 08:58

## 2023-09-08 RX ADMIN — MULTIVITAMIN TABLET 1 TABLET: TABLET at 08:57

## 2023-09-08 RX ADMIN — ATORVASTATIN CALCIUM 80 MG: 40 TABLET, FILM COATED ORAL at 08:57

## 2023-09-08 RX ADMIN — POLYETHYLENE GLYCOL 3350 17 G: 17 POWDER, FOR SOLUTION ORAL at 08:58

## 2023-09-08 RX ADMIN — DOCUSATE SODIUM 100 MG: 100 CAPSULE, LIQUID FILLED ORAL at 08:57

## 2023-09-08 RX ADMIN — SODIUM CHLORIDE, PRESERVATIVE FREE 10 ML: 5 INJECTION INTRAVENOUS at 08:58

## 2023-09-08 RX ADMIN — OXYCODONE HYDROCHLORIDE AND ACETAMINOPHEN 2 TABLET: 5; 325 TABLET ORAL at 06:45

## 2023-09-08 RX ADMIN — CYANOCOBALAMIN TAB 1000 MCG 1000 MCG: 1000 TAB at 08:58

## 2023-09-08 RX ADMIN — BISACODYL 10 MG: 10 SUPPOSITORY RECTAL at 08:50

## 2023-09-08 RX ADMIN — SODIUM CHLORIDE 500 ML: 9 INJECTION, SOLUTION INTRAVENOUS at 10:49

## 2023-09-08 RX ADMIN — ANTACID TABLETS 500 MG: 500 TABLET, CHEWABLE ORAL at 08:58

## 2023-09-08 ASSESSMENT — PAIN - FUNCTIONAL ASSESSMENT: PAIN_FUNCTIONAL_ASSESSMENT: PREVENTS OR INTERFERES SOME ACTIVE ACTIVITIES AND ADLS

## 2023-09-08 ASSESSMENT — PAIN DESCRIPTION - LOCATION: LOCATION: SHOULDER;ARM

## 2023-09-08 ASSESSMENT — PAIN DESCRIPTION - DESCRIPTORS: DESCRIPTORS: ACHING;DISCOMFORT

## 2023-09-08 ASSESSMENT — PAIN DESCRIPTION - ORIENTATION: ORIENTATION: RIGHT

## 2023-09-08 ASSESSMENT — PAIN SCALES - GENERAL
PAINLEVEL_OUTOF10: 10
PAINLEVEL_OUTOF10: 2

## 2023-09-08 ASSESSMENT — PAIN DESCRIPTION - ONSET: ONSET: ON-GOING

## 2023-09-08 ASSESSMENT — PAIN DESCRIPTION - PAIN TYPE: TYPE: ACUTE PAIN

## 2023-09-08 ASSESSMENT — PAIN DESCRIPTION - FREQUENCY: FREQUENCY: CONTINUOUS

## 2023-09-08 NOTE — PROGRESS NOTES
[facility] Note-Warfarin Follow-up       Patient:  Jeanna Pinzon  MRN: [id number]    Warfarin consult follow-up:    INR (no units)   Date Value   09/08/2023 3.7   09/07/2023 3.8   09/06/2023 4.8 (HH)   08/01/2023 2.5   06/29/2023 1.8   06/20/2023 2.3   05/18/2023 2.3       Hemoglobin (g/dL)   Date Value   09/08/2023 8.8 (L)   09/07/2023 9.0 (L)   09/06/2023 8.4 (L)     Hematocrit (%)   Date Value   09/08/2023 27.7 (L)   09/07/2023 28.3 (L)   09/06/2023 26.4 (L)     Platelets (k/uL)   Date Value   09/08/2023 131 (L)   09/07/2023 127 (L)   09/06/2023 119 (L)       Target INR Range: 2-3    Significant Drug-Drug Interactions:  New warfarin drug-drug interactions: none  Discontinued drug-drug interactions: none      Notes:                     HOLD warfarin for INR 3.7. Daily PT/INR until stable within therapeutic range.      Thank you,  Ck Pham, PharmD, 9/8/2023 8:42 AM

## 2023-09-08 NOTE — PROGRESS NOTES
Physical Therapy  Facility/Department: [facility] AT THE [facility] MED SURG  Daily Treatment Note  NAME: Alfredo Preciado  : 1935  MRN: [id number]    Date of Service: 2023    Discharge Recommendations:  Continue to assess pending progress, Patient would benefit from continued therapy after discharge, [address]        Patient Diagnosis(es): The encounter diagnosis was History of right shoulder fracture. Assessment   Assessment: Gait training with SBQC and CGA with additional time to complete 25ft. Cues for posture and foot clearance. Transfers completed with CGA. Seated and supine B LE therex x 15 each, AAROM SLR B LE's d/t weakness and fatigue. Short rest breaks throughout for energy conservation. Activity Tolerance: Patient limited by fatigue;Patient limited by endurance (Fair tolerance.)     Plan    Physcial Therapy Plan  General Plan: 2 times a day 7 days a week (1x per day on weekends.)  Current Treatment Recommendations: Strengthening;Balance training;Functional mobility training;Transfer training; Endurance training;Gait training;Stair training;Neuromuscular re-education;Home exercise program;Safety education & training; Therapeutic activities; Positioning     Restrictions  Restrictions/Precautions  Restrictions/Precautions: Fall Risk, General Precautions, Weight Bearing  Required Braces or Orthoses?: Yes  Upper Extremity Weight Bearing Restrictions  Right Upper Extremity Weight Bearing: Non Weight Bearing  Required Braces or Orthoses  Right Upper Extremity Brace/Splint: Sling  Right Upper Extremity Brace/Splint: sling and swathe     Subjective    Subjective  Subjective: pt sitting EOB with RN upon PTA's arrival, pleasant and agreeable to therapy  Pain: pt reports she always has LBP, and also had R UE pain--RN already administered pain meds this AM.     Objective   Vitals     Bed Mobility Training  Bed Mobility Training: No (pt sitting EOB upon PTA's arrival.)  Balance  Sitting: Intact  Standing:

## 2023-09-08 NOTE — PROGRESS NOTES
Entered patient's room for morning vital signs and head to toe assessment. Patient resting in the bed at this time. A&O x4, calm, and cooperative. Patient complains of 10/10 pain at this time in her right arm/shoulder, PRN pain medication given, see MAR. Vital signs and head to toe assessment completed at this time, see flowsheets for more details. Patient repositioned in bed at this time. Patient denies no more needs at this time. Call light within reach. Bed alarm on. Bed wheels locked. Bed in lowest position.

## 2023-09-08 NOTE — DISCHARGE SUMMARY
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 [facility] Course: Miles Cohen is a 80 y.o. female admitted with closed fracture of proximal right humerus. She  presented to the emergency room with complaints of right shoulder pain. Patient underwent a fall at home with a trip falling onto her right shoulder. Patient stated she came to the emergency room and was evaluated and found to have a humerus fracture and was discharged home. Patient does live alone. She states her sister lives across the street and her son checks on her on the weekends. Patient stated normally she is very independent and goes outside some weeds her flower gardens. Patient stated her pain has been uncontrolled causing constipation and drowsiness as well. She denied any further falls since Saturday. Patient stated since that time her back hurts as well as other muscles. During patient's admission Dr. Padmini Aranda was consulted no surgical intervention was required. Patient was placed in a sling and swath. Patient was changed to Percocet orally and tolerated this change. PT and OT was consulted. Patient continues to complain of pain however is stable. Hemodynamically patient stable. Plan will be to discharge patient to [place] [facility] today. She will continue on her Coumadin. I will ask that she get a BMP, CBC with differential and a PT/INR on the .     Consultants:  Dr. Padmini Aranda, ortho    Procedures: 500 MG Tabs tablet  Commonly known as: OSCAL     carvedilol 25 MG tablet  Commonly known as: COREG     CENTRUM SILVER PO     Handicap Placard Misc  by Does not apply route Duration; 4yrs     losartan 100 MG tablet  Commonly known as: COZAAR  Take 1 tablet by mouth daily     vitamin B-12 1000 MCG tablet  Commonly known as: CYANOCOBALAMIN            STOP taking these medications      HYDROcodone-acetaminophen 5-325 MG per tablet  Commonly known as: Norco               Where to Get Your Medications        You can get these medications from any pharmacy    Bring a paper prescription for each of these medications  oxyCODONE-acetaminophen 5-325 MG per tablet       Information about where to get these medications is not yet available    Ask your nurse or doctor about these medications  docusate 100 MG Caps  famotidine 10 MG tablet  ondansetron 4 MG disintegrating tablet  polyethylene glycol 17 g packet  polyethylene glycol 17 g packet         Patient Instructions:    Activity: activity as tolerated  Diet: regular diet  Wound Care: none needed  Other: BMP, CBC with differential, PT/INR on 9/12/2023    Disposition:   DC to [address] [place] [address]    Follow up:  Patient will be followed by Delia Sierra MD in 1-2 weeks    CORE MEASURES on Discharge (if applicable)  ACE/ARB in CHF: NA  Statin in MI: NA  ASA in MI: NA  Statin in CVA: NA  Antiplatelet in CVA: NA    Total time spent on discharge services: 40 minutes    Including the following activities:  Evaluation and Management of patient  Discussion with patient and/or surrogate about current care plan  Coordination with Case Management and/or   Coordination of care with Consultants (if applicable)   Coordination of care with Receiving Facility Physician (if applicable)  Completion of DME forms (if applicable)  Preparation of Discharge Summary  Preparation of Medication Reconciliation  Preparation of Discharge Prescriptions    Signed:  Domonique Martinez

## 2023-09-08 NOTE — PLAN OF CARE
Problem: Discharge Planning  Goal: Discharge to home or other facility with appropriate resources  Outcome: Completed     Problem: Pain  Goal: Verbalizes/displays adequate comfort level or baseline comfort level  Outcome: Completed     Problem: Safety - Adult  Goal: Free from fall injury  Outcome: Completed     Problem: ABCDS Injury Assessment  Goal: Absence of physical injury  Outcome: Completed     Problem: Skin/Tissue Integrity  Goal: Absence of new skin breakdown  Description: 1. Monitor for areas of redness and/or skin breakdown  2. Assess vascular access sites hourly  3. Every 4-6 hours minimum:  Change oxygen saturation probe site  4. Every 4-6 hours:  If on nasal continuous positive airway pressure, respiratory therapy assess nares and determine need for appliance change or resting period.   Outcome: Completed     Problem: Nutrition Deficit:  Goal: Optimize nutritional status  Outcome: Completed

## 2023-09-08 NOTE — PROGRESS NOTES
Occupational Therapy  Facility/Department: [address] [place] [address] MED SURG  Daily Treatment Note  NAME: Melodie Carter  : 1935  MRN: [id number]    Date of Service: 2023    Discharge Recommendations:  Continue to assess pending progress, Subacute/Skilled Nursing Facility         Patient Diagnosis(es): The encounter diagnosis was History of right shoulder fracture. Assessment    Activity Tolerance: Patient limited by pain; Patient limited by endurance  Discharge Recommendations: Continue to assess pending progress;Subacute/Skilled Nursing Facility      Plan   Occupational Therapy Plan  Times Per Day: Once a day  Days Per Week: 7 Days  Current Treatment Recommendations: Strengthening;ROM;Balance training;Functional mobility training; Endurance training; Safety education & training;Patient/Caregiver education & training;Equipment evaluation, education, & procurement;Self-Care / ADL; Coordination training     Restrictions   Restrictions/Precautions  Restrictions/Precautions: Fall Risk, General Precautions, Weight Bearing  Required Braces or Orthoses?: Yes  Upper Extremity Weight Bearing Restrictions  Right Upper Extremity Weight Bearing: Non Weight Bearing  Required Braces or Orthoses  Right Upper Extremity Brace/Splint: Sling  Right Upper Extremity Brace/Splint: sling and swathe     Subjective   Subjective  Subjective: Pt in recliner, \"Ill do a little\" therex. Pain: RUE did not rate  Pain: pt reports she always has LBP, and also had R UE pain--RN already administered pain meds this AM.           Objective    Vitals     Pt declined ADLs at this time. OT Exercises  Exercise Treatment: Pt tolerated 1 unit only of LUE therex to increase strength/endurnace for ease of fxl tasks. red digiflex x 5, pt declined to attempt with R hand, 1# free weight x 10 reps x all planes shoulder elbow and wrist for a total of ~ 5-6 min with 0 RBs. Pt edu verbally on HEP for strengthening of LUE to compensate for non-use of RUE at this time. Pt was not interested. PROM Exercises: Clinician attempted to edu pt on PROM for R digits, thumb and wrist/FA, pt would not allow clinician to touch RUE citing pain. Safety Devices  Type of Devices: All fall risk precautions in place;Call light within reach; Chair alarm in place; Left in chair     Patient Education  Education Given To: Patient  Education Provided: Role of Therapy;Plan of Care;Home Exercise Program  Education Method: Verbal  Barriers to Learning: None  Education Outcome: Continued education needed    Goals  Short Term Goals  Time Frame for Short Term Goals: 21 visits  Short Term Goal 1: Patient to be educated on and complete pendulums RUE to prevent tightness/contracutres. Short Term Goal 2: Patient to complete ADL routine c min A c use of compensatory techniques vs. one handed techniques to improve self care and independence. Short Term Goal 3: Patient to engage in LUE strengthening and RUE elbow, forearm, wrist and digit ROM. Short Term Goal 4: Patient to be educated on d/c folder, AE/DME and basic home safety to ensure safe and indep return home.        Therapy Time   Individual Concurrent Group Co-treatment   Time In 1118         Time Out 1130         Minutes 12                 ROBERT Mosley

## 2023-09-08 NOTE — PROGRESS NOTES
Patient is discharge to [facility] today. Discharge paper work done and fax to SNF.   CLAY Camargo

## 2023-09-08 NOTE — PROGRESS NOTES
Faina Howard CNP notified of the patient's lower BP reading this AM, more than likely due to percocet that was given this AM for pain, see MAR. Faina Howard CNP approved for metoprolol and losartan to be held at this time until BP reaches a more acceptable range.

## 2023-09-08 NOTE — PROGRESS NOTES
Discussed transport with family, family believes it will be too difficult to get patient into their personal vehicles. [facility] called at this time for transport. [facility] transport scheduled for 1430.

## 2023-09-08 NOTE — PROGRESS NOTES
[facility]. Report given to receiving nurse, Anthony Reynaga. Updated in medications and scripts being sent with the patient. Summary of hospitalization. Receiving nurse updated on the patient's lower BP this AM. Last set of vital signs given. ETA of 1430 and a call back number given at this time. No further questions or concerns.

## 2023-09-08 NOTE — PROGRESS NOTES
Patient discharged at this time. Patient left floor via wheelchair. [facility] to transport to [facility].

## 2023-09-11 ENCOUNTER — HOSPITAL ENCOUNTER (OUTPATIENT)
Age: 88
Setting detail: SPECIMEN
Discharge: HOME | End: 2023-09-11

## 2023-09-11 LAB
ALBUMIN SERPL-MCNC: 3.4 G/DL (ref 3.5–5.2)
ALBUMIN/GLOB SERPL: 1.3 {RATIO} (ref 1–2.5)
ALP SERPL-CCNC: 71 U/L (ref 35–104)
ALT SERPL-CCNC: 10 U/L (ref 5–33)
ANION GAP SERPL CALCULATED.3IONS-SCNC: 8 MMOL/L (ref 9–17)
AST SERPL-CCNC: 18 U/L
BILIRUB SERPL-MCNC: 0.5 MG/DL (ref 0.3–1.2)
BUN SERPL-MCNC: 38 MG/DL (ref 8–23)
BUN/CREAT SERPL: 21 (ref 9–20)
CALCIUM SERPL-MCNC: 8.9 MG/DL (ref 8.6–10.4)
CHLORIDE SERPL-SCNC: 110 MMOL/L (ref 98–107)
CO2 SERPL-SCNC: 21 MMOL/L (ref 20–31)
CREAT SERPL-MCNC: 1.8 MG/DL (ref 0.5–0.9)
ERYTHROCYTE [DISTWIDTH] IN BLOOD BY AUTOMATED COUNT: 14.4 % (ref 11.8–14.4)
GFR SERPL CREATININE-BSD FRML MDRD: 27 ML/MIN/1.73M2
GLUCOSE SERPL-MCNC: 103 MG/DL (ref 70–99)
HCT VFR BLD AUTO: 27.7 % (ref 36.3–47.1)
HGB BLD-MCNC: 8.7 G/DL (ref 11.9–15.1)
MCH RBC QN AUTO: 32.3 PG (ref 25.2–33.5)
MCHC RBC AUTO-ENTMCNC: 31.4 G/DL (ref 28.4–34.8)
MCV RBC AUTO: 103 FL (ref 82.6–102.9)
NRBC BLD-RTO: 0 PER 100 WBC
PLATELET # BLD AUTO: 170 K/UL (ref 138–453)
PMV BLD AUTO: 11.9 FL (ref 8.1–13.5)
POTASSIUM SERPL-SCNC: 4.9 MMOL/L (ref 3.7–5.3)
PROT SERPL-MCNC: 6 G/DL (ref 6.4–8.3)
RBC # BLD AUTO: 2.69 M/UL (ref 3.95–5.11)
SODIUM SERPL-SCNC: 139 MMOL/L (ref 135–144)
WBC OTHER # BLD: 5.7 K/UL (ref 3.5–11.3)

## 2023-09-11 PROCEDURE — P9603 ONE-WAY ALLOW PRORATED MILES: HCPCS

## 2023-09-11 PROCEDURE — 85027 COMPLETE CBC AUTOMATED: CPT

## 2023-09-11 PROCEDURE — 36415 COLL VENOUS BLD VENIPUNCTURE: CPT

## 2023-09-11 PROCEDURE — 80053 COMPREHEN METABOLIC PANEL: CPT

## 2023-09-14 ENCOUNTER — HOSPITAL ENCOUNTER (OUTPATIENT)
Age: 88
Setting detail: SPECIMEN
Discharge: HOME | End: 2023-09-14
Payer: MEDICARE

## 2023-09-14 LAB

## 2023-09-14 PROCEDURE — 87086 URINE CULTURE/COLONY COUNT: CPT

## 2023-09-14 PROCEDURE — 81001 URINALYSIS AUTO W/SCOPE: CPT

## 2023-09-14 PROCEDURE — 87186 SC STD MICRODIL/AGAR DIL: CPT

## 2023-09-14 PROCEDURE — 87088 URINE BACTERIA CULTURE: CPT

## 2023-09-16 LAB
MICROORGANISM SPEC CULT: ABNORMAL
SPECIMEN DESCRIPTION: ABNORMAL

## 2023-09-18 ENCOUNTER — HOSPITAL ENCOUNTER (OUTPATIENT)
Age: 88
Setting detail: SPECIMEN
Discharge: HOME | End: 2023-09-18

## 2023-09-18 LAB
ALBUMIN SERPL-MCNC: 3.4 G/DL (ref 3.5–5.2)
ALBUMIN/GLOB SERPL: 1.4 {RATIO} (ref 1–2.5)
ALP SERPL-CCNC: 87 U/L (ref 35–104)
ALT SERPL-CCNC: 10 U/L (ref 5–33)
ANION GAP SERPL CALCULATED.3IONS-SCNC: 6 MMOL/L (ref 9–17)
AST SERPL-CCNC: 17 U/L
BILIRUB SERPL-MCNC: 0.5 MG/DL (ref 0.3–1.2)
BUN SERPL-MCNC: 36 MG/DL (ref 8–23)
BUN/CREAT SERPL: 23 (ref 9–20)
CALCIUM SERPL-MCNC: 8.7 MG/DL (ref 8.6–10.4)
CHLORIDE SERPL-SCNC: 108 MMOL/L (ref 98–107)
CO2 SERPL-SCNC: 24 MMOL/L (ref 20–31)
CREAT SERPL-MCNC: 1.6 MG/DL (ref 0.5–0.9)
ERYTHROCYTE [DISTWIDTH] IN BLOOD BY AUTOMATED COUNT: 14.6 % (ref 11.8–14.4)
GFR SERPL CREATININE-BSD FRML MDRD: 31 ML/MIN/1.73M2
GLUCOSE SERPL-MCNC: 104 MG/DL (ref 70–99)
HCT VFR BLD AUTO: 27.8 % (ref 36.3–47.1)
HGB BLD-MCNC: 8.8 G/DL (ref 11.9–15.1)
MCH RBC QN AUTO: 32.2 PG (ref 25.2–33.5)
MCHC RBC AUTO-ENTMCNC: 31.7 G/DL (ref 28.4–34.8)
MCV RBC AUTO: 101.8 FL (ref 82.6–102.9)
NRBC BLD-RTO: 0 PER 100 WBC
PLATELET # BLD AUTO: 200 K/UL (ref 138–453)
PMV BLD AUTO: 12.2 FL (ref 8.1–13.5)
POTASSIUM SERPL-SCNC: 5.1 MMOL/L (ref 3.7–5.3)
PROT SERPL-MCNC: 5.8 G/DL (ref 6.4–8.3)
RBC # BLD AUTO: 2.73 M/UL (ref 3.95–5.11)
SODIUM SERPL-SCNC: 138 MMOL/L (ref 135–144)
WBC OTHER # BLD: 7.9 K/UL (ref 3.5–11.3)

## 2023-09-18 PROCEDURE — 85027 COMPLETE CBC AUTOMATED: CPT

## 2023-09-18 PROCEDURE — 36415 COLL VENOUS BLD VENIPUNCTURE: CPT

## 2023-09-18 PROCEDURE — 83540 ASSAY OF IRON: CPT

## 2023-09-18 PROCEDURE — P9604 ONE-WAY ALLOW PRORATED TRIP: HCPCS

## 2023-09-18 PROCEDURE — 80053 COMPREHEN METABOLIC PANEL: CPT

## 2023-09-18 PROCEDURE — 83550 IRON BINDING TEST: CPT

## 2023-09-19 LAB
IRON SATN MFR SERPL: 23 % (ref 20–55)
IRON SERPL-MCNC: 40 UG/DL (ref 37–145)
TIBC SERPL-MCNC: 176 UG/DL (ref 250–450)
UNSATURATED IRON BINDING CAPACITY: 136 UG/DL (ref 112–347)

## 2023-09-25 ENCOUNTER — HOSPITAL ENCOUNTER (INPATIENT)
Age: 88
LOS: 3 days | Discharge: NURSING, REHAB OR LONG-TERM CARE | End: 2023-09-28
Attending: EMERGENCY MEDICINE | Admitting: INTERNAL MEDICINE
Payer: MEDICARE

## 2023-09-25 ENCOUNTER — HOSPITAL ENCOUNTER (OUTPATIENT)
Age: 88
Setting detail: SPECIMEN
Discharge: HOME | End: 2023-09-25
Payer: MEDICARE

## 2023-09-25 DIAGNOSIS — S42.271K: ICD-10-CM

## 2023-09-25 DIAGNOSIS — E87.5 HYPERKALEMIA: Primary | ICD-10-CM

## 2023-09-25 DIAGNOSIS — N17.9 AKI (ACUTE KIDNEY INJURY) (HCC): ICD-10-CM

## 2023-09-25 LAB
ALBUMIN SERPL-MCNC: 3 G/DL (ref 3.5–5.2)
ALBUMIN SERPL-MCNC: 3.3 G/DL (ref 3.5–5.2)
ALBUMIN/GLOB SERPL: 1.2 {RATIO} (ref 1–2.5)
ALBUMIN/GLOB SERPL: 1.3 {RATIO} (ref 1–2.5)
ALP SERPL-CCNC: 101 U/L (ref 35–104)
ALP SERPL-CCNC: 90 U/L (ref 35–104)
ALT SERPL-CCNC: 13 U/L (ref 5–33)
ALT SERPL-CCNC: 13 U/L (ref 5–33)
ANION GAP SERPL CALCULATED.3IONS-SCNC: 10 MMOL/L (ref 9–17)
ANION GAP SERPL CALCULATED.3IONS-SCNC: 7 MMOL/L (ref 9–17)
ANION GAP SERPL CALCULATED.3IONS-SCNC: 9 MMOL/L (ref 9–17)
AST SERPL-CCNC: 26 U/L
AST SERPL-CCNC: 34 U/L
BASOPHILS # BLD: <0.03 K/UL (ref 0–0.2)
BASOPHILS NFR BLD: 0 % (ref 0–2)
BILIRUB SERPL-MCNC: 0.2 MG/DL (ref 0.3–1.2)
BILIRUB SERPL-MCNC: 0.3 MG/DL (ref 0.3–1.2)
BUN SERPL-MCNC: 53 MG/DL (ref 8–23)
BUN SERPL-MCNC: 56 MG/DL (ref 8–23)
BUN SERPL-MCNC: 57 MG/DL (ref 8–23)
BUN/CREAT SERPL: 17 (ref 9–20)
BUN/CREAT SERPL: 18 (ref 9–20)
BUN/CREAT SERPL: 18 (ref 9–20)
CALCIUM SERPL-MCNC: 8.1 MG/DL (ref 8.6–10.4)
CALCIUM SERPL-MCNC: 8.1 MG/DL (ref 8.6–10.4)
CALCIUM SERPL-MCNC: 8.6 MG/DL (ref 8.6–10.4)
CHLORIDE SERPL-SCNC: 103 MMOL/L (ref 98–107)
CHLORIDE SERPL-SCNC: 104 MMOL/L (ref 98–107)
CHLORIDE SERPL-SCNC: 106 MMOL/L (ref 98–107)
CHP ED QC CHECK: NORMAL
CO2 SERPL-SCNC: 19 MMOL/L (ref 20–31)
CO2 SERPL-SCNC: 20 MMOL/L (ref 20–31)
CO2 SERPL-SCNC: 20 MMOL/L (ref 20–31)
CREAT SERPL-MCNC: 3.1 MG/DL (ref 0.5–0.9)
CREAT SERPL-MCNC: 3.1 MG/DL (ref 0.5–0.9)
CREAT SERPL-MCNC: 3.2 MG/DL (ref 0.5–0.9)
EOSINOPHIL # BLD: <0.03 K/UL (ref 0–0.44)
EOSINOPHILS RELATIVE PERCENT: 0 % (ref 1–4)
ERYTHROCYTE [DISTWIDTH] IN BLOOD BY AUTOMATED COUNT: 14.4 % (ref 11.8–14.4)
ERYTHROCYTE [DISTWIDTH] IN BLOOD BY AUTOMATED COUNT: 14.6 % (ref 11.8–14.4)
GFR SERPL CREATININE-BSD FRML MDRD: 13 ML/MIN/1.73M2
GFR SERPL CREATININE-BSD FRML MDRD: 14 ML/MIN/1.73M2
GFR SERPL CREATININE-BSD FRML MDRD: 14 ML/MIN/1.73M2
GLUCOSE BLD-MCNC: 102 MG/DL
GLUCOSE BLD-MCNC: 102 MG/DL (ref 74–100)
GLUCOSE BLD-MCNC: 116 MG/DL
GLUCOSE BLD-MCNC: 116 MG/DL (ref 74–100)
GLUCOSE BLD-MCNC: 124 MG/DL
GLUCOSE BLD-MCNC: 124 MG/DL (ref 74–100)
GLUCOSE BLD-MCNC: 161 MG/DL
GLUCOSE BLD-MCNC: 161 MG/DL (ref 74–100)
GLUCOSE BLD-MCNC: 69 MG/DL
GLUCOSE BLD-MCNC: 69 MG/DL (ref 74–100)
GLUCOSE BLD-MCNC: 89 MG/DL
GLUCOSE BLD-MCNC: 89 MG/DL (ref 74–100)
GLUCOSE SERPL-MCNC: 408 MG/DL (ref 70–99)
GLUCOSE SERPL-MCNC: 73 MG/DL (ref 70–99)
GLUCOSE SERPL-MCNC: 76 MG/DL (ref 70–99)
HCT VFR BLD AUTO: 27.8 % (ref 36.3–47.1)
HCT VFR BLD AUTO: 28.5 % (ref 36.3–47.1)
HGB BLD-MCNC: 8.6 G/DL (ref 11.9–15.1)
HGB BLD-MCNC: 9.2 G/DL (ref 11.9–15.1)
IMM GRANULOCYTES # BLD AUTO: <0.03 K/UL (ref 0–0.3)
IMM GRANULOCYTES NFR BLD: 0 %
INR PPP: 4.1
LYMPHOCYTES NFR BLD: 0.69 K/UL (ref 1.1–3.7)
LYMPHOCYTES RELATIVE PERCENT: 12 % (ref 24–43)
MCH RBC QN AUTO: 32 PG (ref 25.2–33.5)
MCH RBC QN AUTO: 33.7 PG (ref 25.2–33.5)
MCHC RBC AUTO-ENTMCNC: 30.9 G/DL (ref 28.4–34.8)
MCHC RBC AUTO-ENTMCNC: 32.3 G/DL (ref 28.4–34.8)
MCV RBC AUTO: 103.3 FL (ref 82.6–102.9)
MCV RBC AUTO: 104.4 FL (ref 82.6–102.9)
MONOCYTES NFR BLD: 0.25 K/UL (ref 0.1–1.2)
MONOCYTES NFR BLD: 5 % (ref 3–12)
NEUTROPHILS NFR BLD: 83 % (ref 36–65)
NEUTS SEG NFR BLD: 4.55 K/UL (ref 1.5–8.1)
NRBC BLD-RTO: 0 PER 100 WBC
NRBC BLD-RTO: 0 PER 100 WBC
PLATELET # BLD AUTO: 141 K/UL (ref 138–453)
PLATELET # BLD AUTO: 191 K/UL (ref 138–453)
PMV BLD AUTO: 12.5 FL (ref 8.1–13.5)
PMV BLD AUTO: 12.8 FL (ref 8.1–13.5)
POTASSIUM SERPL-SCNC: 6.7 MMOL/L (ref 3.7–5.3)
POTASSIUM SERPL-SCNC: 7.1 MMOL/L (ref 3.7–5.3)
POTASSIUM SERPL-SCNC: 7.7 MMOL/L (ref 3.7–5.3)
PROT SERPL-MCNC: 5.6 G/DL (ref 6.4–8.3)
PROT SERPL-MCNC: 5.8 G/DL (ref 6.4–8.3)
PROTHROMBIN TIME: 39.7 SEC (ref 11.9–14.8)
RBC # BLD AUTO: 2.69 M/UL (ref 3.95–5.11)
RBC # BLD AUTO: 2.73 M/UL (ref 3.95–5.11)
SODIUM SERPL-SCNC: 132 MMOL/L (ref 135–144)
SODIUM SERPL-SCNC: 133 MMOL/L (ref 135–144)
SODIUM SERPL-SCNC: 133 MMOL/L (ref 135–144)
WBC OTHER # BLD: 5.6 K/UL (ref 3.5–11.3)
WBC OTHER # BLD: 5.9 K/UL (ref 3.5–11.3)

## 2023-09-25 PROCEDURE — 2580000003 HC RX 258: Performed by: EMERGENCY MEDICINE

## 2023-09-25 PROCEDURE — 6370000000 HC RX 637 (ALT 250 FOR IP): Performed by: EMERGENCY MEDICINE

## 2023-09-25 PROCEDURE — 82947 ASSAY GLUCOSE BLOOD QUANT: CPT

## 2023-09-25 PROCEDURE — 85610 PROTHROMBIN TIME: CPT

## 2023-09-25 PROCEDURE — 80048 BASIC METABOLIC PNL TOTAL CA: CPT

## 2023-09-25 PROCEDURE — 99285 EMERGENCY DEPT VISIT HI MDM: CPT

## 2023-09-25 PROCEDURE — 96374 THER/PROPH/DIAG INJ IV PUSH: CPT

## 2023-09-25 PROCEDURE — 93005 ELECTROCARDIOGRAM TRACING: CPT | Performed by: EMERGENCY MEDICINE

## 2023-09-25 PROCEDURE — 94761 N-INVAS EAR/PLS OXIMETRY MLT: CPT

## 2023-09-25 PROCEDURE — 36415 COLL VENOUS BLD VENIPUNCTURE: CPT

## 2023-09-25 PROCEDURE — 80053 COMPREHEN METABOLIC PANEL: CPT

## 2023-09-25 PROCEDURE — 2500000003 HC RX 250 WO HCPCS: Performed by: EMERGENCY MEDICINE

## 2023-09-25 PROCEDURE — 85025 COMPLETE CBC W/AUTO DIFF WBC: CPT

## 2023-09-25 PROCEDURE — 1200000000 HC SEMI PRIVATE

## 2023-09-25 PROCEDURE — 6370000000 HC RX 637 (ALT 250 FOR IP): Performed by: INTERNAL MEDICINE

## 2023-09-25 PROCEDURE — 2580000003 HC RX 258: Performed by: INTERNAL MEDICINE

## 2023-09-25 PROCEDURE — 85027 COMPLETE CBC AUTOMATED: CPT

## 2023-09-25 RX ORDER — SODIUM CHLORIDE 0.9 % (FLUSH) 0.9 %
10 SYRINGE (ML) INJECTION EVERY 12 HOURS SCHEDULED
Status: DISCONTINUED | OUTPATIENT
Start: 2023-09-25 | End: 2023-09-28 | Stop reason: HOSPADM

## 2023-09-25 RX ORDER — CARVEDILOL 12.5 MG/1
12.5 TABLET ORAL 2 TIMES DAILY
Status: DISCONTINUED | OUTPATIENT
Start: 2023-09-25 | End: 2023-09-28 | Stop reason: HOSPADM

## 2023-09-25 RX ORDER — WARFARIN SODIUM 7.5 MG/1
3.75 TABLET ORAL SEE ADMIN INSTRUCTIONS
Status: DISCONTINUED | OUTPATIENT
Start: 2023-09-25 | End: 2023-09-25

## 2023-09-25 RX ORDER — POLYETHYLENE GLYCOL 3350 17 G/17G
17 POWDER, FOR SOLUTION ORAL DAILY PRN
Status: DISCONTINUED | OUTPATIENT
Start: 2023-09-25 | End: 2023-09-28 | Stop reason: HOSPADM

## 2023-09-25 RX ORDER — ACETAMINOPHEN 650 MG/1
650 SUPPOSITORY RECTAL EVERY 6 HOURS PRN
Status: DISCONTINUED | OUTPATIENT
Start: 2023-09-25 | End: 2023-09-28 | Stop reason: HOSPADM

## 2023-09-25 RX ORDER — SODIUM CHLORIDE 9 MG/ML
INJECTION, SOLUTION INTRAVENOUS PRN
Status: DISCONTINUED | OUTPATIENT
Start: 2023-09-25 | End: 2023-09-28 | Stop reason: HOSPADM

## 2023-09-25 RX ORDER — OXYCODONE HYDROCHLORIDE AND ACETAMINOPHEN 5; 325 MG/1; MG/1
1 TABLET ORAL EVERY 4 HOURS PRN
Status: DISCONTINUED | OUTPATIENT
Start: 2023-09-25 | End: 2023-09-28 | Stop reason: HOSPADM

## 2023-09-25 RX ORDER — DEXTROSE MONOHYDRATE 25 G/50ML
12.5 INJECTION, SOLUTION INTRAVENOUS ONCE
Status: COMPLETED | OUTPATIENT
Start: 2023-09-25 | End: 2023-09-25

## 2023-09-25 RX ORDER — OXYCODONE HYDROCHLORIDE AND ACETAMINOPHEN 5; 325 MG/1; MG/1
1 TABLET ORAL EVERY 4 HOURS PRN
Status: ON HOLD | COMMUNITY
End: 2023-09-28 | Stop reason: SDUPTHER

## 2023-09-25 RX ORDER — WARFARIN SODIUM 5 MG/1
2.5 TABLET ORAL
Status: DISCONTINUED | OUTPATIENT
Start: 2023-09-27 | End: 2023-09-26

## 2023-09-25 RX ORDER — SODIUM CHLORIDE 9 MG/ML
INJECTION, SOLUTION INTRAVENOUS CONTINUOUS
Status: DISCONTINUED | OUTPATIENT
Start: 2023-09-25 | End: 2023-09-28 | Stop reason: HOSPADM

## 2023-09-25 RX ORDER — AMLODIPINE BESYLATE 2.5 MG/1
2.5 TABLET ORAL NIGHTLY
Status: DISCONTINUED | OUTPATIENT
Start: 2023-09-25 | End: 2023-09-28 | Stop reason: HOSPADM

## 2023-09-25 RX ORDER — ATORVASTATIN CALCIUM 40 MG/1
80 TABLET, FILM COATED ORAL DAILY
Status: DISCONTINUED | OUTPATIENT
Start: 2023-09-26 | End: 2023-09-28 | Stop reason: HOSPADM

## 2023-09-25 RX ORDER — ONDANSETRON 4 MG/1
4 TABLET, ORALLY DISINTEGRATING ORAL EVERY 8 HOURS PRN
Status: DISCONTINUED | OUTPATIENT
Start: 2023-09-25 | End: 2023-09-28 | Stop reason: HOSPADM

## 2023-09-25 RX ORDER — CALCIUM CARBONATE 500 MG/1
500 TABLET, CHEWABLE ORAL DAILY
Status: DISCONTINUED | OUTPATIENT
Start: 2023-09-26 | End: 2023-09-28 | Stop reason: HOSPADM

## 2023-09-25 RX ORDER — ACETAMINOPHEN 325 MG/1
650 TABLET ORAL EVERY 6 HOURS PRN
Status: DISCONTINUED | OUTPATIENT
Start: 2023-09-25 | End: 2023-09-28 | Stop reason: HOSPADM

## 2023-09-25 RX ORDER — 0.9 % SODIUM CHLORIDE 0.9 %
500 INTRAVENOUS SOLUTION INTRAVENOUS ONCE
Status: COMPLETED | OUTPATIENT
Start: 2023-09-25 | End: 2023-09-25

## 2023-09-25 RX ORDER — DEXTROSE MONOHYDRATE 25 G/50ML
25 INJECTION, SOLUTION INTRAVENOUS ONCE
Status: COMPLETED | OUTPATIENT
Start: 2023-09-25 | End: 2023-09-25

## 2023-09-25 RX ORDER — SODIUM CHLORIDE 0.9 % (FLUSH) 0.9 %
10 SYRINGE (ML) INJECTION PRN
Status: DISCONTINUED | OUTPATIENT
Start: 2023-09-25 | End: 2023-09-28 | Stop reason: HOSPADM

## 2023-09-25 RX ORDER — WARFARIN SODIUM 5 MG/1
2.5 TABLET ORAL SEE ADMIN INSTRUCTIONS
Status: DISCONTINUED | OUTPATIENT
Start: 2023-09-25 | End: 2023-09-25

## 2023-09-25 RX ORDER — FAMOTIDINE 10 MG
10 TABLET ORAL DAILY
Status: DISCONTINUED | OUTPATIENT
Start: 2023-09-26 | End: 2023-09-28 | Stop reason: HOSPADM

## 2023-09-25 RX ORDER — WARFARIN SODIUM 7.5 MG/1
3.75 TABLET ORAL
Status: DISCONTINUED | OUTPATIENT
Start: 2023-09-26 | End: 2023-09-26

## 2023-09-25 RX ORDER — ESCITALOPRAM OXALATE 5 MG/1
5 TABLET ORAL DAILY
Status: DISCONTINUED | OUTPATIENT
Start: 2023-09-26 | End: 2023-09-28 | Stop reason: HOSPADM

## 2023-09-25 RX ADMIN — AMLODIPINE BESYLATE 2.5 MG: 2.5 TABLET ORAL at 21:42

## 2023-09-25 RX ADMIN — INSULIN HUMAN 5 UNITS: 100 INJECTION, SOLUTION PARENTERAL at 17:08

## 2023-09-25 RX ADMIN — DEXTROSE MONOHYDRATE 25 G: 25 INJECTION, SOLUTION INTRAVENOUS at 17:09

## 2023-09-25 RX ADMIN — SODIUM CHLORIDE: 9 INJECTION, SOLUTION INTRAVENOUS at 21:14

## 2023-09-25 RX ADMIN — CARVEDILOL 12.5 MG: 12.5 TABLET, FILM COATED ORAL at 21:42

## 2023-09-25 RX ADMIN — DEXTROSE MONOHYDRATE 12.5 G: 25 INJECTION, SOLUTION INTRAVENOUS at 18:57

## 2023-09-25 RX ADMIN — SODIUM ZIRCONIUM CYCLOSILICATE 10 G: 10 POWDER, FOR SUSPENSION ORAL at 17:08

## 2023-09-25 RX ADMIN — SODIUM CHLORIDE 500 ML: 9 INJECTION, SOLUTION INTRAVENOUS at 17:21

## 2023-09-25 RX ADMIN — ACETAMINOPHEN 650 MG: 325 TABLET ORAL at 21:49

## 2023-09-25 ASSESSMENT — PAIN DESCRIPTION - ORIENTATION
ORIENTATION: RIGHT

## 2023-09-25 ASSESSMENT — PATIENT HEALTH QUESTIONNAIRE - PHQ9
SUM OF ALL RESPONSES TO PHQ QUESTIONS 1-9: 8
4. FEELING TIRED OR HAVING LITTLE ENERGY: 2
7. TROUBLE CONCENTRATING ON THINGS, SUCH AS READING THE NEWSPAPER OR WATCHING TELEVISION: 0
6. FEELING BAD ABOUT YOURSELF - OR THAT YOU ARE A FAILURE OR HAVE LET YOURSELF OR YOUR FAMILY DOWN: 0
8. MOVING OR SPEAKING SO SLOWLY THAT OTHER PEOPLE COULD HAVE NOTICED. OR THE OPPOSITE, BEING SO FIGETY OR RESTLESS THAT YOU HAVE BEEN MOVING AROUND A LOT MORE THAN USUAL: 0
10. IF YOU CHECKED OFF ANY PROBLEMS, HOW DIFFICULT HAVE THESE PROBLEMS MADE IT FOR YOU TO DO YOUR WORK, TAKE CARE OF THINGS AT HOME, OR GET ALONG WITH OTHER PEOPLE: 0
SUM OF ALL RESPONSES TO PHQ9 QUESTIONS 1 & 2: 4
5. POOR APPETITE OR OVEREATING: 0
1. LITTLE INTEREST OR PLEASURE IN DOING THINGS: 2
9. THOUGHTS THAT YOU WOULD BE BETTER OFF DEAD, OR OF HURTING YOURSELF: 0
SUM OF ALL RESPONSES TO PHQ QUESTIONS 1-9: 8
3. TROUBLE FALLING OR STAYING ASLEEP: 2
SUM OF ALL RESPONSES TO PHQ QUESTIONS 1-9: 8
SUM OF ALL RESPONSES TO PHQ QUESTIONS 1-9: 8
2. FEELING DOWN, DEPRESSED OR HOPELESS: 2

## 2023-09-25 ASSESSMENT — PAIN - FUNCTIONAL ASSESSMENT
PAIN_FUNCTIONAL_ASSESSMENT: 0-10
PAIN_FUNCTIONAL_ASSESSMENT: PREVENTS OR INTERFERES SOME ACTIVE ACTIVITIES AND ADLS

## 2023-09-25 ASSESSMENT — PAIN SCALES - GENERAL
PAINLEVEL_OUTOF10: 8
PAINLEVEL_OUTOF10: 7
PAINLEVEL_OUTOF10: 3

## 2023-09-25 ASSESSMENT — PAIN DESCRIPTION - DESCRIPTORS
DESCRIPTORS: ACHING

## 2023-09-25 ASSESSMENT — PAIN DESCRIPTION - LOCATION
LOCATION: SHOULDER

## 2023-09-25 ASSESSMENT — LIFESTYLE VARIABLES
HOW OFTEN DO YOU HAVE A DRINK CONTAINING ALCOHOL: NEVER
HOW MANY STANDARD DRINKS CONTAINING ALCOHOL DO YOU HAVE ON A TYPICAL DAY: PATIENT DOES NOT DRINK

## 2023-09-25 ASSESSMENT — PAIN DESCRIPTION - PAIN TYPE: TYPE: ACUTE PAIN

## 2023-09-25 NOTE — ED PROVIDER NOTES
[facility]      Pt Name: Alfredo Preciado  MRN: [id number]  [facility] 1935  Date of evaluation: 9/25/2023  Provider: Joni Mcgrath MD    CHIEF COMPLAINT       Chief Complaint   Patient presents with    Abnormal Lab     K 7.1 from outpatient labs today. Patient on last day of Bactrim for UTI. HISTORY OF PRESENT ILLNESS      Alfredo Preciado is a 80 y.o. female who presents to the emergency department after being advised that her potassium was elevated during routine outpatient labs today. Patient states that she feels generally weak and \"sick\" but she has a difficult time describing this. Denies any vomiting. No fever. No chest pain or dyspnea. She has no other complaints at this time. Review of the prior labs from earlier today demonstrate creatinine of 3.1, up from 1.6 on 9/18. Potassium is 7.1.       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 09/05/2023    Colon polyp 2006, 2009    Compression fracture of lumbar vertebra (HCC) / prednisone related to PMR 07/15/2015    Diarrhea     Drug-induced hyperkalemia from Lisinopril 07/27/2018    DVT (deep venous thrombosis) ([address]) 2015    Emphysema     Essential hypertension     Hyperlipidemia     Irritable bowel syndrome     Lumbago     Osteoarthritis     Polymyalgia rheumatica ([address]) 2015    Rheumatism     Seborrheic keratoses     on back         SURGICAL HISTORY       Past Surgical History:   Procedure Laterality Date    APPENDECTOMY  1949    CATARACT REMOVAL WITH IMPLANT      COLECTOMY Right 06/20/2016    open right hemicolectomy bowel resection    COLONOSCOPY  10/17/2011    Dr. Padmini Cerda    COLONOSCOPY  05/25/2016    -bx mass hepatic flexure,tattoo    COLONOSCOPY  12/13/2017

## 2023-09-26 PROBLEM — E44.0 MODERATE MALNUTRITION (HCC): Status: RESOLVED | Noted: 2023-09-06 | Resolved: 2023-09-26

## 2023-09-26 PROBLEM — E43 SEVERE MALNUTRITION (HCC): Status: ACTIVE | Noted: 2023-09-26

## 2023-09-26 LAB
ANION GAP SERPL CALCULATED.3IONS-SCNC: 10 MMOL/L (ref 9–17)
ANION GAP SERPL CALCULATED.3IONS-SCNC: 8 MMOL/L (ref 9–17)
ANION GAP SERPL CALCULATED.3IONS-SCNC: 8 MMOL/L (ref 9–17)
BASOPHILS # BLD: 0.03 K/UL (ref 0–0.2)
BASOPHILS NFR BLD: 0 % (ref 0–2)
BUN SERPL-MCNC: 50 MG/DL (ref 8–23)
BUN SERPL-MCNC: 53 MG/DL (ref 8–23)
BUN SERPL-MCNC: 54 MG/DL (ref 8–23)
BUN/CREAT SERPL: 17 (ref 9–20)
BUN/CREAT SERPL: 17 (ref 9–20)
BUN/CREAT SERPL: 18 (ref 9–20)
CALCIUM SERPL-MCNC: 8.1 MG/DL (ref 8.6–10.4)
CALCIUM SERPL-MCNC: 8.2 MG/DL (ref 8.6–10.4)
CALCIUM SERPL-MCNC: 8.5 MG/DL (ref 8.6–10.4)
CHLORIDE SERPL-SCNC: 105 MMOL/L (ref 98–107)
CHLORIDE SERPL-SCNC: 107 MMOL/L (ref 98–107)
CHLORIDE SERPL-SCNC: 107 MMOL/L (ref 98–107)
CHLORIDE UR-SCNC: 68 MMOL/L
CO2 SERPL-SCNC: 19 MMOL/L (ref 20–31)
CO2 SERPL-SCNC: 19 MMOL/L (ref 20–31)
CO2 SERPL-SCNC: 20 MMOL/L (ref 20–31)
CREAT SERPL-MCNC: 2.9 MG/DL (ref 0.5–0.9)
CREAT SERPL-MCNC: 3 MG/DL (ref 0.5–0.9)
CREAT SERPL-MCNC: 3.1 MG/DL (ref 0.5–0.9)
EKG ATRIAL RATE: 68 BPM
EKG ATRIAL RATE: 68 BPM
EKG P AXIS: 3 DEGREES
EKG P AXIS: 67 DEGREES
EKG P-R INTERVAL: 134 MS
EKG P-R INTERVAL: 150 MS
EKG Q-T INTERVAL: 404 MS
EKG Q-T INTERVAL: 406 MS
EKG QRS DURATION: 80 MS
EKG QRS DURATION: 86 MS
EKG QTC CALCULATION (BAZETT): 429 MS
EKG QTC CALCULATION (BAZETT): 431 MS
EKG R AXIS: -11 DEGREES
EKG R AXIS: -29 DEGREES
EKG T AXIS: 19 DEGREES
EKG T AXIS: 26 DEGREES
EKG VENTRICULAR RATE: 68 BPM
EKG VENTRICULAR RATE: 68 BPM
EOSINOPHIL # BLD: 0.15 K/UL (ref 0–0.44)
EOSINOPHILS RELATIVE PERCENT: 2 % (ref 1–4)
ERYTHROCYTE [DISTWIDTH] IN BLOOD BY AUTOMATED COUNT: 14.5 % (ref 11.8–14.4)
GFR SERPL CREATININE-BSD FRML MDRD: 14 ML/MIN/1.73M2
GFR SERPL CREATININE-BSD FRML MDRD: 14 ML/MIN/1.73M2
GFR SERPL CREATININE-BSD FRML MDRD: 15 ML/MIN/1.73M2
GLUCOSE SERPL-MCNC: 76 MG/DL (ref 70–99)
GLUCOSE SERPL-MCNC: 81 MG/DL (ref 70–99)
GLUCOSE SERPL-MCNC: 86 MG/DL (ref 70–99)
HCT VFR BLD AUTO: 26.3 % (ref 36.3–47.1)
HGB BLD-MCNC: 8.4 G/DL (ref 11.9–15.1)
IMM GRANULOCYTES # BLD AUTO: 0.04 K/UL (ref 0–0.3)
IMM GRANULOCYTES NFR BLD: 1 %
INR PPP: 4.3
LYMPHOCYTES NFR BLD: 0.87 K/UL (ref 1.1–3.7)
LYMPHOCYTES RELATIVE PERCENT: 10 % (ref 24–43)
MCH RBC QN AUTO: 32.9 PG (ref 25.2–33.5)
MCHC RBC AUTO-ENTMCNC: 31.9 G/DL (ref 28.4–34.8)
MCV RBC AUTO: 103.1 FL (ref 82.6–102.9)
MONOCYTES NFR BLD: 0.67 K/UL (ref 0.1–1.2)
MONOCYTES NFR BLD: 8 % (ref 3–12)
NEUTROPHILS NFR BLD: 80 % (ref 36–65)
NEUTS SEG NFR BLD: 6.95 K/UL (ref 1.5–8.1)
NRBC BLD-RTO: 0 PER 100 WBC
PLATELET # BLD AUTO: 135 K/UL (ref 138–453)
PMV BLD AUTO: 12.2 FL (ref 8.1–13.5)
POTASSIUM SERPL-SCNC: 6.1 MMOL/L (ref 3.7–5.3)
POTASSIUM SERPL-SCNC: 6.3 MMOL/L (ref 3.7–5.3)
POTASSIUM SERPL-SCNC: 6.6 MMOL/L (ref 3.7–5.3)
POTASSIUM, UR: 22.4 MMOL/L
PROTHROMBIN TIME: 41.4 SEC (ref 11.9–14.8)
RBC # BLD AUTO: 2.55 M/UL (ref 3.95–5.11)
SODIUM SERPL-SCNC: 134 MMOL/L (ref 135–144)
SODIUM SERPL-SCNC: 134 MMOL/L (ref 135–144)
SODIUM SERPL-SCNC: 135 MMOL/L (ref 135–144)
SODIUM UR-SCNC: 107 MMOL/L
WBC OTHER # BLD: 8.7 K/UL (ref 3.5–11.3)

## 2023-09-26 PROCEDURE — 97162 PT EVAL MOD COMPLEX 30 MIN: CPT

## 2023-09-26 PROCEDURE — 97166 OT EVAL MOD COMPLEX 45 MIN: CPT

## 2023-09-26 PROCEDURE — 99222 1ST HOSP IP/OBS MODERATE 55: CPT | Performed by: INTERNAL MEDICINE

## 2023-09-26 PROCEDURE — 6370000000 HC RX 637 (ALT 250 FOR IP): Performed by: INTERNAL MEDICINE

## 2023-09-26 PROCEDURE — 94761 N-INVAS EAR/PLS OXIMETRY MLT: CPT

## 2023-09-26 PROCEDURE — 80048 BASIC METABOLIC PNL TOTAL CA: CPT

## 2023-09-26 PROCEDURE — 85610 PROTHROMBIN TIME: CPT

## 2023-09-26 PROCEDURE — 93010 ELECTROCARDIOGRAM REPORT: CPT | Performed by: INTERNAL MEDICINE

## 2023-09-26 PROCEDURE — 93005 ELECTROCARDIOGRAM TRACING: CPT | Performed by: INTERNAL MEDICINE

## 2023-09-26 PROCEDURE — 2580000003 HC RX 258: Performed by: INTERNAL MEDICINE

## 2023-09-26 PROCEDURE — 85025 COMPLETE CBC W/AUTO DIFF WBC: CPT

## 2023-09-26 PROCEDURE — 2500000003 HC RX 250 WO HCPCS

## 2023-09-26 PROCEDURE — 6370000000 HC RX 637 (ALT 250 FOR IP)

## 2023-09-26 PROCEDURE — 05HY33Z INSERTION OF INFUSION DEVICE INTO UPPER VEIN, PERCUTANEOUS APPROACH: ICD-10-PCS | Performed by: INTERNAL MEDICINE

## 2023-09-26 PROCEDURE — 36569 INSJ PICC 5 YR+ W/O IMAGING: CPT

## 2023-09-26 PROCEDURE — 36415 COLL VENOUS BLD VENIPUNCTURE: CPT

## 2023-09-26 PROCEDURE — 82436 ASSAY OF URINE CHLORIDE: CPT

## 2023-09-26 PROCEDURE — 84133 ASSAY OF URINE POTASSIUM: CPT

## 2023-09-26 PROCEDURE — 84300 ASSAY OF URINE SODIUM: CPT

## 2023-09-26 PROCEDURE — 1200000000 HC SEMI PRIVATE

## 2023-09-26 RX ORDER — DEXTROSE MONOHYDRATE 25 G/50ML
12.5 INJECTION, SOLUTION INTRAVENOUS ONCE
Status: COMPLETED | OUTPATIENT
Start: 2023-09-26 | End: 2023-09-26

## 2023-09-26 RX ORDER — SODIUM CHLORIDE 0.9 % (FLUSH) 0.9 %
5-40 SYRINGE (ML) INJECTION PRN
Status: DISCONTINUED | OUTPATIENT
Start: 2023-09-26 | End: 2023-09-28 | Stop reason: HOSPADM

## 2023-09-26 RX ORDER — SODIUM CHLORIDE 9 MG/ML
25 INJECTION, SOLUTION INTRAVENOUS PRN
Status: DISCONTINUED | OUTPATIENT
Start: 2023-09-26 | End: 2023-09-28 | Stop reason: HOSPADM

## 2023-09-26 RX ORDER — SODIUM CHLORIDE 0.9 % (FLUSH) 0.9 %
5-40 SYRINGE (ML) INJECTION EVERY 12 HOURS SCHEDULED
Status: DISCONTINUED | OUTPATIENT
Start: 2023-09-26 | End: 2023-09-28 | Stop reason: HOSPADM

## 2023-09-26 RX ORDER — LIDOCAINE HYDROCHLORIDE 10 MG/ML
5 INJECTION, SOLUTION INFILTRATION; PERINEURAL ONCE
Status: DISCONTINUED | OUTPATIENT
Start: 2023-09-26 | End: 2023-09-28

## 2023-09-26 RX ADMIN — SODIUM CHLORIDE, PRESERVATIVE FREE 10 ML: 5 INJECTION INTRAVENOUS at 20:15

## 2023-09-26 RX ADMIN — FAMOTIDINE 10 MG: 10 TABLET ORAL at 09:21

## 2023-09-26 RX ADMIN — CARVEDILOL 12.5 MG: 12.5 TABLET, FILM COATED ORAL at 20:10

## 2023-09-26 RX ADMIN — OXYCODONE HYDROCHLORIDE AND ACETAMINOPHEN 1 TABLET: 5; 325 TABLET ORAL at 11:38

## 2023-09-26 RX ADMIN — SODIUM ZIRCONIUM CYCLOSILICATE 10 G: 10 POWDER, FOR SUSPENSION ORAL at 14:28

## 2023-09-26 RX ADMIN — ANTACID TABLETS 500 MG: 500 TABLET, CHEWABLE ORAL at 09:21

## 2023-09-26 RX ADMIN — SODIUM CHLORIDE: 9 INJECTION, SOLUTION INTRAVENOUS at 16:10

## 2023-09-26 RX ADMIN — ESCITALOPRAM 5 MG: 5 TABLET, FILM COATED ORAL at 09:21

## 2023-09-26 RX ADMIN — CARVEDILOL 12.5 MG: 12.5 TABLET, FILM COATED ORAL at 09:21

## 2023-09-26 RX ADMIN — ATORVASTATIN CALCIUM 80 MG: 40 TABLET, FILM COATED ORAL at 09:21

## 2023-09-26 RX ADMIN — SODIUM ZIRCONIUM CYCLOSILICATE 10 G: 10 POWDER, FOR SUSPENSION ORAL at 11:38

## 2023-09-26 RX ADMIN — DEXTROSE MONOHYDRATE 12.5 G: 25 INJECTION, SOLUTION INTRAVENOUS at 01:11

## 2023-09-26 RX ADMIN — SODIUM ZIRCONIUM CYCLOSILICATE 10 G: 10 POWDER, FOR SUSPENSION ORAL at 20:10

## 2023-09-26 RX ADMIN — INSULIN HUMAN 5 UNITS: 100 INJECTION, SOLUTION PARENTERAL at 01:23

## 2023-09-26 RX ADMIN — AMLODIPINE BESYLATE 2.5 MG: 2.5 TABLET ORAL at 20:11

## 2023-09-26 ASSESSMENT — ENCOUNTER SYMPTOMS: NAUSEA: 1

## 2023-09-26 ASSESSMENT — PAIN SCALES - GENERAL
PAINLEVEL_OUTOF10: 7
PAINLEVEL_OUTOF10: 1

## 2023-09-26 ASSESSMENT — PAIN DESCRIPTION - ORIENTATION: ORIENTATION: RIGHT

## 2023-09-26 ASSESSMENT — PAIN - FUNCTIONAL ASSESSMENT: PAIN_FUNCTIONAL_ASSESSMENT: ACTIVITIES ARE NOT PREVENTED

## 2023-09-26 ASSESSMENT — PAIN DESCRIPTION - DESCRIPTORS: DESCRIPTORS: ACHING

## 2023-09-26 ASSESSMENT — PAIN DESCRIPTION - LOCATION: LOCATION: ARM;SHOULDER

## 2023-09-26 NOTE — PROGRESS NOTES
Case Management Assessment  Initial Evaluation    Date/Time of Evaluation: 9/26/2023 12:28 PM  Assessment Completed by: CLAY Jane    If patient is discharged prior to next notation, then this note serves as note for discharge by case management. Patient Name: Radha Cannon                   YOB: 1935  Diagnosis: Hyperkalemia [E87.5]  Acute hyperkalemia [E87.5]  TARIQ (acute kidney injury) [facility]) [N17.9]                   Date / Time: 9/25/2023  4:43 PM    Patient Admission Status: Inpatient   Readmission Risk (Low < 19, Mod (19-27), High > 27): Readmission Risk Score: 22.1    Current PCP: Vani Foster MD  PCP verified by CM? Yes    Chart Reviewed: Yes      History Provided by: Patient, Medical Record  Patient Orientation: Alert and Oriented, Person, Place, Situation, Self    Patient Cognition: Alert    Hospitalization in the last 30 days (Readmission):  Yes    If yes, Readmission Assessment in  Navigator will be completed. Advance Directives:      Code Status: DNR-CCA   Patient's Primary Decision Maker is: Named in [facility] TALON Cody     Primary Decision MakerKiGreenwoodn [place] - Child - [phone]    Discharge Planning:    Patient lives with: Alone Type of Home: Skilled Nursing Facility  Primary Care Giver: Self  Patient Support Systems include: Family Members, Friends/Neighbors   Current Financial resources: Medicare  Current community resources: ECF/Home Care  Current services prior to admission: Durable Medical Equipment            Current DME: Shower Chair, Walker, Cane            Type of Home Care services:  None    ADLS  Prior functional level: Assistance with the following:, Bathing, Dressing, Cooking, Housework  Current functional level: Assistance with the following:, Bathing, Dressing, Cooking, Housework    PT AM-PAC:   /24  OT AM-PAC:   /24    Family can provide assistance at DC:  Yes  Would you like Case Management to discuss the discharge plan with any other family members/significant others, and if so, who? No  Plans to Return to Present Housing: Yes  Other Identified Issues/Barriers to RETURNING to current housing: [facility] prior to her return home;  Potential Assistance needed at discharge: [address]            Potential DME:    Patient expects to discharge to: [address] for transportation at discharge: [address] [place]: Naoma Cedric / Plan: MEDICARE PART A AND B / Product Type: *No Product type* /     Does insurance require precert for SNF: No    Potential assistance Purchasing Medications: No  Meds-to-Beds request:        [facility] [id number] [place], [place] - [address] [address]  Phone: [phone] Fax: [phone]      Notes:    Factors facilitating achievement of predicted outcomes: Family support, Cooperative, Pleasant, and Has needed Durable Medical Equipment at home    Barriers to discharge: Pain and Medical complications    Additional Case Management Notes: see LSW note dated 9/26/2023 for further detail; The Plan for Transition of Care is related to the following treatment goals of Hyperkalemia [E87.5]  Acute hyperkalemia [E87.5]  TARIQ (acute kidney injury) ([address]) [N87.0]    IF APPLICABLE: The Patient and/or patient representative Media How and her family were provided with a choice of provider and agrees with the discharge plan. Freedom of choice list with basic dialogue that supports the patient's individualized plan of care/goals and shares the quality data associated with the providers was provided to:     Patient Representative Name:       The Patient and/or Patient Representative Agree with the Discharge Plan?   Yes    C[facility]rera Vargas [place]  Case Management Department  Ph: [phone] Fax: [phone]

## 2023-09-26 NOTE — H&P
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) /  Coumadin 08/03/2016    Status post right hemicolectomy 06/21/2016    Rectal bleeding 06/05/2016    Anemia 06/05/2016    Nuclear sclerotic cataract of left eye 11/12/2012       Plan:     MEDICAL DECISION MAKING:    Primary Problem(s): Acute hyperkalemia  Differential diagnoses: CKD, dehydration  Condition is a chronic illness with exacerbation, progression or side effects of treatment  Condition is stable  Treatment plan:   Labs ordered: BMP, CBC, PT/INR  Potassium improved to 6.3  Patient given regular insulin, 50% dextrose, [address]  Nephrology consult  Telemetry monitoring  Monitor vital signs  Imaging: no further imaging studies ordered today  Medications:   Hold losartan  Patient received regular insulin, 50% dextrose, Lokelma  Medication Monitoring / High Risk Medications: none      Chronic kidney disease stage IV  Condition is a chronic stable condition  Treatment plan:   Labs ordered: BMP, CBC  Creatinine 3.1, GFR 14  Nephrology consult  Monitor vital signs  I&O, daily weights  Imaging: no further imaging studies ordered today  Medications: Hold losartan      Long term current use of anticoagulant therapy  Condition is a chronic stable condition  Treatment plan:   CBC, BMP, PT/INR  Pharmacy to dose warfarin  Imaging: no further imaging studies ordered today  Medications: Warfarin    Nutrition status:   at risk for malnutrition  Dietician consult initiated    Hospital Prophylaxis:   DVT: Warfarin   Stress Ulcer: H2 Blocker     MDM Data:   Test interpretation:  My independent EKG interpretation: normal sinus rhythm  My independent X-ray interpretation:  not applicable  Management and/or test interpretation discussed with ER MD at time of admission  Consults and Nursing notes were personally reviewed, all current labs and imaging were personally reviewed, tests ordered: CBC, BMP, and history obtained by independent historian       Disposition:  Shared decision making: All test results, treatment options and disposition options were discussed with the patient today  Social determinants of health that may impact management: none  Code status: Full Code   Disposition: Discharge plan is pending        Critical Care Time:  Total critical care time caring for this patient with life threatening, unstable organ failure, including direct patient contact, management of life support systems, review of data including imaging and labs, discussions with other team members and physicians at least 0 minutes so far today, excluding separately billable procedures. [facility] Medication Reconciliation documentation:    [x] I have utilized all available immediate resources to obtain, update, or review the patient's current medications (including all prescriptions, over-the-counter products, herbals, cannabinoid products and bitamin/mineral/dietary/nutritional supplements. Qian 'yes\", Nas Bui     []  The patient is not eligible for medication reconciliation; the patient is in an emergent medical situation where delaying treatment would jeopardize the patient's health    []  I did NOT confirm, update or review the patient's current list of medications today.   [DOES NOT SATISFY [facility] PERFORMANCE]        [facility] Advanced Care Planning documentation:  [x] I have confirmed that the patient's Advance Care Plan is present, Code Status is documented, or surrogate decision maker is listed in the patient's medical record  [If \"yes\", STOP HERE]     [] The patient's Advance Care

## 2023-09-26 NOTE — PLAN OF CARE
Problem: Discharge Planning  Goal: Discharge to home or other facility with appropriate resources  Outcome: Progressing     Problem: Safety - Adult  Goal: Free from fall injury  Outcome: Progressing     Problem: Pain  Goal: Verbalizes/displays adequate comfort level or baseline comfort level  Outcome: Progressing     Problem: ABCDS Injury Assessment  Goal: Absence of physical injury  Outcome: Progressing  Flowsheets (Taken 9/25/2023 2103)  Absence of Physical Injury: Implement safety measures based on patient assessment

## 2023-09-26 NOTE — PROGRESS NOTES
Patient's Potassium 6.3. Patient given IV dextrose and insulin regular at this time as ordered. See MAR.

## 2023-09-26 NOTE — PROGRESS NOTES
Writer to bedside to complete morning assessment. Upon entry to room, pt laying in bed, respirations even and unlabored while on room air. Vitals obtained and assessment completed, see flow sheet for details. Pt denies needs from writer at this time. Call light in reach. Bed alarm active. Care ongoing.

## 2023-09-26 NOTE — PROGRESS NOTES
Met with Patient this a.m. to discuss discharge plans. Patient is an 80year old , white female admitted from [address] [place] [address] with a diagnosis of Hyperkalemia and TARIQ. Patient is alert and oriented, polite and cooperative with this assessment. States that her plan is to return to [facility] to finish out her skilled stay following this hospitalization. Patient resides in [facility] alone. She uses a walker, rollator, cane, shower chair and grab bars at home for assistance. Patient has been recuperating from a fractured shoulder at [address] [place] [address]. Ordinarily she is independent with all ADL's and has no outside resources or services currently in place. Patient drives herself and relies on her son for assistance with needs as appropriate. PCP is Dr. Cheryl Charles. Patient has medical insurance and reports no need for financial assistance with the cost of her prescription medications at this time. Discharge plan is for Patient to return to [facility] when stable. Patient has a '[facility]' order now in place. She has medical directives on file as well. LSW to assist with discharge placement and all other needs as they present.     [address] [place] [address]  9/26/2023

## 2023-09-26 NOTE — PROGRESS NOTES
Perfect Serve sent to Dr. Hiwot Schaefer requesting message to be forwarded to Dr Samuel Taveras about critical potassium value. Awaiting response.

## 2023-09-26 NOTE — ED NOTES
Stanley Helms contacted us asking for an update on the patient. I informed them the patient was admitted.       Mel Angelo  09/25/23 3525

## 2023-09-26 NOTE — PROGRESS NOTES
Writer to bedside to check on pt. Writer noticed IV in left arm looks to be infiltrated. Site is warm and swollen. IV fluids stopped at this time. Writer will continue to monitor.

## 2023-09-26 NOTE — DISCHARGE INSTR - COC
oriented and alert    IV Access:  - None    Nursing Mobility/ADLs:  Walking   Assisted  Transfer  Assisted  Bathing  Assisted  Dressing  Assisted  Toileting  Assisted  Feeding  Independent  Med Admin  Assisted  Med Delivery   whole and prefers mixed with pudding     Wound Care Documentation and Therapy:        Elimination:  Continence: Bowel: Yes  Bladder: Yes  Urinary Catheter: None   Colostomy/Ileostomy/Ileal Conduit: No       Date of Last BM: 9/27/2023    Intake/Output Summary (Last 24 hours) at 9/28/2023 1214  Last data filed at 9/28/2023 0647  Gross per 24 hour   Intake 2680.6 ml   Output 350 ml   Net 2330.6 ml     I/O last 3 completed shifts: In: 4346.4 [P.O.:500; I.V.:3846.4]  Out: 1100 [Urine:1100]    Safety Concerns:     History of Falls (last 30 days) and At Risk for Falls    Impairments/Disabilities:      Vision, Hearing, and right arm in sling    Nutrition Therapy:  Current Nutrition Therapy:   - Oral Diet:  General and low potassium    Routes of Feeding: Oral  Liquids: No Restrictions  Daily Fluid Restriction: no  Last Modified Barium Swallow with Video (Video Swallowing Test): not done    Treatments at the Time of Hospital Discharge:   Respiratory Treatments: See MAR  Oxygen Therapy:  is not on home oxygen therapy. Ventilator:    - No ventilator support    Rehab Therapies: Physical Therapy and Occupational Therapy  Weight Bearing Status/Restrictions: No weight bearing restrictions  Other Medical Equipment (for information only, NOT a DME order):  cane  Other Treatments: Sling to right arm.     Patient's personal belongings (please select all that are sent with patient):  Glasses, bag of clothes    RN SIGNATURE:  Electronically signed by Khadijah Rivera RN on 9/28/23 at 2:39 PM EDT    CASE MANAGEMENT/SOCIAL WORK SECTION    Inpatient Status Date: 9/25/23    Readmission Risk Assessment Score:  Readmission Risk              Risk of Unplanned Readmission:  31           Discharging to Facility/ Agency

## 2023-09-26 NOTE — PROGRESS NOTES
Access RN at pt bedside. RN came to writer asking for approval to place PICC due to INR level at 4.3. Writer asked Terese Rosario \[facility] [place] [facility]\"" for approval and told writer to not place PICC. Writer to pt bedside to notify pt. Pt okay with decision. Pt resting in bed with no further questions. Call light within reach. Bed alarm active. Care ongoing.

## 2023-09-26 NOTE — CONSULTS
[facility] Department    Clinical Pharmacy Note-Warfarin Consult    Rigoberto Walters is a 80 y.o. female for whom pharmacy has been asked to manage warfarin therapy. Consulting Physician: Dr Tessie Steele  Reason for Admission: Acute Hyperkalemia    Warfarin dose prior to admission: 2.5 mg Monday, Wednesday, Friday. 3.75 mg on all other days. 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    Colon polyp 2006, 2009    Compression fracture of lumbar vertebra (HCC) / prednisone related to PMR 07/15/2015    Diarrhea     Drug-induced hyperkalemia from Lisinopril 07/27/2018    DVT (deep venous thrombosis) ([address]) 2015    Emphysema     Essential hypertension     Hyperlipidemia     Irritable bowel syndrome     Lumbago     Osteoarthritis     Polymyalgia rheumatica ([address]) 2015    Rheumatism     Seborrheic keratoses     on back                  INR (no units)   Date Value   09/26/2023 4.3   09/25/2023 4.1   09/08/2023 3.7   09/07/2023 3.8   09/06/2023 4.8 (HH)   08/01/2023 2.5   06/29/2023 1.8       Hemoglobin (g/dL)   Date Value   09/26/2023 8.4 (L)   09/25/2023 9.2 (L)   09/25/2023 8.6 (L)     Hematocrit (%)   Date Value   09/26/2023 26.3 (L)   09/25/2023 28.5 (L)   09/25/2023 27.8 (L)     Platelets (k/uL)   Date Value   09/26/2023 135 (L)   09/25/2023 191   09/25/2023 141       Current warfarin drug-drug interactions: none      Date             INR        Dose   9/26/2023            4.2       HOLD    Daily PT/INR until stable within therapeutic range. Thank you for the consult.    Sonya Navarro, PharmD, 9/26/2023 11:09 AM

## 2023-09-26 NOTE — PROGRESS NOTES
Nursing supervisor came to Juma stating he placed a new IV but not sure how well it will work. Writer to talk to Scott, Terese E Ayla Hidalgo and gave writer orders to order a midline for continuous IV fluids. Writer placed order at this time.

## 2023-09-26 NOTE — PROGRESS NOTES
Patient admitted to room [facility] at this time. Patient is alert and oriented x4. Patient admission assessment and vitals completed along with patient navigator and medication list. Patient has her right arm in a sling due to fracturing her clavicle from a previous fall. 4+ pitting edema noted. Patient states no current needs, call light is in reach, plan of care is ongoing.

## 2023-09-26 NOTE — PLAN OF CARE
Problem: Discharge Planning  Goal: Discharge to home or other facility with appropriate resources  9/26/2023 1110 by David Franks RN  Outcome: Progressing  Flowsheets (Taken 9/26/2023 1110)  Discharge to home or other facility with appropriate resources:   Identify barriers to discharge with patient and caregiver   Arrange for needed discharge resources and transportation as appropriate   Identify discharge learning needs (meds, wound care, etc)     Problem: Safety - Adult  Goal: Free from fall injury  9/26/2023 1110 by David Franks RN  Outcome: Progressing  Flowsheets (Taken 9/26/2023 1110)  Free From Fall Injury: Instruct family/caregiver on patient safety     Problem: Pain  Goal: Verbalizes/displays adequate comfort level or baseline comfort level  9/26/2023 1110 by David Franks RN  Outcome: Progressing  Flowsheets (Taken 9/26/2023 1110)  Verbalizes/displays adequate comfort level or baseline comfort level:   Encourage patient to monitor pain and request assistance   Assess pain using appropriate pain scale   Administer analgesics based on type and severity of pain and evaluate response     Problem: ABCDS Injury Assessment  Goal: Absence of physical injury  9/26/2023 1110 by David Franks RN  Outcome: Progressing  Flowsheets (Taken 9/26/2023 1110)  Absence of Physical Injury: Implement safety measures based on patient assessment

## 2023-09-26 NOTE — PROGRESS NOTES
Occupational Therapy  Facility/Department: [facility] THE [facility] MED SURG  Occupational Therapy Initial Assessment    Name: Gris Key  : 1935  MRN: [id number]  Date of Service: 2023    Discharge Recommendations:  Continue to assess pending progress, Subacute/Skilled Nursing Facility          Patient Diagnosis(es): The primary encounter diagnosis was Hyperkalemia. A diagnosis of TARIQ (acute kidney injury) ([address]) was also pertinent to this visit. 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 Past Surgical History:  has a past surgical history that includes Cataract removal with implant; Glaucoma surgery; Nasal septum surgery; Dental surgery; Appendectomy (); Skin cancer excision; Hysterectomy, total abdominal (); Tonsillectomy (); Colonoscopy (10/17/2011); Colonoscopy (2016); colectomy (Right, 2016); Colonoscopy (2017); and pr colsc flx w/rmvl of tumor polyp lesion snare tq (N/A, 2017). Treatment Diagnosis: Weakness      Assessment   Performance deficits / Impairments: Decreased endurance;Decreased functional mobility ; Decreased ADL status; Decreased balance;Decreased ROM; Decreased strength;Decreased safe awareness;Decreased fine motor control;Decreased coordination  Assessment: 81 y/o F admitted to [facility] for hyperkalemia. Patient recently d/c from this facility to SNF for R shoulder fracture, tx with immobilization. Patient with weakness, deconditioning requiring assist c ADL.  Patient also with pitting Time In 1334         Time Out 1344         Minutes 10                 Rosaura Lopez, OTR/L

## 2023-09-26 NOTE — PROGRESS NOTES
Comprehensive Nutrition Assessment    Type and Reason for Visit:  Initial, Patient Education, Positive Nutrition Screen    Nutrition Recommendations/Plan:   Nepro tid supplement  Lower K+ in diet (education provided)  Encourage oral fluids     Malnutrition Assessment:  Malnutrition Status:  Severe malnutrition (09/26/23 0942)    Context:  Acute Illness     Findings of the 6 clinical characteristics of malnutrition:  Energy Intake:  75% or less of estimated energy requirements for 7 or more days  Weight Loss:  No significant weight loss     Body Fat Loss: Moderate body fat loss Orbital   Muscle Mass Loss: Moderate muscle mass loss Temples (temporalis), Clavicles (pectoralis & deltoids)  Fluid Accumulation:  Mild Extremities   Strength:  Not Performed    Nutrition Assessment:    Severe malnutrition r/t inadequate nutrient intakes, AEB moderate fat and muscle loss and poor nutrition intakes. Recent humeral fracture and sling on right (dominant) arm. Increased phlegm which may be r/t lower oral fluid intakes, as are c/o for low energy/tiredness. K+ improving on lokelma. Provided with lower K+ food handout which she may not require long term with cessation of losartan. Discussed and added Nepro as ONS (low in K+) and encouraged oral fluids. Nutrition Related Findings:    moderate fat and muscle losses. upper and lower edema. sling to dominant arm. Wound Type: None       Current Nutrition Intake & Therapies:    Average Meal Intake: Unable to assess (no PO records but low appetite reported)  Average Supplements Intake: None Ordered  ADULT DIET; Regular; Low Potassium (Less than 3000 mg/day)  ADULT ORAL NUTRITION SUPPLEMENT; Breakfast, Lunch, Dinner; Renal Oral Supplement    Anthropometric Measures:  Height: 5' (152.4 cm)  Ideal Body Weight (IBW): 100 lbs (45 kg)    Admission Body Weight: 107 lb 3.2 oz (48.6 kg)  Current Body Weight: 101 lb 10.1 oz (46.1 kg), 101.6 % IBW.     Current BMI (kg/m2): 19.8  Usual Body

## 2023-09-26 NOTE — ACP (ADVANCE CARE PLANNING)
Advance Care Planning     Advance Care Planning Activator (Inpatient)  Conversation Note      Date of ACP Conversation: 9/26/2023     Conversation Conducted with: Patient with Decision Making Capacity    ACP Activator: Damir Montez RN        Health Care Decision Maker:     Current Designated Health Care Decision Maker:     Primary Decision Maker: Jaclyn Nogueiradiannetyron Child - [phone]      Care Preferences    Ventilation: \"If you were in your present state of health and suddenly became very ill and were unable to breathe on your own, what would your preference be about the use of a ventilator (breathing machine) if it were available to you? \"      Would the patient desire the use of ventilator (breathing machine)?: no    \"If your health worsens and it becomes clear that your chance of recovery is unlikely, what would your preference be about the use of a ventilator (breathing machine) if it were available to you? \"     Would the patient desire the use of ventilator (breathing machine)?: No      Resuscitation  \"CPR works best to restart the heart when there is a sudden event, like a heart attack, in someone who is otherwise healthy. Unfortunately, CPR does not typically restart the heart for people who have serious health conditions or who are very sick. \"    \"In the event your heart stopped as a result of an underlying serious health condition, would you want attempts to be made to restart your heart (answer \"yes\" for attempt to resuscitate) or would you prefer a natural death (answer \"no\" for do not attempt to resuscitate)? \" no       [] Yes   [] No   Educated Patient / Wilder Melvin regarding differences between Advance Directives and portable DNR orders.     Length of ACP Conversation in minutes:  20    Conversation Outcomes:  ACP discussion completed and Portable Do Not Resuscitate prepared for Provider review and signature    Follow-up plan:    [] Schedule follow-up conversation to continue planning  [x]

## 2023-09-26 NOTE — PROGRESS NOTES
Writer spoke with Dr. Myles Cherry. Writer received nephrology clearance for PICC line insertion at this time.

## 2023-09-26 NOTE — PROGRESS NOTES
Physical Therapy  Facility/Department: [facility] AT THE [facility] MED SURG  Physical Therapy Initial Assessment    Name: Prashanth Engel  : 1935  MRN: [id number]  Date of Service: 2023    Discharge Recommendations:  Continue to assess pending progress          Patient Diagnosis(es): The primary encounter diagnosis was Hyperkalemia. A diagnosis of TARIQ (acute kidney injury) ([address]) was also pertinent to this visit. 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 Past Surgical History:  has a past surgical history that includes Cataract removal with implant; Glaucoma surgery; Nasal septum surgery; Dental surgery; Appendectomy (); Skin cancer excision; Hysterectomy, total abdominal (); Tonsillectomy (); Colonoscopy (10/17/2011); Colonoscopy (2016); colectomy (Right, 2016); Colonoscopy (2017); and pr colsc flx w/rmvl of tumor polyp lesion snare tq (N/A, 2017). Assessment   Body Structures, Functions, Activity Limitations Requiring Skilled Therapeutic Intervention: Decreased functional mobility ; Decreased endurance;Decreased balance;Decreased strength;Decreased safe awareness;Decreased tolerance to work activity; Decreased high-level IADLs  Assessment: Pt is an 80year old female that was admitted fro hyperkalemia. Pt presents with decreased ability to perform transfers, decreased ambulation tolerance, poor safety awareness, decreased BLe strength and reduced dynamic standing balance.  pt will benefit from skilled PT in order to address these deficits.   Treatment Diagnosis: general weakness  Therapy Prognosis: Fair  Decision Making: Medium Complexity  Requires PT Follow-Up: Yes  Activity Tolerance  Activity Tolerance: Patient tolerated evaluation without incident     Plan   Physcial Therapy Plan  General Plan: 2 times a day 7 days a week  Current Treatment Recommendations: Strengthening, Balance training, Gait training, Home exercise program, Therapeutic activities, Stair training, Safety education & training, Neuromuscular re-education, Functional mobility training, Transfer training, Patient/Caregiver education & training, Endurance training  Safety Devices  Type of Devices: Call light within reach, All fall risk precautions in place, Left in bed, Bed alarm in place   1x/daily on holidays and weekends  Restrictions  Restrictions/Precautions  Restrictions/Precautions: Fall Risk, General Precautions, Weight Bearing  Required Braces or Orthoses?: Yes  Upper Extremity Weight Bearing Restrictions  Right Upper Extremity Weight Bearing: Non Weight Bearing  Required Braces or Orthoses  Right Upper Extremity Brace/Splint: sling and swathe     Subjective   General  Chart Reviewed: Yes  Response To Previous Treatment: Not applicable  Family / Caregiver Present: No  Referring Practitioner: Dr. Mock Mail  Referral Date : 09/26/23  Diagnosis: hyperkalemia  Subjective  Subjective: pt confused but did not give subjective information         Social/Functional History  Social/Functional History  Lives With: Alone  Type of Home: House  Home Layout: One level  Home Access: Stairs to enter with rails  Entrance Stairs - Number of Steps: 3  Bathroom Shower/Tub: Walk-in shower  Bathroom Toilet: Standard  Bathroom Equipment: Grab bars in [address] [place] [address] Street: Cane, quad  ADL Assistance: Independent  Homemaking Assistance: Independent  Ambulation Assistance: Independent  Transfer Assistance: Independent  Additional Comments: Rohit aragon lives home

## 2023-09-26 NOTE — PROGRESS NOTES
[facility]  Inpatient/Observation/Outpatient Rehabilitation    Date: 2023  Patient Name: Darlene Jc       [x] Inpatient Acute/Observation       []  Outpatient  : 1935       [] Pt no showed for scheduled appointment    [x] Pt refused/declined therapy at this time due to:  pt upset with being unable to rest all morning. Refuses evaluation at this time. [] Pt cancelled due to:  [] No Reason Given   [] Sick/ill   [] Other:     Therapist/Assistant will attempt to see this patient, at our earliest opportunity.        Celine Rose OT Date: 2023

## 2023-09-27 LAB
ANION GAP SERPL CALCULATED.3IONS-SCNC: 6 MMOL/L (ref 9–17)
BASOPHILS # BLD: <0.03 K/UL (ref 0–0.2)
BASOPHILS NFR BLD: 0 % (ref 0–2)
BUN SERPL-MCNC: 44 MG/DL (ref 8–23)
BUN/CREAT SERPL: 18 (ref 9–20)
CALCIUM SERPL-MCNC: 8.1 MG/DL (ref 8.6–10.4)
CHLORIDE SERPL-SCNC: 110 MMOL/L (ref 98–107)
CO2 SERPL-SCNC: 20 MMOL/L (ref 20–31)
CREAT SERPL-MCNC: 2.4 MG/DL (ref 0.5–0.9)
EOSINOPHIL # BLD: 0.18 K/UL (ref 0–0.44)
EOSINOPHILS RELATIVE PERCENT: 3 % (ref 1–4)
ERYTHROCYTE [DISTWIDTH] IN BLOOD BY AUTOMATED COUNT: 14.4 % (ref 11.8–14.4)
GFR SERPL CREATININE-BSD FRML MDRD: 19 ML/MIN/1.73M2
GLUCOSE SERPL-MCNC: 91 MG/DL (ref 70–99)
HCT VFR BLD AUTO: 25.9 % (ref 36.3–47.1)
HGB BLD-MCNC: 8.2 G/DL (ref 11.9–15.1)
IMM GRANULOCYTES # BLD AUTO: <0.03 K/UL (ref 0–0.3)
IMM GRANULOCYTES NFR BLD: 0 %
INR PPP: 4.1
LYMPHOCYTES NFR BLD: 0.91 K/UL (ref 1.1–3.7)
LYMPHOCYTES RELATIVE PERCENT: 15 % (ref 24–43)
MCH RBC QN AUTO: 32.7 PG (ref 25.2–33.5)
MCHC RBC AUTO-ENTMCNC: 31.7 G/DL (ref 28.4–34.8)
MCV RBC AUTO: 103.2 FL (ref 82.6–102.9)
MONOCYTES NFR BLD: 0.56 K/UL (ref 0.1–1.2)
MONOCYTES NFR BLD: 9 % (ref 3–12)
NEUTROPHILS NFR BLD: 73 % (ref 36–65)
NEUTS SEG NFR BLD: 4.39 K/UL (ref 1.5–8.1)
NRBC BLD-RTO: 0 PER 100 WBC
PLATELET # BLD AUTO: 126 K/UL (ref 138–453)
PMV BLD AUTO: 12.3 FL (ref 8.1–13.5)
POTASSIUM SERPL-SCNC: 5.1 MMOL/L (ref 3.7–5.3)
PROTHROMBIN TIME: 39.8 SEC (ref 11.9–14.8)
RBC # BLD AUTO: 2.51 M/UL (ref 3.95–5.11)
SODIUM SERPL-SCNC: 136 MMOL/L (ref 135–144)
WBC OTHER # BLD: 6.1 K/UL (ref 3.5–11.3)

## 2023-09-27 PROCEDURE — 85025 COMPLETE CBC W/AUTO DIFF WBC: CPT

## 2023-09-27 PROCEDURE — 6370000000 HC RX 637 (ALT 250 FOR IP): Performed by: INTERNAL MEDICINE

## 2023-09-27 PROCEDURE — 85610 PROTHROMBIN TIME: CPT

## 2023-09-27 PROCEDURE — 94761 N-INVAS EAR/PLS OXIMETRY MLT: CPT

## 2023-09-27 PROCEDURE — 36415 COLL VENOUS BLD VENIPUNCTURE: CPT

## 2023-09-27 PROCEDURE — 99232 SBSQ HOSP IP/OBS MODERATE 35: CPT | Performed by: INTERNAL MEDICINE

## 2023-09-27 PROCEDURE — 2580000003 HC RX 258: Performed by: INTERNAL MEDICINE

## 2023-09-27 PROCEDURE — 80048 BASIC METABOLIC PNL TOTAL CA: CPT

## 2023-09-27 PROCEDURE — 1200000000 HC SEMI PRIVATE

## 2023-09-27 RX ADMIN — AMLODIPINE BESYLATE 2.5 MG: 2.5 TABLET ORAL at 20:05

## 2023-09-27 RX ADMIN — CARVEDILOL 12.5 MG: 12.5 TABLET, FILM COATED ORAL at 20:03

## 2023-09-27 RX ADMIN — SODIUM ZIRCONIUM CYCLOSILICATE 10 G: 10 POWDER, FOR SUSPENSION ORAL at 14:33

## 2023-09-27 RX ADMIN — SODIUM ZIRCONIUM CYCLOSILICATE 10 G: 10 POWDER, FOR SUSPENSION ORAL at 11:32

## 2023-09-27 RX ADMIN — CARVEDILOL 12.5 MG: 12.5 TABLET, FILM COATED ORAL at 09:42

## 2023-09-27 RX ADMIN — SODIUM CHLORIDE: 9 INJECTION, SOLUTION INTRAVENOUS at 03:55

## 2023-09-27 RX ADMIN — FAMOTIDINE 10 MG: 10 TABLET ORAL at 09:42

## 2023-09-27 RX ADMIN — OXYCODONE HYDROCHLORIDE AND ACETAMINOPHEN 1 TABLET: 5; 325 TABLET ORAL at 03:52

## 2023-09-27 RX ADMIN — ANTACID TABLETS 500 MG: 500 TABLET, CHEWABLE ORAL at 09:42

## 2023-09-27 RX ADMIN — ONDANSETRON 4 MG: 4 TABLET, ORALLY DISINTEGRATING ORAL at 08:06

## 2023-09-27 RX ADMIN — SODIUM CHLORIDE: 9 INJECTION, SOLUTION INTRAVENOUS at 22:55

## 2023-09-27 RX ADMIN — ACETAMINOPHEN 650 MG: 325 TABLET ORAL at 19:27

## 2023-09-27 RX ADMIN — ESCITALOPRAM 5 MG: 5 TABLET, FILM COATED ORAL at 09:42

## 2023-09-27 RX ADMIN — SODIUM ZIRCONIUM CYCLOSILICATE 10 G: 10 POWDER, FOR SUSPENSION ORAL at 20:05

## 2023-09-27 RX ADMIN — ATORVASTATIN CALCIUM 80 MG: 40 TABLET, FILM COATED ORAL at 09:42

## 2023-09-27 ASSESSMENT — PAIN DESCRIPTION - LOCATION: LOCATION: HEAD

## 2023-09-27 ASSESSMENT — PAIN DESCRIPTION - FREQUENCY: FREQUENCY: INTERMITTENT

## 2023-09-27 ASSESSMENT — PAIN DESCRIPTION - DESCRIPTORS: DESCRIPTORS: ACHING

## 2023-09-27 ASSESSMENT — PAIN DESCRIPTION - PAIN TYPE: TYPE: ACUTE PAIN

## 2023-09-27 ASSESSMENT — PAIN - FUNCTIONAL ASSESSMENT: PAIN_FUNCTIONAL_ASSESSMENT: ACTIVITIES ARE NOT PREVENTED

## 2023-09-27 ASSESSMENT — PAIN DESCRIPTION - ONSET: ONSET: GRADUAL

## 2023-09-27 ASSESSMENT — PAIN SCALES - GENERAL: PAINLEVEL_OUTOF10: 6

## 2023-09-27 NOTE — PLAN OF CARE
Problem: Discharge Planning  Goal: Discharge to home or other facility with appropriate resources  9/27/2023 1307 by Ciara Vaughn RN  Outcome: Progressing  Flowsheets (Taken 9/27/2023 1307)  Discharge to home or other facility with appropriate resources:   Identify barriers to discharge with patient and caregiver   Arrange for needed discharge resources and transportation as appropriate   Identify discharge learning needs (meds, wound care, etc)     Problem: Safety - Adult  Goal: Free from fall injury  9/27/2023 1307 by Ciara Vaughn RN  Outcome: Progressing  Flowsheets (Taken 9/27/2023 1307)  Free From Fall Injury: Instruct family/caregiver on patient safety     Problem: Pain  Goal: Verbalizes/displays adequate comfort level or baseline comfort level  9/27/2023 1307 by Ciara Vaughn RN  Outcome: Progressing  Flowsheets (Taken 9/27/2023 1307)  Verbalizes/displays adequate comfort level or baseline comfort level:   Encourage patient to monitor pain and request assistance   Assess pain using appropriate pain scale   Administer analgesics based on type and severity of pain and evaluate response   Implement non-pharmacological measures as appropriate and evaluate response     Problem: ABCDS Injury Assessment  Goal: Absence of physical injury  9/27/2023 1307 by Ciara Vaughn RN  Outcome: Progressing  Flowsheets (Taken 9/27/2023 1307)  Absence of Physical Injury: Implement safety measures based on patient assessment

## 2023-09-27 NOTE — PROGRESS NOTES
[facility]  Inpatient/Observation/Outpatient Rehabilitation    Date: 2023  Patient Name: Radha Cannon       [x] Inpatient Acute/Observation       []  Outpatient  : 1935     Plan of Care/Recert ends    [] Pt no showed for scheduled appointment    [x] Pt refused/declined therapy at this time due to: Attemted x2, pt. Refusing therapy both times and refuses out of bed. Explained and educated pt. On benefits of getting up to chair and being active with therapy, pt. Continues to refuse at this time. RN aware and pt. Infromed therapy would stop back later. [] Pt cancelled due to:  [] No Reason Given   [] Sick/ill   [] Other:    Therapist/Assistant will attempt to see this patient, at our earliest opportunity.        Rosanne Daniels, PTA Date: 2023

## 2023-09-27 NOTE — PROGRESS NOTES
Writer to bedside to encourage pt to get up to chair. Pt refused to get up to chair at this time for lunch. Writer will try again for supper. Call light within reach. Bed alarm active. Care ongoing.

## 2023-09-27 NOTE — PLAN OF CARE
Problem: Discharge Planning  Goal: Discharge to home or other facility with appropriate resources  9/26/2023 2335 by Sin Schmitz RN  Outcome: Progressing  Flowsheets (Taken 9/26/2023 2335)  Discharge to home or other facility with appropriate resources:   Identify barriers to discharge with patient and caregiver   Arrange for needed discharge resources and transportation as appropriate     Problem: Safety - Adult  Goal: Free from fall injury  9/26/2023 2335 by Sin Schmitz RN  Outcome: Progressing  Flowsheets (Taken 9/26/2023 2335)  Free From Fall Injury: Instruct family/caregiver on patient safety     Problem: Pain  Goal: Verbalizes/displays adequate comfort level or baseline comfort level  9/26/2023 2335 by Sin Schmitz RN  Outcome: Progressing  Flowsheets (Taken 9/26/2023 2335)  Verbalizes/displays adequate comfort level or baseline comfort level:   Encourage patient to monitor pain and request assistance   Assess pain using appropriate pain scale   Administer analgesics based on type and severity of pain and evaluate response   Implement non-pharmacological measures as appropriate and evaluate response     Problem: ABCDS Injury Assessment  Goal: Absence of physical injury  9/26/2023 2335 by Sin Schmitz RN  Outcome: Progressing  Flowsheets (Taken 9/26/2023 2335)  Absence of Physical Injury: Implement safety measures based on patient assessment

## 2023-09-27 NOTE — PROGRESS NOTES
[address] Note-Warfarin Follow-up       Patient:  Hien Hernandez  MRN: [id number]    Warfarin consult follow-up:    INR (no units)   Date Value   09/27/2023 4.1   09/26/2023 4.3   09/25/2023 4.1   09/08/2023 3.7   09/07/2023 3.8   09/06/2023 4.8 (HH)   08/01/2023 2.5       Hemoglobin (g/dL)   Date Value   09/27/2023 8.2 (L)   09/26/2023 8.4 (L)   09/25/2023 9.2 (L)     Hematocrit (%)   Date Value   09/27/2023 25.9 (L)   09/26/2023 26.3 (L)   09/25/2023 28.5 (L)     Platelets (k/uL)   Date Value   09/27/2023 126 (L)   09/26/2023 135 (L)   09/25/2023 191       Target INR Range: 2.0-3.0    Significant Drug-Drug Interactions:  New warfarin drug-drug interactions: no change  Discontinued drug-drug interactions: no change      Notes:    continue to hold warfarin today. Pharmacy will continue to monitor closely. Daily PT/INR until stable within therapeutic range.      Thank you,  Milka Graham, [facility],   9/27/2023, 6:50 AM

## 2023-09-27 NOTE — PROGRESS NOTES
[facility]  Inpatient/Observation/Outpatient Rehabilitation    Date: 2023  Patient Name: Roberth Huynh       [x] Inpatient Acute/Observation       []  Outpatient  : 1935       [] Pt no showed for scheduled appointment    [x] Pt refused/declined therapy at this time due to:      Pt reused x2 attempts this date. Clinician offered self care, therex, and transfer to chair through out the day, pt refused all.       [] Pt cancelled due to:  [] No Reason Given   [] Sick/ill   [] Other:        ROBERT Petit Date: 2023

## 2023-09-27 NOTE — PROGRESS NOTES
Writer to pt bedside to give morning medications. Writer assisted pt to bathroom at this time. Writer encouraged pt to get into chair for a little while. Pt refused at this time. Will continue to encourage pt to get up to chair. Pt denies any further questions. Call light within reach. Bed alarm active. Care ongoing.

## 2023-09-28 VITALS
BODY MASS INDEX: 20.91 KG/M2 | TEMPERATURE: 98.5 F | RESPIRATION RATE: 16 BRPM | OXYGEN SATURATION: 91 % | SYSTOLIC BLOOD PRESSURE: 117 MMHG | HEART RATE: 76 BPM | HEIGHT: 60 IN | DIASTOLIC BLOOD PRESSURE: 57 MMHG | WEIGHT: 106.48 LBS

## 2023-09-28 LAB
ANION GAP SERPL CALCULATED.3IONS-SCNC: 5 MMOL/L (ref 9–17)
BASOPHILS # BLD: <0.03 K/UL (ref 0–0.2)
BASOPHILS NFR BLD: 0 % (ref 0–2)
BUN SERPL-MCNC: 32 MG/DL (ref 8–23)
BUN/CREAT SERPL: 17 (ref 9–20)
CALCIUM SERPL-MCNC: 7.9 MG/DL (ref 8.6–10.4)
CHLORIDE SERPL-SCNC: 114 MMOL/L (ref 98–107)
CO2 SERPL-SCNC: 19 MMOL/L (ref 20–31)
CREAT SERPL-MCNC: 1.9 MG/DL (ref 0.5–0.9)
EOSINOPHIL # BLD: 0.18 K/UL (ref 0–0.44)
EOSINOPHILS RELATIVE PERCENT: 4 % (ref 1–4)
ERYTHROCYTE [DISTWIDTH] IN BLOOD BY AUTOMATED COUNT: 14.4 % (ref 11.8–14.4)
GFR SERPL CREATININE-BSD FRML MDRD: 25 ML/MIN/1.73M2
GLUCOSE SERPL-MCNC: 86 MG/DL (ref 70–99)
HCT VFR BLD AUTO: 26.2 % (ref 36.3–47.1)
HGB BLD-MCNC: 8.2 G/DL (ref 11.9–15.1)
IMM GRANULOCYTES # BLD AUTO: <0.03 K/UL (ref 0–0.3)
IMM GRANULOCYTES NFR BLD: 0 %
INR PPP: 3.1
LYMPHOCYTES NFR BLD: 0.91 K/UL (ref 1.1–3.7)
LYMPHOCYTES RELATIVE PERCENT: 20 % (ref 24–43)
MCH RBC QN AUTO: 32.5 PG (ref 25.2–33.5)
MCHC RBC AUTO-ENTMCNC: 31.3 G/DL (ref 28.4–34.8)
MCV RBC AUTO: 104 FL (ref 82.6–102.9)
MONOCYTES NFR BLD: 0.53 K/UL (ref 0.1–1.2)
MONOCYTES NFR BLD: 11 % (ref 3–12)
NEUTROPHILS NFR BLD: 65 % (ref 36–65)
NEUTS SEG NFR BLD: 3.02 K/UL (ref 1.5–8.1)
NRBC BLD-RTO: 0 PER 100 WBC
PLATELET # BLD AUTO: 113 K/UL (ref 138–453)
PMV BLD AUTO: 12 FL (ref 8.1–13.5)
POTASSIUM SERPL-SCNC: 4.7 MMOL/L (ref 3.7–5.3)
PROTHROMBIN TIME: 32.3 SEC (ref 11.9–14.8)
RBC # BLD AUTO: 2.52 M/UL (ref 3.95–5.11)
SODIUM SERPL-SCNC: 138 MMOL/L (ref 135–144)
WBC OTHER # BLD: 4.7 K/UL (ref 3.5–11.3)

## 2023-09-28 PROCEDURE — 97535 SELF CARE MNGMENT TRAINING: CPT

## 2023-09-28 PROCEDURE — 97116 GAIT TRAINING THERAPY: CPT

## 2023-09-28 PROCEDURE — 6370000000 HC RX 637 (ALT 250 FOR IP): Performed by: INTERNAL MEDICINE

## 2023-09-28 PROCEDURE — 94761 N-INVAS EAR/PLS OXIMETRY MLT: CPT

## 2023-09-28 PROCEDURE — 85025 COMPLETE CBC W/AUTO DIFF WBC: CPT

## 2023-09-28 PROCEDURE — 2580000003 HC RX 258: Performed by: INTERNAL MEDICINE

## 2023-09-28 PROCEDURE — 97110 THERAPEUTIC EXERCISES: CPT

## 2023-09-28 PROCEDURE — 85610 PROTHROMBIN TIME: CPT

## 2023-09-28 PROCEDURE — 80048 BASIC METABOLIC PNL TOTAL CA: CPT

## 2023-09-28 PROCEDURE — 99232 SBSQ HOSP IP/OBS MODERATE 35: CPT | Performed by: INTERNAL MEDICINE

## 2023-09-28 PROCEDURE — 36415 COLL VENOUS BLD VENIPUNCTURE: CPT

## 2023-09-28 RX ORDER — AMLODIPINE BESYLATE 10 MG/1
10 TABLET ORAL NIGHTLY
DISCHARGE
Start: 2023-09-28

## 2023-09-28 RX ORDER — OXYCODONE HYDROCHLORIDE AND ACETAMINOPHEN 5; 325 MG/1; MG/1
1 TABLET ORAL EVERY 4 HOURS PRN
Qty: 18 TABLET | Refills: 0 | Status: SHIPPED | OUTPATIENT
Start: 2023-09-28 | End: 2023-10-01

## 2023-09-28 RX ORDER — WARFARIN SODIUM 2 MG/1
2 TABLET ORAL
Status: DISCONTINUED | OUTPATIENT
Start: 2023-09-28 | End: 2023-09-28 | Stop reason: HOSPADM

## 2023-09-28 RX ADMIN — SODIUM CHLORIDE: 9 INJECTION, SOLUTION INTRAVENOUS at 06:52

## 2023-09-28 RX ADMIN — CARVEDILOL 12.5 MG: 12.5 TABLET, FILM COATED ORAL at 09:01

## 2023-09-28 RX ADMIN — ATORVASTATIN CALCIUM 80 MG: 40 TABLET, FILM COATED ORAL at 09:02

## 2023-09-28 RX ADMIN — ANTACID TABLETS 500 MG: 500 TABLET, CHEWABLE ORAL at 09:02

## 2023-09-28 RX ADMIN — FAMOTIDINE 10 MG: 10 TABLET ORAL at 09:01

## 2023-09-28 RX ADMIN — SODIUM ZIRCONIUM CYCLOSILICATE 10 G: 10 POWDER, FOR SUSPENSION ORAL at 16:23

## 2023-09-28 RX ADMIN — ESCITALOPRAM 5 MG: 5 TABLET, FILM COATED ORAL at 09:01

## 2023-09-28 RX ADMIN — SODIUM ZIRCONIUM CYCLOSILICATE 10 G: 10 POWDER, FOR SUSPENSION ORAL at 11:45

## 2023-09-28 ASSESSMENT — PAIN SCALES - GENERAL
PAINLEVEL_OUTOF10: 0
PAINLEVEL_OUTOF10: 0

## 2023-09-28 NOTE — PROGRESS NOTES
09/26/23  0545 09/27/23  0550 09/28/23  0525   WBC 8.7 6.1 4.7   RBC 2.55* 2.51* 2.52*   HGB 8.4* 8.2* 8.2*   HCT 26.3* 25.9* 26.2*   .1* 103.2* 104.0*   MCH 32.9 32.7 32.5   MCHC 31.9 31.7 31.3   RDW 14.5* 14.4 14.4   * 126* 113*   MPV 12.2 12.3 12.0        Last 3 Blood Glucose:   Recent Labs     09/25/23  1915 09/25/23  1945 09/25/23 2015 09/25/23  2340 09/26/23  0545 09/26/23  1155 09/27/23  0550 09/28/23  0525   GLUCOSE 116 102 89 81 76 86 91 86        Comprehensive Metabolic Profile:   Recent Labs     09/25/23  0820 09/25/23  1655 09/25/23  1742 09/26/23  1155 09/27/23  0550 09/28/23  0525   * 132*   < > 135 136 138   K 7.1* 7.7*   < > 6.6* 5.1 4.7    103   < > 107 110* 114*   CO2 20 19*   < > 20 20 19*   BUN 53* 56*   < > 50* 44* 32*   CREATININE 3.1* 3.2*   < > 2.9* 2.4* 1.9*   GLUCOSE 73 408*   < > 86 91 86   CALCIUM 8.6 8.1*   < > 8.1* 8.1* 7.9*   PROT 5.8* 5.6*  --   --   --   --    LABALBU 3.3* 3.0*  --   --   --   --    BILITOT 0.3 0.2*  --   --   --   --    ALKPHOS 101 90  --   --   --   --    AST 26 34*  --   --   --   --    ALT 13 13  --   --   --   --     < > = values in this interval not displayed.         Urinalysis:   Lab Results   Component Value Date/Time    NITRU NEGATIVE 09/14/2023 12:00 PM    COLORU Yellow 09/14/2023 12:00 PM    PHUR 6.5 09/14/2023 12:00 PM    WBCUA 0 TO 2 09/14/2023 12:00 PM    RBCUA None 09/14/2023 12:00 PM    MUCUS NOT REPORTED 07/27/2021 12:53 PM    TRICHOMONAS NOT REPORTED 07/27/2021 12:53 PM    YEAST NOT REPORTED 07/27/2021 12:53 PM    BACTERIA TRACE 09/14/2023 12:00 PM    SPECGRAV 1.010 09/14/2023 12:00 PM    LEUKOCYTESUR TRACE 09/14/2023 12:00 PM    UROBILINOGEN Normal 09/14/2023 12:00 PM    BILIRUBINUR NEGATIVE 09/14/2023 12:00 PM    GLUCOSEU NEGATIVE 09/14/2023 12:00 PM    KETUA NEGATIVE 09/14/2023 12:00 PM    AMORPHOUS NOT REPORTED 07/27/2021 12:53 PM           Radiology/Imaging:  No orders to display         ASSESSMENT / PLAN:    MEDICAL Care Plan is NOT present because:    []  I confirmed today that the patient does not wish or was not able to name a   surrogate decision maker or provide and advance care plan.    [] Hospice care is currently being provided or has been provided within the   calendar year. []  I did NOT confirm today the presence of an [address] [place] [address] Street or surrogate   decision maker documented within the patient's medical record.    [DOES NOT SATISFY [address] - CNP , GARRY, NP-C  [facility]        9/28/2023, 8:02 AM

## 2023-09-28 NOTE — PROGRESS NOTES
[facility] [address] Note-Warfarin Follow-up       Patient:  Alfredo Preciado  MRN: [id number]    Warfarin consult follow-up:    INR (no units)   Date Value   09/28/2023 3.1   09/27/2023 4.1   09/26/2023 4.3   09/25/2023 4.1   09/08/2023 3.7   09/07/2023 3.8   09/06/2023 4.8 (HH)       Hemoglobin (g/dL)   Date Value   09/28/2023 8.2 (L)   09/27/2023 8.2 (L)   09/26/2023 8.4 (L)     Hematocrit (%)   Date Value   09/28/2023 26.2 (L)   09/27/2023 25.9 (L)   09/26/2023 26.3 (L)     Platelets (k/uL)   Date Value   09/28/2023 113 (L)   09/27/2023 126 (L)   09/26/2023 135 (L)       Target INR Range: 2.0-3.0    Significant Drug-Drug Interactions:  New warfarin drug-drug interactions: no change  Discontinued drug-drug interactions: no change      Notes:   Patient will be given warfarin 2mg po today as a small dose to prevent a significant decrease in INR over next 24 hours. Pharmacy will continue to monitor closely. Daily PT/INR until stable within therapeutic range.      Thank you,  Keegan Bowers, [facility],   9/28/2023, 8:25 AM

## 2023-09-28 NOTE — PROGRESS NOTES
Physical Therapy  Facility/Department: [facility] AT THE [facility] MED SURG  Daily Treatment Note  NAME: Robert Coleman  : 1935  MRN: [id number]    Date of Service: 2023    Discharge Recommendations:  Continue to assess pending progress     Patient Diagnosis(es): The primary encounter diagnosis was Hyperkalemia. Diagnoses of TARIQ (acute kidney injury) ([address]) and Closed torus fracture of proximal end of right humerus with nonunion, subsequent encounter were also pertinent to this visit. Assessment   Assessment: Gait with quad cane, 80ftx1 with CGA for safety. Transfers:CGA. Bed mobility: CGA. Seated exercises B Le x20. STS x5  Activity Tolerance: Patient tolerated treatment well     Plan    Physcial Therapy Plan  General Plan: 2 times a day 7 days a week  Specific Instructions for Next Treatment: 1x/daily on holidays and weekends  Current Treatment Recommendations: Strengthening;Balance training;Gait training;Home exercise program;Therapeutic activities;Stair training; Safety education & training;Neuromuscular re-education; Functional mobility training;Transfer training;Patient/Caregiver education & training; Endurance training     Restrictions  Restrictions/Precautions  Restrictions/Precautions: Fall Risk, General Precautions, Weight Bearing  Required Braces or Orthoses?: Yes  Upper Extremity Weight Bearing Restrictions  Right Upper Extremity Weight Bearing: Non Weight Bearing  Required Braces or Orthoses  Right Upper Extremity Brace/Splint: sling and swathe     Subjective    Subjective  Subjective: PT. in bed upon arrival, family present and agreeable to therapy at htis time.   Pain: 3/10 R UE  Orientation  Overall Orientation Status: Within Functional Limits     Objective   Bed Mobility Training  Bed Mobility Training: Yes  Overall Level of Assistance: Contact-guard assistance;Assist X1  Interventions: Safety awareness training;Verbal cues  Supine to Sit: Contact-guard assistance;Assist X1  Scooting: Contact-guard assistance;Assist X1  Transfer Training  Transfer Training: Yes  Overall Level of Assistance: Contact-guard assistance;Assist X1  Interventions: Safety awareness training;Verbal cues  Sit to Stand: Contact-guard assistance;Assist X1  Stand to Sit: Contact-guard assistance;Assist X1  Gait Training  Gait Training: Yes  Gait  Overall Level of Assistance: Contact-guard assistance;Assist X1  Interventions: Safety awareness training;Verbal cues  Speed/Vale: Slow  Step Length: Right shortened;Left shortened  Gait Abnormalities: Shuffling gait; Decreased step clearance  Distance (ft): 80 Feet  Assistive Device: Cane, quad;Gait belt  PT Exercises  Exercise Treatment: Seated exercises B Le x20. STS x5  Safety Devices  Type of Devices: All fall risk precautions in place;Call light within reach; Left in chair;Nurse notified       Goals  Short Term Goals  Time Frame for Short Term Goals: 20 days  Short Term Goal 1: Pt educated on her POC and HEP  Short Term Goal 2: Pt will perform all transfers SBa in order to increase independence  Short Term Goal 3: Pt will ambulate >/=100 feet with quad cane CGA in order to use the bathroom  Short Term Goal 4: Pt will increase dynamic standing balance to Fair + in order to reduce fall risk    Education  Patient Education  Education Provided: Role of Therapy;Plan of Care  Education Method: Verbal  Barriers to Learning: None  Education Outcome: Verbalized understanding    Therapy Time   Individual Concurrent Group Co-treatment   Time In 1350         Time Out 1414         Minutes [address] [facility]

## 2023-09-28 NOTE — PROGRESS NOTES
Occupational Therapy  Facility/Department: [facility] AT THE [facility] MED SURG  Daily Treatment Note  NAME: Savanah Monge  : 1935  MRN: [id number]    Date of Service: 2023    Discharge Recommendations:  Continue to assess pending progress, Subacute/Skilled Nursing Facility         Patient Diagnosis(es): The primary encounter diagnosis was Hyperkalemia. A diagnosis of TARIQ (acute kidney injury) ([address]) was also pertinent to this visit. Assessment    Activity Tolerance: Patient tolerated treatment well  Discharge Recommendations: Continue to assess pending progress;Subacute/Skilled [facility] [address]  Times Per Day: Once a day  Days Per Week: 7 Days  Current Treatment Recommendations: Strengthening;ROM;Balance training;Functional mobility training; Endurance training;Equipment evaluation, education, & procurement;Patient/Caregiver education & training; Safety education & training;Self-Care / ADL; Coordination training;Neuromuscular re-education;Home management training;Manual Therapy:  STM     Restrictions   General fall risk, NWBing RUE    Subjective   Subjective  Subjective: Pt recieved on toilet post BM. Pt reluctant to participate and wanted \"to rest\" instead, however agreed to Min washing up at sink. Pain: RUE did not rate           Objective    Vitals           ADL  Feeding: Setup  Feeding Skilled Clinical Factors: pt edu to place items on L side and have A with opening packaging  Grooming: Minimal assistance  Grooming Skilled Clinical Factors: combing hair using L UE standing at sink, 0 LOB, washing face and hands  Toileting: Maximum assistance; Moderate assistance  Toileting Skilled Clinical Factors: Max A posterior hygiene and Mod A clothing management d/t one armed tech  Additional Comments: Patient CGA transfers with QC. Patient CGA/SBA ADL mobility. Patient fair balance standing. Occasional cueing for safety. Safety Devices  Type of Devices:  All fall risk precautions in

## 2023-09-28 NOTE — PROGRESS NOTES
Patient is discharge to [facility] today. Discharge paper work done and fax to SNF.   CLAY Aguila

## 2023-09-28 NOTE — PLAN OF CARE
Problem: Discharge Planning  Goal: Discharge to home or other facility with appropriate resources  Outcome: Completed     Problem: Safety - Adult  Goal: Free from fall injury  Outcome: Completed     Problem: Pain  Goal: Verbalizes/displays adequate comfort level or baseline comfort level  Outcome: Completed     Problem: ABCDS Injury Assessment  Goal: Absence of physical injury  Outcome: Completed     Problem: Nutrition Deficit:  Goal: Optimize nutritional status  Outcome: Completed     Problem: Metabolic/Fluid and Electrolytes - Adult  Goal: Electrolytes maintained within normal limits  Outcome: Completed

## 2023-09-28 NOTE — CARE COORDINATION
Wrong chart. 09/28/23 0953   Condition of Participation: Discharge Planning   The Plan for Transition of Care is related to the following treatment goals: skilled therapy   The Patient and/or Patient Representative was provided with a Choice of Provider? Patient;Patient Representative   The Patient and/Or Patient Representative agree with the Discharge Plan? Yes   Freedom of Choice list was provided with basic dialogue that supports the patient's individualized plan of care/goals, treatment preferences, and shares the quality data associated with the providers? Yes     They chose the [facility]. Referral made to the [facility] for short term rehab.     CLAY Pineda

## 2023-09-28 NOTE — PROGRESS NOTES
Shift assessment and vitals obtained at this time as charted. Blood pressure elevated, vitals otherwise WNL. Patient denies pain. Patient is alert and oriented x4. Lungs clear to diminished throughout. Right arm remains in sling, pulse palpable and capillary refill WNL. Assessment otherwise as charted, see flowsheets. Patient was also assisted to the bathroom at this time with a +1 assist with a cane, patient urinated and then was assisted back to bed. Patient resting in the bed with bed alarm on and call light in reach, denies other needs at this time. Care ongoing.

## 2023-09-28 NOTE — PLAN OF CARE
Problem: Safety - Adult  Goal: Free from fall injury  9/27/2023 2041 by Scottie St RN  Outcome: Progressing  Note: Bed in low position. Wheels locked. Bed alarm on. 2/4 side rails are up. Fall band on. Call light within reach. Problem: Pain  Goal: Verbalizes/displays adequate comfort level or baseline comfort level  9/27/2023 2041 by Scottie St RN  Outcome: Progressing  Note: Pt is able to verbalize when in pain. Pt given Tylenol as needed. Will continue to monitor. Problem: Metabolic/Fluid and Electrolytes - Adult  Goal: Electrolytes maintained within normal limits  Outcome: Progressing  Note: Monitoring electrolytes.

## 2023-09-28 NOTE — PROGRESS NOTES
Patient leaving floor at this time via wheelchair via SCAT to return to [facility] [address]. Belongings sent with patient.

## 2023-09-28 NOTE — DISCHARGE SUMMARY
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) /  Coumadin 08/03/2016    Status post right hemicolectomy 06/21/2016    Rectal bleeding 06/05/2016    Anemia 06/05/2016    Nuclear sclerotic cataract of left eye 11/12/2012        Discharge Medications:         Medication List        START taking these medications      sodium zirconium cyclosilicate 5 g Pack oral suspension  Commonly known as: LOKELMA  Take 1 packet by mouth three times a week Monday, Wednesday, Friday  Start taking on: September 29, 2023            CHANGE how you take these medications      amLODIPine 10 MG tablet  Commonly known as: NORVASC  Take 1 tablet by mouth at bedtime  What changed:   medication strength  how much to take     polyethylene glycol 17 g packet  Commonly known as: GLYCOLAX  Take 1 packet by mouth daily  What changed: Another medication with the same name was removed. Continue taking this medication, and follow the directions you see here. * Coumadin 2.5 MG tablet  Generic drug: warfarin  Take as directed. If you are unsure how to take this medication, talk to your nurse or doctor. What changed: Another medication with the same name was changed. Make sure you understand how and when to take each. * warfarin 2.5 MG tablet  Commonly known as: COUMADIN  Take as directed. If you are unsure how to take this medication, talk to your nurse or doctor. Original instructions: TAKE 1 AND 1/2 TABLET BY MOUTH ON MONDAY, WEDNESDAY AND FRIDAY AND TAKE 1 TABLET ON ALL OTHER DAYS  What changed: See the new instructions. * This list has 2 medication(s) that are the same as other medications prescribed for you.  Read the NA  ASA in MI: NA  Statin in CVA: NA  Antiplatelet in CVA: NA    Total time spent on discharge services: 40 minutes    Including the following activities:  Evaluation and Management of patient  Discussion with patient and/or surrogate about current care plan  Coordination with Case Management and/or   Coordination of care with Consultants (if applicable)   Coordination of care with Receiving Facility Physician (if applicable)  Completion of DME forms (if applicable)  Preparation of Discharge Summary  Preparation of Medication Reconciliation  Preparation of Discharge Prescriptions    Signed:  GARRY Elias CNP, GARRY, NP-C  9/28/2023, 2:22 PM

## 2023-09-28 NOTE — PROGRESS NOTES
Pt laying in bed watching TV when writer entered the room. Family at bedside. Vitals and assessment as charted. Pt rated her pain a 6 out of 10 in her head. Pt given Tylenol. Pt denies any further needs at this time. Call light within reach. Bed alarm on.

## 2023-09-28 NOTE — PROGRESS NOTES
[facility]  Inpatient/Observation/Outpatient Rehabilitation    Date: 2023  Patient Name: Le Porter       [x] Inpatient Acute/Observation       []  Outpatient  : 1935      Plan of Care/Recert ends    [] Pt no showed for scheduled appointment    [x] Pt refused/declined therapy at this time due to: Attempted x2 pt. Continues to refuse therapy at this time and out of bed, max encouragement provided. [] Pt cancelled due to:  [] No Reason Given   [] Sick/ill   [] Other:    Therapist/Assistant will attempt to see this patient, at our earliest opportunity.        Alvarez Christina, PTA Date: 2023

## 2023-09-29 ENCOUNTER — CLINICAL DOCUMENTATION ONLY (OUTPATIENT)
Facility: CLINIC | Age: 88
End: 2023-09-29

## 2023-10-02 ENCOUNTER — HOSPITAL ENCOUNTER (OUTPATIENT)
Age: 88
Setting detail: SPECIMEN
Discharge: HOME | End: 2023-10-02

## 2023-10-02 LAB
ALBUMIN SERPL-MCNC: 3 G/DL (ref 3.5–5.2)
ALBUMIN/GLOB SERPL: 1.2 {RATIO} (ref 1–2.5)
ALP SERPL-CCNC: 90 U/L (ref 35–104)
ALT SERPL-CCNC: 11 U/L (ref 5–33)
ANION GAP SERPL CALCULATED.3IONS-SCNC: 10 MMOL/L (ref 9–17)
AST SERPL-CCNC: 24 U/L
BILIRUB SERPL-MCNC: 0.3 MG/DL (ref 0.3–1.2)
BUN SERPL-MCNC: 18 MG/DL (ref 8–23)
BUN/CREAT SERPL: 11 (ref 9–20)
CALCIUM SERPL-MCNC: 8.4 MG/DL (ref 8.6–10.4)
CHLORIDE SERPL-SCNC: 110 MMOL/L (ref 98–107)
CO2 SERPL-SCNC: 21 MMOL/L (ref 20–31)
CREAT SERPL-MCNC: 1.6 MG/DL (ref 0.5–0.9)
ERYTHROCYTE [DISTWIDTH] IN BLOOD BY AUTOMATED COUNT: 14.6 % (ref 11.8–14.4)
GFR SERPL CREATININE-BSD FRML MDRD: 31 ML/MIN/1.73M2
GLUCOSE SERPL-MCNC: 91 MG/DL (ref 70–99)
HCT VFR BLD AUTO: 27.6 % (ref 36.3–47.1)
HGB BLD-MCNC: 8.9 G/DL (ref 11.9–15.1)
MCH RBC QN AUTO: 32.5 PG (ref 25.2–33.5)
MCHC RBC AUTO-ENTMCNC: 32.2 G/DL (ref 28.4–34.8)
MCV RBC AUTO: 100.7 FL (ref 82.6–102.9)
NRBC BLD-RTO: 0 PER 100 WBC
PLATELET # BLD AUTO: 148 K/UL (ref 138–453)
PMV BLD AUTO: 12.5 FL (ref 8.1–13.5)
POTASSIUM SERPL-SCNC: 4.3 MMOL/L (ref 3.7–5.3)
PROT SERPL-MCNC: 5.5 G/DL (ref 6.4–8.3)
RBC # BLD AUTO: 2.74 M/UL (ref 3.95–5.11)
SODIUM SERPL-SCNC: 141 MMOL/L (ref 135–144)
WBC OTHER # BLD: 5.8 K/UL (ref 3.5–11.3)

## 2023-10-02 PROCEDURE — 85027 COMPLETE CBC AUTOMATED: CPT

## 2023-10-02 PROCEDURE — 36415 COLL VENOUS BLD VENIPUNCTURE: CPT

## 2023-10-02 PROCEDURE — 80053 COMPREHEN METABOLIC PANEL: CPT

## 2023-10-06 ENCOUNTER — HOSPITAL ENCOUNTER (OUTPATIENT)
Age: 88
Setting detail: SPECIMEN
Discharge: HOME | End: 2023-10-06

## 2023-10-06 LAB
INR PPP: 1.3
PROTHROMBIN TIME: 16.7 SEC (ref 11.9–14.8)

## 2023-10-06 PROCEDURE — 85610 PROTHROMBIN TIME: CPT

## 2023-10-09 ENCOUNTER — HOSPITAL ENCOUNTER (OUTPATIENT)
Age: 88
Setting detail: SPECIMEN
Discharge: HOME | End: 2023-10-09
Payer: MEDICARE

## 2023-10-09 LAB
ALBUMIN SERPL-MCNC: 3.1 G/DL (ref 3.5–5.2)
ALBUMIN/GLOB SERPL: 1.2 {RATIO} (ref 1–2.5)
ALP SERPL-CCNC: 100 U/L (ref 35–104)
ALT SERPL-CCNC: 9 U/L (ref 5–33)
ANION GAP SERPL CALCULATED.3IONS-SCNC: 8 MMOL/L (ref 9–17)
AST SERPL-CCNC: 20 U/L
BASOPHILS # BLD: <0.03 K/UL (ref 0–0.2)
BASOPHILS NFR BLD: 0 % (ref 0–2)
BILIRUB SERPL-MCNC: 0.4 MG/DL (ref 0.3–1.2)
BUN SERPL-MCNC: 23 MG/DL (ref 8–23)
BUN/CREAT SERPL: 14 (ref 9–20)
CALCIUM SERPL-MCNC: 8.5 MG/DL (ref 8.6–10.4)
CHLORIDE SERPL-SCNC: 110 MMOL/L (ref 98–107)
CO2 SERPL-SCNC: 23 MMOL/L (ref 20–31)
CREAT SERPL-MCNC: 1.6 MG/DL (ref 0.5–0.9)
EOSINOPHIL # BLD: 0.19 K/UL (ref 0–0.44)
EOSINOPHILS RELATIVE PERCENT: 3 % (ref 1–4)
ERYTHROCYTE [DISTWIDTH] IN BLOOD BY AUTOMATED COUNT: 14.8 % (ref 11.8–14.4)
GFR SERPL CREATININE-BSD FRML MDRD: 31 ML/MIN/1.73M2
GLUCOSE SERPL-MCNC: 94 MG/DL (ref 70–99)
HCT VFR BLD AUTO: 27.8 % (ref 36.3–47.1)
HGB BLD-MCNC: 8.8 G/DL (ref 11.9–15.1)
IMM GRANULOCYTES # BLD AUTO: 0.03 K/UL (ref 0–0.3)
IMM GRANULOCYTES NFR BLD: 0 %
INR PPP: 1.2
LYMPHOCYTES # BLD: 12 % (ref 24–43)
LYMPHOCYTES NFR BLD: 0.91 K/UL (ref 1.1–3.7)
MCH RBC QN AUTO: 32.4 PG (ref 25.2–33.5)
MCHC RBC AUTO-ENTMCNC: 31.7 G/DL (ref 28.4–34.8)
MCV RBC AUTO: 102.2 FL (ref 82.6–102.9)
MONOCYTES NFR BLD: 0.93 K/UL (ref 0.1–1.2)
MONOCYTES NFR BLD: 13 % (ref 3–12)
NEUTROPHILS NFR BLD: 72 % (ref 36–65)
NEUTS SEG NFR BLD: 5.36 K/UL (ref 1.5–8.1)
NRBC BLD-RTO: 0 PER 100 WBC
PLATELET # BLD AUTO: 126 K/UL (ref 138–453)
PMV BLD AUTO: 12.6 FL (ref 8.1–13.5)
POTASSIUM SERPL-SCNC: 4.7 MMOL/L (ref 3.7–5.3)
PROT SERPL-MCNC: 5.7 G/DL (ref 6.4–8.3)
PROTHROMBIN TIME: 15.6 SEC (ref 11.9–14.8)
RBC # BLD AUTO: 2.72 M/UL (ref 3.95–5.11)
SODIUM SERPL-SCNC: 141 MMOL/L (ref 135–144)
WBC OTHER # BLD: 7.4 K/UL (ref 3.5–11.3)

## 2023-10-09 PROCEDURE — 85025 COMPLETE CBC W/AUTO DIFF WBC: CPT

## 2023-10-09 PROCEDURE — 80053 COMPREHEN METABOLIC PANEL: CPT

## 2023-10-09 PROCEDURE — 85610 PROTHROMBIN TIME: CPT

## 2023-10-09 PROCEDURE — 36415 COLL VENOUS BLD VENIPUNCTURE: CPT

## 2023-10-10 ENCOUNTER — APPOINTMENT (OUTPATIENT)
Dept: PHARMACY | Age: 88
End: 2023-10-10
Payer: MEDICARE

## 2023-10-16 ENCOUNTER — HOSPITAL ENCOUNTER (OUTPATIENT)
Age: 88
Setting detail: SPECIMEN
Discharge: HOME | End: 2023-10-16
Payer: MEDICARE

## 2023-10-16 LAB
ALBUMIN SERPL-MCNC: 3.2 G/DL (ref 3.5–5.2)
ALBUMIN/GLOB SERPL: 1.4 {RATIO} (ref 1–2.5)
ALP SERPL-CCNC: 84 U/L (ref 35–104)
ALT SERPL-CCNC: 11 U/L (ref 5–33)
ANION GAP SERPL CALCULATED.3IONS-SCNC: 7 MMOL/L (ref 9–17)
AST SERPL-CCNC: 20 U/L
BASOPHILS # BLD: 0.03 K/UL (ref 0–0.2)
BASOPHILS NFR BLD: 1 % (ref 0–2)
BILIRUB SERPL-MCNC: 0.3 MG/DL (ref 0.3–1.2)
BUN SERPL-MCNC: 19 MG/DL (ref 8–23)
BUN/CREAT SERPL: 14 (ref 9–20)
CALCIUM SERPL-MCNC: 8.5 MG/DL (ref 8.6–10.4)
CHLORIDE SERPL-SCNC: 110 MMOL/L (ref 98–107)
CO2 SERPL-SCNC: 25 MMOL/L (ref 20–31)
CREAT SERPL-MCNC: 1.4 MG/DL (ref 0.5–0.9)
EOSINOPHIL # BLD: 0.22 K/UL (ref 0–0.44)
EOSINOPHILS RELATIVE PERCENT: 4 % (ref 1–4)
ERYTHROCYTE [DISTWIDTH] IN BLOOD BY AUTOMATED COUNT: 14.6 % (ref 11.8–14.4)
GFR SERPL CREATININE-BSD FRML MDRD: 36 ML/MIN/1.73M2
GLUCOSE SERPL-MCNC: 92 MG/DL (ref 70–99)
HCT VFR BLD AUTO: 27 % (ref 36.3–47.1)
HGB BLD-MCNC: 8.3 G/DL (ref 11.9–15.1)
IMM GRANULOCYTES # BLD AUTO: <0.03 K/UL (ref 0–0.3)
IMM GRANULOCYTES NFR BLD: 0 %
LYMPHOCYTES # BLD: 26 % (ref 24–43)
LYMPHOCYTES NFR BLD: 1.27 K/UL (ref 1.1–3.7)
MCH RBC QN AUTO: 32.5 PG (ref 25.2–33.5)
MCHC RBC AUTO-ENTMCNC: 30.7 G/DL (ref 28.4–34.8)
MCV RBC AUTO: 105.9 FL (ref 82.6–102.9)
MONOCYTES NFR BLD: 0.78 K/UL (ref 0.1–1.2)
MONOCYTES NFR BLD: 16 % (ref 3–12)
NEUTROPHILS NFR BLD: 53 % (ref 36–65)
NEUTS SEG NFR BLD: 2.65 K/UL (ref 1.5–8.1)
NRBC BLD-RTO: 0 PER 100 WBC
PLATELET # BLD AUTO: 163 K/UL (ref 138–453)
PMV BLD AUTO: 11.5 FL (ref 8.1–13.5)
POTASSIUM SERPL-SCNC: 4.9 MMOL/L (ref 3.7–5.3)
PROT SERPL-MCNC: 5.5 G/DL (ref 6.4–8.3)
RBC # BLD AUTO: 2.55 M/UL (ref 3.95–5.11)
SODIUM SERPL-SCNC: 142 MMOL/L (ref 135–144)
WBC OTHER # BLD: 5 K/UL (ref 3.5–11.3)

## 2023-10-16 PROCEDURE — 80053 COMPREHEN METABOLIC PANEL: CPT

## 2023-10-16 PROCEDURE — 36415 COLL VENOUS BLD VENIPUNCTURE: CPT

## 2023-10-16 PROCEDURE — 85025 COMPLETE CBC W/AUTO DIFF WBC: CPT

## 2023-10-23 ENCOUNTER — HOSPITAL ENCOUNTER (OUTPATIENT)
Age: 88
Setting detail: SPECIMEN
Discharge: HOME | End: 2023-10-23
Payer: MEDICARE

## 2023-10-23 LAB
ALBUMIN SERPL-MCNC: 3.5 G/DL (ref 3.5–5.2)
ALBUMIN/GLOB SERPL: 1.5 {RATIO} (ref 1–2.5)
ALP SERPL-CCNC: 79 U/L (ref 35–104)
ALT SERPL-CCNC: 10 U/L (ref 5–33)
ANION GAP SERPL CALCULATED.3IONS-SCNC: 8 MMOL/L (ref 9–17)
AST SERPL-CCNC: 20 U/L
BILIRUB SERPL-MCNC: 0.3 MG/DL (ref 0.3–1.2)
BUN SERPL-MCNC: 21 MG/DL (ref 8–23)
BUN/CREAT SERPL: 15 (ref 9–20)
CALCIUM SERPL-MCNC: 8.5 MG/DL (ref 8.6–10.4)
CHLORIDE SERPL-SCNC: 112 MMOL/L (ref 98–107)
CO2 SERPL-SCNC: 22 MMOL/L (ref 20–31)
CREAT SERPL-MCNC: 1.4 MG/DL (ref 0.5–0.9)
ERYTHROCYTE [DISTWIDTH] IN BLOOD BY AUTOMATED COUNT: 14.6 % (ref 11.8–14.4)
GFR SERPL CREATININE-BSD FRML MDRD: 36 ML/MIN/1.73M2
GLUCOSE SERPL-MCNC: 85 MG/DL (ref 70–99)
HCT VFR BLD AUTO: 27.9 % (ref 36.3–47.1)
HGB BLD-MCNC: 8.8 G/DL (ref 11.9–15.1)
MCH RBC QN AUTO: 32.2 PG (ref 25.2–33.5)
MCHC RBC AUTO-ENTMCNC: 31.5 G/DL (ref 28.4–34.8)
MCV RBC AUTO: 102.2 FL (ref 82.6–102.9)
NRBC BLD-RTO: 0 PER 100 WBC
PLATELET # BLD AUTO: 180 K/UL (ref 138–453)
PMV BLD AUTO: 11.3 FL (ref 8.1–13.5)
POTASSIUM SERPL-SCNC: 5.2 MMOL/L (ref 3.7–5.3)
PROT SERPL-MCNC: 5.9 G/DL (ref 6.4–8.3)
RBC # BLD AUTO: 2.73 M/UL (ref 3.95–5.11)
SODIUM SERPL-SCNC: 142 MMOL/L (ref 135–144)
WBC OTHER # BLD: 5.7 K/UL (ref 3.5–11.3)

## 2023-10-23 PROCEDURE — 36415 COLL VENOUS BLD VENIPUNCTURE: CPT

## 2023-10-23 PROCEDURE — 80053 COMPREHEN METABOLIC PANEL: CPT

## 2023-10-23 PROCEDURE — 85027 COMPLETE CBC AUTOMATED: CPT

## 2023-10-30 ENCOUNTER — CLINICAL DOCUMENTATION ONLY (OUTPATIENT)
Facility: CLINIC | Age: 88
End: 2023-10-30

## 2023-10-31 ENCOUNTER — HOSPITAL ENCOUNTER (OUTPATIENT)
Dept: PHARMACY | Age: 88
Setting detail: THERAPIES SERIES
Discharge: HOME | End: 2023-10-31
Payer: MEDICARE

## 2023-10-31 VITALS — WEIGHT: 97.8 LBS | BODY MASS INDEX: 19.1 KG/M2

## 2023-10-31 DIAGNOSIS — I27.82 CHRONIC SADDLE PULMONARY EMBOLISM WITHOUT ACUTE COR PULMONALE (HCC): ICD-10-CM

## 2023-10-31 DIAGNOSIS — Z79.01 LONG TERM CURRENT USE OF ANTICOAGULANT THERAPY: Primary | ICD-10-CM

## 2023-10-31 DIAGNOSIS — I26.92 CHRONIC SADDLE PULMONARY EMBOLISM WITHOUT ACUTE COR PULMONALE (HCC): ICD-10-CM

## 2023-10-31 PROBLEM — S42.201D CLOSED FRACTURE OF PROXIMAL END OF RIGHT HUMERUS WITH ROUTINE HEALING: Status: ACTIVE | Noted: 2023-10-31

## 2023-10-31 PROBLEM — S42.201D CLOSED FRACTURE OF PROXIMAL END OF RIGHT HUMERUS WITH ROUTINE HEALING: Status: ACTIVE | Noted: 2023-09-05

## 2023-10-31 LAB — INR BLD: 1.2

## 2023-10-31 PROCEDURE — 85610 PROTHROMBIN TIME: CPT

## 2023-10-31 PROCEDURE — 99212 OFFICE O/P EST SF 10 MIN: CPT

## 2023-10-31 NOTE — PROGRESS NOTES
[address] [facility]  Medication Management  ANTICOAGULATION    Referring Provider: Dr. Mini Mcgrath INR: 2-3    TODAY'S INR: 1.2    WARFARIN Dosage: 2.5 mg po daily    INR (no units)   Date Value   10/31/2023 1.2   10/09/2023 1.2   10/06/2023 1.3   2023 3.1   2023 4.1   2023 4.3   2023 4.1       Hemoglobin   Date Value Ref Range Status   10/23/2023 8.8 (L) 11.9 - 15.1 g/dL Final     Hematocrit   Date Value Ref Range Status   10/23/2023 27.9 (L) 36.3 - 47.1 % Final     ALT   Date Value Ref Range Status   10/23/2023 10 5 - 33 U/L Final     AST   Date Value Ref Range Status   10/23/2023 20 <32 U/L Final       Medication changes:  Stop losartan  Increase amlodipine to 10 mg po daily    Notes:    Fingerstick INR drawn per clinic protocol. Patient states no visible blood in urine and no black tarry stool. No change in other maintenance medications or in diet. Will recheck INR in 1 week. Patient acknowledges working in consult agreement with pharmacist as referred by his/her physician. Patient discharged from [facility] on 10/27/23. She sustained a closed nondisplaced fracture of right humerus. Patient is accompanied by her sister. Patient instructed to take warfarin 2.5 mg po daily upon discharge from [facility]. Patient admits to not taking any warfarin for the past 3 days since she has been home. Patient will begin warfarin 2.5 mg po daily this evening. For Pharmacy Admin Tracking Only    Intervention Detail: Adherence Monitorin and Dose Adjustment: 1, reason: Improve Adherence, Therapy Optimization  Total # of Interventions Recommended: 2  Total # of Interventions Accepted: 2  Time Spent (min): [address] [facility]

## 2023-11-07 ENCOUNTER — HOSPITAL ENCOUNTER (OUTPATIENT)
Dept: PHARMACY | Age: 88
Setting detail: THERAPIES SERIES
Discharge: HOME | End: 2023-11-07
Payer: MEDICARE

## 2023-11-07 VITALS
WEIGHT: 98.8 LBS | DIASTOLIC BLOOD PRESSURE: 87 MMHG | SYSTOLIC BLOOD PRESSURE: 142 MMHG | HEART RATE: 85 BPM | BODY MASS INDEX: 19.3 KG/M2

## 2023-11-07 DIAGNOSIS — Z79.01 LONG TERM CURRENT USE OF ANTICOAGULANT THERAPY: Primary | ICD-10-CM

## 2023-11-07 DIAGNOSIS — I27.82 CHRONIC SADDLE PULMONARY EMBOLISM WITHOUT ACUTE COR PULMONALE (HCC): ICD-10-CM

## 2023-11-07 DIAGNOSIS — I26.92 CHRONIC SADDLE PULMONARY EMBOLISM WITHOUT ACUTE COR PULMONALE (HCC): ICD-10-CM

## 2023-11-07 LAB — INR BLD: 3.2

## 2023-11-07 PROCEDURE — 85610 PROTHROMBIN TIME: CPT

## 2023-11-07 PROCEDURE — 99212 OFFICE O/P EST SF 10 MIN: CPT

## 2023-11-07 NOTE — PATIENT INSTRUCTIONS
Please hold warfarin tomorrow. Decrease current dose of warfarin as instructed on dosing calendar provided. Continue to monitor urine and stool for signs and symptoms of bleeding. Please notify the clinic of any medication changes. Kindly notify the clinic if you are unable to make to your next appointment.

## 2023-11-07 NOTE — PROGRESS NOTES
[facility]  Medication Management  ANTICOAGULATION    Referring Provider: Dr. Rona Rivas INR: 2-3    TODAY'S INR: 3.2    WARFARIN Dosage: Hold x 1, then decrease warfarin to 1.25 mg po every Tuesday; 2.5 mg po all other days    INR (no units)   Date Value   2023 3.2   10/31/2023 1.2   10/09/2023 1.2   10/06/2023 1.3   2023 3.1   2023 4.1   2023 4.3       Hemoglobin   Date Value Ref Range Status   10/23/2023 8.8 (L) 11.9 - 15.1 g/dL Final     Hematocrit   Date Value Ref Range Status   10/23/2023 27.9 (L) 36.3 - 47.1 % Final     ALT   Date Value Ref Range Status   10/23/2023 10 5 - 33 U/L Final     AST   Date Value Ref Range Status   10/23/2023 20 <32 U/L Final       Medication changes:  No changes    Notes:    Fingerstick INR drawn per clinic protocol. Patient states no visible blood in urine and no black tarry stool. No change in other maintenance medications or in diet. Will recheck INR in 3 week per patient request. Patient acknowledges working in consult agreement with pharmacist as referred by his/her physician. Patient discharged from [facility] on 10/27/23. She sustained a closed nondisplaced fracture of right humerus. Patient is accompanied by her sister. Patient will decrease warfarin as above. She already took warfarin 2.5 mg this AM.  She will hold warfarin dose tomorrow. For Pharmacy Admin Tracking Only    Intervention Detail: Adherence Monitorin and Dose Adjustment: 2, reason: Therapy De-escalation  Total # of Interventions Recommended: 3  Total # of Interventions Accepted: 3  Time Spent (min): [address]  BRANDON Marquez [facility]

## 2023-11-14 ENCOUNTER — CLINICAL DOCUMENTATION ONLY (OUTPATIENT)
Facility: CLINIC | Age: 88
End: 2023-11-14

## 2023-11-14 ENCOUNTER — OFFICE VISIT (OUTPATIENT)
Dept: NEPHROLOGY | Age: 88
End: 2023-11-14
Payer: MEDICARE

## 2023-11-14 VITALS
DIASTOLIC BLOOD PRESSURE: 57 MMHG | SYSTOLIC BLOOD PRESSURE: 119 MMHG | BODY MASS INDEX: 19.24 KG/M2 | RESPIRATION RATE: 16 BRPM | TEMPERATURE: 97.9 F | HEART RATE: 84 BPM | WEIGHT: 98 LBS | HEIGHT: 60 IN

## 2023-11-14 DIAGNOSIS — I10 ESSENTIAL HYPERTENSION: ICD-10-CM

## 2023-11-14 DIAGNOSIS — E87.5 HYPERKALEMIA: ICD-10-CM

## 2023-11-14 DIAGNOSIS — N18.32 STAGE 3B CHRONIC KIDNEY DISEASE (HCC): Primary | ICD-10-CM

## 2023-11-14 PROCEDURE — 1090F PRES/ABSN URINE INCON ASSESS: CPT | Performed by: INTERNAL MEDICINE

## 2023-11-14 PROCEDURE — G8484 FLU IMMUNIZE NO ADMIN: HCPCS | Performed by: INTERNAL MEDICINE

## 2023-11-14 PROCEDURE — 1123F ACP DISCUSS/DSCN MKR DOCD: CPT | Performed by: INTERNAL MEDICINE

## 2023-11-14 PROCEDURE — 1036F TOBACCO NON-USER: CPT | Performed by: INTERNAL MEDICINE

## 2023-11-14 PROCEDURE — G8420 CALC BMI NORM PARAMETERS: HCPCS | Performed by: INTERNAL MEDICINE

## 2023-11-14 PROCEDURE — G8427 DOCREV CUR MEDS BY ELIG CLIN: HCPCS | Performed by: INTERNAL MEDICINE

## 2023-11-14 PROCEDURE — 99213 OFFICE O/P EST LOW 20 MIN: CPT | Performed by: INTERNAL MEDICINE

## 2023-11-14 NOTE — PATIENT INSTRUCTIONS
SURVEY:    You may be receiving a survey from [employer] regarding your visit today. Please complete the survey to enable us to provide the highest quality of care to you and your family. If you cannot score us a very good on any question, please call the office to discuss how we could have made your experience a very good one. Thank you.

## 2023-11-14 NOTE — PROGRESS NOTES
[facility]        Outpatient Follow-Up note         11/14/2023 11:24 AM    Patient Name:   Amarjit Young:    1935  Primary Care Physician:  Grover Carlson MD   [facility]  [facility] [place] [address]  Dept: [phone]     Chief Complaint / Reason for follow-up : Follow Up of ckd/htn     Interval History :  Patient seen and examined by me. Feels ok.  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    Colon polyp 2006, 2009    Compression fracture of lumbar vertebra (HCC) / prednisone related to PMR 07/15/2015    Diarrhea     Drug-induced hyperkalemia -- from Losartan 2023    Drug-induced hyperkalemia from Lisinopril 07/27/2018    DVT (deep venous thrombosis) ([address]) 2015    Emphysema     Essential hypertension     Hyperlipidemia     Irritable bowel syndrome     Lumbago     Osteoarthritis     Polymyalgia rheumatica ([address]) 2015    Rheumatism     Seborrheic keratoses     on back     Past Surgical History:   Procedure Laterality Date    APPENDECTOMY  1949    CATARACT REMOVAL WITH IMPLANT      COLECTOMY Right 06/20/2016    open right hemicolectomy bowel resection    COLONOSCOPY  10/17/2011    Dr. Davey Art    COLONOSCOPY  05/25/2016    -bx mass hepatic flexure,tattoo    COLONOSCOPY  12/13/2017    Dr Morales-polyp(adenomatous)hemorrhoids    DENTAL SURGERY      Implants    GLAUCOMA SURGERY      preventative surg    HYSTERECTOMY, TOTAL ABDOMINAL (CERVIX REMOVED)  1973    NASAL SEPTUM SURGERY

## 2023-11-15 ENCOUNTER — HOSPITAL ENCOUNTER (INPATIENT)
Age: 88
LOS: 1 days | Discharge: HOME | End: 2023-11-16
Attending: EMERGENCY MEDICINE | Admitting: INTERNAL MEDICINE
Payer: MEDICARE

## 2023-11-15 ENCOUNTER — APPOINTMENT (OUTPATIENT)
Dept: CT IMAGING | Age: 88
End: 2023-11-15
Payer: MEDICARE

## 2023-11-15 ENCOUNTER — APPOINTMENT (OUTPATIENT)
Dept: GENERAL RADIOLOGY | Age: 88
End: 2023-11-15
Payer: MEDICARE

## 2023-11-15 DIAGNOSIS — R07.89 OTHER CHEST PAIN: Primary | ICD-10-CM

## 2023-11-15 DIAGNOSIS — D64.89 ANEMIA DUE TO OTHER CAUSE, NOT CLASSIFIED: ICD-10-CM

## 2023-11-15 DIAGNOSIS — N28.9 RENAL INSUFFICIENCY: ICD-10-CM

## 2023-11-15 DIAGNOSIS — R10.10 PAIN OF UPPER ABDOMEN: ICD-10-CM

## 2023-11-15 DIAGNOSIS — R79.89 ELEVATED TROPONIN: ICD-10-CM

## 2023-11-15 DIAGNOSIS — I31.39 PERICARDIAL EFFUSION: ICD-10-CM

## 2023-11-15 DIAGNOSIS — R07.89 CHEST WALL PAIN: ICD-10-CM

## 2023-11-15 DIAGNOSIS — S22.050A COMPRESSION FRACTURE OF T6 VERTEBRA, INITIAL ENCOUNTER (HCC): ICD-10-CM

## 2023-11-15 LAB
ALBUMIN SERPL-MCNC: 3.8 G/DL (ref 3.5–5.2)
ALBUMIN/GLOB SERPL: 1.4 {RATIO} (ref 1–2.5)
ALP SERPL-CCNC: 89 U/L (ref 35–104)
ALT SERPL-CCNC: 14 U/L (ref 5–33)
ANION GAP SERPL CALCULATED.3IONS-SCNC: 9 MMOL/L (ref 9–17)
AST SERPL-CCNC: 29 U/L
BACTERIA URNS QL MICRO: ABNORMAL
BASOPHILS # BLD: 0.05 K/UL (ref 0–0.2)
BASOPHILS NFR BLD: 1 % (ref 0–2)
BILIRUB SERPL-MCNC: 0.3 MG/DL (ref 0.3–1.2)
BILIRUB UR QL STRIP: NEGATIVE
BNP SERPL-MCNC: 1918 PG/ML
BUN SERPL-MCNC: 23 MG/DL (ref 8–23)
BUN/CREAT SERPL: 15 (ref 9–20)
CALCIUM SERPL-MCNC: 9.3 MG/DL (ref 8.6–10.4)
CHLORIDE SERPL-SCNC: 109 MMOL/L (ref 98–107)
CLARITY UR: CLEAR
CO2 SERPL-SCNC: 23 MMOL/L (ref 20–31)
COLOR UR: YELLOW
CREAT SERPL-MCNC: 1.5 MG/DL (ref 0.5–0.9)
EKG ATRIAL RATE: 80 BPM
EKG P AXIS: 19 DEGREES
EKG P-R INTERVAL: 124 MS
EKG Q-T INTERVAL: 364 MS
EKG QRS DURATION: 66 MS
EKG QTC CALCULATION (BAZETT): 483 MS
EKG R AXIS: -26 DEGREES
EKG T AXIS: 5 DEGREES
EKG VENTRICULAR RATE: 106 BPM
EOSINOPHIL # BLD: 0.26 K/UL (ref 0–0.44)
EOSINOPHILS RELATIVE PERCENT: 4 % (ref 1–4)
EPI CELLS #/AREA URNS HPF: ABNORMAL /HPF (ref 0–25)
ERYTHROCYTE [DISTWIDTH] IN BLOOD BY AUTOMATED COUNT: 13.7 % (ref 11.8–14.4)
GFR SERPL CREATININE-BSD FRML MDRD: 33 ML/MIN/1.73M2
GLUCOSE SERPL-MCNC: 98 MG/DL (ref 70–99)
GLUCOSE UR STRIP-MCNC: NEGATIVE MG/DL
HCT VFR BLD AUTO: 33.1 % (ref 36.3–47.1)
HGB BLD-MCNC: 10.3 G/DL (ref 11.9–15.1)
HGB UR QL STRIP.AUTO: NEGATIVE
IMM GRANULOCYTES # BLD AUTO: <0.03 K/UL (ref 0–0.3)
IMM GRANULOCYTES NFR BLD: 0 %
INR PPP: 1.5
KETONES UR STRIP-MCNC: NEGATIVE MG/DL
LEUKOCYTE ESTERASE UR QL STRIP: ABNORMAL
LIPASE SERPL-CCNC: 94 U/L (ref 13–60)
LYMPHOCYTES NFR BLD: 1.33 K/UL (ref 1.1–3.7)
LYMPHOCYTES RELATIVE PERCENT: 18 % (ref 24–43)
MCH RBC QN AUTO: 32.4 PG (ref 25.2–33.5)
MCHC RBC AUTO-ENTMCNC: 31.1 G/DL (ref 28.4–34.8)
MCV RBC AUTO: 104.1 FL (ref 82.6–102.9)
MONOCYTES NFR BLD: 0.81 K/UL (ref 0.1–1.2)
MONOCYTES NFR BLD: 11 % (ref 3–12)
MUCOUS THREADS URNS QL MICRO: ABNORMAL
NEUTROPHILS NFR BLD: 66 % (ref 36–65)
NEUTS SEG NFR BLD: 4.83 K/UL (ref 1.5–8.1)
NITRITE UR QL STRIP: NEGATIVE
NRBC BLD-RTO: 0 PER 100 WBC
PH UR STRIP: 7.5 [PH] (ref 5–9)
PLATELET # BLD AUTO: 181 K/UL (ref 138–453)
PMV BLD AUTO: 11.6 FL (ref 8.1–13.5)
POTASSIUM SERPL-SCNC: 5.3 MMOL/L (ref 3.7–5.3)
PROT SERPL-MCNC: 6.6 G/DL (ref 6.4–8.3)
PROT UR STRIP-MCNC: NEGATIVE MG/DL
PROTHROMBIN TIME: 18.5 SEC (ref 11.9–14.8)
RBC # BLD AUTO: 3.18 M/UL (ref 3.95–5.11)
RBC #/AREA URNS HPF: ABNORMAL /HPF (ref 0–2)
RENAL EPITHELIAL, UA: ABNORMAL /HPF
SODIUM SERPL-SCNC: 141 MMOL/L (ref 135–144)
SP GR UR STRIP: 1.02 (ref 1.01–1.02)
TROPONIN I SERPL HS-MCNC: 60 NG/L (ref 0–14)
TROPONIN I SERPL HS-MCNC: 62 NG/L (ref 0–14)
UROBILINOGEN UR STRIP-ACNC: NORMAL EU/DL (ref 0–1)
WBC #/AREA URNS HPF: ABNORMAL /HPF (ref 0–5)
WBC OTHER # BLD: 7.3 K/UL (ref 3.5–11.3)

## 2023-11-15 PROCEDURE — 84484 ASSAY OF TROPONIN QUANT: CPT

## 2023-11-15 PROCEDURE — 6370000000 HC RX 637 (ALT 250 FOR IP): Performed by: STUDENT IN AN ORGANIZED HEALTH CARE EDUCATION/TRAINING PROGRAM

## 2023-11-15 PROCEDURE — 99285 EMERGENCY DEPT VISIT HI MDM: CPT

## 2023-11-15 PROCEDURE — 85025 COMPLETE CBC W/AUTO DIFF WBC: CPT

## 2023-11-15 PROCEDURE — 85610 PROTHROMBIN TIME: CPT

## 2023-11-15 PROCEDURE — 83880 ASSAY OF NATRIURETIC PEPTIDE: CPT

## 2023-11-15 PROCEDURE — 36415 COLL VENOUS BLD VENIPUNCTURE: CPT

## 2023-11-15 PROCEDURE — 1200000000 HC SEMI PRIVATE

## 2023-11-15 PROCEDURE — 93010 ELECTROCARDIOGRAM REPORT: CPT | Performed by: INTERNAL MEDICINE

## 2023-11-15 PROCEDURE — 71045 X-RAY EXAM CHEST 1 VIEW: CPT

## 2023-11-15 PROCEDURE — 81001 URINALYSIS AUTO W/SCOPE: CPT

## 2023-11-15 PROCEDURE — 83690 ASSAY OF LIPASE: CPT

## 2023-11-15 PROCEDURE — 80053 COMPREHEN METABOLIC PANEL: CPT

## 2023-11-15 PROCEDURE — 6370000000 HC RX 637 (ALT 250 FOR IP): Performed by: EMERGENCY MEDICINE

## 2023-11-15 PROCEDURE — 2580000003 HC RX 258: Performed by: STUDENT IN AN ORGANIZED HEALTH CARE EDUCATION/TRAINING PROGRAM

## 2023-11-15 PROCEDURE — 93005 ELECTROCARDIOGRAM TRACING: CPT | Performed by: EMERGENCY MEDICINE

## 2023-11-15 PROCEDURE — 71250 CT THORAX DX C-: CPT

## 2023-11-15 PROCEDURE — 94761 N-INVAS EAR/PLS OXIMETRY MLT: CPT

## 2023-11-15 RX ORDER — ACETAMINOPHEN 325 MG/1
650 TABLET ORAL ONCE
Status: COMPLETED | OUTPATIENT
Start: 2023-11-15 | End: 2023-11-15

## 2023-11-15 RX ORDER — ACETAMINOPHEN 650 MG/1
650 SUPPOSITORY RECTAL EVERY 6 HOURS PRN
Status: DISCONTINUED | OUTPATIENT
Start: 2023-11-15 | End: 2023-11-16 | Stop reason: HOSPADM

## 2023-11-15 RX ORDER — SODIUM CHLORIDE 9 MG/ML
INJECTION, SOLUTION INTRAVENOUS PRN
Status: DISCONTINUED | OUTPATIENT
Start: 2023-11-15 | End: 2023-11-16 | Stop reason: HOSPADM

## 2023-11-15 RX ORDER — MORPHINE SULFATE 2 MG/ML
2 INJECTION, SOLUTION INTRAMUSCULAR; INTRAVENOUS
Status: DISCONTINUED | OUTPATIENT
Start: 2023-11-15 | End: 2023-11-16

## 2023-11-15 RX ORDER — MORPHINE SULFATE 2 MG/ML
2 INJECTION, SOLUTION INTRAMUSCULAR; INTRAVENOUS ONCE
Status: DISCONTINUED | OUTPATIENT
Start: 2023-11-15 | End: 2023-11-16 | Stop reason: HOSPADM

## 2023-11-15 RX ORDER — POLYETHYLENE GLYCOL 3350 17 G/17G
17 POWDER, FOR SOLUTION ORAL DAILY PRN
Status: DISCONTINUED | OUTPATIENT
Start: 2023-11-15 | End: 2023-11-16 | Stop reason: HOSPADM

## 2023-11-15 RX ORDER — M-VIT,TX,IRON,MINS/CALC/FOLIC 27MG-0.4MG
1 TABLET ORAL DAILY
Status: DISCONTINUED | OUTPATIENT
Start: 2023-11-15 | End: 2023-11-16 | Stop reason: HOSPADM

## 2023-11-15 RX ORDER — CALCIUM CARBONATE 500 MG/1
500 TABLET, CHEWABLE ORAL DAILY
Status: DISCONTINUED | OUTPATIENT
Start: 2023-11-15 | End: 2023-11-16 | Stop reason: HOSPADM

## 2023-11-15 RX ORDER — ONDANSETRON 2 MG/ML
4 INJECTION INTRAMUSCULAR; INTRAVENOUS EVERY 6 HOURS PRN
Status: DISCONTINUED | OUTPATIENT
Start: 2023-11-15 | End: 2023-11-16 | Stop reason: HOSPADM

## 2023-11-15 RX ORDER — LANOLIN ALCOHOL/MO/W.PET/CERES
1000 CREAM (GRAM) TOPICAL DAILY
Status: DISCONTINUED | OUTPATIENT
Start: 2023-11-15 | End: 2023-11-16 | Stop reason: HOSPADM

## 2023-11-15 RX ORDER — ALBUTEROL SULFATE 2.5 MG/3ML
2.5 SOLUTION RESPIRATORY (INHALATION) EVERY 6 HOURS PRN
Status: DISCONTINUED | OUTPATIENT
Start: 2023-11-15 | End: 2023-11-15

## 2023-11-15 RX ORDER — ATORVASTATIN CALCIUM 40 MG/1
80 TABLET, FILM COATED ORAL DAILY
Status: DISCONTINUED | OUTPATIENT
Start: 2023-11-15 | End: 2023-11-16 | Stop reason: HOSPADM

## 2023-11-15 RX ORDER — SODIUM CHLORIDE 0.9 % (FLUSH) 0.9 %
10 SYRINGE (ML) INJECTION EVERY 12 HOURS SCHEDULED
Status: DISCONTINUED | OUTPATIENT
Start: 2023-11-15 | End: 2023-11-16 | Stop reason: HOSPADM

## 2023-11-15 RX ORDER — MORPHINE SULFATE 4 MG/ML
4 INJECTION, SOLUTION INTRAMUSCULAR; INTRAVENOUS
Status: DISCONTINUED | OUTPATIENT
Start: 2023-11-15 | End: 2023-11-16

## 2023-11-15 RX ORDER — FAMOTIDINE 10 MG
10 TABLET ORAL DAILY
Status: DISCONTINUED | OUTPATIENT
Start: 2023-11-15 | End: 2023-11-16 | Stop reason: HOSPADM

## 2023-11-15 RX ORDER — ONDANSETRON 2 MG/ML
4 INJECTION INTRAMUSCULAR; INTRAVENOUS ONCE
Status: DISCONTINUED | OUTPATIENT
Start: 2023-11-15 | End: 2023-11-16 | Stop reason: HOSPADM

## 2023-11-15 RX ORDER — SODIUM CHLORIDE 0.9 % (FLUSH) 0.9 %
10 SYRINGE (ML) INJECTION PRN
Status: DISCONTINUED | OUTPATIENT
Start: 2023-11-15 | End: 2023-11-16 | Stop reason: HOSPADM

## 2023-11-15 RX ORDER — ONDANSETRON 4 MG/1
4 TABLET, ORALLY DISINTEGRATING ORAL EVERY 8 HOURS PRN
Status: DISCONTINUED | OUTPATIENT
Start: 2023-11-15 | End: 2023-11-16 | Stop reason: HOSPADM

## 2023-11-15 RX ORDER — MAGNESIUM SULFATE IN WATER 40 MG/ML
2000 INJECTION, SOLUTION INTRAVENOUS PRN
Status: DISCONTINUED | OUTPATIENT
Start: 2023-11-15 | End: 2023-11-15

## 2023-11-15 RX ORDER — AMLODIPINE BESYLATE 10 MG/1
10 TABLET ORAL NIGHTLY
Status: DISCONTINUED | OUTPATIENT
Start: 2023-11-15 | End: 2023-11-16 | Stop reason: HOSPADM

## 2023-11-15 RX ORDER — POTASSIUM CHLORIDE 7.45 MG/ML
10 INJECTION INTRAVENOUS PRN
Status: DISCONTINUED | OUTPATIENT
Start: 2023-11-15 | End: 2023-11-15

## 2023-11-15 RX ORDER — SODIUM CHLORIDE 9 MG/ML
INJECTION, SOLUTION INTRAVENOUS CONTINUOUS
Status: DISCONTINUED | OUTPATIENT
Start: 2023-11-15 | End: 2023-11-16

## 2023-11-15 RX ORDER — WARFARIN SODIUM 7.5 MG/1
3.75 TABLET ORAL
Status: COMPLETED | OUTPATIENT
Start: 2023-11-15 | End: 2023-11-15

## 2023-11-15 RX ORDER — ACETAMINOPHEN 325 MG/1
650 TABLET ORAL EVERY 6 HOURS PRN
Status: DISCONTINUED | OUTPATIENT
Start: 2023-11-15 | End: 2023-11-16 | Stop reason: HOSPADM

## 2023-11-15 RX ORDER — POTASSIUM CHLORIDE 20 MEQ/1
40 TABLET, EXTENDED RELEASE ORAL PRN
Status: DISCONTINUED | OUTPATIENT
Start: 2023-11-15 | End: 2023-11-15

## 2023-11-15 RX ORDER — CARVEDILOL 12.5 MG/1
12.5 TABLET ORAL 2 TIMES DAILY
Status: DISCONTINUED | OUTPATIENT
Start: 2023-11-15 | End: 2023-11-16 | Stop reason: HOSPADM

## 2023-11-15 RX ADMIN — WARFARIN SODIUM 3.75 MG: 7.5 TABLET ORAL at 20:10

## 2023-11-15 RX ADMIN — ACETAMINOPHEN 650 MG: 325 TABLET ORAL at 15:17

## 2023-11-15 RX ADMIN — CARVEDILOL 12.5 MG: 12.5 TABLET, FILM COATED ORAL at 20:11

## 2023-11-15 RX ADMIN — SODIUM CHLORIDE: 9 INJECTION, SOLUTION INTRAVENOUS at 19:20

## 2023-11-15 RX ADMIN — AMLODIPINE BESYLATE 10 MG: 10 TABLET ORAL at 20:11

## 2023-11-15 RX ADMIN — FAMOTIDINE 10 MG: 10 TABLET ORAL at 20:10

## 2023-11-15 ASSESSMENT — PAIN DESCRIPTION - DESCRIPTORS: DESCRIPTORS: ACHING

## 2023-11-15 ASSESSMENT — PAIN SCALES - GENERAL
PAINLEVEL_OUTOF10: 8
PAINLEVEL_OUTOF10: 10
PAINLEVEL_OUTOF10: 6

## 2023-11-15 ASSESSMENT — PAIN DESCRIPTION - ORIENTATION
ORIENTATION: LEFT
ORIENTATION: LEFT

## 2023-11-15 ASSESSMENT — PAIN - FUNCTIONAL ASSESSMENT
PAIN_FUNCTIONAL_ASSESSMENT: ACTIVITIES ARE NOT PREVENTED
PAIN_FUNCTIONAL_ASSESSMENT: 0-10

## 2023-11-15 ASSESSMENT — LIFESTYLE VARIABLES: HOW OFTEN DO YOU HAVE A DRINK CONTAINING ALCOHOL: NEVER

## 2023-11-15 ASSESSMENT — PAIN DESCRIPTION - FREQUENCY: FREQUENCY: CONTINUOUS

## 2023-11-15 ASSESSMENT — PAIN DESCRIPTION - LOCATION
LOCATION: RIB CAGE
LOCATION: RIB CAGE

## 2023-11-15 ASSESSMENT — PAIN DESCRIPTION - ONSET: ONSET: ON-GOING

## 2023-11-15 ASSESSMENT — PAIN DESCRIPTION - PAIN TYPE: TYPE: ACUTE PAIN

## 2023-11-15 NOTE — ED NOTES
Pt assisted to bathroom per wheelchair before transported to [address] [place] [address] [address], [facility] [address], [address]  11/15/23 3274

## 2023-11-15 NOTE — PROGRESS NOTES
PHARMACY NOTE:    The electrolyte replacement protocol for potassium/magnesium has been discontinued per P&T guidelines because the patient has reduced renal function (CrCl < 30 mL/min). The patient's most recent potassium & magnesium levels are:  Recent Labs     11/15/23  1332   K 5.3     Estimated Creatinine Clearance: 18 mL/min (A) (based on SCr of 1.5 mg/dL (H)). For patients with decreased renal function (below 30ml/min) needing potassium/magnesium supplementation, please order individual bolus doses with appropriate monitoring. Please contact the inpatient pharmacy with any concerns. Thank you.     Tatum KirbyD, BCPS 11/15/2023 6:43 PM

## 2023-11-15 NOTE — PROGRESS NOTES
Pt arrived from ED to [facility] room 327 at this time. Pt ambulated to bed with 1 assist. Received report from ED RN at bedside. Pt oriented to room and use of call light. Pt is A&Ox4. Pt vital signs completed, see flow sheets for details. Pt updated on plan of care and whiteboard updated. Call light in reach, bed alarm on. Care ongoing.

## 2023-11-16 ENCOUNTER — APPOINTMENT (OUTPATIENT)
Age: 88
End: 2023-11-16
Payer: MEDICARE

## 2023-11-16 VITALS
DIASTOLIC BLOOD PRESSURE: 49 MMHG | HEIGHT: 64 IN | BODY MASS INDEX: 17.13 KG/M2 | OXYGEN SATURATION: 94 % | SYSTOLIC BLOOD PRESSURE: 106 MMHG | WEIGHT: 100.31 LBS | RESPIRATION RATE: 18 BRPM | HEART RATE: 78 BPM | TEMPERATURE: 97.4 F

## 2023-11-16 PROBLEM — E43 SEVERE MALNUTRITION (HCC): Status: RESOLVED | Noted: 2023-09-26 | Resolved: 2023-11-16

## 2023-11-16 PROBLEM — D68.69 SECONDARY HYPERCOAGULABLE STATE (HCC): Status: ACTIVE | Noted: 2023-11-16

## 2023-11-16 LAB
ANION GAP SERPL CALCULATED.3IONS-SCNC: 9 MMOL/L (ref 9–17)
BASOPHILS # BLD: 0.05 K/UL (ref 0–0.2)
BASOPHILS NFR BLD: 1 % (ref 0–2)
BUN SERPL-MCNC: 21 MG/DL (ref 8–23)
BUN/CREAT SERPL: 16 (ref 9–20)
CALCIUM SERPL-MCNC: 9.1 MG/DL (ref 8.6–10.4)
CHLORIDE SERPL-SCNC: 110 MMOL/L (ref 98–107)
CO2 SERPL-SCNC: 22 MMOL/L (ref 20–31)
CREAT SERPL-MCNC: 1.3 MG/DL (ref 0.5–0.9)
ECHO AO ROOT DIAM: 3.6 CM
ECHO AO ROOT INDEX: 2.47 CM/M2
ECHO AV ACCELERATION TIME: 62.99 MS
ECHO AV CUSP MM: 1.5 CM
ECHO AV MEAN GRADIENT: 10 MMHG
ECHO AV MEAN VELOCITY: 1.3 M/S
ECHO AV PEAK VELOCITY: 1.9 M/S
ECHO AV REGURGITANT PHT: 504.6 MILLISECOND
ECHO AV VTI: 38 CM
ECHO BSA: 1.43 M2
ECHO EST RA PRESSURE: 3 MMHG
ECHO LA DIAMETER INDEX: 2.33 CM/M2
ECHO LA DIAMETER: 3.4 CM
ECHO LA MAJOR AXIS: 6.7 CM
ECHO LA TO AORTIC ROOT RATIO: 0.94
ECHO LA VOL BP: 51 ML (ref 22–52)
ECHO LA VOL MOD A2C: 48 ML (ref 22–52)
ECHO LA VOL MOD A4C: 51 ML (ref 22–52)
ECHO LA VOL/BSA BIPLANE: 35 ML/M2 (ref 16–34)
ECHO LA VOLUME AREA LENGTH: 53 ML
ECHO LA VOLUME INDEX AREA LENGTH: 36 ML/M2 (ref 16–34)
ECHO LA VOLUME INDEX MOD A2C: 33 ML/M2 (ref 16–34)
ECHO LA VOLUME INDEX MOD A4C: 35 ML/M2 (ref 16–34)
ECHO LV E' LATERAL VELOCITY: 8 CM/S
ECHO LV EDV A2C: 57 ML
ECHO LV EDV A4C: 64 ML
ECHO LV EDV BP: 61 ML (ref 56–104)
ECHO LV EDV INDEX A4C: 44 ML/M2
ECHO LV EDV INDEX BP: 42 ML/M2
ECHO LV EDV NDEX A2C: 39 ML/M2
ECHO LV EJECTION FRACTION BIPLANE: 69 % (ref 55–100)
ECHO LV ESV A2C: 18 ML
ECHO LV ESV A4C: 20 ML
ECHO LV ESV BP: 19 ML (ref 19–49)
ECHO LV ESV INDEX A2C: 12 ML/M2
ECHO LV ESV INDEX A4C: 14 ML/M2
ECHO LV ESV INDEX BP: 13 ML/M2
ECHO LV FRACTIONAL SHORTENING: 16 % (ref 28–44)
ECHO LV INTERNAL DIMENSION DIASTOLE INDEX: 2.53 CM/M2
ECHO LV INTERNAL DIMENSION DIASTOLIC: 3.7 CM (ref 3.9–5.3)
ECHO LV INTERNAL DIMENSION SYSTOLIC INDEX: 2.12 CM/M2
ECHO LV INTERNAL DIMENSION SYSTOLIC: 3.1 CM
ECHO LV IVSD: 1.1 CM (ref 0.6–0.9)
ECHO LV IVSS: 1.5 CM
ECHO LV MASS 2D: 138.2 G (ref 67–162)
ECHO LV MASS INDEX 2D: 94.6 G/M2 (ref 43–95)
ECHO LV POSTERIOR WALL DIASTOLIC: 1.2 CM (ref 0.6–0.9)
ECHO LV POSTERIOR WALL SYSTOLIC: 1.5 CM
ECHO LV RELATIVE WALL THICKNESS RATIO: 0.65
ECHO LVOT AV VTI INDEX: 1.01
ECHO LVOT MEAN GRADIENT: 5 MMHG
ECHO LVOT VTI: 38.2 CM
ECHO MV A VELOCITY: 1.27 M/S
ECHO MV E DECELERATION TIME (DT): 180.5 MS
ECHO MV E VELOCITY: 0.93 M/S
ECHO MV E/A RATIO: 0.73
ECHO MV E/E' LATERAL: 11.63
ECHO PV MAX VELOCITY: 0.8 M/S
ECHO RIGHT VENTRICULAR SYSTOLIC PRESSURE (RVSP): 21 MMHG
ECHO TV REGURGITANT MAX VELOCITY: 2.14 M/S
EOSINOPHIL # BLD: 0.26 K/UL (ref 0–0.44)
EOSINOPHILS RELATIVE PERCENT: 4 % (ref 1–4)
ERYTHROCYTE [DISTWIDTH] IN BLOOD BY AUTOMATED COUNT: 13.6 % (ref 11.8–14.4)
GFR SERPL CREATININE-BSD FRML MDRD: 40 ML/MIN/1.73M2
GLUCOSE SERPL-MCNC: 91 MG/DL (ref 70–99)
HCT VFR BLD AUTO: 33.6 % (ref 36.3–47.1)
HGB BLD-MCNC: 10.7 G/DL (ref 11.9–15.1)
IMM GRANULOCYTES # BLD AUTO: <0.03 K/UL (ref 0–0.3)
IMM GRANULOCYTES NFR BLD: 0 %
INR PPP: 1.6
LYMPHOCYTES NFR BLD: 1 K/UL (ref 1.1–3.7)
LYMPHOCYTES RELATIVE PERCENT: 16 % (ref 24–43)
MCH RBC QN AUTO: 32.3 PG (ref 25.2–33.5)
MCHC RBC AUTO-ENTMCNC: 31.8 G/DL (ref 28.4–34.8)
MCV RBC AUTO: 101.5 FL (ref 82.6–102.9)
MONOCYTES NFR BLD: 0.68 K/UL (ref 0.1–1.2)
MONOCYTES NFR BLD: 11 % (ref 3–12)
NEUTROPHILS NFR BLD: 68 % (ref 36–65)
NEUTS SEG NFR BLD: 4.41 K/UL (ref 1.5–8.1)
NRBC BLD-RTO: 0 PER 100 WBC
PLATELET # BLD AUTO: 165 K/UL (ref 138–453)
PMV BLD AUTO: 10.9 FL (ref 8.1–13.5)
POTASSIUM SERPL-SCNC: 5.3 MMOL/L (ref 3.7–5.3)
PROTHROMBIN TIME: 18.9 SEC (ref 11.9–14.8)
RBC # BLD AUTO: 3.31 M/UL (ref 3.95–5.11)
SODIUM SERPL-SCNC: 141 MMOL/L (ref 135–144)
WBC OTHER # BLD: 6.4 K/UL (ref 3.5–11.3)

## 2023-11-16 PROCEDURE — 93306 TTE W/DOPPLER COMPLETE: CPT | Performed by: FAMILY MEDICINE

## 2023-11-16 PROCEDURE — 80048 BASIC METABOLIC PNL TOTAL CA: CPT

## 2023-11-16 PROCEDURE — 97110 THERAPEUTIC EXERCISES: CPT

## 2023-11-16 PROCEDURE — 97116 GAIT TRAINING THERAPY: CPT

## 2023-11-16 PROCEDURE — 97535 SELF CARE MNGMENT TRAINING: CPT

## 2023-11-16 PROCEDURE — 6370000000 HC RX 637 (ALT 250 FOR IP): Performed by: STUDENT IN AN ORGANIZED HEALTH CARE EDUCATION/TRAINING PROGRAM

## 2023-11-16 PROCEDURE — 97166 OT EVAL MOD COMPLEX 45 MIN: CPT

## 2023-11-16 PROCEDURE — 85025 COMPLETE CBC W/AUTO DIFF WBC: CPT

## 2023-11-16 PROCEDURE — 97162 PT EVAL MOD COMPLEX 30 MIN: CPT

## 2023-11-16 PROCEDURE — 2580000003 HC RX 258: Performed by: STUDENT IN AN ORGANIZED HEALTH CARE EDUCATION/TRAINING PROGRAM

## 2023-11-16 PROCEDURE — 85610 PROTHROMBIN TIME: CPT

## 2023-11-16 PROCEDURE — 94761 N-INVAS EAR/PLS OXIMETRY MLT: CPT

## 2023-11-16 PROCEDURE — 36415 COLL VENOUS BLD VENIPUNCTURE: CPT

## 2023-11-16 PROCEDURE — 97530 THERAPEUTIC ACTIVITIES: CPT

## 2023-11-16 PROCEDURE — 93306 TTE W/DOPPLER COMPLETE: CPT

## 2023-11-16 RX ADMIN — SODIUM CHLORIDE, PRESERVATIVE FREE 10 ML: 5 INJECTION INTRAVENOUS at 09:23

## 2023-11-16 RX ADMIN — CARVEDILOL 12.5 MG: 12.5 TABLET, FILM COATED ORAL at 09:23

## 2023-11-16 RX ADMIN — CYANOCOBALAMIN TAB 1000 MCG 1000 MCG: 1000 TAB at 09:23

## 2023-11-16 RX ADMIN — ANTACID TABLETS 500 MG: 500 TABLET, CHEWABLE ORAL at 09:23

## 2023-11-16 RX ADMIN — FAMOTIDINE 10 MG: 10 TABLET ORAL at 09:23

## 2023-11-16 RX ADMIN — ATORVASTATIN CALCIUM 80 MG: 40 TABLET, FILM COATED ORAL at 09:23

## 2023-11-16 RX ADMIN — MULTIPLE VITAMINS W/ MINERALS TAB 1 TABLET: TAB at 09:24

## 2023-11-16 ASSESSMENT — PAIN - FUNCTIONAL ASSESSMENT: PAIN_FUNCTIONAL_ASSESSMENT: ACTIVITIES ARE NOT PREVENTED

## 2023-11-16 ASSESSMENT — PAIN SCALES - GENERAL: PAINLEVEL_OUTOF10: 3

## 2023-11-16 ASSESSMENT — ENCOUNTER SYMPTOMS
ABDOMINAL PAIN: 0
COUGH: 0
SORE THROAT: 0
DIARRHEA: 0
VOMITING: 0
TROUBLE SWALLOWING: 0
CHEST TIGHTNESS: 1
WHEEZING: 0
NAUSEA: 0
BLOOD IN STOOL: 0
CONSTIPATION: 0
SHORTNESS OF BREATH: 0

## 2023-11-16 ASSESSMENT — PAIN DESCRIPTION - ORIENTATION: ORIENTATION: LEFT

## 2023-11-16 ASSESSMENT — PAIN DESCRIPTION - FREQUENCY: FREQUENCY: CONTINUOUS

## 2023-11-16 ASSESSMENT — PAIN DESCRIPTION - ONSET: ONSET: ON-GOING

## 2023-11-16 ASSESSMENT — PAIN DESCRIPTION - PAIN TYPE: TYPE: ACUTE PAIN

## 2023-11-16 ASSESSMENT — PAIN DESCRIPTION - LOCATION: LOCATION: RIB CAGE

## 2023-11-16 ASSESSMENT — PAIN DESCRIPTION - DESCRIPTORS: DESCRIPTORS: ACHING;OTHER (COMMENT)

## 2023-11-16 NOTE — CARE COORDINATION
Case Management Assessment  Initial Evaluation    Date/Time of Evaluation: 11/16/2023 1:36 PM  Assessment Completed by: Keena Sargent MSW LSW     If patient is discharged prior to next notation, then this note serves as note for discharge by case management. Patient Name: Savnaah Monge                   YOB: 1935  Diagnosis: Other chest pain [R07.89]  Chest wall pain [R07.89]  Pericardial effusion [I31.39]  Renal insufficiency [N28.9]  Elevated troponin [R79.89]  Pain of upper abdomen [R10.10]  Anemia due to other cause, not classified [D64.89]  Compression fracture of T6 vertebra, initial encounter ([address]) Jules Dunn                   Date / Time: 11/15/2023  1:22 PM    Patient Admission Status: Inpatient   Readmission Risk (Low < 19, Mod (19-27), High > 27): Readmission Risk Score: 19.3    Current PCP: Nina Arzola MD  PCP verified by CM? Yes    Chart Reviewed: Yes      History Provided by: Patient  Patient Orientation: Alert and Oriented, Person, Place, Situation    Patient Cognition: Alert    Hospitalization in the last 30 days (Readmission):  No    If yes, Readmission Assessment in  Navigator will be completed.     Advance Directives:      Code Status: DNR-CCA   Patient's Primary Decision Maker is: Named in [address] [place] [address]    Primary [facility]iel [facility] - [phone]    Discharge Planning:    Patient lives with: Alone Type of Home: House  Primary Care Giver: Self  Patient Support Systems include: Children   Current Financial resources: Medicare  Current community resources: None  Current services prior to admission: Durable Medical Equipment            Current DME: Beau Ore, Shower Chair, Other (Comment) (grabbar and rollator)            Type of Home Care services:  None    ADLS  Prior functional level: Assistance with the following:, Shopping  Current functional level: Assistance with the following:, Shopping    PT AM-PAC:   /24  OT AM-PAC: 19 directives on file in her medical record. Pt reports that her medications are affordable. Pt plans to return home at discharge. Pt identifies no further discharge needs or concerns. SW will remain available as needed.  Jailene West MSW LSW 11/16/2023     The Plan for Transition of Care is related to the following treatment goals of Other chest pain [R07.89]  Chest wall pain [R07.89]  Pericardial effusion [I31.39]  Renal insufficiency [N28.9]  Elevated troponin [R79.89]  Pain of upper abdomen [R10.10]  Anemia due to other cause, not classified [D64.89]  Compression fracture of T6 vertebra, initial encounter [facility]) [L47.385B]      The Patient and/or Patient Representative Agree with the Discharge Plan?  yes    Cuca Strong MSW [place]   Case Management Department  Ph: [phone] Fax: [phone]

## 2023-11-16 NOTE — PROGRESS NOTES
Physical Therapy  Facility/Department: [facility] MED SURG  Daily Treatment Note    NAME: Melodie Carter  : 1935  MRN: [id number]    Date of Service: 2023    Discharge Recommendations:  Home independently, No therapy recommended at discharge        Patient Diagnosis(es): The primary encounter diagnosis was Other chest pain. Diagnoses of Pain of upper abdomen, Elevated troponin, Pericardial effusion, Compression fracture of T6 vertebra, initial encounter ([address]), Anemia due to other cause, not classified, Renal insufficiency, and Chest wall pain were also pertinent to this visit. Assessment   Assessment: Patient is independent with bed mobility and transfers. Patient ambulated 76' with LBQC, Sup/SBA no LOB with decreased step clearance noted, patient doesn't always use AD and demoed carrying her [facility] periodically during ambulation, education was provided on benefits of proper use of AD. Seated B LE ther ex x20 each in all planes. Cues provided for slowing pace, fair carryover. Patient experience bradycardia at times during seated ther ex with HR dropping into low 40's and 39 BPM at one point, no s/s reported from patient. Nursing notified post treatment of HR. Activity Tolerance: Patient tolerated treatment well; Other (comment)  Equipment Needed: No     Plan    Physical Therapy Plan  General Plan: 2 times a day 7 days a week (1x/day on weekends and holidays)  Current Treatment Recommendations: Strengthening;ROM;Balance training;Functional mobility training;Transfer training;ADL/Self-care training; Therapeutic activities;Gait training; Safety education & training;Patient/Caregiver education & training     Restrictions  Restrictions/Precautions  Restrictions/Precautions: Bed Alarm, Fall Risk (CHAIR ALARM.)  Required Braces or Orthoses?: No     Subjective    Subjective  Subjective: Pt in bed upon arrival pleasant and agreeable to therapy  Pain: 2/10 R shoulder and ribs at rest, but patient reports this pain will education needed    Therapy Time   Individual Concurrent Group Co-treatment   Time In [phone])         Time Out 1209 (1315)         Minutes 14         Timed Code Treatment Minutes: [address], [place]

## 2023-11-16 NOTE — PROGRESS NOTES
Patient would benefit from OT services throughout hospitalization to ensure safe and indep return home, alone. Treatment Diagnosis: weakness  Prognosis: Good  Decision Making: Medium Complexity  REQUIRES OT FOLLOW-UP: Yes        Plan   Occupational Therapy Plan  Times Per Day: Once a day  Days Per Week: 7 Days  Current Treatment Recommendations: Balance training, ROM, Strengthening, Functional mobility training, Safety education & training, Patient/Caregiver education & training, Endurance training, Equipment evaluation, education, & procurement, Home management training, Self-Care / ADL     Restrictions  Restrictions/Precautions  Restrictions/Precautions: Bed Alarm, Fall Risk (CHAIR ALARM.)  Required Braces or Orthoses?: No    Subjective   General  Patient assessed for rehabilitation services?: Yes  Subjective  Subjective: Patient denies pain, agreeable to OT evaluation. Social/Functional History  Social/Functional History  Lives With: Alone  Type of Home: House  Home Layout: One level  Home Access: Stairs to enter with rails  Entrance Stairs - Number of Steps: 2  Bathroom Shower/Tub: Tub/Shower unit  Bathroom Toilet: Handicap height  Bathroom Equipment: Shower chair, Grab bars in shower  ADL Assistance: [address] Independent  Ambulation Assistance: Independent  Transfer Assistance: Independent  Additional Comments: Sister assists with laundry, son does grocery shopping       Objective        Observation/Palpation  Posture: Poor  Safety Devices  Type of Devices: All fall risk precautions in place; Bed alarm in place;Call light within reach; Left in bed       AROM: Generally decreased, functional (R shoulder decreased d/t previous injury in sept.)  Strength: Generally decreased, functional  Coordination: Generally decreased, functional  Tone: Normal  Sensation: Intact  ADL  Feeding: Independent  Grooming: Stand by assistance  UE Bathing: Stand by assistance  LE Bathing: Stand by

## 2023-11-16 NOTE — PROGRESS NOTES
Pharmacy Note  Warfarin Consult    Chelsey Hunter is a 80 y.o. female for whom pharmacy has been consulted to manage warfarin therapy. 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    Colon polyp 2006, 2009    Compression fracture of lumbar vertebra ([address]) / prednisone related to PMR 07/15/2015    Diarrhea     Drug-induced hyperkalemia -- from Losartan 2023    Drug-induced hyperkalemia from Lisinopril 07/27/2018    DVT (deep venous thrombosis) ([address]) 2015    Emphysema     Essential hypertension     Hyperlipidemia     Irritable bowel syndrome     Lumbago     Osteoarthritis     Polymyalgia rheumatica ([address]) 2015    Rheumatism     Seborrheic keratoses     on back                Recent Labs     11/15/23  1332   INR 1.5     Recent Labs     11/15/23  1332   HGB 10.3*   HCT 33.1*            Date             INR        Dose   11/15/2023      1.5    3.75    Daily PT/INR while inpatient. PT/INR ordered to start 11/16/23. Thank you for the consult. Will continue to follow. Trae Iyer, Pharm. D., RAZ, BCCCP  11/15/2023 7:32 PM

## 2023-11-16 NOTE — PROGRESS NOTES
Pt is A/O x 4, calm and cooperative. Assessment and vital signs completed, see flow sheet. Pt resting in bed chair and call light within reach. Patient denies pain. Denies further needs and will continue to monitor.

## 2023-11-16 NOTE — PLAN OF CARE
Problem: Discharge Planning  Goal: Discharge to home or other facility with appropriate resources  11/16/2023 0956 by Camron Lopez RN  Outcome: Progressing     Problem: Pain  Goal: Verbalizes/displays adequate comfort level or baseline comfort level  11/16/2023 0956 by Camron Lopez RN  Outcome: Progressing     Problem: Safety - Adult  Goal: Free from fall injury  11/16/2023 0956 by Camron Lopez RN  Outcome: Progressing     Problem: ABCDS Injury Assessment  Goal: Absence of physical injury  11/16/2023 0956 by Camron Lopez RN  Outcome: Progressing

## 2023-11-16 NOTE — PROGRESS NOTES
[address] Note-Warfarin Follow-up       Patient:  Roberth Huynh  MRN: [id number]    Warfarin consult follow-up:    INR (no units)   Date Value   11/16/2023 1.6   11/15/2023 1.5   11/07/2023 3.2   10/31/2023 1.2   10/09/2023 1.2   10/06/2023 1.3   09/28/2023 3.1       Hemoglobin (g/dL)   Date Value   11/16/2023 10.7 (L)   11/15/2023 10.3 (L)   10/23/2023 8.8 (L)     Hematocrit (%)   Date Value   11/16/2023 33.6 (L)   11/15/2023 33.1 (L)   10/23/2023 27.9 (L)     Platelets (k/uL)   Date Value   11/16/2023 165   11/15/2023 181   10/23/2023 180       Target INR Range: 2-3      Notes:                     Patient received two doses of warfarin on 11/15/23. She took 2.5 mg at home and then received 3.75 mg at hospital.  Patient being discharged today - no coumadin today. Take 2.5 mg warfarin tomorrow morning and then have INR check at coumadin clinic at 12:40 pm prior to Dr Teagan Bravo appointment. Patient has been made aware of these plans.     Thank you,  Hayes Reed, PharmD, 11/16/2023 3:41 PM

## 2023-11-16 NOTE — H&P
History and Physical    Patient:  Aura Tidwell  MRN: [id number]    Chief Complaint: Rib pain    History Obtained From: Patient, electronic medical record    PCP: Estrellita Case MD    History of Present Illness: The patient is a 80 y.o. female who presents with left lower chest pain. It is worse with movement. She did fall 1 month ago and fractured her right shoulder. Patient states she has had the left lower chest pain since the fall. She also states that she has been working with physical therapy and is unsure if maybe she pulled something. She denies any shortness of breath, abdominal pain, lightheadedness, fever or chills. Patient is on Coumadin. Past medical medical history includes CKD stage IV, PE, hypertension, NSTEMI. Troponin 62 and 60, proBNP 1918, CT chest shows mild/moderate pericardial effusion.     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    Colon polyp 2006, 2009    Compression fracture of lumbar vertebra (HCC) / prednisone related to PMR 07/15/2015    Diarrhea     Drug-induced hyperkalemia -- from Losartan 2023    Drug-induced hyperkalemia from Lisinopril 07/27/2018    DVT (deep venous thrombosis) ([address]) 2015    Emphysema     Essential hypertension     Hyperlipidemia     Irritable bowel syndrome     Lumbago     Osteoarthritis     Polymyalgia rheumatica ([address]) 2015    Rheumatism     Seborrheic keratoses     on back       Past Surgical History:        Procedure Laterality Date    APPENDECTOMY  1949    CATARACT REMOVAL WITH IMPLANT      COLECTOMY Right 06/20/2016    open right hemicolectomy bowel resection    COLONOSCOPY  10/17/2011    Dr. Praveen Myers    COLONOSCOPY  05/25/2016    -bx mass hepatic flexure,tattoo    COLONOSCOPY  12/13/2017

## 2023-11-16 NOTE — PROGRESS NOTES
Comprehensive Nutrition Assessment    Type and Reason for Visit:  Initial    Nutrition Recommendations/Plan:   Modify current diet to include ground beef and pork. Malnutrition Assessment:  Malnutrition Status: Moderate malnutrition (11/16/23 0804)    Context:  Acute Illness     Findings of the 6 clinical characteristics of malnutrition:  Energy Intake:  Mild decrease in energy intake (Comment)  Weight Loss:  No significant weight loss     Body Fat Loss:  Mild body fat loss Orbital, Fat Overlying Ribs   Muscle Mass Loss:  Mild muscle mass loss Temples (temporalis), Clavicles (pectoralis & deltoids), Hand (interosseous)  Fluid Accumulation:  Mild Extremities (+ 1 pitting RLE, + 1 non pitting LLE)   Strength:  Not Performed    Nutrition Assessment:    moderate malnutrition r/t inadequate oral intakes aeb BMI 17.22, Pt reports she has never been a big eater. States she needs beef and pork ground-\"it's like chewing rubber. \" Declined offer of all types of supplements, states she wouldn't drink or eat them. She watches K+ content in diet at home. Sister and son help her a lot. States she fell and pain comes and goes. No vitamin D level noted. GFR 40. Nutrition Related Findings:    + 1 pitting RLE, + 1 non pitting  LLE         Current Nutrition Intake & Therapies:    Average Meal Intake: Unable to assess  Average Supplements Intake: None Ordered  ADULT DIET; Regular    Anthropometric Measures:  Height: 162.6 cm (5' 4\")  Ideal Body Weight (IBW): 120 lbs (55 kg)    Admission Body Weight: 45.7 kg (100 lb 12 oz)  Current Body Weight: 45.5 kg (100 lb 5 oz), 83.6 % IBW.  Weight Source: Bed Scale  Current BMI (kg/m2): 17.2  Usual Body Weight: 46.3 kg (102 lb 1.2 oz)  % Weight Change (Calculated): -1.7  Weight Adjustment For: No Adjustment                 BMI Categories: Underweight (BMI less than 22) age over 72    Estimated Daily Nutrient Needs:  Energy Requirements Based On: Kcal/kg  Weight Used for Energy

## 2023-11-16 NOTE — PROGRESS NOTES
Admission assessment and vitals completed at this time along with navigator. Pt here for chest wall pain, but denies any pain at this time. Pt denies SOB. Helped pt ambulate to bathroom with cane, tolerated well. All needs met at this time, call light within reach. Care ongoing.

## 2023-11-16 NOTE — PROGRESS NOTES
Occupational Therapy  Facility/Department: [facility] AT THE [facility] MED SURG  Daily Treatment Note  NAME: Melodie Carter  : 1935  MRN: [id number]    Date of Service: 2023    Discharge Recommendations:  Continue to assess pending progress         Patient Diagnosis(es): The primary encounter diagnosis was Other chest pain. Diagnoses of Pain of upper abdomen, Elevated troponin, Pericardial effusion, Compression fracture of T6 vertebra, initial encounter ([address]), Anemia due to other cause, not classified, Renal insufficiency, and Chest wall pain were also pertinent to this visit. Assessment    Activity Tolerance: Patient tolerated treatment well  Discharge Recommendations: Continue to assess pending progress      Plan   Occupational Therapy Plan  Times Per Day: Once a day  Days Per Week: 7 Days  Current Treatment Recommendations: Balance training;ROM;Strengthening; Functional mobility training; Safety education & training;Patient/Caregiver education & training; Endurance training;Equipment evaluation, education, & procurement;Home management training;Self-Care / ADL     Restrictions  Restrictions/Precautions  Restrictions/Precautions: Bed Alarm; Fall Risk (CHAIR ALARM.)  Required Braces or Orthoses?: No    Subjective   Subjective  Subjective: Pt agreed to fxl mob training and BUE therex, then requested toileting. Pain: denied  Orientation  Overall Orientation Status: Within Functional Limits  Cognition  Overall Cognitive Status: WFL        Objective    Vitals       ADL  Feeding: Independent  Grooming: Stand by assistance  UE Bathing: Stand by assistance  LE Bathing: Stand by assistance  UE Dressing: Stand by assistance  LE Dressing: Stand by assistance  Toileting: Stand by assistance  Additional Comments: SBA/SUP fxl mob normal househol distacnes carrying quad cane, 0 LOB noted. Pt completed toileting SUP managing clothing and hygiene. Pt reports MOD I at home for all self care tasks.   OT Exercises  Exercise Treatment: BUE therex to increase strength/endurance for ease of fxl tasks. Pt completed AROM x 10 x all planes shoulder elbow and wrists, limitations noted in B shoulder ROM. Pt edu on HEP with G verbal return. Safety Devices  Type of Devices: All fall risk precautions in place;Call light within reach; Left in bed     Patient Education  Education Given To: Patient  Education Provided: Role of Therapy;Plan of Care;Home Exercise Program;Transfer Training;ADL Adaptive Strategies  Education Method: Verbal  Barriers to Learning: None  Education Outcome: Verbalized understanding;Demonstrated understanding    Goals  Short Term Goals  Time Frame for Short Term Goals: 21 visits  Short Term Goal 1: Patient to safely complete ADL routine c mod I to ensure safe return home. Short Term Goal 2: Patient to engage in ROM to R shoulder and generlaized BUE strength and activity tolerance tasks to improve strength and ROM for I/ADL. Short Term Goal 3: Patient to be educated on d/c folder, AE/DME and basic home safety to ensure safe return home.        Therapy Time   Individual Concurrent Group Co-treatment   Time In 0956         Time Out 1019         Minutes 23                 Tess Naylor, ROBERT

## 2023-11-16 NOTE — DISCHARGE SUMMARY
Discharge Summary    Philip Hernandez  :  1935  MRN:  [id number]    Admit date:  11/15/2023      Discharge date: 2023     Admitting Physician:  Noemi Candelaria MD    Discharge Diagnoses:    Principal Problem:    Chest wall pain  Active Problems:    CKD (chronic kidney disease) stage 4, GFR 15-29 ml/min (HCC)    Moderate malnutrition (HCC)    Secondary hypercoagulable state ([address])  Resolved Problems:    * No resolved hospital problems. [facility] Course: Philip Hernandez is a 80 y.o. female admitted with chest wall pain. She presented with complaints of left lower chest pain. Worse with movement. She fell approximately 1 month ago and fractured her right shoulder. She stated she had left lower chest pain since the fall. She stated she was working with physical therapy but felt as though she may have pulled something. She denies shortness of breath abdominal pain lightheadedness or dizziness. She denied recent illness including fever chills. She does have history of CKD stage IV, pulmonary hypertension, NSTEMI. Troponins were flat at 62 and 16 proBNP was 1918. CT chest showed mild to moderate pericardial effusion but no tamponade. Patient was admitted echocardiogram was completed and no tamponade was seen. Recommendation was to repeat echocardiogram in 2 weeks. Hemodynamically patient is stable. Plan will be to discharge today she will repeat echocardiogram in 2 weeks and follow-up with primary care. Consultants:  none    Procedures: none    Complications: none    Discharge Condition: fair    Exam:  GEN:    Awake, alert and oriented x3. EYES:   EOMI, pupils equal   NECK: Supple. No lymphadenopathy. No carotid bruit  CVS:     regular rate and rhythm, 2/6 systolic murmur  PULM:  CTA, no wheezes, rales or rhonchi, no acute respiratory distress  ABD:     Bowels sounds normal.  Abdomen is soft. No distention. no tenderness to palpation. EXT:     trace edema bilaterally .   No calf

## 2023-11-16 NOTE — PROGRESS NOTES
Physical Therapy  Facility/Department: [facility] AT THE [facility] MED SURG  Physical Therapy Initial Assessment    Name: Jaime Hardy  : 1935  MRN: [id number]  Date of Service: 2023    Discharge Recommendations:  Home independently, No therapy recommended at discharge ([address] [place] [facility].)   PT Equipment Recommendations  Equipment Needed: No      Patient Diagnosis(es): The primary encounter diagnosis was Other chest pain. Diagnoses of Pain of upper abdomen, Elevated troponin, Pericardial effusion, Compression fracture of T6 vertebra, initial encounter ([address]), Anemia due to other cause, not classified, Renal insufficiency, and Chest wall pain were also pertinent to this visit. 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 Past Surgical History:  has a past surgical history that includes Cataract removal with implant; Glaucoma surgery; Nasal septum surgery; Dental surgery; Appendectomy (); Skin cancer excision; Hysterectomy, total abdominal (); Tonsillectomy (); Colonoscopy (10/17/2011); Colonoscopy (2016); colectomy (Right, 2016); Colonoscopy (2017); and pr colsc flx w/rmvl of tumor polyp lesion snare tq (N/A, 2017). Assessment   Body Structures, Functions, Activity Limitations Requiring Skilled Therapeutic Intervention: Decreased functional mobility ; Decreased ADL status; Increased pain  Assessment: DX RIB PAIN. SUPERVISION GAIT QUAD CANE. RIB PAIN

## 2023-11-16 NOTE — PROGRESS NOTES
Discharge summary reviewed with patient at this time and copy provided to patient. Patient taken down via wheelchair to transportation home.

## 2023-11-16 NOTE — PLAN OF CARE
Problem: Discharge Planning  Goal: Discharge to home or other facility with appropriate resources  11/16/2023 1522 by Camron Lopez RN  Outcome: Completed  11/16/2023 0956 by Camron Lopez RN  Outcome: Progressing     Problem: Pain  Goal: Verbalizes/displays adequate comfort level or baseline comfort level  11/16/2023 1522 by Camron Lopez RN  Outcome: Completed  11/16/2023 0956 by Camron Lopez RN  Outcome: Progressing     Problem: Safety - Adult  Goal: Free from fall injury  11/16/2023 1522 by Camron Lopez RN  Outcome: Completed  11/16/2023 0956 by Camron Lopez RN  Outcome: Progressing     Problem: ABCDS Injury Assessment  Goal: Absence of physical injury  11/16/2023 1522 by Camron Lopez RN  Outcome: Completed  11/16/2023 0956 by Camron Lopez RN  Outcome: Progressing     Problem: Nutrition Deficit:  Goal: Optimize nutritional status  11/16/2023 1522 by Camron Lopez RN  Outcome: Completed  11/16/2023 1048 by Nacho Fuentes RD, LD  Flowsheets (Taken 11/16/2023 1048)  Nutrient intake appropriate for improving, restoring, or maintaining nutritional needs:   Monitor oral intake, labs, and treatment plans   Recommend appropriate diets, oral nutritional supplements, and vitamin/mineral supplements  Note: Nutrition Problem #1: Moderate malnutrition  Intervention: Food and/or Nutrient Delivery: Start Oral Nutrition Supplement, Modify Current Diet

## 2023-11-16 NOTE — DISCHARGE INSTR - DIET

## 2023-11-16 NOTE — PLAN OF CARE
Problem: Discharge Planning  Goal: Discharge to home or other facility with appropriate resources  Outcome: Progressing  Flowsheets (Taken 11/15/2023 0023)  Discharge to home or other facility with appropriate resources: Identify barriers to discharge with patient and caregiver     Problem: Pain  Goal: Verbalizes/displays adequate comfort level or baseline comfort level  Outcome: Progressing     Problem: Safety - Adult  Goal: Free from fall injury  Outcome: Progressing     Problem: ABCDS Injury Assessment  Goal: Absence of physical injury  Outcome: Progressing

## 2023-11-17 ENCOUNTER — HOSPITAL ENCOUNTER (OUTPATIENT)
Dept: PHARMACY | Age: 88
Setting detail: THERAPIES SERIES
Discharge: HOME | End: 2023-11-17
Payer: MEDICARE

## 2023-11-17 VITALS — BODY MASS INDEX: 17.43 KG/M2 | WEIGHT: 101.6 LBS

## 2023-11-17 DIAGNOSIS — I27.82 CHRONIC SADDLE PULMONARY EMBOLISM WITHOUT ACUTE COR PULMONALE (HCC): ICD-10-CM

## 2023-11-17 DIAGNOSIS — Z79.01 LONG TERM CURRENT USE OF ANTICOAGULANT THERAPY: Primary | ICD-10-CM

## 2023-11-17 DIAGNOSIS — I26.92 CHRONIC SADDLE PULMONARY EMBOLISM WITHOUT ACUTE COR PULMONALE (HCC): ICD-10-CM

## 2023-11-17 PROBLEM — I31.39 PERICARDIAL EFFUSION WITHOUT CARDIAC TAMPONADE: Status: ACTIVE | Noted: 2023-11-17

## 2023-11-17 LAB — INR BLD: 1.7

## 2023-11-17 PROCEDURE — 85610 PROTHROMBIN TIME: CPT

## 2023-11-17 PROCEDURE — 99212 OFFICE O/P EST SF 10 MIN: CPT

## 2023-11-17 NOTE — PROGRESS NOTES
[facility]  ANTICOAGULATION    Referring Provider: Dr. Ilene Lisa INR: 2-3    TODAY'S INR: 1.7    WARFARIN Dosage: Increase warfarin to 3.75 mg po every Tuesday; 2.5 mg po all other days    INR (no units)   Date Value   2023 1.7   2023 1.6   11/15/2023 1.5   2023 3.2   10/31/2023 1.2   10/09/2023 1.2   10/06/2023 1.3       Hemoglobin   Date Value Ref Range Status   2023 10.7 (L) 11.9 - 15.1 g/dL Final     Hematocrit   Date Value Ref Range Status   2023 33.6 (L) 36.3 - 47.1 % Final     ALT   Date Value Ref Range Status   11/15/2023 14 5 - 33 U/L Final     AST   Date Value Ref Range Status   11/15/2023 29 <32 U/L Final       Medication changes:  No changes    Notes:    Fingerstick INR drawn per clinic protocol. Patient states no visible blood in urine and no black tarry stool. Denies any missed doses of warfarin. No change in other maintenance medications or in diet. Will recheck INR in 1.5 weeks. Patient acknowledges working in consult agreement with pharmacist as referred by his/her physician. Patient hospitalized at [facility] 11/15/23-23 for chest pain. Patient states she believes pain was related to her fall approximately 1 month ago when she fractured her right shoulder. Patient is scheduled for a repeat echocardiogram in 2 weeks. Patient states that her activity level is increased and she is eating better. Will increase warfarin as above. For Pharmacy Admin Tracking Only    Intervention Detail: Adherence Monitorin and Dose Adjustment: 1, reason: Improve Adherence, Therapy Optimization  Total # of Interventions Recommended: 2  Total # of Interventions Accepted: 2  Time Spent (min): [address] [facility]

## 2023-11-17 NOTE — PATIENT INSTRUCTIONS
Please take warfarin 3.75 mg today (1 & 1/2 tablets). Increase current dose of warfarin as instructed on dosing calendar provided. Continue to monitor urine and stool for signs and symptoms of bleeding. Please notify the clinic of any medication changes. Kindly notify the clinic if you are unable to make to your next appointment.

## 2023-11-20 ENCOUNTER — CLINICAL DOCUMENTATION ONLY (OUTPATIENT)
Facility: CLINIC | Age: 88
End: 2023-11-20

## 2023-11-28 ENCOUNTER — APPOINTMENT (OUTPATIENT)
Dept: PHARMACY | Age: 88
End: 2023-11-28
Payer: MEDICARE

## 2023-11-28 VITALS
BODY MASS INDEX: 17.65 KG/M2 | HEART RATE: 78 BPM | WEIGHT: 102.8 LBS | DIASTOLIC BLOOD PRESSURE: 82 MMHG | SYSTOLIC BLOOD PRESSURE: 145 MMHG

## 2023-11-28 DIAGNOSIS — I26.92 CHRONIC SADDLE PULMONARY EMBOLISM WITHOUT ACUTE COR PULMONALE (HCC): ICD-10-CM

## 2023-11-28 DIAGNOSIS — I27.82 CHRONIC SADDLE PULMONARY EMBOLISM WITHOUT ACUTE COR PULMONALE (HCC): ICD-10-CM

## 2023-11-28 DIAGNOSIS — Z79.01 LONG TERM CURRENT USE OF ANTICOAGULANT THERAPY: Primary | ICD-10-CM

## 2023-11-28 LAB — INR BLD: 1.4

## 2023-11-28 PROCEDURE — 85610 PROTHROMBIN TIME: CPT | Performed by: FAMILY MEDICINE

## 2023-11-28 PROCEDURE — 99212 OFFICE O/P EST SF 10 MIN: CPT | Performed by: FAMILY MEDICINE

## 2023-11-28 NOTE — PROGRESS NOTES
[facility]  [facility]ICO[facility]UL[facility]    Referring Provider: Dr Maki Mars INR: 2.0-3.0    TODAY'S INR: 1.4    WARFARIN Dosage: 3.75mg x1 (already taken) then increase to 3.75mg MWF, 2.5mg all other days    INR (no units)   Date Value   2023 1.4   2023 1.7   2023 1.6   11/15/2023 1.5   2023 3.2   10/31/2023 1.2   10/09/2023 1.2       Hemoglobin   Date Value Ref Range Status   2023 10.7 (L) 11.9 - 15.1 g/dL Final     Hematocrit   Date Value Ref Range Status   2023 33.6 (L) 36.3 - 47.1 % Final     ALT   Date Value Ref Range Status   11/15/2023 14 5 - 33 U/L Final     AST   Date Value Ref Range Status   11/15/2023 29 <32 U/L Final       Medication changes:  No change    Notes:    Fingerstick INR drawn per clinic protocol. Patient states no visible blood in urine and no black tarry stool. Denies any missed doses of warfarin. No change in other maintenance medications or in diet. Will recheck INR in 1.5 weeks. Patient is eating a bit better and has been more active (doing dishes, folding laundry, making meals). Patient acknowledges working in consult agreement with pharmacist as referred by his/her physician.                   For Pharmacy Admin Tracking Only    Intervention Detail: Adherence Monitorin and Dose Adjustment: 2, reason: Therapy Optimization  Total # of Interventions Recommended: 4  Total # of Interventions Accepted: 4  Time Spent (min): 20      Reji Rothman R.Ph., 2023,3:44 PM

## 2023-11-28 NOTE — PATIENT INSTRUCTIONS
Begin taking warfarin 3.75mg (1 1/2 tablets) Monday Wednesday and Friday and 2.5mg (1 tablet) all other days. Continue to monitor urine and stool for signs and symptoms of bleeding. Please notify the clinic of any medication changes. Please remember to bring all medications (both prescription and OTC) to your next visit. Kindly notify the clinic if you are unable to make to your next appointment. Follow warfarin dosing instructions on dosing calendar provided.

## 2023-12-08 ENCOUNTER — HOSPITAL ENCOUNTER (OUTPATIENT)
Dept: PHARMACY | Age: 88
Setting detail: THERAPIES SERIES
Discharge: HOME | End: 2023-12-08
Payer: MEDICARE

## 2023-12-08 VITALS
HEART RATE: 77 BPM | DIASTOLIC BLOOD PRESSURE: 67 MMHG | SYSTOLIC BLOOD PRESSURE: 124 MMHG | BODY MASS INDEX: 18.02 KG/M2 | WEIGHT: 105 LBS

## 2023-12-08 DIAGNOSIS — Z79.01 LONG TERM CURRENT USE OF ANTICOAGULANT THERAPY: Primary | ICD-10-CM

## 2023-12-08 DIAGNOSIS — I26.92 CHRONIC SADDLE PULMONARY EMBOLISM WITHOUT ACUTE COR PULMONALE (HCC): ICD-10-CM

## 2023-12-08 DIAGNOSIS — I27.82 CHRONIC SADDLE PULMONARY EMBOLISM WITHOUT ACUTE COR PULMONALE (HCC): ICD-10-CM

## 2023-12-08 LAB — INR BLD: 2.9

## 2023-12-08 PROCEDURE — 85610 PROTHROMBIN TIME: CPT

## 2023-12-08 PROCEDURE — 99211 OFF/OP EST MAY X REQ PHY/QHP: CPT

## 2023-12-08 NOTE — PROGRESS NOTES
[facility]  ANTICOAGULATION    Referring Provider: Dr. Sadi Ibarra INR: 2-3    TODAY'S INR: 2.9    WARFARIN Dosage: Continue warfarin 3.75 mg po every Mon, Wed, Fri; 2.5 mg po all other days    INR (no units)   Date Value   2023 2.9   2023 1.4   2023 1.7   2023 1.6   11/15/2023 1.5   2023 3.2   10/31/2023 1.2       Hemoglobin   Date Value Ref Range Status   2023 10.7 (L) 11.9 - 15.1 g/dL Final     Hematocrit   Date Value Ref Range Status   2023 33.6 (L) 36.3 - 47.1 % Final     ALT   Date Value Ref Range Status   11/15/2023 14 5 - 33 U/L Final     AST   Date Value Ref Range Status   11/15/2023 29 <32 U/L Final       Medication changes:  Stop [facility] due to cost  Start Kayexalate    Notes:    Fingerstick INR drawn per clinic protocol. Patient states no visible blood in urine and no black tarry stool. Denies any missed doses of warfarin. No change in other maintenance medications or in diet. Will recheck INR in 4 weeks - per patient request. (She is tired of multiple medical appointments and does not want to come back to clinic any sooner). Patient acknowledges working in consult agreement with pharmacist as referred by his/her physician. For Pharmacy Admin Tracking Only    Intervention Detail: Adherence Monitorin  Total # of Interventions Recommended: 1  Total # of Interventions Accepted: 1  Time Spent (min): [address].  DEY[facility]

## 2024-01-02 ENCOUNTER — HOSPITAL ENCOUNTER (OUTPATIENT)
Dept: PHARMACY | Age: 89
Setting detail: THERAPIES SERIES
Discharge: HOME | End: 2024-01-02
Payer: MEDICARE

## 2024-01-02 VITALS
DIASTOLIC BLOOD PRESSURE: 52 MMHG | BODY MASS INDEX: 17.99 KG/M2 | WEIGHT: 104.8 LBS | SYSTOLIC BLOOD PRESSURE: 98 MMHG | HEART RATE: 77 BPM

## 2024-01-02 DIAGNOSIS — Z79.01 LONG TERM CURRENT USE OF ANTICOAGULANT THERAPY: Primary | ICD-10-CM

## 2024-01-02 DIAGNOSIS — I26.92 CHRONIC SADDLE PULMONARY EMBOLISM WITHOUT ACUTE COR PULMONALE (HCC): ICD-10-CM

## 2024-01-02 DIAGNOSIS — I27.82 CHRONIC SADDLE PULMONARY EMBOLISM WITHOUT ACUTE COR PULMONALE (HCC): ICD-10-CM

## 2024-01-02 LAB — INR BLD: 4.9

## 2024-01-02 PROCEDURE — 99212 OFFICE O/P EST SF 10 MIN: CPT | Performed by: FAMILY MEDICINE

## 2024-01-02 PROCEDURE — 85610 PROTHROMBIN TIME: CPT | Performed by: FAMILY MEDICINE

## 2024-01-02 NOTE — PROGRESS NOTES
[facility]      Progress Note  [facility] CCU-STEPDOWN 3B       Patient: Roxanne Edwards  Unit/Bed: 6T-81/652-G  YOB: 1944  MRN: [id number]  Acct: [de-identified]   Admitting Diagnosis: Unstable angina ([facility] [address] [I20.0]  Unstable angina ([facility] [address] [I20.0]  Admit Date:  11/16/2017  Hospital Day: 1    Interval History:    Patient was on admission for unstable angina and serial Cardiac enzymes were negative. However this AM, he went into cardiac arrest with asystole and required CPR with 3 rounds of Epi nad 2 shocks for VT/VF, before eventual ROSC. Evaluation showed a drop in Hb from 9 to 7, with new ST elevations in inferior leads and corresponding depressions in lateral leads. A STEMI code was activated as well and he will be taken for emergent angiography    The following Services are consulting: Cardiology     Chart, Labs, Radiology studies, and Consults reviewed. Subjective:  Patient seen and examined       Objective:   BP 94/62   Pulse 75   Temp 98.3 °F (36.8 °C) (Oral)   Resp 18   Ht 5' 2\" (1.575 m)   Wt 133 lb 6.4 oz (60.5 kg)   SpO2 99%   BMI 24.40 kg/m²     Intake/Output Summary (Last 24 hours) at 11/17/17 0746  Last data filed at 11/17/17 0415   Gross per 24 hour   Intake           715.77 ml   Output             1075 ml   Net          -359.23 ml        General Appearance:  Elderly male, now intubated. Skin:  Skin is pale, good turgor, no rashes, no acute lesions  Lungs: CTA bilaterally, no wheeze, rales or rhonchi, good air entry, good respiratory effort  Heart: Regular rate and rhythm, S1 and S2 only without murmur, gallop or rub. Abdomen:  Soft, non-tender, normal bowel sounds. No bruits, organomegaly or masses. Extremities: Extremities cool to touch, pink, with no edema, slow capillary refill. Neurologic: Sedated.  No focal deficits  Affect: Apathetic    Morin:  Drains:  Central Line/port:   EKG:  Imaging:    Diet:  Diet NPO, After Midnight    [facility]  Medication Management  ANTICOAGULATION    Referring Provider: Dr Cunha    GOAL INR: 2.0-3.0    TODAY'S INR: 4.9    WARFARIN Dosage: hold x2 then decrease to 3.75mg MF, 2.5mg all other days    INR (no units)   Date Value   2024 4.9   2023 2.9   2023 1.4   2023 1.7   2023 1.6   11/15/2023 1.5   2023 3.2       Hemoglobin   Date Value Ref Range Status   2023 10.7 (L) 11.9 - 15.1 g/dL Final     Hematocrit   Date Value Ref Range Status   2023 33.6 (L) 36.3 - 47.1 % Final     ALT   Date Value Ref Range Status   11/15/2023 14 5 - 33 U/L Final     AST   Date Value Ref Range Status   11/15/2023 29 <32 U/L Final       Medication changes:  No change    Notes:    Fingerstick INR drawn per clinic protocol. Patient states no visible blood in urine and no black tarry stool. Denies any missed doses of warfarin. No change in other maintenance medications or in diet. Will recheck INR in 1 week. Patient has had company and more activity over the last week or so. Patient feels she is eating pretty well.  Patient does feel a little weak and tired today.  Patient's BP is 98/52. Denies dizziness, lightheadness, unsteady gait, etc.  Confirms not drinking many fluids.  Encouraged water vs soda.  Patient will hold warfarin for 2 doses (took today's warfarin already) then decrease weekly dosage as above.  Patient reluctantly agrees to have INR check next week but is somewhat angry at having to come in routinely to have \"check my blood\".   Patient acknowledges working in consult agreement with pharmacist as referred by his/her physician.                  For Pharmacy Admin Tracking Only    Intervention Detail: Adherence Monitorin and Dose Adjustment: 3, reason: Therapy De-escalation  Total # of Interventions Recommended: 5  Total # of Interventions Accepted: 5  Time Spent (min): 30  Lucy Baron R.Ph., 2024,3:47 PM               Prophylaxis:    Data:  CBC: Recent Labs      11/16/17   1308  11/17/17   0419   WBC  15.4*  9.6   RBC  2.85*  2.12*   HGB  9.3*  7.1*   HCT  28.3*  21.2*   MCV  99.2*  99.9*   RDW  13.7  14.1   PLT  400  288     BMP: Recent Labs      11/16/17   1308  11/17/17   0419   NA  138  135   K  4.8  4.8   CL  98  103   CO2  27  25   BUN  67*  65*   CREATININE  1.6*  1.3*     BNP: No results for input(s): BNP in the last 72 hours. PT/INR:   Recent Labs      11/16/17   1308   INR  1.17*     APTT: No results for input(s): APTT in the last 72 hours. CARDIAC ENZYMES: Recent Labs      11/16/17   1308  11/16/17   1525  11/16/17   1800   TROPONINT  < 0.010  < 0.010  < 0.010         Assessment/Plan:  Principal Problem:    Unstable angina (HCC)  Active Problems:    Chest pain    History of placement of stent in LAD coronary artery    Renal failure syndrome      Active Issues    PLAN    1. Acute MI / Cardiac Arrest w/ asystole, VT and VF  - s/p CPR with ROSC  - anemia, acidosis and respiratory failure  - inferior wall from EKG  - STEMI code activated  - will follow    2. Acute blood loss anemia  - etiology unclear  - will transfuse 2 Units for hemodynamic stability  - GI evaluation when stable    3.  Continue supportive measures and other current management    Electronically signed by Jose Daniel Smith MD on 11/17/2017 at 7:46 AM    Attending Physician

## 2024-01-02 NOTE — PATIENT INSTRUCTIONS
Please do not take warfarin Wednesday (tomorrow) and Thursday January 4.    Please take warfarin 3.75mg (1 1/2 tablets) Mondays and Fridays and 2.5mg (1 tablet) all other days.  Continue to monitor urine and stool for signs and symptoms of bleeding.   Please notify the clinic of any medication changes.   Please remember to bring all medications (both prescription and OTC) to your next visit.   Kindly notify the clinic if you are unable to make to your next appointment.   Follow warfarin dosing instructions on dosing calendar provided.

## 2024-01-09 ENCOUNTER — APPOINTMENT (OUTPATIENT)
Dept: PHARMACY | Age: 89
End: 2024-01-09
Payer: MEDICARE

## 2024-01-09 VITALS
WEIGHT: 108 LBS | BODY MASS INDEX: 18.54 KG/M2 | DIASTOLIC BLOOD PRESSURE: 69 MMHG | SYSTOLIC BLOOD PRESSURE: 114 MMHG | HEART RATE: 76 BPM

## 2024-01-09 DIAGNOSIS — I27.82 CHRONIC SADDLE PULMONARY EMBOLISM WITHOUT ACUTE COR PULMONALE (HCC): ICD-10-CM

## 2024-01-09 DIAGNOSIS — Z79.01 LONG TERM CURRENT USE OF ANTICOAGULANT THERAPY: Primary | ICD-10-CM

## 2024-01-09 DIAGNOSIS — I26.92 CHRONIC SADDLE PULMONARY EMBOLISM WITHOUT ACUTE COR PULMONALE (HCC): ICD-10-CM

## 2024-01-09 LAB — INR BLD: 3.3

## 2024-01-09 PROCEDURE — 85610 PROTHROMBIN TIME: CPT | Performed by: FAMILY MEDICINE

## 2024-01-09 PROCEDURE — 99212 OFFICE O/P EST SF 10 MIN: CPT | Performed by: FAMILY MEDICINE

## 2024-01-09 NOTE — PROGRESS NOTES
[facility]  Medication Management  ANTICOAGULATION    Referring Provider: Dr Cunha    GOAL INR: 2.0-3.0    TODAY'S INR: 3.3    WARFARIN Dosage: hold x1 then decrease to 3.75mg M, 2.5mg all other days    INR (no units)   Date Value   2024 3.3   2024 4.9   2023 2.9   2023 1.4   2023 1.7   2023 1.6   11/15/2023 1.5       Hemoglobin   Date Value Ref Range Status   2023 10.7 (L) 11.9 - 15.1 g/dL Final     Hematocrit   Date Value Ref Range Status   2023 33.6 (L) 36.3 - 47.1 % Final     ALT   Date Value Ref Range Status   11/15/2023 14 5 - 33 U/L Final     AST   Date Value Ref Range Status   11/15/2023 29 <32 U/L Final       Medication changes:  No change    Notes:    Fingerstick INR drawn per clinic protocol. Patient states no visible blood in urine and no black tarry stool. Denies any missed doses of warfarin. No change in other maintenance medications or in diet. Will recheck INR in 2 weeks. Patient has taken warfarin dose today so will hold tomorrow's dose then decrease weekly dosage 6% as above.  Patient acknowledges working in consult agreement with pharmacist as referred by his/her physician.                  For Pharmacy Admin Tracking Only    Intervention Detail: Adherence Monitorin and Dose Adjustment: 2, reason: Therapy De-escalation  Total # of Interventions Recommended: 4  Total # of Interventions Accepted: 4  Time Spent (min): 20    RUSSEL Bai.Ph., 2024,2:34 PM

## 2024-01-09 NOTE — PATIENT INSTRUCTIONS
Please do not take warfarin tomorrow, Wednesday January 10.   Decrease your dosing to take warfarin 3.75mg (1 1/2 tablets) Monday and warfarin 2.5mg (1 tablet) all other days.  Continue to monitor urine and stool for signs and symptoms of bleeding.   Please notify the clinic of any medication changes.   Please remember to bring all medications (both prescription and OTC) to your next visit.   Kindly notify the clinic if you are unable to make to your next appointment.   Follow warfarin dosing instructions on dosing calendar provided.

## 2024-01-23 ENCOUNTER — HOSPITAL ENCOUNTER (OUTPATIENT)
Dept: PHARMACY | Age: 89
Setting detail: THERAPIES SERIES
Discharge: HOME | End: 2024-01-23
Payer: MEDICARE

## 2024-01-23 VITALS
DIASTOLIC BLOOD PRESSURE: 70 MMHG | SYSTOLIC BLOOD PRESSURE: 123 MMHG | WEIGHT: 105 LBS | BODY MASS INDEX: 18.02 KG/M2 | HEART RATE: 72 BPM

## 2024-01-23 DIAGNOSIS — I27.82 CHRONIC SADDLE PULMONARY EMBOLISM WITHOUT ACUTE COR PULMONALE (HCC): ICD-10-CM

## 2024-01-23 DIAGNOSIS — I26.92 CHRONIC SADDLE PULMONARY EMBOLISM WITHOUT ACUTE COR PULMONALE (HCC): ICD-10-CM

## 2024-01-23 DIAGNOSIS — Z79.01 LONG TERM CURRENT USE OF ANTICOAGULANT THERAPY: Primary | ICD-10-CM

## 2024-01-23 LAB — INR BLD: 4.1

## 2024-01-23 PROCEDURE — 99212 OFFICE O/P EST SF 10 MIN: CPT

## 2024-01-23 PROCEDURE — 85610 PROTHROMBIN TIME: CPT

## 2024-01-23 NOTE — PROGRESS NOTES
[facility]  [facility]  ANTICOAGULATION    Referring Provider: Dr Cunha    GOAL INR: 2-3    TODAY'S INR: 4.1    WARFARIN Dosage: Hold x 2, then decrease warfarin to 2.5 mg po daily     INR (no units)   Date Value   2024 4.1   2024 3.3   2024 4.9   2023 2.9   2023 1.4   2023 1.7   2023 1.6       Hemoglobin   Date Value Ref Range Status   2023 10.7 (L) 11.9 - 15.1 g/dL Final     Hematocrit   Date Value Ref Range Status   2023 33.6 (L) 36.3 - 47.1 % Final     ALT   Date Value Ref Range Status   11/15/2023 14 5 - 33 U/L Final     AST   Date Value Ref Range Status   11/15/2023 29 <32 U/L Final       Medication changes:  Stopped Kayexalate  Started Lokelma 10 g twice a week    Notes:    Fingerstick INR drawn per clinic protocol. Patient states no visible blood in urine and no black tarry stool. Denies any missed doses of warfarin. No change in other maintenance medications or in diet. Will recheck INR in 2 weeks.  Patient acknowledges working in consult agreement with pharmacist as referred by his/her physician.     Patient reports frequent diarrhea when taking Kayexalate.  She stopped Kayexalate and started Lokelma with hope that diarrhea will decrease.  Will hold warfarin for 2 days - then decrease weekly dose as above.                For Pharmacy Admin Tracking Only    Intervention Detail: Adherence Monitorin and Dose Adjustment: 3, reason: Improve Adherence, Therapy De-escalation, Therapy Optimization  Total # of Interventions Recommended: 4  Total # of Interventions Accepted: 4  Time Spent (min): 20         [facility] Crowe [facility]

## 2024-01-23 NOTE — PATIENT INSTRUCTIONS
Please hold warfarin today and tomorrow.   Decrease current dose of warfarin as instructed on dosing calendar provided.   Continue to monitor urine and stool for signs and symptoms of bleeding.   Please notify the clinic of any medication changes.   Please remember to bring all medications (both prescription and OTC) to your next visit. Kindly notify the clinic if you are unable to make to your next appointment.

## 2024-02-06 ENCOUNTER — HOSPITAL ENCOUNTER (OUTPATIENT)
Dept: PHARMACY | Age: 89
Setting detail: THERAPIES SERIES
Discharge: HOME | End: 2024-02-06
Payer: MEDICARE

## 2024-02-06 ENCOUNTER — HOSPITAL ENCOUNTER (OUTPATIENT)
Age: 89
Discharge: HOME | End: 2024-02-06
Payer: MEDICARE

## 2024-02-06 VITALS
SYSTOLIC BLOOD PRESSURE: 130 MMHG | HEART RATE: 83 BPM | BODY MASS INDEX: 18.71 KG/M2 | DIASTOLIC BLOOD PRESSURE: 71 MMHG | WEIGHT: 109 LBS

## 2024-02-06 DIAGNOSIS — I26.92 CHRONIC SADDLE PULMONARY EMBOLISM WITHOUT ACUTE COR PULMONALE (HCC): ICD-10-CM

## 2024-02-06 DIAGNOSIS — I27.82 CHRONIC SADDLE PULMONARY EMBOLISM WITHOUT ACUTE COR PULMONALE (HCC): ICD-10-CM

## 2024-02-06 DIAGNOSIS — Z79.01 LONG TERM CURRENT USE OF ANTICOAGULANT THERAPY: Primary | ICD-10-CM

## 2024-02-06 DIAGNOSIS — I10 ESSENTIAL HYPERTENSION: ICD-10-CM

## 2024-02-06 LAB
ANION GAP SERPL CALCULATED.3IONS-SCNC: 12 MMOL/L (ref 9–17)
BUN SERPL-MCNC: 33 MG/DL (ref 8–23)
BUN/CREAT SERPL: 21 (ref 9–20)
CALCIUM SERPL-MCNC: 8.4 MG/DL (ref 8.6–10.4)
CHLORIDE SERPL-SCNC: 110 MMOL/L (ref 98–107)
CO2 SERPL-SCNC: 17 MMOL/L (ref 20–31)
CREAT SERPL-MCNC: 1.6 MG/DL (ref 0.5–0.9)
GFR SERPL CREATININE-BSD FRML MDRD: 31 ML/MIN/1.73M2
GLUCOSE SERPL-MCNC: 113 MG/DL (ref 70–99)
INR BLD: 3.4
POTASSIUM SERPL-SCNC: 4.9 MMOL/L (ref 3.7–5.3)
SODIUM SERPL-SCNC: 139 MMOL/L (ref 135–144)

## 2024-02-06 PROCEDURE — 85610 PROTHROMBIN TIME: CPT

## 2024-02-06 PROCEDURE — 36415 COLL VENOUS BLD VENIPUNCTURE: CPT

## 2024-02-06 PROCEDURE — 80048 BASIC METABOLIC PNL TOTAL CA: CPT

## 2024-02-06 PROCEDURE — 99212 OFFICE O/P EST SF 10 MIN: CPT

## 2024-02-06 NOTE — PATIENT INSTRUCTIONS
Please hold warfarin tomorrow.   Decrease current dose of warfarin as instructed on dosing calendar provided.   Continue to monitor urine and stool for signs and symptoms of bleeding.   Please notify the clinic of any medication changes.   Please remember to bring all medications (both prescription and OTC) to your next visit. Kindly notify the clinic if you are unable to make to your next appointment.

## 2024-02-06 NOTE — PROGRESS NOTES
[facility]  Medication [facility]  ANTICOAGULATION    Referring Provider: Dr Cunha    GOAL INR: 2-3    TODAY'S INR: 3.4    WARFARIN Dosage: Hold x 1, then decrease warfarin to 1.25 mg po every Monday; 2.5 mg po all other days    INR (no units)   Date Value   2024 3.4   2024 4.1   2024 3.3   2024 4.9   2023 2.9   2023 1.4   2023 1.7       Hemoglobin   Date Value Ref Range Status   2023 10.7 (L) 11.9 - 15.1 g/dL Final     Hematocrit   Date Value Ref Range Status   2023 33.6 (L) 36.3 - 47.1 % Final     ALT   Date Value Ref Range Status   11/15/2023 14 5 - 33 U/L Final     AST   Date Value Ref Range Status   11/15/2023 29 <32 U/L Final       Medication changes:  No changes    Notes:    Fingerstick INR drawn per clinic protocol. Patient states no visible blood in urine and no black tarry stool. Denies any missed doses of warfarin. No change in other maintenance medications or in diet. Will recheck INR in 2 weeks.  Patient acknowledges working in consult agreement with pharmacist as referred by his/her physician.                For Pharmacy Admin Tracking Only    Intervention Detail: Adherence Monitorin and Dose Adjustment: 2, reason: Improve Adherence, Therapy De-escalation, Therapy Optimization  Total # of Interventions Recommended: 3  Total # of Interventions Accepted: 3  Time Spent (min): 20         Sweta Crowe [facility]

## 2024-02-27 ENCOUNTER — APPOINTMENT (OUTPATIENT)
Dept: PHARMACY | Age: 89
End: 2024-02-27
Payer: MEDICARE

## 2024-02-27 VITALS
HEART RATE: 74 BPM | DIASTOLIC BLOOD PRESSURE: 79 MMHG | WEIGHT: 102 LBS | SYSTOLIC BLOOD PRESSURE: 141 MMHG | BODY MASS INDEX: 17.51 KG/M2

## 2024-02-27 DIAGNOSIS — I26.92 CHRONIC SADDLE PULMONARY EMBOLISM WITHOUT ACUTE COR PULMONALE (HCC): ICD-10-CM

## 2024-02-27 DIAGNOSIS — Z79.01 LONG TERM CURRENT USE OF ANTICOAGULANT THERAPY: Primary | ICD-10-CM

## 2024-02-27 DIAGNOSIS — I27.82 CHRONIC SADDLE PULMONARY EMBOLISM WITHOUT ACUTE COR PULMONALE (HCC): ICD-10-CM

## 2024-02-27 PROBLEM — E44.0 MODERATE MALNUTRITION (HCC): Status: RESOLVED | Noted: 2023-09-06 | Resolved: 2024-02-27

## 2024-02-27 LAB — INR BLD: 2

## 2024-02-27 PROCEDURE — 85610 PROTHROMBIN TIME: CPT

## 2024-02-27 PROCEDURE — 99211 OFF/OP EST MAY X REQ PHY/QHP: CPT

## 2024-02-27 NOTE — PROGRESS NOTES
[facility]  Medication Management  ANTICOAGULATION    Referring Provider: Dr Cunha     GOAL INR: 2 - 3     TODAY'S INR: 2.0     WARFARIN Dosage: Continue warfarin half tablet for 1.25 mg Monday and 2.5 mg tablet all other days.    INR (no units)   Date Value   2024 3.4   2024 4.1   2024 3.3   2024 4.9   2023 2.9   2023 1.4   2023 1.7       Hemoglobin   Date Value Ref Range Status   2023 10.7 (L) 11.9 - 15.1 g/dL Final     Hematocrit   Date Value Ref Range Status   2023 33.6 (L) 36.3 - 47.1 % Final     ALT   Date Value Ref Range Status   11/15/2023 14 5 - 33 U/L Final     AST   Date Value Ref Range Status   11/15/2023 29 <32 U/L Final       Medication changes:  Stopped Pepcid    Notes:    Fingerstick INR drawn per clinic protocol. Patient states no visible blood in urine and no black tarry stool. Denies any missed doses of warfarin. No change in other maintenance medications or in diet. Will recheck INR in 3 weeks. Patient acknowledges working in consult agreement with pharmacist as referred by his/her physician.                  For Pharmacy Admin Tracking Only    Intervention Detail: Adherence Monitorin and New Rx: 1, reason: Patient Preference  Total # of Interventions Recommended: 1  Total # of Interventions Accepted: 1  Time Spent (min): 20      Yue Fitzgerald RPH, PharmD

## 2024-03-21 ENCOUNTER — HOSPITAL ENCOUNTER (OUTPATIENT)
Dept: PHARMACY | Age: 89
Setting detail: THERAPIES SERIES
Discharge: HOME | End: 2024-03-21
Payer: MEDICARE

## 2024-03-21 VITALS
WEIGHT: 103 LBS | BODY MASS INDEX: 17.68 KG/M2 | HEART RATE: 72 BPM | DIASTOLIC BLOOD PRESSURE: 60 MMHG | SYSTOLIC BLOOD PRESSURE: 105 MMHG

## 2024-03-21 DIAGNOSIS — Z79.01 LONG TERM CURRENT USE OF ANTICOAGULANT THERAPY: Primary | ICD-10-CM

## 2024-03-21 DIAGNOSIS — I26.92 CHRONIC SADDLE PULMONARY EMBOLISM WITHOUT ACUTE COR PULMONALE (HCC): ICD-10-CM

## 2024-03-21 DIAGNOSIS — I27.82 CHRONIC SADDLE PULMONARY EMBOLISM WITHOUT ACUTE COR PULMONALE (HCC): ICD-10-CM

## 2024-03-21 LAB — INR BLD: 3.5

## 2024-03-21 PROCEDURE — 85610 PROTHROMBIN TIME: CPT

## 2024-03-21 PROCEDURE — 99212 OFFICE O/P EST SF 10 MIN: CPT

## 2024-03-21 NOTE — PROGRESS NOTES
[facility]  Medication Management  ANTICOAGULATION    Referring Provider: Dr Cunha     GOAL INR: 2 - 3     TODAY'S INR: 3.5     WARFARIN Dosage: Hold warfarin today then decrease warfarin to half tablet for 1.25 mg Mon,Th and 2.5 mg tablet all other days.    INR (no units)   Date Value   2024 2.0   2024 3.4   2024 4.1   2024 3.3   2024 4.9   2023 2.9   2023 1.4       Hemoglobin   Date Value Ref Range Status   2023 10.7 (L) 11.9 - 15.1 g/dL Final     Hematocrit   Date Value Ref Range Status   2023 33.6 (L) 36.3 - 47.1 % Final     ALT   Date Value Ref Range Status   11/15/2023 14 5 - 33 U/L Final     AST   Date Value Ref Range Status   11/15/2023 29 <32 U/L Final       Medication changes:  Stopped Kayexalate due to diarrhea  Started Lokelma    Notes:    Fingerstick INR drawn per clinic protocol. Patient states no visible blood in urine and no black tarry stool. Denies any missed doses of warfarin. No change in other maintenance medications or in diet. Will recheck INR in 3 weeks. Patient acknowledges working in consult agreement with pharmacist as referred by his/her physician.                  For Pharmacy Admin Tracking Only    Intervention Detail: Adherence Monitorin, Dose Adjustment: 2, reason: Therapy Optimization, and New Rx: 2, reason: Needs Additional Therapy  Total # of Interventions Recommended: 4  Total # of Interventions Accepted: 4  Time Spent (min): 20      Yue Fitzgerald [facility], PharmD

## 2024-04-08 ENCOUNTER — APPOINTMENT (OUTPATIENT)
Dept: PHARMACY | Age: 89
End: 2024-04-08
Payer: MEDICARE

## 2024-04-12 ENCOUNTER — HOSPITAL ENCOUNTER (OUTPATIENT)
Age: 89
Discharge: HOME | End: 2024-04-12
Payer: MEDICARE

## 2024-04-12 ENCOUNTER — HOSPITAL ENCOUNTER (OUTPATIENT)
Dept: PHARMACY | Age: 89
Setting detail: THERAPIES SERIES
Discharge: HOME | End: 2024-04-12
Payer: MEDICARE

## 2024-04-12 VITALS — HEART RATE: 75 BPM | SYSTOLIC BLOOD PRESSURE: 122 MMHG | DIASTOLIC BLOOD PRESSURE: 66 MMHG

## 2024-04-12 DIAGNOSIS — N18.32 STAGE 3B CHRONIC KIDNEY DISEASE (HCC): ICD-10-CM

## 2024-04-12 DIAGNOSIS — N18.4 CKD (CHRONIC KIDNEY DISEASE) STAGE 4, GFR 15-29 ML/MIN (HCC): ICD-10-CM

## 2024-04-12 DIAGNOSIS — I27.82 CHRONIC SADDLE PULMONARY EMBOLISM WITHOUT ACUTE COR PULMONALE (HCC): ICD-10-CM

## 2024-04-12 DIAGNOSIS — I26.92 CHRONIC SADDLE PULMONARY EMBOLISM WITHOUT ACUTE COR PULMONALE (HCC): ICD-10-CM

## 2024-04-12 DIAGNOSIS — Z87.81 HISTORY OF RIGHT SHOULDER FRACTURE: ICD-10-CM

## 2024-04-12 DIAGNOSIS — Z79.01 LONG TERM CURRENT USE OF ANTICOAGULANT THERAPY: Primary | ICD-10-CM

## 2024-04-12 DIAGNOSIS — I10 ESSENTIAL HYPERTENSION: ICD-10-CM

## 2024-04-12 DIAGNOSIS — E87.5 HYPERKALEMIA: ICD-10-CM

## 2024-04-12 LAB
ANION GAP SERPL CALCULATED.3IONS-SCNC: 12 MMOL/L (ref 9–17)
BUN SERPL-MCNC: 26 MG/DL (ref 8–23)
BUN/CREAT SERPL: 14 (ref 9–20)
CALCIUM SERPL-MCNC: 8.9 MG/DL (ref 8.6–10.4)
CHLORIDE SERPL-SCNC: 111 MMOL/L (ref 98–107)
CO2 SERPL-SCNC: 18 MMOL/L (ref 20–31)
CREAT SERPL-MCNC: 1.8 MG/DL (ref 0.5–0.9)
GFR SERPL CREATININE-BSD FRML MDRD: 27 ML/MIN/1.73M2
GLUCOSE SERPL-MCNC: 99 MG/DL (ref 70–99)
INR BLD: 1.6
POTASSIUM SERPL-SCNC: 5.6 MMOL/L (ref 3.7–5.3)
SODIUM SERPL-SCNC: 141 MMOL/L (ref 135–144)

## 2024-04-12 PROCEDURE — 85610 PROTHROMBIN TIME: CPT | Performed by: FAMILY MEDICINE

## 2024-04-12 PROCEDURE — 80048 BASIC METABOLIC PNL TOTAL CA: CPT

## 2024-04-12 PROCEDURE — 99212 OFFICE O/P EST SF 10 MIN: CPT | Performed by: FAMILY MEDICINE

## 2024-04-12 PROCEDURE — 36415 COLL VENOUS BLD VENIPUNCTURE: CPT

## 2024-04-12 NOTE — PATIENT INSTRUCTIONS
Increase your dosing to take warfarin 1.25mg (1/2 tablet) Thursdays and 2.5mg (1 tablet) all other days.  Continue to monitor urine and stool for signs and symptoms of bleeding.   Please notify the clinic of any medication changes.   Please remember to bring all medications (both prescription and OTC) to your next visit.   Kindly notify the clinic if you are unable to make to your next appointment.   Follow warfarin dosing instructions on dosing calendar provided.

## 2024-04-12 NOTE — PROGRESS NOTES
[facility]  Medication Management  ANTICOAGULATION    Referring Provider: Dr Cunha    GOAL INR: 2.0-3.0    TODAY'S INR: 1.6    WARFARIN Dosage: increase to 1.25mg Thursday, 2.5mg all other days    INR (no units)   Date Value   2024 1.6   2024 3.5   2024 2.0   2024 3.4   2024 4.1   2024 3.3   2024 4.9       Hemoglobin   Date Value Ref Range Status   2023 10.7 (L) 11.9 - 15.1 g/dL Final     Hematocrit   Date Value Ref Range Status   2023 33.6 (L) 36.3 - 47.1 % Final     ALT   Date Value Ref Range Status   11/15/2023 14 5 - 33 U/L Final     AST   Date Value Ref Range Status   11/15/2023 29 <32 U/L Final       Medication changes:  Is not taking centrum Silver or calcium due to not being able to swallow tablets-took a couple over the course of time     Notes:    Fingerstick INR drawn per clinic protocol. Patient states no visible blood in urine and no black tarry stool. Denies any missed doses of warfarin. No change in other maintenance medications or in diet. Will recheck INR in 2.5 weeks due to patient request and transportation.  Diarrhea continues but is improved. Will increase weekly dosage 8% as above. Declines weight check as she is already registered for lab work and wants to get back out the the waiting room. Patient acknowledges working in consult agreement with pharmacist as referred by his/her physician.                  For Pharmacy Admin Tracking Only    Intervention Detail: Adherence Monitorin and Dose Adjustment: 1, reason: Therapy Optimization  Total # of Interventions Recommended: 3  Total # of Interventions Accepted: 3  Time Spent (min): 20      RUSSEL Bai.Phan., 2024,3:02 PM

## 2024-04-15 ENCOUNTER — TELEPHONE (OUTPATIENT)
Dept: NEPHROLOGY | Age: 89
End: 2024-04-15

## 2024-04-15 DIAGNOSIS — N18.32 STAGE 3B CHRONIC KIDNEY DISEASE (HCC): Primary | ICD-10-CM

## 2024-04-15 NOTE — TELEPHONE ENCOUNTER
Jame Yoon MD  [facility] Clinical Staff  Patient's potassium is running higher check with the patient if she is taking the Lokelma at this time.  The prescription says she is supposed to be taking 2 times a week  Please change that to every other day however take it every day for the next 3 days and low potassium diet  And drink 60 ounces of liquids per day    BMP in a week        Patient has been informed. Lab orders signed. MAR updated and new prescription sent to pharmacy

## 2024-05-01 ENCOUNTER — HOSPITAL ENCOUNTER (OUTPATIENT)
Dept: PHARMACY | Age: 89
Setting detail: THERAPIES SERIES
Discharge: HOME | End: 2024-05-01
Payer: MEDICARE

## 2024-05-01 VITALS — DIASTOLIC BLOOD PRESSURE: 77 MMHG | HEART RATE: 77 BPM | SYSTOLIC BLOOD PRESSURE: 123 MMHG

## 2024-05-01 DIAGNOSIS — Z79.01 LONG TERM CURRENT USE OF ANTICOAGULANT THERAPY: Primary | ICD-10-CM

## 2024-05-01 DIAGNOSIS — I27.82 CHRONIC SADDLE PULMONARY EMBOLISM WITHOUT ACUTE COR PULMONALE (HCC): ICD-10-CM

## 2024-05-01 DIAGNOSIS — I26.92 CHRONIC SADDLE PULMONARY EMBOLISM WITHOUT ACUTE COR PULMONALE (HCC): ICD-10-CM

## 2024-05-01 LAB — INR BLD: 2.8

## 2024-05-01 PROCEDURE — 99211 OFF/OP EST MAY X REQ PHY/QHP: CPT

## 2024-05-01 PROCEDURE — 85610 PROTHROMBIN TIME: CPT

## 2024-05-01 NOTE — PROGRESS NOTES
[facility]  Medication Management  ANTICOAGULATION    Referring Provider: Dr. Cunha    GOAL INR: 2-3    TODAY'S INR: 2.8    WARFARIN Dosage: Continue warfarin 1.25 mg po every Thursday; 2.5 mg po all other days    INR (no units)   Date Value   2024 2.8   2024 1.6   2024 3.5   2024 2.0   2024 3.4   2024 4.1   2024 3.3       Hemoglobin   Date Value Ref Range Status   2023 10.7 (L) 11.9 - 15.1 g/dL Final     Hematocrit   Date Value Ref Range Status   2023 33.6 (L) 36.3 - 47.1 % Final     ALT   Date Value Ref Range Status   11/15/2023 14 5 - 33 U/L Final     AST   Date Value Ref Range Status   11/15/2023 29 <32 U/L Final       Medication changes:  No changes    Notes:    Fingerstick INR drawn per clinic protocol. Patient states no visible blood in urine and no black tarry stool. Denies any missed doses of warfarin. No change in other maintenance medications or in diet. Will recheck INR in 4 weeks. Patient acknowledges working in consult agreement with pharmacist as referred by his/her physician.                Declined weight.     For Pharmacy Admin Tracking Only    Intervention Detail: Adherence Monitorin  Total # of Interventions Recommended: 1  Total # of Interventions Accepted: 1  Time Spent (min): 20      Sweta Crowe RP

## 2024-05-07 ENCOUNTER — HOSPITAL ENCOUNTER (OUTPATIENT)
Age: 89
Discharge: HOME | End: 2024-05-07
Payer: MEDICARE

## 2024-05-07 DIAGNOSIS — N18.32 STAGE 3B CHRONIC KIDNEY DISEASE (HCC): ICD-10-CM

## 2024-05-07 LAB
ANION GAP SERPL CALCULATED.3IONS-SCNC: 12 MMOL/L (ref 9–17)
BUN SERPL-MCNC: 27 MG/DL (ref 8–23)
BUN/CREAT SERPL: 16 (ref 9–20)
CALCIUM SERPL-MCNC: 8.4 MG/DL (ref 8.6–10.4)
CHLORIDE SERPL-SCNC: 107 MMOL/L (ref 98–107)
CO2 SERPL-SCNC: 22 MMOL/L (ref 20–31)
CREAT SERPL-MCNC: 1.7 MG/DL (ref 0.5–0.9)
GFR, ESTIMATED: 29 ML/MIN/1.73M2
GLUCOSE SERPL-MCNC: 104 MG/DL (ref 70–99)
POTASSIUM SERPL-SCNC: 4.1 MMOL/L (ref 3.7–5.3)
SODIUM SERPL-SCNC: 141 MMOL/L (ref 135–144)

## 2024-05-07 PROCEDURE — 36415 COLL VENOUS BLD VENIPUNCTURE: CPT

## 2024-05-07 PROCEDURE — 80048 BASIC METABOLIC PNL TOTAL CA: CPT

## 2024-05-14 ENCOUNTER — OFFICE VISIT (OUTPATIENT)
Dept: NEPHROLOGY | Age: 89
End: 2024-05-14
Payer: MEDICARE

## 2024-05-14 VITALS
WEIGHT: 103.8 LBS | HEIGHT: 60 IN | BODY MASS INDEX: 20.38 KG/M2 | TEMPERATURE: 97.4 F | DIASTOLIC BLOOD PRESSURE: 79 MMHG | HEART RATE: 89 BPM | SYSTOLIC BLOOD PRESSURE: 144 MMHG | RESPIRATION RATE: 16 BRPM

## 2024-05-14 DIAGNOSIS — E87.5 HYPERKALEMIA: ICD-10-CM

## 2024-05-14 DIAGNOSIS — I10 ESSENTIAL HYPERTENSION: ICD-10-CM

## 2024-05-14 DIAGNOSIS — N18.32 STAGE 3B CHRONIC KIDNEY DISEASE (HCC): Primary | ICD-10-CM

## 2024-05-14 PROCEDURE — G8427 DOCREV CUR MEDS BY ELIG CLIN: HCPCS | Performed by: INTERNAL MEDICINE

## 2024-05-14 PROCEDURE — 99214 OFFICE O/P EST MOD 30 MIN: CPT | Performed by: INTERNAL MEDICINE

## 2024-05-14 PROCEDURE — 1036F TOBACCO NON-USER: CPT | Performed by: INTERNAL MEDICINE

## 2024-05-14 PROCEDURE — 1090F PRES/ABSN URINE INCON ASSESS: CPT | Performed by: INTERNAL MEDICINE

## 2024-05-14 PROCEDURE — 1123F ACP DISCUSS/DSCN MKR DOCD: CPT | Performed by: INTERNAL MEDICINE

## 2024-05-14 PROCEDURE — 99213 OFFICE O/P EST LOW 20 MIN: CPT | Performed by: INTERNAL MEDICINE

## 2024-05-14 PROCEDURE — G8420 CALC BMI NORM PARAMETERS: HCPCS | Performed by: INTERNAL MEDICINE

## 2024-05-14 NOTE — PROGRESS NOTES
[facility]        Outpatient Follow-Up note         5/14/2024 12:17 PM    Patient Name:   Ayde Landis  YOB: 1935  Primary Care Physician:  Samuel Cunha MD [facility]  [address] [place] [id number]  Dept: [phone]     Chief Complaint / Reason for follow-up : Follow Up of ckd/htn     Interval History :  Patient seen and examined by me.   Feels ok. 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    Colon polyp 2006, 2009    Compression fracture of lumbar vertebra (HCC) / prednisone related to PMR 07/15/2015    Diarrhea     Drug-induced hyperkalemia -- from Losartan 2023    Drug-induced hyperkalemia from Lisinopril 07/27/2018    DVT (deep venous thrombosis) (HCC) 2015    Emphysema     Essential hypertension     Hyperlipidemia     Irritable bowel syndrome     Lumbago     Osteoarthritis     Polymyalgia rheumatica (HCC) 2015    Rheumatism     Seborrheic keratoses     on back     Past Surgical History:   Procedure Laterality Date    APPENDECTOMY  1949    CATARACT REMOVAL WITH IMPLANT      COLECTOMY Right 06/20/2016    open right hemicolectomy bowel resection    COLONOSCOPY  10/17/2011    Dr. Morales    COLONOSCOPY  05/25/2016    -bx mass hepatic flexure,tattoo    COLONOSCOPY  12/13/2017    Dr Morales-polyp(adenomatous)hemorrhoids    DENTAL SURGERY      Implants    GLAUCOMA SURGERY      preventative surg    HYSTERECTOMY, TOTAL ABDOMINAL (CERVIX REMOVED)  1973    NASAL SEPTUM SURGERY

## 2024-05-14 NOTE — PATIENT INSTRUCTIONS
SURVEY:    Thank you for allowing us to care for you today.    You may be receiving a survey from [facility] regarding your visit today- electronically or via mail.      Please help us by completing the survey as this will provide the needed feedback to ensure we are providing the very best care for you and your family.    If you cannot score us a very good on any question, please call the office to discuss how we could have made your experience a very good one.    Thank you.       STAFF:    Altagracia Harris, Belle Contreras, Libby Fritz      CLINICAL STAFF:    Juany Medina LPN, Marylou Garcia LPN, Irasema Magaña LPN, Trina Dickens CMA

## 2024-05-27 ENCOUNTER — HOSPITAL ENCOUNTER (EMERGENCY)
Age: 89
Discharge: HOME | End: 2024-05-27
Payer: MEDICARE

## 2024-05-27 ENCOUNTER — APPOINTMENT (OUTPATIENT)
Dept: GENERAL RADIOLOGY | Age: 89
End: 2024-05-27
Payer: MEDICARE

## 2024-05-27 VITALS
HEIGHT: 60 IN | HEART RATE: 87 BPM | RESPIRATION RATE: 20 BRPM | WEIGHT: 102 LBS | OXYGEN SATURATION: 96 % | DIASTOLIC BLOOD PRESSURE: 72 MMHG | SYSTOLIC BLOOD PRESSURE: 150 MMHG | BODY MASS INDEX: 20.03 KG/M2

## 2024-05-27 DIAGNOSIS — S90.122A CONTUSION OF LEFT FOOT INCLUDING TOES, INITIAL ENCOUNTER: ICD-10-CM

## 2024-05-27 DIAGNOSIS — S90.32XA CONTUSION OF LEFT FOOT INCLUDING TOES, INITIAL ENCOUNTER: ICD-10-CM

## 2024-05-27 DIAGNOSIS — M16.11 ARTHRITIS OF RIGHT HIP: Primary | ICD-10-CM

## 2024-05-27 PROCEDURE — 6370000000 HC RX 637 (ALT 250 FOR IP): Performed by: PHYSICIAN ASSISTANT

## 2024-05-27 PROCEDURE — 99283 EMERGENCY DEPT VISIT LOW MDM: CPT

## 2024-05-27 PROCEDURE — 73502 X-RAY EXAM HIP UNI 2-3 VIEWS: CPT

## 2024-05-27 PROCEDURE — 73630 X-RAY EXAM OF FOOT: CPT

## 2024-05-27 RX ORDER — PREDNISONE 20 MG/1
40 TABLET ORAL ONCE
Status: COMPLETED | OUTPATIENT
Start: 2024-05-27 | End: 2024-05-27

## 2024-05-27 RX ORDER — TRAMADOL HYDROCHLORIDE 50 MG/1
50 TABLET ORAL ONCE
Status: COMPLETED | OUTPATIENT
Start: 2024-05-27 | End: 2024-05-27

## 2024-05-27 RX ORDER — METHYLPREDNISOLONE 4 MG/1
TABLET ORAL
Qty: 1 KIT | Refills: 0 | Status: SHIPPED | OUTPATIENT
Start: 2024-05-28 | End: 2024-06-02

## 2024-05-27 RX ORDER — TRAMADOL HYDROCHLORIDE 50 MG/1
50 TABLET ORAL EVERY 6 HOURS PRN
Qty: 12 TABLET | Refills: 0 | Status: SHIPPED | OUTPATIENT
Start: 2024-05-27 | End: 2024-05-30

## 2024-05-27 RX ADMIN — TRAMADOL HYDROCHLORIDE 50 MG: 50 TABLET, COATED ORAL at 13:57

## 2024-05-27 RX ADMIN — PREDNISONE 40 MG: 20 TABLET ORAL at 14:31

## 2024-05-27 ASSESSMENT — PAIN SCALES - GENERAL
PAINLEVEL_OUTOF10: 10
PAINLEVEL_OUTOF10: 10

## 2024-05-27 ASSESSMENT — PAIN DESCRIPTION - DESCRIPTORS: DESCRIPTORS: DISCOMFORT

## 2024-05-27 ASSESSMENT — ENCOUNTER SYMPTOMS
COUGH: 0
BACK PAIN: 0
SHORTNESS OF BREATH: 0

## 2024-05-27 ASSESSMENT — PAIN DESCRIPTION - ORIENTATION
ORIENTATION: RIGHT
ORIENTATION: RIGHT

## 2024-05-27 ASSESSMENT — PAIN DESCRIPTION - LOCATION
LOCATION: GROIN
LOCATION: GROIN

## 2024-05-27 ASSESSMENT — PAIN DESCRIPTION - PAIN TYPE: TYPE: ACUTE PAIN

## 2024-05-27 ASSESSMENT — PAIN DESCRIPTION - FREQUENCY: FREQUENCY: CONTINUOUS

## 2024-05-27 ASSESSMENT — PAIN - FUNCTIONAL ASSESSMENT: PAIN_FUNCTIONAL_ASSESSMENT: 0-10

## 2024-05-27 NOTE — DISCHARGE INSTRUCTIONS
Use Ultram and Tylenol as needed for pain.  You have been placed on a short taper of steroids for your arthritis flareup.  Follow-up with Dr. Cunha in the office.

## 2024-05-27 NOTE — ED PROVIDER NOTES
PELVIS RIGHT   Final Result   1. No acute fracture or dislocation.   2. Mild degenerative changes in the hips.               LABS:  Labs Reviewed - No data to display    All other labs were within normal range or not returned as of this dictation.    EMERGENCY DEPARTMENT COURSE and DIFFERENTIAL DIAGNOSIS/MDM:     Patient seen and evaluated in the emergency department with pain in the right hip, groin, and thigh after physical activity and gardening.  I do feel the patient has some osteoarthritis.  No direct injury or trauma.  X-ray shows no acute fracture.  Patient is able to bear weight.  Will treat for an arthritis flare with a short taper of steroid and pain control with Ultram.  X-ray of the foot shows no acute fracture.  Plan is discharge home and outpatient follow-up.           FINAL IMPRESSION      1. Arthritis of right hip    2. Contusion of left foot including toes, initial encounter          DISPOSITION/PLAN     DISPOSITION Decision To Discharge 05/27/2024 02:22:41 PM      PATIENT REFERRED TO:  Samuel Cunha MD  [address] [place] [address]  [phone]    Schedule an appointment as soon as possible for a visit         DISCHARGE MEDICATIONS:  New Prescriptions    METHYLPREDNISOLONE (MEDROL, NIMA,) 4 MG TABLET    Take by mouth.    TRAMADOL (ULTRAM) 50 MG TABLET    Take 1 tablet by mouth every 6 hours as needed for Pain for up to 3 days. Intended supply: 3 days. Take lowest dose possible to manage pain Max Daily Amount: 200 mg       (Please note that portions of this note were completed with a voice recognition program.  Efforts were made to edit the dictations but occasionally words are mis-transcribed.)    Noa Dias PA-C (electronically signed)  Attending Emergency Physician           Noa Dias PA-C  05/27/24 [id number]

## 2024-05-29 ENCOUNTER — APPOINTMENT (OUTPATIENT)
Dept: PHARMACY | Age: 89
End: 2024-05-29
Payer: MEDICARE

## 2024-05-29 ENCOUNTER — COMMUNITY CARE MANAGEMENT (OUTPATIENT)
Facility: CLINIC | Age: 89
End: 2024-05-29

## 2024-05-30 ENCOUNTER — HOSPITAL ENCOUNTER (OUTPATIENT)
Dept: PHARMACY | Age: 89
Setting detail: THERAPIES SERIES
Discharge: HOME | End: 2024-05-30
Payer: MEDICARE

## 2024-05-30 VITALS — SYSTOLIC BLOOD PRESSURE: 119 MMHG | HEART RATE: 71 BPM | DIASTOLIC BLOOD PRESSURE: 64 MMHG

## 2024-05-30 DIAGNOSIS — I26.92 CHRONIC SADDLE PULMONARY EMBOLISM WITHOUT ACUTE COR PULMONALE (HCC): ICD-10-CM

## 2024-05-30 DIAGNOSIS — I27.82 CHRONIC SADDLE PULMONARY EMBOLISM WITHOUT ACUTE COR PULMONALE (HCC): ICD-10-CM

## 2024-05-30 DIAGNOSIS — Z79.01 LONG TERM CURRENT USE OF ANTICOAGULANT THERAPY: Primary | ICD-10-CM

## 2024-05-30 LAB — INR BLD: 5.5

## 2024-05-30 PROCEDURE — 85610 PROTHROMBIN TIME: CPT

## 2024-05-30 PROCEDURE — 99212 OFFICE O/P EST SF 10 MIN: CPT

## 2024-05-30 NOTE — PROGRESS NOTES
[facility]  Medication Management  ANTICOAGULATION    Referring Provider: Dr. Cunha     GOAL INR: 2 - 3     TODAY'S INR: 5.5     WARFARIN Dosage: Hold warfarin for 3 days then decrease to half tablet for 1.25 mg MWF and 2.5 mg tablet all other days.    INR (no units)   Date Value   2024 2.8   2024 1.6   2024 3.5   2024 2.0   2024 3.4   2024 4.1   2024 3.3       Hemoglobin   Date Value Ref Range Status   2023 10.7 (L) 11.9 - 15.1 g/dL Final     Hematocrit   Date Value Ref Range Status   2023 33.6 (L) 36.3 - 47.1 % Final     ALT   Date Value Ref Range Status   11/15/2023 14 5 - 33 U/L Final     AST   Date Value Ref Range Status   11/15/2023 29 <32 U/L Final       Medication changes:  Tramadol  Prednisone    Notes:    Fingerstick INR drawn per clinic protocol. Patient states no visible blood in urine and no black tarry stool. Denies any missed doses of warfarin. No change in other maintenance medications or in diet. Will recheck INR in 2 weeks refuses to come in any sooner. Patient acknowledges working in consult agreement with pharmacist as referred by his/her physician.   Refuses weight.          Was in ER on 24 ([birth year] birthday) after stubbing her toes on her dresser and arthritis pain in her hip and groin from gardening.         *Patient has 1+ BLE pitting edema and is unable to wear her shoes.  She is wearing slippers to appt today.  Patient does not have a cardiologist.  Dr Cunha and Dr Patel are her only providers.    For Pharmacy Admin Tracking Only    Intervention Detail: Adherence Monitorin, Dose Adjustment: 2, reason: Therapy Optimization, and New Rx: 2, reason: Needs Additional Therapy  Total # of Interventions Recommended: 5  Total # of Interventions Accepted: 5  Time Spent (min): 30      Yue Fitzgerald RP, PharmD

## 2024-06-01 ENCOUNTER — HOSPITAL ENCOUNTER (EMERGENCY)
Age: 89
Discharge: HOME | End: 2024-06-01
Attending: EMERGENCY MEDICINE
Payer: MEDICARE

## 2024-06-01 VITALS
OXYGEN SATURATION: 95 % | TEMPERATURE: 98.7 F | HEART RATE: 79 BPM | RESPIRATION RATE: 18 BRPM | DIASTOLIC BLOOD PRESSURE: 91 MMHG | SYSTOLIC BLOOD PRESSURE: 191 MMHG

## 2024-06-01 DIAGNOSIS — R10.31 RIGHT GROIN PAIN: Primary | ICD-10-CM

## 2024-06-01 PROCEDURE — 99283 EMERGENCY DEPT VISIT LOW MDM: CPT

## 2024-06-01 RX ORDER — HYDROCODONE BITARTRATE AND ACETAMINOPHEN 5; 325 MG/1; MG/1
1 TABLET ORAL EVERY 8 HOURS PRN
Qty: 12 TABLET | Refills: 0 | Status: SHIPPED | OUTPATIENT
Start: 2024-06-01 | End: 2024-06-04

## 2024-06-01 ASSESSMENT — ENCOUNTER SYMPTOMS
BACK PAIN: 0
SHORTNESS OF BREATH: 0
ABDOMINAL DISTENTION: 0
SORE THROAT: 0

## 2024-06-01 ASSESSMENT — PAIN SCALES - GENERAL: PAINLEVEL_OUTOF10: 10

## 2024-06-01 ASSESSMENT — PAIN - FUNCTIONAL ASSESSMENT: PAIN_FUNCTIONAL_ASSESSMENT: 0-10

## 2024-06-01 ASSESSMENT — PAIN DESCRIPTION - ORIENTATION: ORIENTATION: RIGHT

## 2024-06-01 ASSESSMENT — PAIN DESCRIPTION - DESCRIPTORS: DESCRIPTORS: STABBING

## 2024-06-01 ASSESSMENT — PAIN DESCRIPTION - LOCATION: LOCATION: HIP;LEG

## 2024-06-01 ASSESSMENT — PAIN DESCRIPTION - PAIN TYPE: TYPE: ACUTE PAIN

## 2024-06-01 NOTE — DISCHARGE INSTRUCTIONS
Take the pain medications as prescribed every 6-8 hours as needed.  Do not take Tylenol along with this pain medication.  Physical therapy may also be helpful.  Please try to schedule an appointment with your doctor this coming week for any further pain medications.  Make sure to take a stool softener along with these pain medications as they make you constipated.

## 2024-06-01 NOTE — ED PROVIDER NOTES
[facility] ED  EMERGENCY DEPARTMENT ENCOUNTER      Pt Name: Ayde Landis  MRN: [id number]  Birthdate 1935  Date of evaluation: 6/1/2024  Provider: Peggy García DO    CHIEF COMPLAINT       Chief Complaint   Patient presents with    Hip Pain     Right hip pain radiating down right leg to left knee. States was in ER on Monday for same pain. Was given medication which helped relieve pain, but medication is gone         HISTORY OF PRESENT ILLNESS   (Location/Symptom, Timing/Onset, Context/Setting, Quality, Duration, Modifying Factors, Severity)  Note limiting factors.   Ayde Landis is a 89 y.o. female who presents to the emergency department with right hip pain that has been going on for the past 1 week approximately.  Patient was seen in the emergency department about a week ago and was diagnosed with osteoarthritis and started on a Medrol Dosepak and Ultram.  Patient states that while she was on the medications she was feeling better but she only has 2 more Ultram left and 3 more pills of steroids and she is going to run out of them.  Patient apparently was gardening last week and overdid herself and apparently was crawling around in the garden trying to get stuff done.  She states that the pain is mostly located in the right groin.  She has not fallen or injured herself since then.  She has not been able to make an appointment with her doctor yet.    REVIEW OF SYSTEMS    (2-9 systems for level 4, 10 or more for level 5)     Review of Systems   Constitutional:  Negative for fatigue and fever.   HENT:  Negative for congestion and sore throat.    Eyes:  Negative for visual disturbance.   Respiratory:  Negative for shortness of breath.    Cardiovascular:  Negative for chest pain and palpitations.   Gastrointestinal:  Negative for abdominal distention.   Genitourinary:  Negative for dysuria.   Musculoskeletal:  Negative for back pain.        Right groin pain   Skin:  Negative for rash.  than Ultram which would be the Norco.  Advised her to take it every 6-8 hours as needed.  Stool softeners along with it as well.  She will benefit from physical therapy as well.  Recommend following up with her family doctor this week for any further pain management as necessary.  Her son was present in the room with her.  She has a walker at home that she should use all the time.  She understands and has no other questions or concerns.    FINAL IMPRESSION      1. Right groin pain          DISPOSITION/PLAN   DISPOSITION Decision To Discharge 06/01/2024 10:40:56 AM      PATIENT REFERRED TO:  Samuel Cunha MD  [address] [place] [address] A  [place] [id number] [phone]    In 2 days        DISCHARGE MEDICATIONS:  New Prescriptions    HYDROCODONE-ACETAMINOPHEN (NORCO) 5-325 MG PER TABLET    Take 1 tablet by mouth every 8 hours as needed for Pain for up to 3 days. Intended supply: 3 days. Take lowest dose possible to manage pain Max Daily Amount: 3 tablets     Controlled Substances Monitoring:     RX Monitoring Acute Pain Prescriptions   9/28/2023   2:21 PM Severe pain not adequately treated with lower dose.       (Please note that portions of this note were completed with a voice recognition program.  Efforts were made to edit the dictations but occasionally words are mis-transcribed.)    Peggy García DO (electronically signed)  Attending Emergency Physician            Peggy García DO  06/01/24 [id number]

## 2024-06-06 ENCOUNTER — HOSPITAL ENCOUNTER (INPATIENT)
Age: 89
LOS: 7 days | Discharge: NURSING, REHAB OR LONG-TERM CARE | DRG: 521 | End: 2024-06-13
Attending: INTERNAL MEDICINE | Admitting: INTERNAL MEDICINE
Payer: MEDICARE

## 2024-06-06 ENCOUNTER — APPOINTMENT (OUTPATIENT)
Dept: CT IMAGING | Age: 89
DRG: 521 | End: 2024-06-06
Payer: MEDICARE

## 2024-06-06 ENCOUNTER — APPOINTMENT (OUTPATIENT)
Dept: MRI IMAGING | Age: 89
DRG: 521 | End: 2024-06-06
Payer: MEDICARE

## 2024-06-06 DIAGNOSIS — R26.2 INABILITY TO AMBULATE DUE TO HIP: ICD-10-CM

## 2024-06-06 DIAGNOSIS — S72.011A SUBCAPITAL FRACTURE OF RIGHT HIP, CLOSED, INITIAL ENCOUNTER (HCC): ICD-10-CM

## 2024-06-06 DIAGNOSIS — M25.551 RIGHT HIP PAIN: Primary | ICD-10-CM

## 2024-06-06 DIAGNOSIS — Z78.9 FAILURE OF OUTPATIENT TREATMENT: ICD-10-CM

## 2024-06-06 DIAGNOSIS — I31.39 PERICARDIAL EFFUSION: ICD-10-CM

## 2024-06-06 PROBLEM — S72.011D CLOSED SUBCAPITAL FRACTURE OF RIGHT FEMUR WITH ROUTINE HEALING: Status: ACTIVE | Noted: 2024-06-06

## 2024-06-06 PROBLEM — R35.0 URINARY FREQUENCY: Status: ACTIVE | Noted: 2024-06-06

## 2024-06-06 LAB
ANION GAP SERPL CALCULATED.3IONS-SCNC: 12 MMOL/L (ref 9–17)
BACTERIA URNS QL MICRO: ABNORMAL
BASOPHILS # BLD: <0.03 K/UL (ref 0–0.2)
BASOPHILS NFR BLD: 0 % (ref 0–2)
BILIRUB UR QL STRIP: NEGATIVE
BUN SERPL-MCNC: 39 MG/DL (ref 8–23)
BUN/CREAT SERPL: 23 (ref 9–20)
CALCIUM SERPL-MCNC: 8.1 MG/DL (ref 8.6–10.4)
CHLORIDE SERPL-SCNC: 106 MMOL/L (ref 98–107)
CLARITY UR: CLEAR
CO2 SERPL-SCNC: 22 MMOL/L (ref 20–31)
COLOR UR: YELLOW
CREAT SERPL-MCNC: 1.7 MG/DL (ref 0.5–0.9)
EOSINOPHIL # BLD: 0.1 K/UL (ref 0–0.44)
EOSINOPHILS RELATIVE PERCENT: 1 % (ref 1–4)
EPI CELLS #/AREA URNS HPF: ABNORMAL /HPF (ref 0–25)
ERYTHROCYTE [DISTWIDTH] IN BLOOD BY AUTOMATED COUNT: 13 % (ref 11.8–14.4)
GFR, ESTIMATED: 28 ML/MIN/1.73M2
GLUCOSE SERPL-MCNC: 86 MG/DL (ref 70–99)
GLUCOSE UR STRIP-MCNC: NEGATIVE MG/DL
HCT VFR BLD AUTO: 35.2 % (ref 36.3–47.1)
HGB BLD-MCNC: 11.6 G/DL (ref 11.9–15.1)
HGB UR QL STRIP.AUTO: ABNORMAL
IMM GRANULOCYTES # BLD AUTO: 0.04 K/UL (ref 0–0.3)
IMM GRANULOCYTES NFR BLD: 0 %
INR PPP: 2
KETONES UR STRIP-MCNC: ABNORMAL MG/DL
LEUKOCYTE ESTERASE UR QL STRIP: NEGATIVE
LYMPHOCYTES NFR BLD: 1.05 K/UL (ref 1.1–3.7)
LYMPHOCYTES RELATIVE PERCENT: 11 % (ref 24–43)
MCH RBC QN AUTO: 31.7 PG (ref 25.2–33.5)
MCHC RBC AUTO-ENTMCNC: 33 G/DL (ref 28.4–34.8)
MCV RBC AUTO: 96.2 FL (ref 82.6–102.9)
MONOCYTES NFR BLD: 0.94 K/UL (ref 0.1–1.2)
MONOCYTES NFR BLD: 10 % (ref 3–12)
NEUTROPHILS NFR BLD: 78 % (ref 36–65)
NEUTS SEG NFR BLD: 7.11 K/UL (ref 1.5–8.1)
NITRITE UR QL STRIP: NEGATIVE
NRBC BLD-RTO: 0 PER 100 WBC
PH UR STRIP: 7 [PH] (ref 5–9)
PLATELET # BLD AUTO: 129 K/UL (ref 138–453)
PMV BLD AUTO: 12.1 FL (ref 8.1–13.5)
POTASSIUM SERPL-SCNC: 3.8 MMOL/L (ref 3.7–5.3)
PROT UR STRIP-MCNC: ABNORMAL MG/DL
PROTHROMBIN TIME: 22.1 SEC (ref 11.7–14.1)
RBC # BLD AUTO: 3.66 M/UL (ref 3.95–5.11)
RBC #/AREA URNS HPF: ABNORMAL /HPF (ref 0–2)
SODIUM SERPL-SCNC: 140 MMOL/L (ref 135–144)
SP GR UR STRIP: 1.02 (ref 1.01–1.02)
UROBILINOGEN UR STRIP-ACNC: NORMAL EU/DL (ref 0–1)
WBC #/AREA URNS HPF: ABNORMAL /HPF (ref 0–5)
WBC OTHER # BLD: 9.3 K/UL (ref 3.5–11.3)

## 2024-06-06 PROCEDURE — 80048 BASIC METABOLIC PNL TOTAL CA: CPT

## 2024-06-06 PROCEDURE — 73700 CT LOWER EXTREMITY W/O DYE: CPT

## 2024-06-06 PROCEDURE — 99285 EMERGENCY DEPT VISIT HI MDM: CPT

## 2024-06-06 PROCEDURE — 51798 US URINE CAPACITY MEASURE: CPT

## 2024-06-06 PROCEDURE — 1200000000 HC SEMI PRIVATE

## 2024-06-06 PROCEDURE — 73721 MRI JNT OF LWR EXTRE W/O DYE: CPT

## 2024-06-06 PROCEDURE — 85025 COMPLETE CBC W/AUTO DIFF WBC: CPT

## 2024-06-06 PROCEDURE — 81001 URINALYSIS AUTO W/SCOPE: CPT

## 2024-06-06 PROCEDURE — 6360000002 HC RX W HCPCS: Performed by: PHYSICIAN ASSISTANT

## 2024-06-06 PROCEDURE — 85610 PROTHROMBIN TIME: CPT

## 2024-06-06 PROCEDURE — 94761 N-INVAS EAR/PLS OXIMETRY MLT: CPT

## 2024-06-06 PROCEDURE — 6370000000 HC RX 637 (ALT 250 FOR IP): Performed by: PHYSICIAN ASSISTANT

## 2024-06-06 PROCEDURE — 2580000003 HC RX 258: Performed by: INTERNAL MEDICINE

## 2024-06-06 PROCEDURE — 96372 THER/PROPH/DIAG INJ SC/IM: CPT

## 2024-06-06 PROCEDURE — 93005 ELECTROCARDIOGRAM TRACING: CPT | Performed by: INTERNAL MEDICINE

## 2024-06-06 PROCEDURE — 6370000000 HC RX 637 (ALT 250 FOR IP): Performed by: INTERNAL MEDICINE

## 2024-06-06 RX ORDER — TRAMADOL HYDROCHLORIDE 50 MG/1
50 TABLET ORAL ONCE
Status: COMPLETED | OUTPATIENT
Start: 2024-06-06 | End: 2024-06-06

## 2024-06-06 RX ORDER — ACETAMINOPHEN 325 MG/1
650 TABLET ORAL EVERY 6 HOURS PRN
Status: DISCONTINUED | OUTPATIENT
Start: 2024-06-06 | End: 2024-06-13 | Stop reason: HOSPADM

## 2024-06-06 RX ORDER — SODIUM CHLORIDE 9 MG/ML
INJECTION, SOLUTION INTRAVENOUS CONTINUOUS
Status: ACTIVE | OUTPATIENT
Start: 2024-06-06 | End: 2024-06-08

## 2024-06-06 RX ORDER — ATORVASTATIN CALCIUM 40 MG/1
80 TABLET, FILM COATED ORAL DAILY
Status: DISCONTINUED | OUTPATIENT
Start: 2024-06-06 | End: 2024-06-13 | Stop reason: HOSPADM

## 2024-06-06 RX ORDER — FAMOTIDINE 20 MG/1
20 TABLET, FILM COATED ORAL DAILY
Status: DISCONTINUED | OUTPATIENT
Start: 2024-06-06 | End: 2024-06-10

## 2024-06-06 RX ORDER — SODIUM CHLORIDE 0.9 % (FLUSH) 0.9 %
10 SYRINGE (ML) INJECTION PRN
Status: DISCONTINUED | OUTPATIENT
Start: 2024-06-06 | End: 2024-06-13 | Stop reason: HOSPADM

## 2024-06-06 RX ORDER — CALCIUM CARBONATE 500(1250)
500 TABLET ORAL DAILY
Status: DISCONTINUED | OUTPATIENT
Start: 2024-06-06 | End: 2024-06-07 | Stop reason: CLARIF

## 2024-06-06 RX ORDER — ONDANSETRON 2 MG/ML
4 INJECTION INTRAMUSCULAR; INTRAVENOUS EVERY 6 HOURS PRN
Status: DISCONTINUED | OUTPATIENT
Start: 2024-06-06 | End: 2024-06-13 | Stop reason: HOSPADM

## 2024-06-06 RX ORDER — ONDANSETRON 4 MG/1
4 TABLET, ORALLY DISINTEGRATING ORAL EVERY 8 HOURS PRN
Status: DISCONTINUED | OUTPATIENT
Start: 2024-06-06 | End: 2024-06-13 | Stop reason: HOSPADM

## 2024-06-06 RX ORDER — CARVEDILOL 12.5 MG/1
12.5 TABLET ORAL 2 TIMES DAILY
Status: DISCONTINUED | OUTPATIENT
Start: 2024-06-06 | End: 2024-06-13 | Stop reason: HOSPADM

## 2024-06-06 RX ORDER — POLYETHYLENE GLYCOL 3350 17 G/17G
17 POWDER, FOR SOLUTION ORAL DAILY PRN
Status: DISCONTINUED | OUTPATIENT
Start: 2024-06-06 | End: 2024-06-13 | Stop reason: HOSPADM

## 2024-06-06 RX ORDER — WARFARIN SODIUM 2.5 MG/1
2.5 TABLET ORAL
Status: COMPLETED | OUTPATIENT
Start: 2024-06-06 | End: 2024-06-06

## 2024-06-06 RX ORDER — HYDROCODONE BITARTRATE AND ACETAMINOPHEN 5; 325 MG/1; MG/1
1 TABLET ORAL EVERY 4 HOURS PRN
Status: DISCONTINUED | OUTPATIENT
Start: 2024-06-06 | End: 2024-06-13 | Stop reason: HOSPADM

## 2024-06-06 RX ORDER — ACETAMINOPHEN 325 MG/1
650 TABLET ORAL ONCE
Status: COMPLETED | OUTPATIENT
Start: 2024-06-06 | End: 2024-06-06

## 2024-06-06 RX ORDER — TRIAMCINOLONE ACETONIDE 40 MG/ML
40 INJECTION, SUSPENSION INTRA-ARTICULAR; INTRAMUSCULAR ONCE
Status: COMPLETED | OUTPATIENT
Start: 2024-06-06 | End: 2024-06-06

## 2024-06-06 RX ORDER — TIZANIDINE 4 MG/1
2 TABLET ORAL NIGHTLY PRN
Status: DISCONTINUED | OUTPATIENT
Start: 2024-06-06 | End: 2024-06-12

## 2024-06-06 RX ORDER — M-VIT,TX,IRON,MINS/CALC/FOLIC 27MG-0.4MG
1 TABLET ORAL DAILY
Status: DISCONTINUED | OUTPATIENT
Start: 2024-06-06 | End: 2024-06-13 | Stop reason: HOSPADM

## 2024-06-06 RX ORDER — SODIUM CHLORIDE 0.9 % (FLUSH) 0.9 %
10 SYRINGE (ML) INJECTION EVERY 12 HOURS SCHEDULED
Status: DISCONTINUED | OUTPATIENT
Start: 2024-06-06 | End: 2024-06-13 | Stop reason: HOSPADM

## 2024-06-06 RX ORDER — SODIUM CHLORIDE 9 MG/ML
INJECTION, SOLUTION INTRAVENOUS PRN
Status: DISCONTINUED | OUTPATIENT
Start: 2024-06-06 | End: 2024-06-13 | Stop reason: HOSPADM

## 2024-06-06 RX ORDER — AMLODIPINE BESYLATE 10 MG/1
10 TABLET ORAL NIGHTLY
Status: DISCONTINUED | OUTPATIENT
Start: 2024-06-06 | End: 2024-06-13 | Stop reason: HOSPADM

## 2024-06-06 RX ORDER — POTASSIUM CHLORIDE 7.45 MG/ML
10 INJECTION INTRAVENOUS PRN
Status: DISCONTINUED | OUTPATIENT
Start: 2024-06-06 | End: 2024-06-06

## 2024-06-06 RX ORDER — ACETAMINOPHEN 650 MG/1
650 SUPPOSITORY RECTAL EVERY 6 HOURS PRN
Status: DISCONTINUED | OUTPATIENT
Start: 2024-06-06 | End: 2024-06-13 | Stop reason: HOSPADM

## 2024-06-06 RX ORDER — POTASSIUM CHLORIDE 20 MEQ/1
40 TABLET, EXTENDED RELEASE ORAL PRN
Status: DISCONTINUED | OUTPATIENT
Start: 2024-06-06 | End: 2024-06-06

## 2024-06-06 RX ADMIN — TRAMADOL HYDROCHLORIDE 50 MG: 50 TABLET, COATED ORAL at 12:41

## 2024-06-06 RX ADMIN — AMLODIPINE BESYLATE 10 MG: 10 TABLET ORAL at 20:37

## 2024-06-06 RX ADMIN — FAMOTIDINE 20 MG: 20 TABLET, FILM COATED ORAL at 17:24

## 2024-06-06 RX ADMIN — WARFARIN SODIUM 2.5 MG: 2.5 TABLET ORAL at 17:56

## 2024-06-06 RX ADMIN — SODIUM CHLORIDE: 9 INJECTION, SOLUTION INTRAVENOUS at 16:57

## 2024-06-06 RX ADMIN — ATORVASTATIN CALCIUM 80 MG: 40 TABLET, FILM COATED ORAL at 17:24

## 2024-06-06 RX ADMIN — TRIAMCINOLONE ACETONIDE 40 MG: 40 INJECTION, SUSPENSION INTRA-ARTICULAR; INTRAMUSCULAR at 12:41

## 2024-06-06 RX ADMIN — Medication 1 TABLET: at 17:24

## 2024-06-06 RX ADMIN — CARVEDILOL 12.5 MG: 12.5 TABLET, FILM COATED ORAL at 20:37

## 2024-06-06 RX ADMIN — ACETAMINOPHEN 650 MG: 325 TABLET ORAL at 12:41

## 2024-06-06 ASSESSMENT — PAIN SCALES - GENERAL: PAINLEVEL_OUTOF10: 10

## 2024-06-06 ASSESSMENT — PAIN DESCRIPTION - ORIENTATION: ORIENTATION: LOWER

## 2024-06-06 ASSESSMENT — PAIN - FUNCTIONAL ASSESSMENT: PAIN_FUNCTIONAL_ASSESSMENT: 0-10

## 2024-06-06 ASSESSMENT — PAIN DESCRIPTION - PAIN TYPE: TYPE: ACUTE PAIN

## 2024-06-06 ASSESSMENT — PAIN DESCRIPTION - LOCATION: LOCATION: BACK

## 2024-06-06 NOTE — ED PROVIDER NOTES
[facility]  EMERGENCY DEPARTMENT ENCOUNTER      Pt Name: Ayde Landis  MRN: [id number]  Birthdate 1935  Date of evaluation: 6/6/2024  Provider: Noa Dias PA-C    CHIEF COMPLAINT       Chief Complaint   Patient presents with    Back Pain     Ongoing for 2 weeks since working in flower beds, has taken medication with no relief, has order for PT but has not started yet.       Groin Pain     Right           HISTORY OF PRESENT ILLNESS      Ayde Landis is a 89 y.o. female who presents to the emergency department due to inability to ambulate.  Patient presents emergency department complaining of chronic ongoing right hip pain.  The hip pain was atraumatic.  It came on after she worked in her flower beds the spring.  She has gotten minimal relief with pain medicine at home.  She was scheduled to start physical therapy however she is now immobile and cannot go to therapy appointments.  She is having difficulty toileting herself and taking care of herself.  She did get some pain relief when taking Ultram.  She has not seen orthopedics.  There is nothing different about the pain.  There are no other complaints or concerns.  Current discomfort is moderate.        REVIEW OF SYSTEMS       Review of Systems   Constitutional:  Positive for activity change. Negative for appetite change.   Respiratory:  Negative for cough and shortness of breath.    Cardiovascular:  Negative for chest pain.   Musculoskeletal:  Positive for back pain and gait problem.         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

## 2024-06-06 NOTE — ED NOTES
This writer was called into room by family stating patient needed to use the restroom. Patient states she is unable to get up. Patient provided with bedpan at this time. Pericare provided.

## 2024-06-06 NOTE — H&P
Emphysema     Essential hypertension     Hyperlipidemia     Irritable bowel syndrome     Lumbago     Osteoarthritis     Polymyalgia rheumatica (HCC) 2015    Rheumatism     Seborrheic keratoses     on back    Urinary frequency 06/06/2024       Past Surgical History:        Procedure Laterality Date    APPENDECTOMY  1949    CATARACT REMOVAL WITH IMPLANT      COLECTOMY Right 06/20/2016    open right hemicolectomy bowel resection    COLONOSCOPY  10/17/2011    Dr. Morales    COLONOSCOPY  05/25/2016    -bx mass hepatic flexure,tattoo    COLONOSCOPY  12/13/2017    Dr Morales-polyp(adenomatous)hemorrhoids    DENTAL SURGERY      Implants    GLAUCOMA SURGERY      preventative surg    HYSTERECTOMY, TOTAL ABDOMINAL (CERVIX REMOVED)  1973    NASAL SEPTUM SURGERY      Deriated Septum Surgery    [place] COLSC FLX W/RMVL OF TUMOR POLYP LESION SNARE TQ N/A 12/13/2017    COLONOSCOPY POLYPECTOMY SNARE/COLD BIOPSY performed by Harshil Morales MD at [facility] OR    SKIN CANCER EXCISION      Basal cell on back and leg    TONSILLECTOMY  1941       Medications Prior to Admission:    Prior to Admission medications    Medication Sig Start Date End Date Taking? Authorizing Provider   tiZANidine (ZANAFLEX) 2 MG tablet Take 1 tablet by mouth nightly as needed (right sided sciatic/groin pain) 6/3/24 6/8/24  Kala Ricks, APRN - CNP   sodium zirconium cyclosilicate (LOKELMA) 10 g PACK oral suspension Take 1 packet by mouth every other day 4/15/24   Jame Yoon MD   atorvastatin (LIPITOR) 80 MG tablet Take 1 tablet by mouth daily 1/26/24   Samuel Cunha MD   amLODIPine (NORVASC) 10 MG tablet Take 1 tablet by mouth at bedtime 1/15/24   Samuel Cunha MD   warfarin (COUMADIN) 2.5 MG tablet TAKE 1 AND 1/2 TABLET BY MOUTH DAILY ON MONDAY, WEDNESDAY AND FRIDAY AND TAKE ONE TABLET BY MOUTH DAILY ON ALL OTHER DAYS  Patient taking differently: Take 1 tablet by mouth See Admin Instructions Coumadin Clinic:  1.25 mg po Thursday; 2.5

## 2024-06-07 ENCOUNTER — APPOINTMENT (OUTPATIENT)
Dept: NUCLEAR MEDICINE | Age: 89
DRG: 521 | End: 2024-06-07
Payer: MEDICARE

## 2024-06-07 ENCOUNTER — APPOINTMENT (OUTPATIENT)
Age: 89
DRG: 521 | End: 2024-06-07
Attending: INTERNAL MEDICINE
Payer: MEDICARE

## 2024-06-07 ENCOUNTER — APPOINTMENT (OUTPATIENT)
Dept: GENERAL RADIOLOGY | Age: 89
DRG: 521 | End: 2024-06-07
Payer: MEDICARE

## 2024-06-07 LAB
ANION GAP SERPL CALCULATED.3IONS-SCNC: 9 MMOL/L (ref 9–17)
BASOPHILS # BLD: <0.03 K/UL (ref 0–0.2)
BASOPHILS NFR BLD: 0 % (ref 0–2)
BUN SERPL-MCNC: 33 MG/DL (ref 8–23)
BUN/CREAT SERPL: 22 (ref 9–20)
CALCIUM SERPL-MCNC: 8.1 MG/DL (ref 8.6–10.4)
CHLORIDE SERPL-SCNC: 108 MMOL/L (ref 98–107)
CO2 SERPL-SCNC: 23 MMOL/L (ref 20–31)
CREAT SERPL-MCNC: 1.5 MG/DL (ref 0.5–0.9)
ECHO AO ROOT DIAM: 1.9 CM
ECHO AO ROOT INDEX: 1.4 CM/M2
ECHO AO SINUS VALSALVA DIAM: 3 CM
ECHO AO SINUS VALSALVA INDEX: 2.21 CM/M2
ECHO AO ST JNCT DIAM: 2.4 CM
ECHO AR MAX VEL PISA: 4.6 M/S
ECHO AV CUSP MM: 0.7 CM
ECHO AV MEAN GRADIENT: 9 MMHG
ECHO AV MEAN VELOCITY: 1.4 M/S
ECHO AV PEAK GRADIENT: 16 MMHG
ECHO AV PEAK VELOCITY: 2 M/S
ECHO AV REGURGITANT PHT: 511 MS
ECHO AV VTI: 40.6 CM
ECHO BSA: 1.35 M2
ECHO EST RA PRESSURE: 5 MMHG
ECHO LA AREA 2C: 17.5 CM2
ECHO LA AREA 4C: 17.7 CM2
ECHO LA MAJOR AXIS: 5.3 CM
ECHO LA MINOR AXIS: 5.9 CM
ECHO LA VOL BP: 48 ML (ref 22–52)
ECHO LA VOL MOD A2C: 42 ML (ref 22–52)
ECHO LA VOL MOD A4C: 50 ML (ref 22–52)
ECHO LA VOL/BSA BIPLANE: 35 ML/M2 (ref 16–34)
ECHO LA VOLUME INDEX MOD A2C: 31 ML/M2 (ref 16–34)
ECHO LA VOLUME INDEX MOD A4C: 37 ML/M2 (ref 16–34)
ECHO LV EDV A2C: 25 ML
ECHO LV EDV A4C: 40 ML
ECHO LV EDV INDEX A4C: 29 ML/M2
ECHO LV EDV NDEX A2C: 18 ML/M2
ECHO LV EJECTION FRACTION A2C: 59 %
ECHO LV EJECTION FRACTION A4C: 56 %
ECHO LV EJECTION FRACTION BIPLANE: 56 % (ref 55–100)
ECHO LV ESV A2C: 10 ML
ECHO LV ESV A4C: 18 ML
ECHO LV ESV INDEX A2C: 7 ML/M2
ECHO LV ESV INDEX A4C: 13 ML/M2
ECHO LV FRACTIONAL SHORTENING: 32 % (ref 28–44)
ECHO LV INTERNAL DIMENSION DIASTOLE INDEX: 2.79 CM/M2
ECHO LV INTERNAL DIMENSION DIASTOLIC: 3.8 CM (ref 3.9–5.3)
ECHO LV INTERNAL DIMENSION SYSTOLIC INDEX: 1.91 CM/M2
ECHO LV INTERNAL DIMENSION SYSTOLIC: 2.6 CM
ECHO LV IVSD: 1 CM (ref 0.6–0.9)
ECHO LV MASS 2D: 117.3 G (ref 67–162)
ECHO LV MASS INDEX 2D: 86.2 G/M2 (ref 43–95)
ECHO LV POSTERIOR WALL DIASTOLIC: 1 CM (ref 0.6–0.9)
ECHO LV RELATIVE WALL THICKNESS RATIO: 0.53
ECHO LVOT AREA: 3.1 CM2
ECHO LVOT DIAM: 2 CM
ECHO MV A VELOCITY: 1.14 M/S
ECHO MV E DECELERATION TIME (DT): 243 MS
ECHO MV E VELOCITY: 0.72 M/S
ECHO MV E/A RATIO: 0.63
ECHO MV E/E' LATERAL: 12
ECHO MV MAX VELOCITY: 1.4 M/S
ECHO MV MEAN GRADIENT: 3 MMHG
ECHO MV MEAN VELOCITY: 0.8 M/S
ECHO MV PEAK GRADIENT: 8 MMHG
ECHO MV VTI: 46 CM
ECHO PULMONARY ARTERY END DIASTOLIC PRESSURE: 5 MMHG
ECHO PV MAX VELOCITY: 1 M/S
ECHO PV PEAK GRADIENT: 4 MMHG
ECHO PV REGURGITANT MAX VELOCITY: 1.1 M/S
ECHO RIGHT VENTRICULAR SYSTOLIC PRESSURE (RVSP): 26 MMHG
ECHO TV REGURGITANT MAX VELOCITY: 2.28 M/S
ECHO TV REGURGITANT PEAK GRADIENT: 21 MMHG
EKG ATRIAL RATE: 74 BPM
EKG P AXIS: 8 DEGREES
EKG P-R INTERVAL: 124 MS
EKG Q-T INTERVAL: 384 MS
EKG QRS DURATION: 72 MS
EKG QTC CALCULATION (BAZETT): 426 MS
EKG R AXIS: -32 DEGREES
EKG T AXIS: 28 DEGREES
EKG VENTRICULAR RATE: 74 BPM
EOSINOPHIL # BLD: 0.14 K/UL (ref 0–0.44)
EOSINOPHILS RELATIVE PERCENT: 2 % (ref 1–4)
ERYTHROCYTE [DISTWIDTH] IN BLOOD BY AUTOMATED COUNT: 12.9 % (ref 11.8–14.4)
GFR, ESTIMATED: 33 ML/MIN/1.73M2
GLUCOSE SERPL-MCNC: 93 MG/DL (ref 70–99)
HCT VFR BLD AUTO: 35.5 % (ref 36.3–47.1)
HGB BLD-MCNC: 11.7 G/DL (ref 11.9–15.1)
IMM GRANULOCYTES # BLD AUTO: 0.04 K/UL (ref 0–0.3)
IMM GRANULOCYTES NFR BLD: 0 %
INR PPP: 2.1
LYMPHOCYTES NFR BLD: 0.89 K/UL (ref 1.1–3.7)
LYMPHOCYTES RELATIVE PERCENT: 10 % (ref 24–43)
MCH RBC QN AUTO: 31.7 PG (ref 25.2–33.5)
MCHC RBC AUTO-ENTMCNC: 33 G/DL (ref 28.4–34.8)
MCV RBC AUTO: 96.2 FL (ref 82.6–102.9)
MONOCYTES NFR BLD: 0.85 K/UL (ref 0.1–1.2)
MONOCYTES NFR BLD: 9 % (ref 3–12)
NEUTROPHILS NFR BLD: 79 % (ref 36–65)
NEUTS SEG NFR BLD: 7.42 K/UL (ref 1.5–8.1)
NRBC BLD-RTO: 0 PER 100 WBC
PLATELET # BLD AUTO: 119 K/UL (ref 138–453)
PMV BLD AUTO: 12.2 FL (ref 8.1–13.5)
POTASSIUM SERPL-SCNC: 4.1 MMOL/L (ref 3.7–5.3)
PROTHROMBIN TIME: 22.6 SEC (ref 11.7–14.1)
RBC # BLD AUTO: 3.69 M/UL (ref 3.95–5.11)
SODIUM SERPL-SCNC: 140 MMOL/L (ref 135–144)
WBC OTHER # BLD: 9.4 K/UL (ref 3.5–11.3)

## 2024-06-07 PROCEDURE — A9503 TC99M MEDRONATE: HCPCS | Performed by: NURSE PRACTITIONER

## 2024-06-07 PROCEDURE — 78306 BONE IMAGING WHOLE BODY: CPT | Performed by: NURSE PRACTITIONER

## 2024-06-07 PROCEDURE — 1200000000 HC SEMI PRIVATE

## 2024-06-07 PROCEDURE — 93306 TTE W/DOPPLER COMPLETE: CPT

## 2024-06-07 PROCEDURE — 6370000000 HC RX 637 (ALT 250 FOR IP): Performed by: INTERNAL MEDICINE

## 2024-06-07 PROCEDURE — 71046 X-RAY EXAM CHEST 2 VIEWS: CPT

## 2024-06-07 PROCEDURE — 93010 ELECTROCARDIOGRAM REPORT: CPT | Performed by: INTERNAL MEDICINE

## 2024-06-07 PROCEDURE — 85025 COMPLETE CBC W/AUTO DIFF WBC: CPT

## 2024-06-07 PROCEDURE — 85610 PROTHROMBIN TIME: CPT

## 2024-06-07 PROCEDURE — 3430000000 HC RX DIAGNOSTIC RADIOPHARMACEUTICAL: Performed by: NURSE PRACTITIONER

## 2024-06-07 PROCEDURE — 99222 1ST HOSP IP/OBS MODERATE 55: CPT | Performed by: PHYSICIAN ASSISTANT

## 2024-06-07 PROCEDURE — 80048 BASIC METABOLIC PNL TOTAL CA: CPT

## 2024-06-07 PROCEDURE — 94761 N-INVAS EAR/PLS OXIMETRY MLT: CPT

## 2024-06-07 PROCEDURE — 36415 COLL VENOUS BLD VENIPUNCTURE: CPT

## 2024-06-07 PROCEDURE — 6370000000 HC RX 637 (ALT 250 FOR IP): Performed by: NURSE PRACTITIONER

## 2024-06-07 PROCEDURE — 2580000003 HC RX 258: Performed by: INTERNAL MEDICINE

## 2024-06-07 RX ORDER — CALCIUM CARBONATE 500 MG/1
500 TABLET, CHEWABLE ORAL DAILY
Status: DISCONTINUED | OUTPATIENT
Start: 2024-06-07 | End: 2024-06-09

## 2024-06-07 RX ORDER — TC 99M MEDRONATE 20 MG/10ML
25 INJECTION, POWDER, LYOPHILIZED, FOR SOLUTION INTRAVENOUS
Status: COMPLETED | OUTPATIENT
Start: 2024-06-07 | End: 2024-06-07

## 2024-06-07 RX ORDER — TAMSULOSIN HYDROCHLORIDE 0.4 MG/1
0.4 CAPSULE ORAL DAILY
Status: DISCONTINUED | OUTPATIENT
Start: 2024-06-07 | End: 2024-06-13 | Stop reason: HOSPADM

## 2024-06-07 RX ORDER — ENOXAPARIN SODIUM 100 MG/ML
40 INJECTION SUBCUTANEOUS DAILY
Status: DISCONTINUED | OUTPATIENT
Start: 2024-06-07 | End: 2024-06-07

## 2024-06-07 RX ADMIN — SODIUM CHLORIDE: 9 INJECTION, SOLUTION INTRAVENOUS at 19:12

## 2024-06-07 RX ADMIN — SODIUM ZIRCONIUM CYCLOSILICATE 10 G: 10 POWDER, FOR SUSPENSION ORAL at 13:13

## 2024-06-07 RX ADMIN — ATORVASTATIN CALCIUM 80 MG: 40 TABLET, FILM COATED ORAL at 09:08

## 2024-06-07 RX ADMIN — HYDROCODONE BITARTRATE AND ACETAMINOPHEN 1 TABLET: 5; 325 TABLET ORAL at 19:30

## 2024-06-07 RX ADMIN — TC 99M MEDRONATE 25 MILLICURIE: 20 INJECTION, POWDER, LYOPHILIZED, FOR SOLUTION INTRAVENOUS at 14:27

## 2024-06-07 RX ADMIN — AMLODIPINE BESYLATE 10 MG: 10 TABLET ORAL at 20:55

## 2024-06-07 RX ADMIN — SODIUM CHLORIDE: 9 INJECTION, SOLUTION INTRAVENOUS at 05:03

## 2024-06-07 RX ADMIN — ANTACID TABLETS 500 MG: 500 TABLET, CHEWABLE ORAL at 09:10

## 2024-06-07 RX ADMIN — CARVEDILOL 12.5 MG: 12.5 TABLET, FILM COATED ORAL at 20:55

## 2024-06-07 RX ADMIN — TAMSULOSIN HYDROCHLORIDE 0.4 MG: 0.4 CAPSULE ORAL at 09:10

## 2024-06-07 RX ADMIN — CARVEDILOL 12.5 MG: 12.5 TABLET, FILM COATED ORAL at 09:10

## 2024-06-07 RX ADMIN — HYDROCODONE BITARTRATE AND ACETAMINOPHEN 1 TABLET: 5; 325 TABLET ORAL at 01:05

## 2024-06-07 RX ADMIN — FAMOTIDINE 20 MG: 20 TABLET, FILM COATED ORAL at 09:10

## 2024-06-07 RX ADMIN — Medication 1 TABLET: at 09:10

## 2024-06-07 ASSESSMENT — PAIN DESCRIPTION - ONSET: ONSET: ON-GOING

## 2024-06-07 ASSESSMENT — PAIN DESCRIPTION - ORIENTATION
ORIENTATION: RIGHT
ORIENTATION: MID

## 2024-06-07 ASSESSMENT — PAIN DESCRIPTION - DESCRIPTORS
DESCRIPTORS: ACHING;SHARP
DESCRIPTORS: ACHING;OTHER (COMMENT)

## 2024-06-07 ASSESSMENT — PAIN SCALES - GENERAL
PAINLEVEL_OUTOF10: 0
PAINLEVEL_OUTOF10: 10
PAINLEVEL_OUTOF10: 10

## 2024-06-07 ASSESSMENT — PAIN DESCRIPTION - PAIN TYPE: TYPE: ACUTE PAIN

## 2024-06-07 ASSESSMENT — PAIN DESCRIPTION - FREQUENCY: FREQUENCY: CONTINUOUS

## 2024-06-07 ASSESSMENT — PAIN DESCRIPTION - LOCATION
LOCATION: GROIN
LOCATION: GROIN

## 2024-06-07 NOTE — DISCHARGE INSTR - COC
Continuity of Care Form    Patient Name: Ayde Landis   :  1935  MRN:  [id number]    Admit date:  2024  Discharge date:  24    Code Status Order: DNR-CCA   Advance Directives:   Advance Care Flowsheet Documentation       Date/Time Healthcare Directive Type of Healthcare Directive Copy in Chart Healthcare Agent Appointed Healthcare Agent's Name Healthcare Agent's Phone Number    06/10/24 0832 Yes, patient has an advance directive for healthcare treatment -- Yes, copy in chart -- -- --            Admitting Physician:  Devon Hdez MD  PCP: Samuel Cunha MD    Discharging Nurse: SHARI Chu  Discharging Hospital Unit/Room#: 0311/0311-01  Discharging Unit Phone Number: [phone]    Emergency Contact:   Extended Emergency Contact Information  Primary Emergency Contact: Manan Landis   [facility]  Home Phone: [phone]  Relation: Child  Hearing or visual needs: None  Other needs: None  Preferred language: English   needed? No  Secondary Emergency Contact: Lela Wright  Home Phone: [phone]  Mobile Phone: [phone]  Relation: Brother/Sister    Past Surgical History:  Past Surgical History:   Procedure Laterality Date    APPENDECTOMY      CATARACT REMOVAL WITH IMPLANT      COLECTOMY Right 2016    open right hemicolectomy bowel resection    COLONOSCOPY  10/17/2011    Dr. Morales    COLONOSCOPY  2016    -bx mass hepatic flexure,tattoo    COLONOSCOPY  2017    Dr Morales-polyp(adenomatous)hemorrhoids    DENTAL SURGERY      Implants    GLAUCOMA SURGERY      preventative surg    HYSTERECTOMY, TOTAL ABDOMINAL (CERVIX REMOVED)      NASAL SEPTUM SURGERY      Deriated Septum Surgery    [place] COLSC FLX W/RMVL OF TUMOR POLYP LESION SNARE TQ N/A 2017    COLONOSCOPY POLYPECTOMY SNARE/COLD BIOPSY performed by Harshil Morales MD at [facility] OR    SKIN CANCER EXCISION      Basal cell on back and leg    TONSILLECTOMY  194       Immunization

## 2024-06-08 LAB
25(OH)D3 SERPL-MCNC: 27.4 NG/ML (ref 30–100)
ANION GAP SERPL CALCULATED.3IONS-SCNC: 8 MMOL/L (ref 9–17)
BASOPHILS # BLD: <0.03 K/UL (ref 0–0.2)
BASOPHILS NFR BLD: 0 % (ref 0–2)
BNP SERPL-MCNC: 1841 PG/ML
BUN SERPL-MCNC: 31 MG/DL (ref 8–23)
BUN/CREAT SERPL: 21 (ref 9–20)
CALCIUM SERPL-MCNC: 7.8 MG/DL (ref 8.6–10.4)
CHLORIDE SERPL-SCNC: 110 MMOL/L (ref 98–107)
CO2 SERPL-SCNC: 21 MMOL/L (ref 20–31)
CREAT SERPL-MCNC: 1.5 MG/DL (ref 0.5–0.9)
EOSINOPHIL # BLD: 0.16 K/UL (ref 0–0.44)
EOSINOPHILS RELATIVE PERCENT: 2 % (ref 1–4)
ERYTHROCYTE [DISTWIDTH] IN BLOOD BY AUTOMATED COUNT: 13 % (ref 11.8–14.4)
FERRITIN SERPL-MCNC: 231 NG/ML (ref 13–150)
FOLATE SERPL-MCNC: >20 NG/ML (ref 4.8–24.2)
GFR, ESTIMATED: 33 ML/MIN/1.73M2
GLUCOSE SERPL-MCNC: 100 MG/DL (ref 70–99)
HCT VFR BLD AUTO: 32.6 % (ref 36.3–47.1)
HGB BLD-MCNC: 10.7 G/DL (ref 11.9–15.1)
IMM GRANULOCYTES # BLD AUTO: 0.03 K/UL (ref 0–0.3)
IMM GRANULOCYTES NFR BLD: 0 %
IMM RETICS NFR: 11 % (ref 2.7–18.3)
INR PPP: 2
IRON SATN MFR SERPL: 23 % (ref 20–55)
LYMPHOCYTES NFR BLD: 0.99 K/UL (ref 1.1–3.7)
LYMPHOCYTES RELATIVE PERCENT: 11 % (ref 24–43)
MCH RBC QN AUTO: 31.7 PG (ref 25.2–33.5)
MCHC RBC AUTO-ENTMCNC: 32.8 G/DL (ref 28.4–34.8)
MCV RBC AUTO: 96.4 FL (ref 82.6–102.9)
MONOCYTES NFR BLD: 0.77 K/UL (ref 0.1–1.2)
MONOCYTES NFR BLD: 9 % (ref 3–12)
NEUTROPHILS NFR BLD: 78 % (ref 36–65)
NEUTS SEG NFR BLD: 6.82 K/UL (ref 1.5–8.1)
NRBC BLD-RTO: 0 PER 100 WBC
PLATELET # BLD AUTO: 111 K/UL (ref 138–453)
PMV BLD AUTO: 12.2 FL (ref 8.1–13.5)
POTASSIUM SERPL-SCNC: 3.8 MMOL/L (ref 3.7–5.3)
PROTHROMBIN TIME: 22 SEC (ref 11.7–14.1)
RBC # BLD AUTO: 3.38 M/UL (ref 3.95–5.11)
RETIC HEMOGLOBIN: 35 PG (ref 28.2–35.7)
RETICS # AUTO: 0.03 M/UL (ref 0.03–0.08)
RETICS/RBC NFR AUTO: 0.7 % (ref 0.5–1.9)
SODIUM SERPL-SCNC: 139 MMOL/L (ref 135–144)
TIBC SERPL-MCNC: 150 UG/DL (ref 250–450)
TSH SERPL DL<=0.05 MIU/L-ACNC: 0.7 UIU/ML (ref 0.3–5)
UNSATURATED IRON BINDING CAPACITY: 116 UG/DL (ref 112–347)
VIT B12 SERPL-MCNC: 1503 PG/ML (ref 232–1245)
WBC OTHER # BLD: 8.8 K/UL (ref 3.5–11.3)

## 2024-06-08 PROCEDURE — 82746 ASSAY OF FOLIC ACID SERUM: CPT

## 2024-06-08 PROCEDURE — 80048 BASIC METABOLIC PNL TOTAL CA: CPT

## 2024-06-08 PROCEDURE — 6370000000 HC RX 637 (ALT 250 FOR IP): Performed by: INTERNAL MEDICINE

## 2024-06-08 PROCEDURE — 2580000003 HC RX 258: Performed by: INTERNAL MEDICINE

## 2024-06-08 PROCEDURE — 82607 VITAMIN B-12: CPT

## 2024-06-08 PROCEDURE — 83880 ASSAY OF NATRIURETIC PEPTIDE: CPT

## 2024-06-08 PROCEDURE — 85610 PROTHROMBIN TIME: CPT

## 2024-06-08 PROCEDURE — 83550 IRON BINDING TEST: CPT

## 2024-06-08 PROCEDURE — 83540 ASSAY OF IRON: CPT

## 2024-06-08 PROCEDURE — 94761 N-INVAS EAR/PLS OXIMETRY MLT: CPT

## 2024-06-08 PROCEDURE — 36415 COLL VENOUS BLD VENIPUNCTURE: CPT

## 2024-06-08 PROCEDURE — 1200000000 HC SEMI PRIVATE

## 2024-06-08 PROCEDURE — 82306 VITAMIN D 25 HYDROXY: CPT

## 2024-06-08 PROCEDURE — 85025 COMPLETE CBC W/AUTO DIFF WBC: CPT

## 2024-06-08 PROCEDURE — 82728 ASSAY OF FERRITIN: CPT

## 2024-06-08 PROCEDURE — 6370000000 HC RX 637 (ALT 250 FOR IP): Performed by: NURSE PRACTITIONER

## 2024-06-08 PROCEDURE — 85045 AUTOMATED RETICULOCYTE COUNT: CPT

## 2024-06-08 PROCEDURE — 84443 ASSAY THYROID STIM HORMONE: CPT

## 2024-06-08 RX ADMIN — CARVEDILOL 12.5 MG: 12.5 TABLET, FILM COATED ORAL at 08:04

## 2024-06-08 RX ADMIN — AMLODIPINE BESYLATE 10 MG: 10 TABLET ORAL at 20:26

## 2024-06-08 RX ADMIN — CARVEDILOL 12.5 MG: 12.5 TABLET, FILM COATED ORAL at 20:26

## 2024-06-08 RX ADMIN — SODIUM CHLORIDE: 9 INJECTION, SOLUTION INTRAVENOUS at 08:03

## 2024-06-08 RX ADMIN — ANTACID TABLETS 500 MG: 500 TABLET, CHEWABLE ORAL at 08:03

## 2024-06-08 RX ADMIN — TAMSULOSIN HYDROCHLORIDE 0.4 MG: 0.4 CAPSULE ORAL at 08:03

## 2024-06-08 RX ADMIN — Medication 1 TABLET: at 08:03

## 2024-06-08 RX ADMIN — FAMOTIDINE 20 MG: 20 TABLET, FILM COATED ORAL at 08:03

## 2024-06-08 RX ADMIN — ATORVASTATIN CALCIUM 80 MG: 40 TABLET, FILM COATED ORAL at 08:03

## 2024-06-08 RX ADMIN — SODIUM CHLORIDE, PRESERVATIVE FREE 10 ML: 5 INJECTION INTRAVENOUS at 20:26

## 2024-06-08 NOTE — FLOWSHEET NOTE
Resting in bed with eyes closed. Awake for vitals and assessment . Alert and oriented x 4. Currently denies pain. No needs at present time. Call light within reach. Bed alarm on for safety.

## 2024-06-09 LAB
ABO + RH BLD: NORMAL
ANION GAP SERPL CALCULATED.3IONS-SCNC: 11 MMOL/L (ref 9–17)
ARM BAND NUMBER: NORMAL
BASOPHILS # BLD: 0.03 K/UL (ref 0–0.2)
BASOPHILS NFR BLD: 0 % (ref 0–2)
BLOOD BANK SAMPLE EXPIRATION: NORMAL
BLOOD GROUP ANTIBODIES SERPL: NEGATIVE
BUN SERPL-MCNC: 27 MG/DL (ref 8–23)
BUN/CREAT SERPL: 19 (ref 9–20)
CALCIUM SERPL-MCNC: 8.3 MG/DL (ref 8.6–10.4)
CHLORIDE SERPL-SCNC: 110 MMOL/L (ref 98–107)
CO2 SERPL-SCNC: 19 MMOL/L (ref 20–31)
CREAT SERPL-MCNC: 1.4 MG/DL (ref 0.5–0.9)
EOSINOPHIL # BLD: 0.16 K/UL (ref 0–0.44)
EOSINOPHILS RELATIVE PERCENT: 2 % (ref 1–4)
ERYTHROCYTE [DISTWIDTH] IN BLOOD BY AUTOMATED COUNT: 13.2 % (ref 11.8–14.4)
GFR, ESTIMATED: 36 ML/MIN/1.73M2
GLUCOSE SERPL-MCNC: 108 MG/DL (ref 70–99)
HCT VFR BLD AUTO: 33.6 % (ref 36.3–47.1)
HGB BLD-MCNC: 11.4 G/DL (ref 11.9–15.1)
IMM GRANULOCYTES # BLD AUTO: 0.05 K/UL (ref 0–0.3)
IMM GRANULOCYTES NFR BLD: 1 %
INR PPP: 1.7
LYMPHOCYTES NFR BLD: 0.82 K/UL (ref 1.1–3.7)
LYMPHOCYTES RELATIVE PERCENT: 9 % (ref 24–43)
MCH RBC QN AUTO: 32.3 PG (ref 25.2–33.5)
MCHC RBC AUTO-ENTMCNC: 33.9 G/DL (ref 28.4–34.8)
MCV RBC AUTO: 95.2 FL (ref 82.6–102.9)
MONOCYTES NFR BLD: 0.78 K/UL (ref 0.1–1.2)
MONOCYTES NFR BLD: 8 % (ref 3–12)
NEUTROPHILS NFR BLD: 80 % (ref 36–65)
NEUTS SEG NFR BLD: 7.45 K/UL (ref 1.5–8.1)
NRBC BLD-RTO: 0 PER 100 WBC
PLATELET # BLD AUTO: 115 K/UL (ref 138–453)
PMV BLD AUTO: 13 FL (ref 8.1–13.5)
POTASSIUM SERPL-SCNC: 3.8 MMOL/L (ref 3.7–5.3)
PROTHROMBIN TIME: 19.6 SEC (ref 11.7–14.1)
RBC # BLD AUTO: 3.53 M/UL (ref 3.95–5.11)
SODIUM SERPL-SCNC: 140 MMOL/L (ref 135–144)
WBC OTHER # BLD: 9.3 K/UL (ref 3.5–11.3)

## 2024-06-09 PROCEDURE — 6360000002 HC RX W HCPCS: Performed by: ORTHOPAEDIC SURGERY

## 2024-06-09 PROCEDURE — 2580000003 HC RX 258: Performed by: STUDENT IN AN ORGANIZED HEALTH CARE EDUCATION/TRAINING PROGRAM

## 2024-06-09 PROCEDURE — 80048 BASIC METABOLIC PNL TOTAL CA: CPT

## 2024-06-09 PROCEDURE — 2580000003 HC RX 258: Performed by: INTERNAL MEDICINE

## 2024-06-09 PROCEDURE — 85610 PROTHROMBIN TIME: CPT

## 2024-06-09 PROCEDURE — 1200000000 HC SEMI PRIVATE

## 2024-06-09 PROCEDURE — 86850 RBC ANTIBODY SCREEN: CPT

## 2024-06-09 PROCEDURE — 6370000000 HC RX 637 (ALT 250 FOR IP): Performed by: STUDENT IN AN ORGANIZED HEALTH CARE EDUCATION/TRAINING PROGRAM

## 2024-06-09 PROCEDURE — 86901 BLOOD TYPING SEROLOGIC RH(D): CPT

## 2024-06-09 PROCEDURE — 85025 COMPLETE CBC W/AUTO DIFF WBC: CPT

## 2024-06-09 PROCEDURE — 36415 COLL VENOUS BLD VENIPUNCTURE: CPT

## 2024-06-09 PROCEDURE — 6370000000 HC RX 637 (ALT 250 FOR IP): Performed by: NURSE PRACTITIONER

## 2024-06-09 PROCEDURE — 94761 N-INVAS EAR/PLS OXIMETRY MLT: CPT

## 2024-06-09 PROCEDURE — 6370000000 HC RX 637 (ALT 250 FOR IP): Performed by: INTERNAL MEDICINE

## 2024-06-09 PROCEDURE — 86900 BLOOD TYPING SEROLOGIC ABO: CPT

## 2024-06-09 RX ORDER — ERGOCALCIFEROL 1.25 MG/1
50000 CAPSULE ORAL WEEKLY
Status: DISCONTINUED | OUTPATIENT
Start: 2024-06-09 | End: 2024-06-13 | Stop reason: HOSPADM

## 2024-06-09 RX ORDER — CALCIUM CARBONATE 500 MG/1
500 TABLET, CHEWABLE ORAL 2 TIMES DAILY
Status: DISCONTINUED | OUTPATIENT
Start: 2024-06-09 | End: 2024-06-13 | Stop reason: HOSPADM

## 2024-06-09 RX ORDER — PHYTONADIONE 10 MG/ML
5 INJECTION, EMULSION INTRAMUSCULAR; INTRAVENOUS; SUBCUTANEOUS ONCE
Status: COMPLETED | OUTPATIENT
Start: 2024-06-09 | End: 2024-06-09

## 2024-06-09 RX ORDER — SODIUM CHLORIDE 9 MG/ML
INJECTION, SOLUTION INTRAVENOUS CONTINUOUS
Status: DISCONTINUED | OUTPATIENT
Start: 2024-06-10 | End: 2024-06-11

## 2024-06-09 RX ADMIN — CARVEDILOL 12.5 MG: 12.5 TABLET, FILM COATED ORAL at 20:39

## 2024-06-09 RX ADMIN — HYDROCODONE BITARTRATE AND ACETAMINOPHEN 1 TABLET: 5; 325 TABLET ORAL at 09:17

## 2024-06-09 RX ADMIN — ANTACID TABLETS 500 MG: 500 TABLET, CHEWABLE ORAL at 09:16

## 2024-06-09 RX ADMIN — HYDROCODONE BITARTRATE AND ACETAMINOPHEN 1 TABLET: 5; 325 TABLET ORAL at 20:42

## 2024-06-09 RX ADMIN — SODIUM CHLORIDE: 9 INJECTION, SOLUTION INTRAVENOUS at 09:32

## 2024-06-09 RX ADMIN — ERGOCALCIFEROL 50000 UNITS: 1.25 CAPSULE ORAL at 09:22

## 2024-06-09 RX ADMIN — AMLODIPINE BESYLATE 10 MG: 10 TABLET ORAL at 20:39

## 2024-06-09 RX ADMIN — PHYTONADIONE 5 MG: 10 INJECTION, EMULSION INTRAMUSCULAR; INTRAVENOUS; SUBCUTANEOUS at 12:58

## 2024-06-09 RX ADMIN — SODIUM CHLORIDE, PRESERVATIVE FREE 10 ML: 5 INJECTION INTRAVENOUS at 09:16

## 2024-06-09 RX ADMIN — ANTACID TABLETS 500 MG: 500 TABLET, CHEWABLE ORAL at 20:39

## 2024-06-09 RX ADMIN — SODIUM ZIRCONIUM CYCLOSILICATE 10 G: 10 POWDER, FOR SUSPENSION ORAL at 09:22

## 2024-06-09 RX ADMIN — TAMSULOSIN HYDROCHLORIDE 0.4 MG: 0.4 CAPSULE ORAL at 09:22

## 2024-06-09 RX ADMIN — Medication 1 TABLET: at 09:16

## 2024-06-09 RX ADMIN — ATORVASTATIN CALCIUM 80 MG: 40 TABLET, FILM COATED ORAL at 09:16

## 2024-06-09 RX ADMIN — FAMOTIDINE 20 MG: 20 TABLET, FILM COATED ORAL at 09:16

## 2024-06-09 RX ADMIN — CARVEDILOL 12.5 MG: 12.5 TABLET, FILM COATED ORAL at 09:16

## 2024-06-09 ASSESSMENT — PAIN DESCRIPTION - FREQUENCY: FREQUENCY: INTERMITTENT

## 2024-06-09 ASSESSMENT — PAIN SCALES - GENERAL
PAINLEVEL_OUTOF10: 5
PAINLEVEL_OUTOF10: 4
PAINLEVEL_OUTOF10: 0

## 2024-06-09 ASSESSMENT — PAIN - FUNCTIONAL ASSESSMENT: PAIN_FUNCTIONAL_ASSESSMENT: ACTIVITIES ARE NOT PREVENTED

## 2024-06-09 ASSESSMENT — PAIN DESCRIPTION - ORIENTATION
ORIENTATION: RIGHT
ORIENTATION: RIGHT

## 2024-06-09 ASSESSMENT — PAIN DESCRIPTION - PAIN TYPE: TYPE: ACUTE PAIN

## 2024-06-09 ASSESSMENT — PAIN DESCRIPTION - DESCRIPTORS
DESCRIPTORS: ACHING
DESCRIPTORS: ACHING;DISCOMFORT

## 2024-06-09 ASSESSMENT — PAIN DESCRIPTION - LOCATION
LOCATION: LEG
LOCATION: GROIN

## 2024-06-09 NOTE — FLOWSHEET NOTE
Sitting up in bed with visitors in room. Vitals checked. Alert and oriented x 4. Denies pain. No needs at present time. Call light within reach. Bed alarm on for safety.

## 2024-06-09 NOTE — FLOWSHEET NOTE
Awake, resting in bed. Vitals checked and assessment completed. Denies pain currently. States she has been awake since 0400 worrying about having surgery tomorrow. Reassurance given. No needs at present time. Call light within reach. Continue to monitor

## 2024-06-10 ENCOUNTER — ANESTHESIA (OUTPATIENT)
Dept: OPERATING ROOM | Age: 89
DRG: 521 | End: 2024-06-10
Payer: MEDICARE

## 2024-06-10 ENCOUNTER — ANESTHESIA EVENT (OUTPATIENT)
Dept: OPERATING ROOM | Age: 89
DRG: 521 | End: 2024-06-10
Payer: MEDICARE

## 2024-06-10 LAB
ANION GAP SERPL CALCULATED.3IONS-SCNC: 8 MMOL/L (ref 9–17)
BASOPHILS # BLD: <0.03 K/UL (ref 0–0.2)
BASOPHILS NFR BLD: 0 % (ref 0–2)
BUN SERPL-MCNC: 27 MG/DL (ref 8–23)
BUN/CREAT SERPL: 18 (ref 9–20)
CALCIUM SERPL-MCNC: 8 MG/DL (ref 8.6–10.4)
CHLORIDE SERPL-SCNC: 115 MMOL/L (ref 98–107)
CO2 SERPL-SCNC: 20 MMOL/L (ref 20–31)
CREAT SERPL-MCNC: 1.5 MG/DL (ref 0.5–0.9)
EOSINOPHIL # BLD: 0.14 K/UL (ref 0–0.44)
EOSINOPHILS RELATIVE PERCENT: 2 % (ref 1–4)
ERYTHROCYTE [DISTWIDTH] IN BLOOD BY AUTOMATED COUNT: 13.3 % (ref 11.8–14.4)
GFR, ESTIMATED: 33 ML/MIN/1.73M2
GLUCOSE SERPL-MCNC: 104 MG/DL (ref 70–99)
HCT VFR BLD AUTO: 30.9 % (ref 36.3–47.1)
HGB BLD-MCNC: 10 G/DL (ref 11.9–15.1)
IMM GRANULOCYTES # BLD AUTO: 0.04 K/UL (ref 0–0.3)
IMM GRANULOCYTES NFR BLD: 0 %
INR PPP: 1.2
LYMPHOCYTES NFR BLD: 0.91 K/UL (ref 1.1–3.7)
LYMPHOCYTES RELATIVE PERCENT: 10 % (ref 24–43)
MCH RBC QN AUTO: 31.5 PG (ref 25.2–33.5)
MCHC RBC AUTO-ENTMCNC: 32.4 G/DL (ref 28.4–34.8)
MCV RBC AUTO: 97.5 FL (ref 82.6–102.9)
MONOCYTES NFR BLD: 0.81 K/UL (ref 0.1–1.2)
MONOCYTES NFR BLD: 9 % (ref 3–12)
NEUTROPHILS NFR BLD: 79 % (ref 36–65)
NEUTS SEG NFR BLD: 7.41 K/UL (ref 1.5–8.1)
NRBC BLD-RTO: 0 PER 100 WBC
PATH REV BLD -IMP: NORMAL
PLATELET # BLD AUTO: 109 K/UL (ref 138–453)
PMV BLD AUTO: 12.8 FL (ref 8.1–13.5)
POTASSIUM SERPL-SCNC: 3.6 MMOL/L (ref 3.7–5.3)
PROTHROMBIN TIME: 14.7 SEC (ref 11.7–14.1)
RBC # BLD AUTO: 3.17 M/UL (ref 3.95–5.11)
SODIUM SERPL-SCNC: 143 MMOL/L (ref 135–144)
SURGICAL PATHOLOGY REPORT: NORMAL
WBC OTHER # BLD: 9.3 K/UL (ref 3.5–11.3)

## 2024-06-10 PROCEDURE — 6360000002 HC RX W HCPCS

## 2024-06-10 PROCEDURE — C1713 ANCHOR/SCREW BN/BN,TIS/BN: HCPCS | Performed by: ORTHOPAEDIC SURGERY

## 2024-06-10 PROCEDURE — 2700000000 HC OXYGEN THERAPY PER DAY

## 2024-06-10 PROCEDURE — 3700000000 HC ANESTHESIA ATTENDED CARE: Performed by: ORTHOPAEDIC SURGERY

## 2024-06-10 PROCEDURE — 2580000003 HC RX 258: Performed by: ORTHOPAEDIC SURGERY

## 2024-06-10 PROCEDURE — A4216 STERILE WATER/SALINE, 10 ML: HCPCS | Performed by: NURSE ANESTHETIST, CERTIFIED REGISTERED

## 2024-06-10 PROCEDURE — 1200000000 HC SEMI PRIVATE

## 2024-06-10 PROCEDURE — 85610 PROTHROMBIN TIME: CPT

## 2024-06-10 PROCEDURE — 6370000000 HC RX 637 (ALT 250 FOR IP): Performed by: ORTHOPAEDIC SURGERY

## 2024-06-10 PROCEDURE — 6360000002 HC RX W HCPCS: Performed by: ORTHOPAEDIC SURGERY

## 2024-06-10 PROCEDURE — 93005 ELECTROCARDIOGRAM TRACING: CPT | Performed by: PHYSICIAN ASSISTANT

## 2024-06-10 PROCEDURE — 2580000003 HC RX 258

## 2024-06-10 PROCEDURE — 6370000000 HC RX 637 (ALT 250 FOR IP): Performed by: NURSE PRACTITIONER

## 2024-06-10 PROCEDURE — 2500000003 HC RX 250 WO HCPCS

## 2024-06-10 PROCEDURE — 80048 BASIC METABOLIC PNL TOTAL CA: CPT

## 2024-06-10 PROCEDURE — 2580000003 HC RX 258: Performed by: NURSE ANESTHETIST, CERTIFIED REGISTERED

## 2024-06-10 PROCEDURE — 2709999900 HC NON-CHARGEABLE SUPPLY: Performed by: ORTHOPAEDIC SURGERY

## 2024-06-10 PROCEDURE — 85025 COMPLETE CBC W/AUTO DIFF WBC: CPT

## 2024-06-10 PROCEDURE — 94664 DEMO&/EVAL PT USE INHALER: CPT

## 2024-06-10 PROCEDURE — C1776 JOINT DEVICE (IMPLANTABLE): HCPCS | Performed by: ORTHOPAEDIC SURGERY

## 2024-06-10 PROCEDURE — 76942 ECHO GUIDE FOR BIOPSY: CPT

## 2024-06-10 PROCEDURE — 2500000003 HC RX 250 WO HCPCS: Performed by: ORTHOPAEDIC SURGERY

## 2024-06-10 PROCEDURE — 7100000000 HC PACU RECOVERY - FIRST 15 MIN: Performed by: ORTHOPAEDIC SURGERY

## 2024-06-10 PROCEDURE — 3700000001 HC ADD 15 MINUTES (ANESTHESIA): Performed by: ORTHOPAEDIC SURGERY

## 2024-06-10 PROCEDURE — 6360000002 HC RX W HCPCS: Performed by: NURSE ANESTHETIST, CERTIFIED REGISTERED

## 2024-06-10 PROCEDURE — 0SRR0J9 REPLACEMENT OF RIGHT HIP JOINT, FEMORAL SURFACE WITH SYNTHETIC SUBSTITUTE, CEMENTED, OPEN APPROACH: ICD-10-PCS | Performed by: ORTHOPAEDIC SURGERY

## 2024-06-10 PROCEDURE — 36415 COLL VENOUS BLD VENIPUNCTURE: CPT

## 2024-06-10 PROCEDURE — 3600000015 HC SURGERY LEVEL 5 ADDTL 15MIN: Performed by: ORTHOPAEDIC SURGERY

## 2024-06-10 PROCEDURE — 99231 SBSQ HOSP IP/OBS SF/LOW 25: CPT | Performed by: PHYSICIAN ASSISTANT

## 2024-06-10 PROCEDURE — 3600000005 HC SURGERY LEVEL 5 BASE: Performed by: ORTHOPAEDIC SURGERY

## 2024-06-10 PROCEDURE — 2580000003 HC RX 258: Performed by: STUDENT IN AN ORGANIZED HEALTH CARE EDUCATION/TRAINING PROGRAM

## 2024-06-10 PROCEDURE — 7100000001 HC PACU RECOVERY - ADDTL 15 MIN: Performed by: ORTHOPAEDIC SURGERY

## 2024-06-10 PROCEDURE — 94761 N-INVAS EAR/PLS OXIMETRY MLT: CPT

## 2024-06-10 DEVICE — STEM FEM DIA7MM HIP CO CHROM STD OFFSET ECHO FX: Type: IMPLANTABLE DEVICE | Site: HIP | Status: FUNCTIONAL

## 2024-06-10 DEVICE — CEMENT BNE 40GM HI VISC RADPQ FOR REV SURG: Type: IMPLANTABLE DEVICE | Site: HIP | Status: FUNCTIONAL

## 2024-06-10 DEVICE — HEAD UPLR DIA44MM ACET HIP CO CHROM MOLYBDENUM ALLY ENDO II: Type: IMPLANTABLE DEVICE | Site: HIP | Status: FUNCTIONAL

## 2024-06-10 DEVICE — VERSYS DISTAL CENTRALIZER 9MM: Type: IMPLANTABLE DEVICE | Site: HIP | Status: FUNCTIONAL

## 2024-06-10 DEVICE — PLUG BONE DIA12X16MM POLY W/ DISP INSRTR ALLEN: Type: IMPLANTABLE DEVICE | Site: HIP | Status: FUNCTIONAL

## 2024-06-10 DEVICE — INSERT FEM STD NK HIP CO CHROM TAPR ENDO II: Type: IMPLANTABLE DEVICE | Site: HIP | Status: FUNCTIONAL

## 2024-06-10 RX ORDER — LIDOCAINE HYDROCHLORIDE 20 MG/ML
INJECTION, SOLUTION EPIDURAL; INFILTRATION; INTRACAUDAL; PERINEURAL PRN
Status: DISCONTINUED | OUTPATIENT
Start: 2024-06-10 | End: 2024-06-10 | Stop reason: SDUPTHER

## 2024-06-10 RX ORDER — PROPOFOL 10 MG/ML
INJECTION, EMULSION INTRAVENOUS PRN
Status: DISCONTINUED | OUTPATIENT
Start: 2024-06-10 | End: 2024-06-10 | Stop reason: SDUPTHER

## 2024-06-10 RX ORDER — MORPHINE SULFATE 2 MG/ML
2 INJECTION, SOLUTION INTRAMUSCULAR; INTRAVENOUS EVERY 5 MIN PRN
Status: DISCONTINUED | OUTPATIENT
Start: 2024-06-10 | End: 2024-06-10 | Stop reason: HOSPADM

## 2024-06-10 RX ORDER — SODIUM CHLORIDE 9 MG/ML
INJECTION, SOLUTION INTRAVENOUS PRN
Status: DISCONTINUED | OUTPATIENT
Start: 2024-06-10 | End: 2024-06-10 | Stop reason: HOSPADM

## 2024-06-10 RX ORDER — BUPIVACAINE/KETOROLAC/KETAMINE 150-60/50
SYRINGE (ML) INJECTION PRN
Status: DISCONTINUED | OUTPATIENT
Start: 2024-06-10 | End: 2024-06-10 | Stop reason: ALTCHOICE

## 2024-06-10 RX ORDER — SODIUM CHLORIDE 0.9 % (FLUSH) 0.9 %
5-40 SYRINGE (ML) INJECTION PRN
Status: DISCONTINUED | OUTPATIENT
Start: 2024-06-10 | End: 2024-06-13 | Stop reason: HOSPADM

## 2024-06-10 RX ORDER — ENOXAPARIN SODIUM 100 MG/ML
30 INJECTION SUBCUTANEOUS 2 TIMES DAILY
Status: DISCONTINUED | OUTPATIENT
Start: 2024-06-11 | End: 2024-06-10

## 2024-06-10 RX ORDER — FAMOTIDINE 10 MG
10 TABLET ORAL DAILY
Status: DISCONTINUED | OUTPATIENT
Start: 2024-06-11 | End: 2024-06-13 | Stop reason: HOSPADM

## 2024-06-10 RX ORDER — SODIUM CHLORIDE 9 MG/ML
INJECTION, SOLUTION INTRAMUSCULAR; INTRAVENOUS; SUBCUTANEOUS PRN
Status: DISCONTINUED | OUTPATIENT
Start: 2024-06-10 | End: 2024-06-10 | Stop reason: SDUPTHER

## 2024-06-10 RX ORDER — ACETAMINOPHEN 325 MG/1
650 TABLET ORAL EVERY 6 HOURS
Status: DISCONTINUED | OUTPATIENT
Start: 2024-06-10 | End: 2024-06-13 | Stop reason: HOSPADM

## 2024-06-10 RX ORDER — SODIUM CHLORIDE 0.9 % (FLUSH) 0.9 %
5-40 SYRINGE (ML) INJECTION PRN
Status: DISCONTINUED | OUTPATIENT
Start: 2024-06-10 | End: 2024-06-10 | Stop reason: HOSPADM

## 2024-06-10 RX ORDER — SODIUM CHLORIDE 0.9 % (FLUSH) 0.9 %
5-40 SYRINGE (ML) INJECTION EVERY 12 HOURS SCHEDULED
Status: DISCONTINUED | OUTPATIENT
Start: 2024-06-10 | End: 2024-06-13 | Stop reason: HOSPADM

## 2024-06-10 RX ORDER — SODIUM CHLORIDE 9 MG/ML
INJECTION, SOLUTION INTRAVENOUS PRN
Status: DISCONTINUED | OUTPATIENT
Start: 2024-06-10 | End: 2024-06-13 | Stop reason: HOSPADM

## 2024-06-10 RX ORDER — NALOXONE HYDROCHLORIDE 0.4 MG/ML
INJECTION, SOLUTION INTRAMUSCULAR; INTRAVENOUS; SUBCUTANEOUS PRN
Status: DISCONTINUED | OUTPATIENT
Start: 2024-06-10 | End: 2024-06-10 | Stop reason: HOSPADM

## 2024-06-10 RX ORDER — CEFAZOLIN SODIUM IN 0.9 % NACL 2 G/100 ML
2000 PLASTIC BAG, INJECTION (ML) INTRAVENOUS ONCE
Status: COMPLETED | OUTPATIENT
Start: 2024-06-10 | End: 2024-06-10

## 2024-06-10 RX ORDER — HEPARIN SODIUM 5000 [USP'U]/ML
5000 INJECTION, SOLUTION INTRAVENOUS; SUBCUTANEOUS 2 TIMES DAILY
Status: DISCONTINUED | OUTPATIENT
Start: 2024-06-11 | End: 2024-06-11

## 2024-06-10 RX ORDER — FENTANYL CITRATE 50 UG/ML
INJECTION, SOLUTION INTRAMUSCULAR; INTRAVENOUS PRN
Status: DISCONTINUED | OUTPATIENT
Start: 2024-06-10 | End: 2024-06-10 | Stop reason: SDUPTHER

## 2024-06-10 RX ORDER — BUPIVACAINE/KETOROLAC/KETAMINE 150-60/50
SYRINGE (ML) INJECTION
Status: DISPENSED
Start: 2024-06-10 | End: 2024-06-10

## 2024-06-10 RX ORDER — SODIUM CHLORIDE, SODIUM LACTATE, POTASSIUM CHLORIDE, CALCIUM CHLORIDE 600; 310; 30; 20 MG/100ML; MG/100ML; MG/100ML; MG/100ML
INJECTION, SOLUTION INTRAVENOUS CONTINUOUS PRN
Status: DISCONTINUED | OUTPATIENT
Start: 2024-06-10 | End: 2024-06-10 | Stop reason: SDUPTHER

## 2024-06-10 RX ORDER — DEXAMETHASONE SODIUM PHOSPHATE 4 MG/ML
INJECTION, SOLUTION INTRA-ARTICULAR; INTRALESIONAL; INTRAMUSCULAR; INTRAVENOUS; SOFT TISSUE PRN
Status: DISCONTINUED | OUTPATIENT
Start: 2024-06-10 | End: 2024-06-10 | Stop reason: SDUPTHER

## 2024-06-10 RX ORDER — ROPIVACAINE HYDROCHLORIDE 5 MG/ML
INJECTION, SOLUTION EPIDURAL; INFILTRATION; PERINEURAL PRN
Status: DISCONTINUED | OUTPATIENT
Start: 2024-06-10 | End: 2024-06-10 | Stop reason: SDUPTHER

## 2024-06-10 RX ORDER — ONDANSETRON 2 MG/ML
INJECTION INTRAMUSCULAR; INTRAVENOUS PRN
Status: DISCONTINUED | OUTPATIENT
Start: 2024-06-10 | End: 2024-06-10 | Stop reason: SDUPTHER

## 2024-06-10 RX ORDER — SODIUM CHLORIDE 0.9 % (FLUSH) 0.9 %
5-40 SYRINGE (ML) INJECTION EVERY 12 HOURS SCHEDULED
Status: DISCONTINUED | OUTPATIENT
Start: 2024-06-10 | End: 2024-06-10 | Stop reason: HOSPADM

## 2024-06-10 RX ORDER — MORPHINE SULFATE 2 MG/ML
1 INJECTION, SOLUTION INTRAMUSCULAR; INTRAVENOUS EVERY 5 MIN PRN
Status: DISCONTINUED | OUTPATIENT
Start: 2024-06-10 | End: 2024-06-10 | Stop reason: HOSPADM

## 2024-06-10 RX ORDER — SODIUM CHLORIDE, SODIUM LACTATE, POTASSIUM CHLORIDE, CALCIUM CHLORIDE 600; 310; 30; 20 MG/100ML; MG/100ML; MG/100ML; MG/100ML
INJECTION, SOLUTION INTRAVENOUS CONTINUOUS
Status: DISCONTINUED | OUTPATIENT
Start: 2024-06-10 | End: 2024-06-11

## 2024-06-10 RX ORDER — CEFAZOLIN SODIUM IN 0.9 % NACL 2 G/100 ML
2000 PLASTIC BAG, INJECTION (ML) INTRAVENOUS EVERY 12 HOURS
Status: COMPLETED | OUTPATIENT
Start: 2024-06-11 | End: 2024-06-11

## 2024-06-10 RX ORDER — DEXAMETHASONE SODIUM PHOSPHATE 10 MG/ML
INJECTION, SOLUTION INTRAMUSCULAR; INTRAVENOUS PRN
Status: DISCONTINUED | OUTPATIENT
Start: 2024-06-10 | End: 2024-06-10 | Stop reason: SDUPTHER

## 2024-06-10 RX ADMIN — ONDANSETRON 4 MG: 2 INJECTION INTRAMUSCULAR; INTRAVENOUS at 13:30

## 2024-06-10 RX ADMIN — DEXAMETHASONE SODIUM PHOSPHATE 4 MG: 4 INJECTION INTRA-ARTICULAR; INTRALESIONAL; INTRAMUSCULAR; INTRAVENOUS; SOFT TISSUE at 12:04

## 2024-06-10 RX ADMIN — SODIUM CHLORIDE, POTASSIUM CHLORIDE, SODIUM LACTATE AND CALCIUM CHLORIDE: 600; 310; 30; 20 INJECTION, SOLUTION INTRAVENOUS at 15:52

## 2024-06-10 RX ADMIN — HYDROCODONE BITARTRATE AND ACETAMINOPHEN 1 TABLET: 5; 325 TABLET ORAL at 17:18

## 2024-06-10 RX ADMIN — DEXAMETHASONE SODIUM PHOSPHATE 10 MG: 10 INJECTION, SOLUTION INTRAMUSCULAR; INTRAVENOUS at 11:31

## 2024-06-10 RX ADMIN — PROPOFOL 70 MG: 10 INJECTION, EMULSION INTRAVENOUS at 12:04

## 2024-06-10 RX ADMIN — FENTANYL CITRATE 25 MCG: 50 INJECTION INTRAMUSCULAR; INTRAVENOUS at 12:38

## 2024-06-10 RX ADMIN — Medication 2000 MG: at 12:04

## 2024-06-10 RX ADMIN — SODIUM CHLORIDE: 9 INJECTION, SOLUTION INTRAVENOUS at 01:12

## 2024-06-10 RX ADMIN — Medication 2000 MG: at 23:46

## 2024-06-10 RX ADMIN — FENTANYL CITRATE 25 MCG: 50 INJECTION INTRAMUSCULAR; INTRAVENOUS at 12:32

## 2024-06-10 RX ADMIN — HYDROCODONE BITARTRATE AND ACETAMINOPHEN 1 TABLET: 5; 325 TABLET ORAL at 23:52

## 2024-06-10 RX ADMIN — CARVEDILOL 12.5 MG: 12.5 TABLET, FILM COATED ORAL at 20:58

## 2024-06-10 RX ADMIN — SODIUM CHLORIDE 20 ML: 9 INJECTION, SOLUTION INTRAMUSCULAR; INTRAVENOUS; SUBCUTANEOUS at 11:31

## 2024-06-10 RX ADMIN — SODIUM CHLORIDE, POTASSIUM CHLORIDE, SODIUM LACTATE AND CALCIUM CHLORIDE: 600; 310; 30; 20 INJECTION, SOLUTION INTRAVENOUS at 11:56

## 2024-06-10 RX ADMIN — MORPHINE SULFATE 1 MG: 2 INJECTION, SOLUTION INTRAMUSCULAR; INTRAVENOUS at 14:23

## 2024-06-10 RX ADMIN — ANTACID TABLETS 500 MG: 500 TABLET, CHEWABLE ORAL at 20:57

## 2024-06-10 RX ADMIN — HYDROCODONE BITARTRATE AND ACETAMINOPHEN 1 TABLET: 5; 325 TABLET ORAL at 04:17

## 2024-06-10 RX ADMIN — ROPIVACAINE HYDROCHLORIDE 20 ML: 5 INJECTION EPIDURAL; INFILTRATION; PERINEURAL at 11:31

## 2024-06-10 RX ADMIN — LIDOCAINE HYDROCHLORIDE 50 MG: 20 INJECTION, SOLUTION EPIDURAL; INFILTRATION; INTRACAUDAL; PERINEURAL at 12:04

## 2024-06-10 RX ADMIN — AMLODIPINE BESYLATE 10 MG: 10 TABLET ORAL at 20:58

## 2024-06-10 RX ADMIN — ACETAMINOPHEN 650 MG: 325 TABLET ORAL at 20:57

## 2024-06-10 RX ADMIN — ACETAMINOPHEN 650 MG: 325 TABLET ORAL at 17:18

## 2024-06-10 RX ADMIN — PROPOFOL 30 MG: 10 INJECTION, EMULSION INTRAVENOUS at 12:15

## 2024-06-10 ASSESSMENT — PAIN - FUNCTIONAL ASSESSMENT
PAIN_FUNCTIONAL_ASSESSMENT: ACTIVITIES ARE NOT PREVENTED
PAIN_FUNCTIONAL_ASSESSMENT: 0-10
PAIN_FUNCTIONAL_ASSESSMENT: PREVENTS OR INTERFERES SOME ACTIVE ACTIVITIES AND ADLS
PAIN_FUNCTIONAL_ASSESSMENT: PREVENTS OR INTERFERES SOME ACTIVE ACTIVITIES AND ADLS
PAIN_FUNCTIONAL_ASSESSMENT: ACTIVITIES ARE NOT PREVENTED

## 2024-06-10 ASSESSMENT — PAIN DESCRIPTION - PAIN TYPE
TYPE: ACUTE PAIN;SURGICAL PAIN
TYPE: SURGICAL PAIN
TYPE: ACUTE PAIN;SURGICAL PAIN

## 2024-06-10 ASSESSMENT — PAIN DESCRIPTION - DESCRIPTORS
DESCRIPTORS: SHARP;SORE
DESCRIPTORS: ACHING
DESCRIPTORS: ACHING
DESCRIPTORS: PATIENT UNABLE TO DESCRIBE
DESCRIPTORS: ACHING
DESCRIPTORS: ACHING

## 2024-06-10 ASSESSMENT — PAIN SCALES - GENERAL
PAINLEVEL_OUTOF10: 8
PAINLEVEL_OUTOF10: 0
PAINLEVEL_OUTOF10: 7
PAINLEVEL_OUTOF10: 6
PAINLEVEL_OUTOF10: 5
PAINLEVEL_OUTOF10: 0

## 2024-06-10 ASSESSMENT — PAIN DESCRIPTION - ONSET
ONSET: ON-GOING
ONSET: ON-GOING

## 2024-06-10 ASSESSMENT — PAIN DESCRIPTION - ORIENTATION
ORIENTATION: RIGHT

## 2024-06-10 ASSESSMENT — COPD QUESTIONNAIRES: CAT_SEVERITY: MILD

## 2024-06-10 ASSESSMENT — PAIN DESCRIPTION - LOCATION
LOCATION: GROIN

## 2024-06-10 ASSESSMENT — PAIN DESCRIPTION - FREQUENCY
FREQUENCY: CONTINUOUS
FREQUENCY: CONTINUOUS

## 2024-06-10 NOTE — ANESTHESIA PRE PROCEDURE
normal.  Aortic Valve Thickened cusp. Calcified cusp. Sclerosis of the aortic valve cusp. Mild to moderate regurgitation. No stenosis.  Mitral Valve Valve structure is normal. No regurgitation. No stenosis noted.  Tricuspid Valve Valve structure is normal. Mild regurgitation. No stenosis noted. The estimated RVSP is 26 mmHg.  Pulmonic Valve Valve structure is normal. Mild to moderate regurgitation. No stenosis noted.  Aorta Normal sized aortic root.  Pericardium Moderate (1-2 cm) pericardial effusion present. No indication of cardiac tamponade. Evidence includes normal LV size, no IVC dilation and no chamber collapse.         Neuro/Psych:   Negative Neuro/Psych ROS              GI/Hepatic/Renal:   (+) renal disease (stage 3): CRI, bowel prep          Endo/Other:    (+) : arthritis: OA., malignancy/cancer (colon CA).                 Abdominal:             Vascular: negative vascular ROS.          ROS comment: Bilateral DVT LE, filter placed. Other Findings:             Anesthesia Plan      general     ASA 3       Induction: intravenous.      Anesthetic plan and risks discussed with patient.    Use of blood products discussed with patient whom consented to blood products.    Plan discussed with CRNA.          Post-op pain plan if not by surgeon: single peripheral nerve block            GARRY Delgado - ELIZABETH   6/10/2024

## 2024-06-10 NOTE — ANESTHESIA PROCEDURE NOTES
Peripheral Block    Patient location during procedure: pre-op  Reason for block: post-op pain management and at surgeon's request  Start time: 6/10/2024 11:26 AM  End time: 6/10/2024 11:31 AM  Staffing  Performed: resident/CRNA   Resident/CRNA: Yair Martin APRN - CRNA  Performed by: Yair Martin APRN - CRNA  Authorized by: Yair Martin APRN - CRNA    Preanesthetic Checklist  Completed: patient identified, IV checked, site marked, risks and benefits discussed, surgical/procedural consents, equipment checked, pre-op evaluation, timeout performed, anesthesia consent given, oxygen available and monitors applied/VS acknowledged  Peripheral Block   Patient position: supine  Prep: ChloraPrep  Provider prep: sterile gloves and mask  Patient monitoring: continuous pulse ox and cardiac monitor  Block type: Fascia iliaca  Laterality: right  Injection technique: single-shot  Guidance: ultrasound guided  Local infiltration: decadron and ropivacaine  Infiltration strength: 0.5 %  Local infiltration: decadron and ropivacaine  Dose: 20 mL    Needle   Needle type: Other   Needle gauge: 22 G  Needle localization: ultrasound guidance  Needle length: 5 cmOther needle type: pajunk  Assessment   Injection assessment: negative aspiration for heme, no paresthesia on injection and local visualized surrounding nerve on ultrasound  Paresthesia pain: none  Slow fractionated injection: yes  Hemodynamics: stable  Outcomes: uncomplicated and patient tolerated procedure well    Additional Notes  0.5% Ropivacaine 20ml, PFNS 20ml, PF Decadron 10mg

## 2024-06-10 NOTE — BRIEF OP NOTE
Brief Postoperative Note      Patient: Ayde Landis  YOB: 1935  MRN: [id number]    Date of Procedure: 6/10/2024    Pre-Op Diagnosis Codes:     * Hip fx, right, closed, initial encounter ([facility]) [S72.001A]    Post-Op Diagnosis: Same       Procedure(s):  HIP HEMIARTHROPLASTY    Surgeon(s):  Colt Becerra MD    Assistant:  First Assistant: Colt Alejandro RN    Anesthesia: Choice    Estimated Blood Loss (mL): 200     Complications: None    Specimens:   * No specimens in log *    Implants:  Implant Name Type Inv. Item Serial No.  Lot No. LRB No. Used Action   CEMENT BNE 40GM HI VISC RADPQ FOR REV SURG - [id number]  CEMENT BNE 40GM HI VISC RADPQ FOR REV SURG  [employer]- [id number] Right 2 Implanted   PLUG BONE [id number] POLY W/ DISP INSRTR KHADRA - [id number]  PLUG BONE [id number] POLY W/ DISP INSRTR KHADRA  [employer]- [id number] Right 1 Implanted   STEM FEM DIA7MM HIP CO CHROM STD OFFSET ECHO FX - [id number]  STEM FEM DIA7MM HIP CO CHROM STD OFFSET ECHO FX  [employer]- [id number] Right 1 Implanted   VERSYS DISTAL CENTRALIZER 9MM - [id number]  VERSYS DISTAL CENTRALIZER 9MM  [employer]- [id number] Right 1 Implanted   INSERT FEM STD NK HIP CO CHROM TAPR ENDO II - [id number]  INSERT FEM STD NK HIP CO CHROM TAPR ENDO II  [employer]- [id number] Right 1 Implanted   HEAD UPLR [id number] ACET HIP CO CHROM MOLYBDENUM ALLY ENDO II - [id number]  HEAD UPLR [id number] ACET HIP CO CHROM MOLYBDENUM ALLY ENDO II  [employer]- [id number] Right 1 Implanted         Drains:   Urinary Catheter 06/06/24 Humphrey (Active)   Catheter Indications Prolonged immobilization (e.g. unstable thoracic or lumbar spine, multiple traumatic injuries such as pelvic fractures) 06/09/24 1845   Site Assessment Pink 06/09/24 1845   Urine Color Yellow 06/10/24 1036   Urine Appearance Hazy 06/10/24 0418   Collection Container Standard 06/09/24 1845

## 2024-06-10 NOTE — ANESTHESIA POSTPROCEDURE EVALUATION
Department of Anesthesiology  Postprocedure Note    Patient: Ayde Ladnis  MRN: [id number]  YOB: 1935  Date of evaluation: 6/10/2024    Procedure Summary       Date: 06/10/24 Room / Location: [address]    Anesthesia Start: 1156 Anesthesia Stop: 1400    Procedure: HIP HEMIARTHROPLASTY (Right: Hip) Diagnosis:       Hip fx, right, closed, initial encounter (HCC)      (Hip fx, right, closed, initial encounter ([facility]) [S72.001A])    Surgeons: Colt Becerra MD Responsible Provider: Carmina Shelby APRN - CRNA    Anesthesia Type: general ASA Status: 3            Anesthesia Type: No value filed.    Moon Phase I: Moon Score: 10    Moon Phase II:      Anesthesia Post Evaluation    Patient location during evaluation: PACU  Patient participation: complete - patient participated  Level of consciousness: awake  Pain score: 6  Airway patency: patent  Nausea & Vomiting: no vomiting and no nausea  Cardiovascular status: blood pressure returned to baseline and hemodynamically stable  Respiratory status: acceptable and spontaneous ventilation  Hydration status: stable  Multimodal analgesia pain management approach        No notable events documented.

## 2024-06-11 PROBLEM — D62 ACUTE BLOOD LOSS ANEMIA: Status: ACTIVE | Noted: 2024-06-11

## 2024-06-11 LAB
ANION GAP SERPL CALCULATED.3IONS-SCNC: 10 MMOL/L (ref 9–17)
BASOPHILS # BLD: <0.03 K/UL (ref 0–0.2)
BASOPHILS NFR BLD: 0 % (ref 0–2)
BUN SERPL-MCNC: 30 MG/DL (ref 8–23)
BUN/CREAT SERPL: 17 (ref 9–20)
CALCIUM SERPL-MCNC: 8.1 MG/DL (ref 8.6–10.4)
CHLORIDE SERPL-SCNC: 110 MMOL/L (ref 98–107)
CO2 SERPL-SCNC: 19 MMOL/L (ref 20–31)
CREAT SERPL-MCNC: 1.8 MG/DL (ref 0.5–0.9)
EKG ATRIAL RATE: 84 BPM
EKG P AXIS: 2 DEGREES
EKG P-R INTERVAL: 126 MS
EKG Q-T INTERVAL: 362 MS
EKG QRS DURATION: 72 MS
EKG QTC CALCULATION (BAZETT): 427 MS
EKG R AXIS: -34 DEGREES
EKG T AXIS: -19 DEGREES
EKG VENTRICULAR RATE: 84 BPM
EOSINOPHIL # BLD: <0.03 K/UL (ref 0–0.44)
EOSINOPHILS RELATIVE PERCENT: 0 % (ref 1–4)
ERYTHROCYTE [DISTWIDTH] IN BLOOD BY AUTOMATED COUNT: 13.7 % (ref 11.8–14.4)
GFR, ESTIMATED: 27 ML/MIN/1.73M2
GLUCOSE SERPL-MCNC: 167 MG/DL (ref 70–99)
HCT VFR BLD AUTO: 29 % (ref 36.3–47.1)
IMM GRANULOCYTES # BLD AUTO: 0.06 K/UL (ref 0–0.3)
IMM GRANULOCYTES NFR BLD: 1 %
INR PPP: 1.1
LYMPHOCYTES NFR BLD: 0.51 K/UL (ref 1.1–3.7)
LYMPHOCYTES RELATIVE PERCENT: 5 % (ref 24–43)
MCH RBC QN AUTO: 31.7 PG (ref 25.2–33.5)
MCHC RBC AUTO-ENTMCNC: 32.1 G/DL (ref 28.4–34.8)
MCV RBC AUTO: 99 FL (ref 82.6–102.9)
MONOCYTES NFR BLD: 0.62 K/UL (ref 0.1–1.2)
MONOCYTES NFR BLD: 6 % (ref 3–12)
NEUTROPHILS NFR BLD: 88 % (ref 36–65)
NEUTS SEG NFR BLD: 9.84 K/UL (ref 1.5–8.1)
NRBC BLD-RTO: 0 PER 100 WBC
PLATELET # BLD AUTO: 106 K/UL (ref 138–453)
PMV BLD AUTO: 12.5 FL (ref 8.1–13.5)
POTASSIUM SERPL-SCNC: 4.4 MMOL/L (ref 3.7–5.3)
PROTHROMBIN TIME: 13.7 SEC (ref 11.7–14.1)
RBC # BLD AUTO: 2.93 M/UL (ref 3.95–5.11)
SODIUM SERPL-SCNC: 139 MMOL/L (ref 135–144)
WBC OTHER # BLD: 11.1 K/UL (ref 3.5–11.3)

## 2024-06-11 PROCEDURE — 97162 PT EVAL MOD COMPLEX 30 MIN: CPT

## 2024-06-11 PROCEDURE — 2580000003 HC RX 258: Performed by: ORTHOPAEDIC SURGERY

## 2024-06-11 PROCEDURE — 97166 OT EVAL MOD COMPLEX 45 MIN: CPT

## 2024-06-11 PROCEDURE — 80048 BASIC METABOLIC PNL TOTAL CA: CPT

## 2024-06-11 PROCEDURE — 97110 THERAPEUTIC EXERCISES: CPT

## 2024-06-11 PROCEDURE — 36415 COLL VENOUS BLD VENIPUNCTURE: CPT

## 2024-06-11 PROCEDURE — 2580000003 HC RX 258: Performed by: NURSE PRACTITIONER

## 2024-06-11 PROCEDURE — 51798 US URINE CAPACITY MEASURE: CPT

## 2024-06-11 PROCEDURE — 6370000000 HC RX 637 (ALT 250 FOR IP): Performed by: ORTHOPAEDIC SURGERY

## 2024-06-11 PROCEDURE — 97530 THERAPEUTIC ACTIVITIES: CPT

## 2024-06-11 PROCEDURE — 94761 N-INVAS EAR/PLS OXIMETRY MLT: CPT

## 2024-06-11 PROCEDURE — 85025 COMPLETE CBC W/AUTO DIFF WBC: CPT

## 2024-06-11 PROCEDURE — 85610 PROTHROMBIN TIME: CPT

## 2024-06-11 PROCEDURE — 1200000000 HC SEMI PRIVATE

## 2024-06-11 PROCEDURE — 6370000000 HC RX 637 (ALT 250 FOR IP): Performed by: NURSE PRACTITIONER

## 2024-06-11 PROCEDURE — 6360000002 HC RX W HCPCS: Performed by: ORTHOPAEDIC SURGERY

## 2024-06-11 RX ORDER — RIVAROXABAN 10 MG/1
10 TABLET, FILM COATED ORAL
Qty: 10 TABLET | Refills: 0 | Status: SHIPPED | OUTPATIENT
Start: 2024-06-11 | End: 2024-06-13

## 2024-06-11 RX ORDER — HYDROCODONE BITARTRATE AND ACETAMINOPHEN 5; 325 MG/1; MG/1
1 TABLET ORAL
Qty: 30 TABLET | Refills: 0 | Status: SHIPPED | OUTPATIENT
Start: 2024-06-11 | End: 2024-06-18

## 2024-06-11 RX ORDER — WARFARIN SODIUM 3 MG/1
3 TABLET ORAL
Status: COMPLETED | OUTPATIENT
Start: 2024-06-11 | End: 2024-06-11

## 2024-06-11 RX ORDER — SODIUM CHLORIDE 9 MG/ML
INJECTION, SOLUTION INTRAVENOUS CONTINUOUS
Status: DISCONTINUED | OUTPATIENT
Start: 2024-06-11 | End: 2024-06-13 | Stop reason: HOSPADM

## 2024-06-11 RX ORDER — 0.9 % SODIUM CHLORIDE 0.9 %
500 INTRAVENOUS SOLUTION INTRAVENOUS ONCE
Status: COMPLETED | OUTPATIENT
Start: 2024-06-11 | End: 2024-06-11

## 2024-06-11 RX ADMIN — SODIUM CHLORIDE: 9 INJECTION, SOLUTION INTRAVENOUS at 14:21

## 2024-06-11 RX ADMIN — AMLODIPINE BESYLATE 10 MG: 10 TABLET ORAL at 20:22

## 2024-06-11 RX ADMIN — WARFARIN SODIUM 3 MG: 3 TABLET ORAL at 10:05

## 2024-06-11 RX ADMIN — SODIUM CHLORIDE 500 ML: 9 INJECTION, SOLUTION INTRAVENOUS at 11:06

## 2024-06-11 RX ADMIN — Medication 2000 MG: at 13:06

## 2024-06-11 RX ADMIN — HYDROCODONE BITARTRATE AND ACETAMINOPHEN 1 TABLET: 5; 325 TABLET ORAL at 23:40

## 2024-06-11 RX ADMIN — SODIUM ZIRCONIUM CYCLOSILICATE 10 G: 10 POWDER, FOR SUSPENSION ORAL at 08:55

## 2024-06-11 RX ADMIN — ATORVASTATIN CALCIUM 80 MG: 40 TABLET, FILM COATED ORAL at 08:49

## 2024-06-11 RX ADMIN — CARVEDILOL 12.5 MG: 12.5 TABLET, FILM COATED ORAL at 20:22

## 2024-06-11 RX ADMIN — FAMOTIDINE 10 MG: 10 TABLET ORAL at 08:50

## 2024-06-11 RX ADMIN — TIZANIDINE 2 MG: 4 TABLET ORAL at 23:45

## 2024-06-11 RX ADMIN — ACETAMINOPHEN 650 MG: 325 TABLET ORAL at 20:23

## 2024-06-11 RX ADMIN — ANTACID TABLETS 500 MG: 500 TABLET, CHEWABLE ORAL at 20:22

## 2024-06-11 RX ADMIN — CARVEDILOL 12.5 MG: 12.5 TABLET, FILM COATED ORAL at 08:49

## 2024-06-11 RX ADMIN — ANTACID TABLETS 500 MG: 500 TABLET, CHEWABLE ORAL at 08:50

## 2024-06-11 RX ADMIN — TAMSULOSIN HYDROCHLORIDE 0.4 MG: 0.4 CAPSULE ORAL at 08:49

## 2024-06-11 RX ADMIN — HYDROCODONE BITARTRATE AND ACETAMINOPHEN 1 TABLET: 5; 325 TABLET ORAL at 06:52

## 2024-06-11 RX ADMIN — HYDROCODONE BITARTRATE AND ACETAMINOPHEN 1 TABLET: 5; 325 TABLET ORAL at 13:34

## 2024-06-11 RX ADMIN — Medication 1 TABLET: at 08:49

## 2024-06-11 RX ADMIN — SODIUM CHLORIDE, PRESERVATIVE FREE 10 ML: 5 INJECTION INTRAVENOUS at 08:47

## 2024-06-11 RX ADMIN — ACETAMINOPHEN 650 MG: 325 TABLET ORAL at 04:10

## 2024-06-11 RX ADMIN — HEPARIN SODIUM 5000 UNITS: 5000 INJECTION INTRAVENOUS; SUBCUTANEOUS at 08:49

## 2024-06-11 ASSESSMENT — PAIN DESCRIPTION - ORIENTATION
ORIENTATION: RIGHT

## 2024-06-11 ASSESSMENT — PAIN SCALES - GENERAL
PAINLEVEL_OUTOF10: 0
PAINLEVEL_OUTOF10: 0
PAINLEVEL_OUTOF10: 7
PAINLEVEL_OUTOF10: 8
PAINLEVEL_OUTOF10: 0
PAINLEVEL_OUTOF10: 0
PAINLEVEL_OUTOF10: 8

## 2024-06-11 ASSESSMENT — PAIN - FUNCTIONAL ASSESSMENT
PAIN_FUNCTIONAL_ASSESSMENT: PREVENTS OR INTERFERES SOME ACTIVE ACTIVITIES AND ADLS
PAIN_FUNCTIONAL_ASSESSMENT: ACTIVITIES ARE NOT PREVENTED

## 2024-06-11 ASSESSMENT — PAIN DESCRIPTION - PAIN TYPE
TYPE: SURGICAL PAIN
TYPE: ACUTE PAIN;SURGICAL PAIN

## 2024-06-11 ASSESSMENT — PAIN DESCRIPTION - ONSET
ONSET: GRADUAL
ONSET: ON-GOING

## 2024-06-11 ASSESSMENT — PAIN DESCRIPTION - LOCATION
LOCATION: HIP
LOCATION: LEG
LOCATION: GROIN

## 2024-06-11 ASSESSMENT — PAIN DESCRIPTION - DESCRIPTORS
DESCRIPTORS: ACHING;DISCOMFORT
DESCRIPTORS: ACHING;SHARP
DESCRIPTORS: ACHING

## 2024-06-11 ASSESSMENT — PAIN DESCRIPTION - FREQUENCY
FREQUENCY: CONTINUOUS
FREQUENCY: INTERMITTENT

## 2024-06-11 NOTE — DISCHARGE SUMMARY
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    Colon polyp 2006, 2009    Compression fracture of lumbar vertebra ([facility]) / prednisone related to PMR 07/15/2015    Diarrhea     Drug-induced hyperkalemia -- from Losartan 2023    Drug-induced hyperkalemia from Lisinopril 07/27/2018    DVT (deep venous thrombosis) ([facility]) 2015    Emphysema     Essential hypertension     Hyperlipidemia     Irritable bowel syndrome     Lumbago     Osteoarthritis     Polymyalgia rheumatica ([facility]) 2015    Rheumatism     Seborrheic keratoses     on back    Urinary frequency 06/06/2024       Hospital Course: The patient was admitted for the above.  She was treated with surgery and improved over the course of her hospitalization.      Consults: none    Procedures: right Hip total replacement    Complications: none    Discharge Condition: good    Post op anemia:  acute blood loss    Lab Results   Component Value Date/Time    HGB 9.3 06/11/2024 05:35 AM    HGB 10.0 06/10/2024 05:32 AM    HGB 11.4 06/09/2024 05:38 AM    HGB 10.7 06/08/2024 05:33 AM    HGB 11.7 06/07/2024 06:47 AM       Estimate Blood Loss:       BMI: Body mass index is 22.25 kg/m².  Normal weight      Disposition: Nursing Home    Patient Instructions:      Medication List        START taking these medications      HYDROcodone-acetaminophen 5-325 MG per tablet  Commonly known as: Norco  Take 1 tablet by mouth every 4-6 hours as needed for Pain for up to 7 days. Max Daily Amount: 6 tablets

## 2024-06-11 NOTE — ACP (ADVANCE CARE PLANNING)
Advance Care Planning     Palliative Team Advance Care Planning (ACP) Conversation    Date of Conversation: 06/11/24    Individuals present for the conversation: Patient with decision making capacity     ACP documents on file prior to discussion:  -Power of  for Healthcare  -Living Will  -Portable DNR        Healthcare Decision Maker:    Primary Decision Maker: Manan Landis - Child - [phone]     Conversation Summary:  She confirmed decision maker and code status as DNR-CCA    Resuscitation Status:   Code Status: DNR-CCA     Documentation Completed:  -No new documents completed.    I spent 15 minutes with the patient and/or surrogate decision maker discussing the patient's wishes and goals.      Aaliyah Burch RN

## 2024-06-11 NOTE — OP NOTE
[address] [address]                            OPERATIVE REPORT      PATIENT NAME: ZEB LARSON                 : 1935  MED REC NO: [id number]                          ROOM: 0311  ACCOUNT NO: [id number]                       ADMIT DATE: 2024  PROVIDER: Colt Becerra MD      DATE OF PROCEDURE:  06/10/2024    SURGEON:  Colt Becerra MD    PREOPERATIVE DIAGNOSIS:  Stress fracture, right hip.    POSTOPERATIVE DIAGNOSIS:  Stress fracture, right hip.    PROCEDURE:  Endoprosthetic replacement, right hip.    ANESTHESIA:  Spinal.    DESCRIPTION OF PROCEDURE:  Patient was brought in the operating room, placed in supine position on operating table.  Spinal anesthetic was administered.  The patient received Ancef, was placed into a left lateral decubitus position.  The right hip girdle was prepped with ChloraPrep, draped in sterile fashion.  A 10 cm posterolateral skin incision was made.  This was taken down through the skin and subcutaneous tissue.  The deep fascia was split, and the gluteal muscle was split.  External rotators were taken off the greater trochanter posteriorly.  The femoral neck was then identified.  Stress fracture was noted.  The head of the neck was then osteotomized, and the head and neck were removed.  This was measured for a 44 mm Endo head using the Mindy Echo system.  The fovea was cleared.  The canal was reamed.  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  It was rasped up to a size 9.  Trial reduction was then carried out with a 44 mm Endo head standard neck length.  This appeared to be appropriate.  The hip was re-dislocated.  The trial was removed.  The cement restrictor was placed down in the canal.  The canal was cleansed with Simpulse and dried.  Methylmethacrylate was prepared and was placed down the canal with a cement gun.  The prosthesis was inserted using a

## 2024-06-12 ENCOUNTER — APPOINTMENT (OUTPATIENT)
Dept: CT IMAGING | Age: 89
DRG: 521 | End: 2024-06-12
Payer: MEDICARE

## 2024-06-12 LAB
ANION GAP SERPL CALCULATED.3IONS-SCNC: 8 MMOL/L (ref 9–17)
BUN SERPL-MCNC: 36 MG/DL (ref 8–23)
BUN/CREAT SERPL: 18 (ref 9–20)
CALCIUM SERPL-MCNC: 7.6 MG/DL (ref 8.6–10.4)
CHLORIDE SERPL-SCNC: 115 MMOL/L (ref 98–107)
CO2 SERPL-SCNC: 18 MMOL/L (ref 20–31)
CREAT SERPL-MCNC: 2 MG/DL (ref 0.5–0.9)
GFR, ESTIMATED: 23 ML/MIN/1.73M2
GLUCOSE SERPL-MCNC: 94 MG/DL (ref 70–99)
HCT VFR BLD AUTO: 26.1 % (ref 36.3–47.1)
HGB BLD-MCNC: 8.4 G/DL (ref 11.9–15.1)
INR PPP: 1.1
POTASSIUM SERPL-SCNC: 3.9 MMOL/L (ref 3.7–5.3)
PROTHROMBIN TIME: 14 SEC (ref 11.7–14.1)
SODIUM SERPL-SCNC: 141 MMOL/L (ref 135–144)

## 2024-06-12 PROCEDURE — 80048 BASIC METABOLIC PNL TOTAL CA: CPT

## 2024-06-12 PROCEDURE — 2580000003 HC RX 258: Performed by: NURSE PRACTITIONER

## 2024-06-12 PROCEDURE — 6370000000 HC RX 637 (ALT 250 FOR IP): Performed by: ORTHOPAEDIC SURGERY

## 2024-06-12 PROCEDURE — 97530 THERAPEUTIC ACTIVITIES: CPT

## 2024-06-12 PROCEDURE — 1200000000 HC SEMI PRIVATE

## 2024-06-12 PROCEDURE — 73700 CT LOWER EXTREMITY W/O DYE: CPT

## 2024-06-12 PROCEDURE — 36415 COLL VENOUS BLD VENIPUNCTURE: CPT

## 2024-06-12 PROCEDURE — 6370000000 HC RX 637 (ALT 250 FOR IP): Performed by: NURSE PRACTITIONER

## 2024-06-12 PROCEDURE — 85018 HEMOGLOBIN: CPT

## 2024-06-12 PROCEDURE — 70450 CT HEAD/BRAIN W/O DYE: CPT

## 2024-06-12 PROCEDURE — 85014 HEMATOCRIT: CPT

## 2024-06-12 PROCEDURE — 72131 CT LUMBAR SPINE W/O DYE: CPT

## 2024-06-12 PROCEDURE — 85610 PROTHROMBIN TIME: CPT

## 2024-06-12 PROCEDURE — 94761 N-INVAS EAR/PLS OXIMETRY MLT: CPT

## 2024-06-12 RX ORDER — TIZANIDINE 4 MG/1
2 TABLET ORAL 3 TIMES DAILY
Status: DISCONTINUED | OUTPATIENT
Start: 2024-06-12 | End: 2024-06-13 | Stop reason: HOSPADM

## 2024-06-12 RX ORDER — TIZANIDINE 2 MG/1
2 TABLET ORAL 3 TIMES DAILY
DISCHARGE
Start: 2024-06-12

## 2024-06-12 RX ORDER — TAMSULOSIN HYDROCHLORIDE 0.4 MG/1
0.4 CAPSULE ORAL DAILY
Qty: 30 CAPSULE | Refills: 3 | DISCHARGE
Start: 2024-06-13

## 2024-06-12 RX ORDER — WARFARIN SODIUM 2.5 MG/1
2.5 TABLET ORAL
Status: COMPLETED | OUTPATIENT
Start: 2024-06-12 | End: 2024-06-12

## 2024-06-12 RX ORDER — TIZANIDINE 4 MG/1
4 TABLET ORAL 3 TIMES DAILY
Status: DISCONTINUED | OUTPATIENT
Start: 2024-06-12 | End: 2024-06-12

## 2024-06-12 RX ADMIN — AMLODIPINE BESYLATE 10 MG: 10 TABLET ORAL at 20:53

## 2024-06-12 RX ADMIN — WARFARIN SODIUM 2.5 MG: 2.5 TABLET ORAL at 21:30

## 2024-06-12 RX ADMIN — HYDROCODONE BITARTRATE AND ACETAMINOPHEN 1 TABLET: 5; 325 TABLET ORAL at 14:20

## 2024-06-12 RX ADMIN — ANTACID TABLETS 500 MG: 500 TABLET, CHEWABLE ORAL at 09:06

## 2024-06-12 RX ADMIN — ATORVASTATIN CALCIUM 80 MG: 40 TABLET, FILM COATED ORAL at 09:04

## 2024-06-12 RX ADMIN — ANTACID TABLETS 500 MG: 500 TABLET, CHEWABLE ORAL at 20:52

## 2024-06-12 RX ADMIN — HYDROCODONE BITARTRATE AND ACETAMINOPHEN 1 TABLET: 5; 325 TABLET ORAL at 07:04

## 2024-06-12 RX ADMIN — SODIUM CHLORIDE: 9 INJECTION, SOLUTION INTRAVENOUS at 10:28

## 2024-06-12 RX ADMIN — CARVEDILOL 12.5 MG: 12.5 TABLET, FILM COATED ORAL at 20:53

## 2024-06-12 RX ADMIN — TIZANIDINE 2 MG: 4 TABLET ORAL at 20:53

## 2024-06-12 RX ADMIN — Medication 1 TABLET: at 09:05

## 2024-06-12 RX ADMIN — CARVEDILOL 12.5 MG: 12.5 TABLET, FILM COATED ORAL at 09:05

## 2024-06-12 RX ADMIN — FAMOTIDINE 10 MG: 10 TABLET ORAL at 09:06

## 2024-06-12 RX ADMIN — SODIUM CHLORIDE: 9 INJECTION, SOLUTION INTRAVENOUS at 01:03

## 2024-06-12 RX ADMIN — SODIUM CHLORIDE: 9 INJECTION, SOLUTION INTRAVENOUS at 20:33

## 2024-06-12 RX ADMIN — TIZANIDINE 4 MG: 4 TABLET ORAL at 10:24

## 2024-06-12 RX ADMIN — ACETAMINOPHEN 650 MG: 325 TABLET ORAL at 20:52

## 2024-06-12 ASSESSMENT — PAIN - FUNCTIONAL ASSESSMENT
PAIN_FUNCTIONAL_ASSESSMENT: PREVENTS OR INTERFERES WITH ALL ACTIVE AND SOME PASSIVE ACTIVITIES
PAIN_FUNCTIONAL_ASSESSMENT: PREVENTS OR INTERFERES WITH ALL ACTIVE AND SOME PASSIVE ACTIVITIES
PAIN_FUNCTIONAL_ASSESSMENT: PREVENTS OR INTERFERES SOME ACTIVE ACTIVITIES AND ADLS
PAIN_FUNCTIONAL_ASSESSMENT: PREVENTS OR INTERFERES SOME ACTIVE ACTIVITIES AND ADLS
PAIN_FUNCTIONAL_ASSESSMENT: PREVENTS OR INTERFERES WITH ALL ACTIVE AND SOME PASSIVE ACTIVITIES

## 2024-06-12 ASSESSMENT — PAIN DESCRIPTION - PAIN TYPE
TYPE: ACUTE PAIN;SURGICAL PAIN

## 2024-06-12 ASSESSMENT — PAIN DESCRIPTION - ONSET
ONSET: ON-GOING

## 2024-06-12 ASSESSMENT — PAIN SCALES - GENERAL
PAINLEVEL_OUTOF10: 10
PAINLEVEL_OUTOF10: 7
PAINLEVEL_OUTOF10: 10

## 2024-06-12 ASSESSMENT — PAIN DESCRIPTION - ORIENTATION
ORIENTATION: RIGHT;LEFT
ORIENTATION: LEFT
ORIENTATION: LEFT;RIGHT
ORIENTATION: LEFT;RIGHT
ORIENTATION: RIGHT;LEFT

## 2024-06-12 ASSESSMENT — PAIN DESCRIPTION - FREQUENCY
FREQUENCY: CONTINUOUS
FREQUENCY: INTERMITTENT
FREQUENCY: INTERMITTENT
FREQUENCY: CONTINUOUS

## 2024-06-12 ASSESSMENT — PAIN DESCRIPTION - DESCRIPTORS
DESCRIPTORS: ACHING
DESCRIPTORS: SPASM
DESCRIPTORS: SPASM
DESCRIPTORS: SPASM;SHARP
DESCRIPTORS: ACHING

## 2024-06-12 ASSESSMENT — PAIN DESCRIPTION - LOCATION
LOCATION: HIP;LEG
LOCATION: HIP

## 2024-06-12 NOTE — DISCHARGE SUMMARY
Discharge Summary    Ayde Landis  :  1935  MRN:  [id number]    Admit date:  2024      Discharge date: 2024     Admitting Physician:  Devon Hdez MD    Discharge Diagnoses:    Principal Problem:    Closed subcapital fracture of right femur with routine healing  Active Problems:    Severe malnutrition (HCC)    Urinary frequency    Acute blood loss anemia  Resolved Problems:    * No resolved hospital problems. *      Hospital Course:   Ayde Landis is a 89 y.o. female admitted with closed subcapital fracture right femur.  She presented to the emergency room with complaints of right groin pain.  Symptom onset 3 weeks.  Patient stated she was sitting in her yard planting flowers at that time.  She stated since then she has had progression of pain.  She denied any falls or injuries.  She stated over the past several days walking has become very difficult and has been using a cane to ambulate.  She lives at home independently.  She was seen in the emergency room on 2024 with complaints of groin pain and during that evaluation she was given a Medrol dose pack and tramadol and discharged home.  Radiology studies at that time showed arthritis.  Patient returned to the emergency room again on 2024 with complaints of right hip pain and was discharged home with [place].  No radiology studies were completed at that time.  Patient returns today for evaluation as pain was becoming unbearable for her.  Patient has also been complaining of urinary frequency since initial symptoms began.  Patient does have mild TARIQ with a BUN of 39 with creatinine of 1.7.  Patient had no leukocytosis and hemoglobin is 11.6.  INR was 2.0.  Urinalysis was negative for UTI.  Patient was admitted labs monitored electrolytes replaced.  Orthopedic surgeon Dr. Becerra was consulted and patient was evaluated.  Patient underwent endoprosthetic replacement of the right hip and tolerated well.  Nuclear bone scan was completed during

## 2024-06-13 VITALS
DIASTOLIC BLOOD PRESSURE: 81 MMHG | RESPIRATION RATE: 18 BRPM | SYSTOLIC BLOOD PRESSURE: 168 MMHG | BODY MASS INDEX: 23.81 KG/M2 | OXYGEN SATURATION: 93 % | WEIGHT: 121.25 LBS | TEMPERATURE: 98.1 F | HEIGHT: 60 IN | HEART RATE: 100 BPM

## 2024-06-13 LAB
ANION GAP SERPL CALCULATED.3IONS-SCNC: 8 MMOL/L (ref 9–17)
BUN SERPL-MCNC: 31 MG/DL (ref 8–23)
BUN/CREAT SERPL: 21 (ref 9–20)
CALCIUM SERPL-MCNC: 7.9 MG/DL (ref 8.6–10.4)
CHLORIDE SERPL-SCNC: 115 MMOL/L (ref 98–107)
CO2 SERPL-SCNC: 19 MMOL/L (ref 20–31)
CREAT SERPL-MCNC: 1.5 MG/DL (ref 0.5–0.9)
GFR, ESTIMATED: 33 ML/MIN/1.73M2
GLUCOSE SERPL-MCNC: 96 MG/DL (ref 70–99)
HCT VFR BLD AUTO: 28.8 % (ref 36.3–47.1)
HGB BLD-MCNC: 9.2 G/DL (ref 11.9–15.1)
INR PPP: 1.1
POTASSIUM SERPL-SCNC: 3.9 MMOL/L (ref 3.7–5.3)
PROTHROMBIN TIME: 14.2 SEC (ref 11.7–14.1)
SODIUM SERPL-SCNC: 142 MMOL/L (ref 135–144)

## 2024-06-13 PROCEDURE — 85610 PROTHROMBIN TIME: CPT

## 2024-06-13 PROCEDURE — 85014 HEMATOCRIT: CPT

## 2024-06-13 PROCEDURE — 6370000000 HC RX 637 (ALT 250 FOR IP): Performed by: ORTHOPAEDIC SURGERY

## 2024-06-13 PROCEDURE — 80048 BASIC METABOLIC PNL TOTAL CA: CPT

## 2024-06-13 PROCEDURE — 85018 HEMOGLOBIN: CPT

## 2024-06-13 PROCEDURE — 36415 COLL VENOUS BLD VENIPUNCTURE: CPT

## 2024-06-13 PROCEDURE — 6370000000 HC RX 637 (ALT 250 FOR IP): Performed by: NURSE PRACTITIONER

## 2024-06-13 RX ORDER — WARFARIN SODIUM 2.5 MG/1
2.5 TABLET ORAL ONCE
Status: COMPLETED | OUTPATIENT
Start: 2024-06-13 | End: 2024-06-13

## 2024-06-13 RX ADMIN — ACETAMINOPHEN 650 MG: 325 TABLET ORAL at 08:18

## 2024-06-13 RX ADMIN — HYDROCODONE BITARTRATE AND ACETAMINOPHEN 1 TABLET: 5; 325 TABLET ORAL at 08:33

## 2024-06-13 RX ADMIN — ACETAMINOPHEN 650 MG: 325 TABLET ORAL at 03:26

## 2024-06-13 RX ADMIN — SODIUM ZIRCONIUM CYCLOSILICATE 10 G: 10 POWDER, FOR SUSPENSION ORAL at 08:17

## 2024-06-13 RX ADMIN — Medication 1 TABLET: at 08:18

## 2024-06-13 RX ADMIN — TAMSULOSIN HYDROCHLORIDE 0.4 MG: 0.4 CAPSULE ORAL at 08:18

## 2024-06-13 RX ADMIN — TIZANIDINE 2 MG: 4 TABLET ORAL at 08:19

## 2024-06-13 RX ADMIN — ATORVASTATIN CALCIUM 80 MG: 40 TABLET, FILM COATED ORAL at 08:19

## 2024-06-13 RX ADMIN — FAMOTIDINE 10 MG: 10 TABLET ORAL at 08:18

## 2024-06-13 RX ADMIN — ANTACID TABLETS 500 MG: 500 TABLET, CHEWABLE ORAL at 08:18

## 2024-06-13 RX ADMIN — WARFARIN SODIUM 2.5 MG: 2.5 TABLET ORAL at 08:18

## 2024-06-13 RX ADMIN — CARVEDILOL 12.5 MG: 12.5 TABLET, FILM COATED ORAL at 08:19

## 2024-06-13 ASSESSMENT — PAIN SCALES - GENERAL: PAINLEVEL_OUTOF10: 8

## 2024-06-13 ASSESSMENT — PAIN DESCRIPTION - LOCATION: LOCATION: HIP

## 2024-06-13 ASSESSMENT — PAIN DESCRIPTION - DESCRIPTORS: DESCRIPTORS: ACHING;SHARP

## 2024-06-13 ASSESSMENT — PAIN DESCRIPTION - ORIENTATION: ORIENTATION: RIGHT

## 2024-06-13 NOTE — PLAN OF CARE
Problem: Discharge Planning  Goal: Discharge to home or other facility with appropriate resources  Outcome: Progressing  Flowsheets (Taken 6/12/2024 1135)  Discharge to home or other facility with appropriate resources: Identify barriers to discharge with patient and caregiver     Problem: Safety - Adult  Goal: Free from fall injury  Outcome: Progressing  Flowsheets (Taken 6/12/2024 1135)  Free From Fall Injury: Instruct family/caregiver on patient safety     Problem: Skin/Tissue Integrity  Goal: Absence of new skin breakdown  Description: 1.  Monitor for areas of redness and/or skin breakdown  2.  Assess vascular access sites hourly  3.  Every 4-6 hours minimum:  Change oxygen saturation probe site  4.  Every 4-6 hours:  If on nasal continuous positive airway pressure, respiratory therapy assess nares and determine need for appliance change or resting period.  Outcome: Progressing  Note: Monitor patient for s/s of skin breakdown     Problem: ABCDS Injury Assessment  Goal: Absence of physical injury  Outcome: Progressing  Flowsheets (Taken 6/12/2024 1135)  Absence of Physical Injury: Implement safety measures based on patient assessment     Problem: Nutrition Deficit:  Goal: Optimize nutritional status  Outcome: Progressing  Flowsheets (Taken 6/12/2024 1135)  Nutrient intake appropriate for improving, restoring, or maintaining nutritional needs: Monitor oral intake, labs, and treatment plans     Problem: Skin/Tissue Integrity - Adult  Goal: Skin integrity remains intact  Outcome: Progressing  Flowsheets (Taken 6/12/2024 1135)  Skin Integrity Remains Intact: Monitor for areas of redness and/or skin breakdown     Problem: Musculoskeletal - Adult  Goal: Return mobility to safest level of function  Outcome: Progressing  Flowsheets (Taken 6/12/2024 1135)  Return Mobility to Safest Level of Function: Assess patient stability and activity tolerance for standing, transferring and ambulating with or without assistive 
  Problem: Discharge Planning  Goal: Discharge to home or other facility with appropriate resources  Outcome: Progressing  Flowsheets (Taken 6/7/2024 1202)  Discharge to home or other facility with appropriate resources:   Identify barriers to discharge with patient and caregiver   Arrange for needed discharge resources and transportation as appropriate     Problem: Safety - Adult  Goal: Free from fall injury  Outcome: Progressing  Flowsheets (Taken 6/7/2024 1202)  Free From Fall Injury: Instruct family/caregiver on patient safety     Problem: Skin/Tissue Integrity  Goal: Absence of new skin breakdown  Description: 1.  Monitor for areas of redness and/or skin breakdown  2.  Assess vascular access sites hourly  3.  Every 4-6 hours minimum:  Change oxygen saturation probe site  4.  Every 4-6 hours:  If on nasal continuous positive airway pressure, respiratory therapy assess nares and determine need for appliance change or resting period.  Outcome: Progressing  Note: No s/s of skin breakdown     Problem: ABCDS Injury Assessment  Goal: Absence of physical injury  Outcome: Progressing  Flowsheets (Taken 6/7/2024 1202)  Absence of Physical Injury: Implement safety measures based on patient assessment     Problem: Nutrition Deficit:  Goal: Optimize nutritional status  6/7/2024 1202 by Alison Coburn, RN  Outcome: Progressing  Flowsheets (Taken 6/7/2024 1202)  Nutrient intake appropriate for improving, restoring, or maintaining nutritional needs:   Monitor oral intake, labs, and treatment plans   Recommend appropriate diets, oral nutritional supplements, and vitamin/mineral supplements     Problem: Skin/Tissue Integrity - Adult  Goal: Skin integrity remains intact  Outcome: Progressing  Flowsheets (Taken 6/7/2024 1202)  Skin Integrity Remains Intact: Monitor for areas of redness and/or skin breakdown     Problem: Musculoskeletal - Adult  Goal: Return mobility to safest level of function  6/7/2024 1204 by Alison Coburn, 
  Problem: Discharge Planning  Goal: Discharge to home or other facility with appropriate resources  Outcome: Progressing  Flowsheets (Taken 6/9/2024 0027)  Discharge to home or other facility with appropriate resources: Identify barriers to discharge with patient and caregiver     Problem: Safety - Adult  Goal: Free from fall injury  Outcome: Progressing  Flowsheets (Taken 6/9/2024 0027)  Free From Fall Injury: Instruct family/caregiver on patient safety     Problem: Skin/Tissue Integrity  Goal: Absence of new skin breakdown  Description: 1.  Monitor for areas of redness and/or skin breakdown  2.  Assess vascular access sites hourly  3.  Every 4-6 hours minimum:  Change oxygen saturation probe site  4.  Every 4-6 hours:  If on nasal continuous positive airway pressure, respiratory therapy assess nares and determine need for appliance change or resting period.  Outcome: Progressing     Problem: ABCDS Injury Assessment  Goal: Absence of physical injury  Outcome: Progressing  Flowsheets (Taken 6/9/2024 0027)  Absence of Physical Injury: Implement safety measures based on patient assessment     Problem: Genitourinary - Adult  Goal: Urinary catheter remains patent  Outcome: Progressing  Flowsheets (Taken 6/9/2024 0027)  Urinary catheter remains patent: Assess patency of urinary catheter     Problem: Pain  Goal: Verbalizes/displays adequate comfort level or baseline comfort level  Outcome: Progressing  Flowsheets (Taken 6/9/2024 0027)  Verbalizes/displays adequate comfort level or baseline comfort level:   Encourage patient to monitor pain and request assistance   Assess pain using appropriate pain scale     
  Problem: Safety - Adult  Goal: Free from fall injury  6/9/2024 2247 by Jacquelyn Ritter RN  Outcome: Progressing  Note: Bed alarm on, bed locked, side rails up, gripper socks on, call light within reach and able to use appropriately. Will continue to monitor this shift.     Problem: Skin/Tissue Integrity  Goal: Absence of new skin breakdown  Description: 1.  Monitor for areas of redness and/or skin breakdown  2.  Assess vascular access sites hourly  3.  Every 4-6 hours minimum:  Change oxygen saturation probe site  4.  Every 4-6 hours:  If on nasal continuous positive airway pressure, respiratory therapy assess nares and determine need for appliance change or resting period.  6/9/2024 2247 by Jacquelyn Ritter RN  Outcome: Progressing  Note: Patient able to reposition self at this time and use call light appropriately for any assistance. Will continue to monitor this shift     Problem: ABCDS Injury Assessment  Goal: Absence of physical injury  6/9/2024 2247 by Jacquelyn Ritter RN  Outcome: Progressing  Note: Patient is encouraged to reposition and assessed for any injuries. Will continue to monitor this shift.     Problem: Nutrition Deficit:  Goal: Optimize nutritional status  6/9/2024 2247 by Jacquelyn Ritter RN  Outcome: Progressing  Flowsheets (Taken 6/9/2024 2247)  Nutrient intake appropriate for improving, restoring, or maintaining nutritional needs: Assess nutritional status and recommend course of action  Note: Encouraging foods and liquids as tolerated. Will continue to monitor this shift.     Problem: Skin/Tissue Integrity - Adult  Goal: Skin integrity remains intact  6/9/2024 2247 by Jacquelyn Ritter RN  Outcome: Progressing  Flowsheets  Taken 6/9/2024 2247  Skin Integrity Remains Intact: Monitor for areas of redness and/or skin breakdown  Taken 6/9/2024 1845  Skin Integrity Remains Intact: Monitor for areas of redness and/or skin breakdown     Problem: Musculoskeletal - Adult  Goal: Return mobility to safest level of 
  Problem: Safety - Adult  Goal: Free from fall injury  Outcome: Progressing  Note: Bed alarm on, bed locked, side rails up, gripper socks on, call light within reach and able to use appropriately. Will continue to monitor this shift.     Problem: Skin/Tissue Integrity  Goal: Absence of new skin breakdown  Description: 1.  Monitor for areas of redness and/or skin breakdown  2.  Assess vascular access sites hourly  3.  Every 4-6 hours minimum:  Change oxygen saturation probe site  4.  Every 4-6 hours:  If on nasal continuous positive airway pressure, respiratory therapy assess nares and determine need for appliance change or resting period.  Outcome: Progressing  Note: Patient able to reposition self at this time and use call light appropriately for any assistance. Will continue to monitor this shift     Problem: ABCDS Injury Assessment  Goal: Absence of physical injury  Outcome: Progressing  Note: Patient is encouraged to reposition and assessed for any injuries. Will continue to monitor this shift.    
  Problem: Safety - Adult  Goal: Free from fall injury  Outcome: Progressing  Note: Bed in low position. Wheels locked. Bed alarm on. 2/4 side rails are up. Non-skid socks on. Fall band on. Call light within reach.     Problem: Musculoskeletal - Adult  Goal: Return mobility to safest level of function  Outcome: Progressing  Note: Pt is up with 2 assist and stand and pivot, will continue to monitor.      Problem: Pain  Goal: Verbalizes/displays adequate comfort level or baseline comfort level  Outcome: Progressing  Note: Pt is able to verbalize when in pain. Pt denies pain at this time. Will continue to monitor.      
6/13/2024 0206)  Achieves stable or improved neurological status: Assess for and report changes in neurological status     
RN  Outcome: Progressing  6/9/2024 0027 by Wendy Strauss RN  Outcome: Progressing  Flowsheets (Taken 6/9/2024 0027)  Verbalizes/displays adequate comfort level or baseline comfort level:   Encourage patient to monitor pain and request assistance   Assess pain using appropriate pain scale

## 2024-06-13 NOTE — CARE COORDINATION
IMM letter provided to patient.  Patient offered four hours to make informed decision regarding appeal process; patient agreeable to discharge.         06/13/24 1041   IMM Letter   IMM Letter given to Patient/Family/Significant other/Guardian/POA/by: second sister / BLAYNE WILLIAMSON   IMM Letter date given: 06/13/24   IMM Letter time given: 1035     [facility].  She will go by ambulance.  Unable to get a hold of the son.  Her sister is aware of the discharge.  Discharge paper work is done and fax.    CLAY Gonzalez    
Identified Issues/Barriers to RETURNING to current housing: Pt will need SNF care  Potential Assistance needed at discharge: Skilled Nursing Facility            Potential DME:  None  Patient expects to discharge to: Skilled nursing facility  Plan for transportation at discharge: Family    Financial    Payor: MEDICARE / Plan: MEDICARE PART A AND B / Product Type: *No Product type* /     Does insurance require precert for SNF: No    Potential assistance Purchasing Medications: No        [id number] [address] [facility] [place]  [phone] - F [phone]  [address] [facility] [address]  Phone: [phone] Fax: [phone]    [facility] - [place], [place] - [address] [address] - P [phone] - F [phone]  [address] [address]  Phone: [phone] Fax: [phone]      Notes:    Factors facilitating achievement of predicted outcomes: Family support, Cooperative, Pleasant, and Has needed Durable Medical Equipment at home    Barriers to discharge: Pain and Decreased endurance    Additional Case Management Notes: DELMA met with pt and pt's son in the ER to complete assessment. Pt is being admitted to the floor. Pt is a 89 year old female admitted for back pain, acute right hip pain. Pt had an njury a couple of weeks ago and it hs been becoming progressively more difficult to get around and care for herself. Son has been staying the night with her and her sister is right across the street from her. Pt was not using any services prior to admission. Pt has a cane and walker and shower chair at home to use. Pt's son provides transportation for her.     Pt is a DNRCCA and follows with Dr Cunha as PCP. Pt's son is her decision maker per advance directives on chart. Pt has insurance to help cover the cost of her medications. Pt and son would like pt to return to [facility] for SNF care at discharge. Referral made to [facility] and will await response if they can accept. DELMA

## 2024-06-13 NOTE — DISCHARGE SUMMARY
Discharge Summary    Ayde Landis  :  1935  MRN:  [id number]    Admit date:  2024      Discharge date:   2024    Admitting Physician:  Devon Hdez MD    Discharge Diagnoses:      Principal Problem:    Closed subcapital fracture of right femur with routine healing  Active Problems:    Severe malnutrition (HCC)    Urinary frequency    Acute blood loss anemia  Resolved Problems:    * No resolved hospital problems. *      Active Hospital Problems    Diagnosis Date Noted    Acute blood loss anemia [D62] 2024    Closed subcapital fracture of right femur with routine healing [S72.011D] 2024    Urinary frequency [R35.0] 2024    Severe malnutrition (HCC) [E43] 2023       Discharge Medications:         Medication List        START taking these medications      HYDROcodone-acetaminophen 5-325 MG per tablet  Commonly known as: Norco  Take 1 tablet by mouth every 4-6 hours as needed for Pain for up to 7 days. Max Daily Amount: 6 tablets     tamsulosin 0.4 MG capsule  Commonly known as: FLOMAX  Take 1 capsule by mouth daily     Xarelto 10 MG Tabs tablet  Generic drug: rivaroxaban  Take 1 tablet by mouth daily (with breakfast)            CHANGE how you take these medications      tiZANidine 2 MG tablet  Commonly known as: ZANAFLEX  Take 1 tablet by mouth 3 times daily  What changed:   when to take this  reasons to take this     warfarin 2.5 MG tablet  Commonly known as: COUMADIN  Take as directed. If you are unsure how to take this medication, talk to your nurse or doctor.  Original instructions: TAKE 1 AND 1/2 TABLET BY MOUTH DAILY ON MONDAY, WEDNESDAY AND FRIDAY AND TAKE ONE TABLET BY MOUTH DAILY ON ALL OTHER DAYS  What changed: See the new instructions.            CONTINUE taking these medications      amLODIPine 10 MG tablet  Commonly known as: NORVASC  Take 1 tablet by mouth at bedtime     atorvastatin 80 MG tablet  Commonly known as: LIPITOR  Take 1 tablet by mouth daily

## 2024-06-13 NOTE — CONSULTS
[facility]  Pharmacy Department    Clinical Pharmacy Note-Warfarin Follow-up       Patient:  Ayde Landis  MRN: [id number]    Warfarin consult follow-up:    INR (no units)   Date Value   06/13/2024 1.1   06/12/2024 1.1   06/11/2024 1.1   06/10/2024 1.2   06/09/2024 1.7   06/08/2024 2.0   06/07/2024 2.1       Hemoglobin (g/dL)   Date Value   06/13/2024 9.2 (L)   06/12/2024 8.4 (L)   06/11/2024 9.3 (L)     Hematocrit (%)   Date Value   06/13/2024 28.8 (L)   06/12/2024 26.1 (L)   06/11/2024 29.0 (L)     Platelets (k/uL)   Date Value   06/11/2024 106 (L)   06/10/2024 109 (L)   06/09/2024 115 (L)       Target INR Range: 2-3    Significant Drug-Drug Interactions:  New warfarin drug-drug interactions: Tylenol, Norco  Discontinued drug-drug interactions: none        Notes:                      Warfarin 2.5 mg PO x 1 dose today.  Continue to monitor Hgb post surgery.  Daily PT/INR until stable within therapeutic range.     Alison Giles [facility],   6/13/2024, 8:00 AM    
  [facility]  Pharmacy Department    Clinical Pharmacy Note-Warfarin Consult    Ayde Landis is a 89 y.o. female for whom pharmacy has been asked to manage warfarin therapy.     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    Colon polyp 2006, 2009    Compression fracture of lumbar vertebra ([facility]) / prednisone related to PMR 07/15/2015    Diarrhea     Drug-induced hyperkalemia -- from Losartan 2023    Drug-induced hyperkalemia from Lisinopril 07/27/2018    DVT (deep venous thrombosis) ([facility]) 2015    Emphysema     Essential hypertension     Hyperlipidemia     Irritable bowel syndrome     Lumbago     Osteoarthritis     Polymyalgia rheumatica ([facility]) 2015    Rheumatism     Seborrheic keratoses     on back    Urinary frequency 06/06/2024                  INR (no units)   Date Value   06/11/2024 1.1   06/10/2024 1.2   06/09/2024 1.7   06/08/2024 2.0   06/07/2024 2.1   06/06/2024 2.0   05/30/2024 5.5       Hemoglobin (g/dL)   Date Value   06/11/2024 9.3 (L)   06/10/2024 10.0 (L)   06/09/2024 11.4 (L)     Hematocrit (%)   Date Value   06/11/2024 29.0 (L)   06/10/2024 30.9 (L)   06/09/2024 33.6 (L)     Platelets (k/uL)   Date Value   06/11/2024 106 (L)   06/10/2024 109 (L)   06/09/2024 115 (L)       Current warfarin drug-drug interactions: tylenol, norco      Date             INR        Dose   6/11/2024            1.1       3 mg    Warfarin ordered to start now due to patient not having a dose yesterday post op.    Daily PT/INR until stable within therapeutic 
Assessment:  Pain is controlled with current analgesics.  Medication(s) being used: narcotic analgesics including hydrocodone/acetaminophen (Lorcet, Lortab, Norco, Vicodin).  Anxiety:  none  Dyspnea:  none  Fatigue:  exercise intolerance  Other: had surgery yesterday    Palliative Performance Scale:     [] 100% Full ambulation; normal activity and work; no evidence of disease; able to do own self care; normal intake; fully conscious  [] 90% Full ambulation; normal activity and work; some evidence of disease; able to do own self care; normal intake; fully conscious  [] 80% Full ambulation; normal activity with effort; some evidence of disease; able to do own self care; normal or reduced intake; fully conscious  [] 70% Ambulation reduced; unable to perform normal job/work; significant disease; able to do own self care; normal or reduced intake; fully conscious  [] 60%  Ambulation reduced; cannot do hobbies/housework; significant disease; occasional assist; intake normal or reduced; fully conscious/some confusion  [] 50%  Mainly sit/lie; can't do any work; extensive disease; considerable assist; intake normal or reduced; fully conscious/some confusion  [] 40%  Mainly in bed; extensive disease; mainly assist; intake normal or reduced; fully conscious/ some confusion   [] 30%  Bed bound; extensive disease; total care; intake reduced; fully conscious/some confusion  [] 20%  Bed bound; extensive disease; total care; intake minimal; drowsy/coma  [] 10%  Bed bound; extensive disease; total care; mouth care only; drowsy/coma  [] 0       Death     Readmission Risk Score: 22%    Plan      Palliative Interaction: Pt is sitting up in her chair watching TV.  She is complaining of some pain in her right hip from her recent surgery.  She was living at home prior to this admission.  The plan is to discharge her to [facility].  She states that Manan remains her decision maker and that she wants to remain a DNR-CCA.  Pt 
Atorvastatin (Lipitor) 80 mg daily.        Once again, thank you for allowing me to participate in this patients care. Please do not hesitate to contact me could I be of further assistance. I discussed patient's symptoms and treatment plan with Dr Rojo, he was in agreement with the plan and follow up.      Sincerely,  Nabila Suarez PA-C  [facility] Cardiology Specialist    [address] [place], [place] [address]  Phone: [phone], Fax: [phone]     I believe that the risk of significant morbidity and mortality related to the patient's current medical conditions are: intermediate-high.        June 7, 2024

## 2024-06-13 NOTE — PROGRESS NOTES
Pharmacy Note     Renal Dose Adjustment    Ayde Landis is a 89 y.o. female. Pharmacist assessment of renally cleared medications.    Recent Labs     06/06/24  1440   BUN 39*     Recent Labs     06/06/24  1440   CREATININE 1.7*     Estimated Creatinine Clearance: 15 mL/min (A) (based on SCr of 1.7 mg/dL (H)).    Height:   Ht Readings from Last 1 Encounters:   06/06/24 1.524 m (5')     Weight:  Wt Readings from Last 1 Encounters:   06/06/24 42.6 kg (93 lb 14.7 oz)     The following medication dose has been adjusted based upon renal function per P&T Guidelines:             Famotidine 20 mg BID to famotidine 20 mg daily    Morelia Harris, PharmD  PGY2 Pharmacy Resident 6/6/2024 4:27 PM [phone]   
    [facility]          Department of Pharmacy   Pharmacy Renal Adjustment Note    Ayde Landis is a 89 y.o. female. Pharmacist assessment of renally cleared medications.    Recent Labs     06/08/24  0533 06/09/24  0538 06/10/24  0532   CREATININE 1.5* 1.4* 1.5*     Estimated Creatinine Clearance: 19 mL/min (A) (based on SCr of 1.5 mg/dL (H)).    Height:   Ht Readings from Last 1 Encounters:   06/07/24 1.52 m (4' 11.84\")     Weight:  Wt Readings from Last 1 Encounters:   06/10/24 47.6 kg (104 lb 15 oz)       The following medication(s) have been adjusted based upon renal function:             Famtodine 20mg po daily decreased to famotidine 10mg po daily to begin 6/11/24. Pharmacy will continue to monitor.      Thank you,  Lucy Baron [facility],6/10/2024,9:19 AM    
    Pharmacist Review and Automatic Dose Adjustment of Prophylactic Enoxaparin         The reviewing pharmacist has made an adjustment to the ordered enoxaparin dose or converted to UFH per the approved [facility] protocol and table as identified below.        Ayde Landis is a 89 y.o. female.     Recent Labs     06/08/24  0533 06/09/24  0538 06/10/24  0532   CREATININE 1.5* 1.4* 1.5*       Estimated Creatinine Clearance: 19 mL/min (A) (based on SCr of 1.5 mg/dL (H)).    Recent Labs     06/09/24  0538 06/10/24  0532   HGB 11.4* 10.0*   HCT 33.6* 30.9*   * 109*     Recent Labs     06/09/24  0538 06/10/24  0532   INR 1.7 1.2       Height:   Ht Readings from Last 1 Encounters:   06/07/24 1.52 m (4' 11.84\")     Weight:  Wt Readings from Last 1 Encounters:   06/10/24 47.6 kg (104 lb 15 oz)               Plan: Based upon the patient's weight and renal function    Ordered: Enoxaparin 30mg SUBQ BID    Changed/converted to    New Order: Heparin 5,000 units SUBQ BID      Thank you,  Micheal Sahu, [facility]  6/10/2024, 3:24 PM    
    Pharmacy Note - Renal dose adjustment made per P/T protocol    Original order:  Cefazolin 2g q8h x3    Estimated Creatinine Clearance: 19 mL/min (A) (based on SCr of 1.5 mg/dL (H)).    Recent Labs     06/08/24  0533 06/09/24  0538 06/10/24  0532   BUN 31* 27* 27*   CREATININE 1.5* 1.4* 1.5*       Renally adjusted order:  Cefazolin 2g q12h x2    Please call pharmacy with any questions.    Thank you,  Micheal Sahu [facility]  6/10/2024 3:13 PM    
    [facility]  Pharmacy Department    Clinical Pharmacy Note-Warfarin Follow-up       Patient:  Ayde Landis  MRN: [id number]    Warfarin consult follow-up:    INR (no units)   Date Value   06/12/2024 1.1   06/11/2024 1.1   06/10/2024 1.2   06/09/2024 1.7   06/08/2024 2.0   06/07/2024 2.1   06/06/2024 2.0       Hemoglobin (g/dL)   Date Value   06/12/2024 8.4 (L)   06/11/2024 9.3 (L)   06/10/2024 10.0 (L)     Hematocrit (%)   Date Value   06/12/2024 26.1 (L)   06/11/2024 29.0 (L)   06/10/2024 30.9 (L)     Platelets (k/uL)   Date Value   06/11/2024 106 (L)   06/10/2024 109 (L)   06/09/2024 115 (L)       Target INR Range: 2 - 3    Significant Drug-Drug Interactions:  New warfarin drug-drug interactions: Tylenol, Norco  Discontinued drug-drug interactions: none      Notes:                     Warfarin 2.5 mg PO x 1 dose today.  Continue to monitor Hgb post surgery.  Daily PT/INR until stable within therapeutic range.     Thank you,  Claribel Mg, PharmD, 6/12/2024 7:59 AM      
  [address] , [place], [place], [address]    Progress Note    Date:   6/9/2024  Patient name:  Ayde Landis  Date of admission:  6/6/2024 11:37 AM  MRN:   [id number]  YOB: 1935    SUBJECTIVE/Last 24 hours update:     Patient seen and examined at the bed side , no new acute events overnight and no new complains noted. VSS, afebrile. Notes from nursing staff and Consults had been reviewed, and the overnight progress had been checked with the nursing staff as well.    REVIEW OF SYSTEMS:      CONSTITUTIONAL:  no fevers, no headcahes  EYES: negative for blury vision  HEENT: No headaches, No nasal congestion, no difficulty swallowing  RESPIRATORY:negative for dyspnea, no wheezing, no Cough  CARDIOVASCULAR: negative for chest pain, no palpitations  GASTROINTESTINAL: no nausea, no vomiting, no change in bowel habits, no abdominal pain   GENITOURINARY: negative for dysuria, no hematuria   MUSCULOSKELETAL: no joint pains, no muscle aches, no swelling of joints or extremities, hip pain  NEUROLOGICAL: No  Weakness or numbness    PAST MEDICAL HISTORY:      has a past medical history of Abnormal EMG, Broken clavicle, Cancer ([facility]), Chronic kidney disease, 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    PAST SURGICAL HISTORY:      has a past surgical history that includes Cataract removal with implant; Glaucoma surgery; Nasal septum surgery; Dental surgery; Appendectomy (1949); Skin cancer excision; Hysterectomy, total abdominal (1973); Tonsillectomy (1941); Colonoscopy 
  [address] , [place], [place], [address]    Progress Note    Date:   6/8/2024  Patient name:  Ayde Landis  Date of admission:  6/6/2024 11:37 AM  MRN:   [id number]  YOB: 1935    SUBJECTIVE/Last 24 hours update:     Patient seen and examined at the bed side , no new acute events overnight and no new complains. VSS, afebrile, BP is normotensive. Pain is under control when laying still. Notes from nursing staff and Consults had been reviewed, and the overnight progress had been checked with the nursing staff as well.    REVIEW OF SYSTEMS:      CONSTITUTIONAL:  no fevers, no headcahes  EYES: negative for blury vision  HEENT: No headaches, No nasal congestion, no difficulty swallowing  RESPIRATORY:negative for dyspnea, no wheezing, no Cough  CARDIOVASCULAR: negative for chest pain, no palpitations  GASTROINTESTINAL: no nausea, no vomiting, no change in bowel habits, no abdominal pain   GENITOURINARY: negative for dysuria, no hematuria   MUSCULOSKELETAL: no joint pains, no muscle aches, no swelling of joints or extremities, hip pain  NEUROLOGICAL: No  Weakness or numbness      PAST MEDICAL HISTORY:      has a past medical history of Abnormal EMG, Broken clavicle, Cancer ([facility]), Chronic kidney disease, 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    PAST SURGICAL HISTORY:      has a past surgical history that includes Cataract removal with implant; Glaucoma surgery; Nasal septum surgery; Dental surgery; Appendectomy (1949); Skin cancer excision; Hysterectomy, 
  Department of Orthopedic Surgery  Progress Note    POD: 1    Subjective:       Systemic or Specific Complaints:No Complaints    Objective:     Patient Vitals for the past 24 hrs:   BP Temp Temp src Pulse Resp SpO2 Weight   06/11/24 0644 106/64 97.8 °F (36.6 °C) Temporal 78 18 93 % --   06/11/24 0445 110/60 -- -- 73 18 93 % --   06/11/24 0412 -- -- -- -- -- -- 51.4 kg (113 lb 5.1 oz)   06/11/24 0022 -- -- -- -- 16 -- --   06/10/24 2346 125/68 97.3 °F (36.3 °C) Temporal 75 16 93 % --   06/10/24 1941 -- -- -- -- -- 92 % --   06/10/24 1936 104/66 97.7 °F (36.5 °C) Temporal 85 18 95 % --   06/10/24 1748 -- -- -- -- 17 -- --   06/10/24 1707 120/70 97.7 °F (36.5 °C) Temporal 72 18 93 % --   06/10/24 1549 137/66 96.9 °F (36.1 °C) Temporal 69 18 91 % --   06/10/24 1455 (!) 149/78 97.1 °F (36.2 °C) Temporal 66 18 95 % --   06/10/24 1439 -- 97.3 °F (36.3 °C) Temporal -- -- -- --   06/10/24 1437 (!) 144/61 -- -- 60 19 96 % --   06/10/24 1430 (!) 168/62 -- -- 63 20 96 % --   06/10/24 1423 -- -- -- -- -- 96 % --   06/10/24 1415 (!) 170/73 -- -- 67 15 94 % --   06/10/24 1410 (!) 170/70 -- -- 67 14 96 % --   06/10/24 1405 (!) 178/70 -- -- 71 20 97 % --   06/10/24 1402 (!) 183/70 -- -- -- -- -- --   06/10/24 1400 (!) 135/107 96.9 °F (36.1 °C) Temporal 66 13 (!) 88 % --     In: 2418 [P.O.:640; I.V.:1678]  Out: 700  In: 2418   Out: 700 [Urine:500]     General: alert, appears stated age, and cooperative   Wound: Wound clean and dry no evidence of infection.   Motion: Painless Range of Motion in affected extremity   DVT Exam: No evidence of DVT seen on physical exam.     Additional exam: up to chair    Data Review  CBC:   Lab Results   Component Value Date/Time    WBC 11.1 06/11/2024 05:35 AM    RBC 2.93 06/11/2024 05:35 AM    HGB 9.3 06/11/2024 05:35 AM    HCT 29.0 06/11/2024 05:35 AM     06/11/2024 05:35 AM           Assessment:      right endo hip .     Plan:      1:  NH placement  2:  Continue Deep venous thrombosis 
  I, Yumiko Ferguson am scribing for and in the presence of Nabila Suarez PA-C.    Patient: Ayde Landis  : 1935  Date of Admission: 2024  Primary Care Physician: Samuel Cunha  Today's Date: 2024    REASON FOR CONSULTATION/CC: Chest discomfort     HPI: I had the pleasure of seeing Ms. Landis today who is a 89 y.o. female for a pre-op clearance.    Ms. Landis  denies any heart problems in the past. She has hypertension, hyperlipidemia, and history of a DVT on warfarin therapy. She was a smoker, she quit 35 years ago. She smoked around 1 pack per day for about 40 years. She follows with a nephrologist, Dr Burrows.    Stress test done 2020: Overall, these results are most consistent with low risk for significant coronary artery disease.    Echo done 2023: Left Ventricle: Normal left ventricular systolic function with a visually estimated EF of 60 - 65%. Left ventricle size is normal. Mildly increased wall thickness. Normal wall motion. Abnormal diastolic function. Aortic Valve: Mild to moderate regurgitation. Mild stenosis of the aortic valve. AV mean gradient is 10 mmHg. Pericardium: Moderate (1-2 cm) pericardial effusion present. No clear indication of cardiac tamponade.    EKG done 2024: Normal sinus rhythm, Left axis deviation    She had right hip hemiarthroplasty done today 6/10/2024 and is very sleepy unable to report but does answer to yes/no questions. She does report feeling very cold. She denies any chest pain or pressure. No shortness of breath. No palpitations.       She denied any current shortness of breath, abdominal pain, bleeding problems, problems with her medications or any other concerns at this time.     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 
1615- Patient arrives to room 311 [facility] via stretcher from MRI, alert and oriented x4, patient denies pain, vital signs assessment and navigator completed, POC explained to patient and she verbalized understanding. Fall precautions in place, bed alarm on, call light in reach, care ongoing.  
Afternoon assessment and vitals completed at this time. Patient resting in bed, A/Ox4, Respirations Easy and Nonlabored. Patient denies pain. Lung sounds clear throughout. Humphrey catheter in place and remains patent. Patient did not eat anything for lunch stating she \"wasn't really hungry\" Encouraged her to at least eat a little bit and she was agreeable to eating her grapes. Patient voiced concerns that she is nervous she has cancer and would like to get the bone scan done as soon as possible. No other concerns at this time. Assessment and vital signs completed as charted. Care ongoing.   
[facility]  Inpatient/Observation/Outpatient Rehabilitation    Date: 2024  Patient Name: Ayde Landis       [x] Inpatient Acute/Observation       []  Outpatient  : 1935      Plan of Care/Recert ends    [] Pt no showed for scheduled appointment    [] As a reminder, pt was contacted/attempted contact via phone of upcoming appointments.    [x] Pt refused/declined therapy at this time due to:      2 attempts made, pt. Sleeping 1st attempt. 2nd attempt pt. Refused d/t increased pain. PT. Stated to PTA \" I can't take any more of this pain, I would rather just die. How can I die, there is nothing in this room to do it with.\" RN notified of pts comment and made aware of situation and is notifying CNP.      [] Pt cancelled due to:  [] No Reason Given   [] Sick/ill   [] Other:    [] Evaluation held by RN/Provider due to:    [] High Heart Rate   [] High Blood Pressure   [] Orthopedic Consult   [] Hgb < 7   [] Other:    [] Pt does not require skilled services due to:      Therapist/Assistant will attempt to see this patient, at our earliest opportunity.       Sima Crowe, PTA Date: 2024    
Cardiology consult called at this time. Dr. Rojo to see patient.   
Chart reveiwed for possible anesthesia for hip fracture repair on Monday 6/10.   PMH: HTN,stage 4 kidney disease, hypercoagulopathy with h/o PE's on Coumadin, severe malnutrition BMI 18.7    Note pending bone scan to rule out metastatic disease/pathological fracture    Noted imaging Nov 2023 CTChest/Abd reveals mild - mod pericardial effusion followed by Echo revealing  EF 60 - 65%, moderal pericardial effusion, mild-mod AR and mild AS.  BNP 11/15/2023 1918 elevated significantly from 11/9/2017 of 888.  CXR 11/15/2023 mild-moderate cardiomegaly.  No repeat cardiothoracic studies since that time.    Recommend repeat BNP and pre-op cardiac consult - left message for JOHAN Page CNP-APRN and spoke with patient's nurse who states whe will follow up with CNP.      
Comprehensive Nutrition Assessment    Type and Reason for Visit:  Initial, Positive Nutrition Screen    Nutrition Recommendations/Plan:   Add chocolate Ensure once diet resumes     Malnutrition Assessment:  Malnutrition Status:  Severe malnutrition (06/07/24 0712)    Context:  Acute Illness     Findings of the 6 clinical characteristics of malnutrition:  Energy Intake:  75% or less of estimated energy requirements for 7 or more days  Weight Loss:  Greater than 5% over 1 month     Body Fat Loss:  Moderate body fat loss Buccal region, Fat Overlying Ribs, Orbital   Muscle Mass Loss:  Moderate muscle mass loss Temples (temporalis), Clavicles (pectoralis & deltoids)  Fluid Accumulation:  No significant fluid accumulation     Strength:  Not Performed    Nutrition Assessment:    Severe acute malnutrition with chronic malnutrition r/t inadequate nutrient itnakes, AEB significant weight declines, low PO intakes and moderate fat and muscle losses.  History of low intakes and declining use of ONS but agreeable to try once diet is resumed (NPO for ortho c/s). Has requested use of ground meats in past due to difficulty in chewing.    Nutrition Related Findings:    active b/s. no edema. Wound Type: None       Current Nutrition Intake & Therapies:    Average Meal Intake: NPO  Average Supplements Intake: NPO  Diet NPO Exceptions are: Sips of Water with Meds    Anthropometric Measures:  Height: 152.4 cm (5')  Ideal Body Weight (IBW): 100 lbs (45 kg)    Admission Body Weight: 42.6 kg (93 lb 14.7 oz)  Current Body Weight: 43.4 kg (95 lb 10.9 oz), 95.7 % IBW. Weight Source: Bed Scale  Current BMI (kg/m2): 18.7  Usual Body Weight: 47.1 kg (103 lb 12.8 oz) (< 1 month ago)  % Weight Change (Calculated): -7.8  Weight Adjustment For: No Adjustment                 BMI Categories: Underweight (BMI less than 22) age over 65    Estimated Daily Nutrient Needs:  Energy Requirements Based On: Kcal/kg  Weight Used for Energy Requirements: 
Comprehensive Nutrition Assessment    Type and Reason for Visit:  Reassess    Nutrition Recommendations/Plan:   Chocolate Ensure as has accepted in past  Encourage oral intakes     Malnutrition Assessment:  Malnutrition Status:  Severe malnutrition (06/07/24 0712)    Context:  Acute Illness     Findings of the 6 clinical characteristics of malnutrition:  Energy Intake:  75% or less of estimated energy requirements for 7 or more days  Weight Loss:  Greater than 5% over 1 month     Body Fat Loss:  Moderate body fat loss Buccal region, Fat Overlying Ribs, Orbital   Muscle Mass Loss:  Moderate muscle mass loss Temples (temporalis), Clavicles (pectoralis & deltoids)  Fluid Accumulation:  No significant fluid accumulation     Strength:  Not Performed    Nutrition Assessment:    Continued malnutrition with increased nutrient needs for healing. Weight up post op. States good breakfast this AM but I/O reflect lower intakes (26-50% of meals). Mostly complains during visit of \"pokes, pain, moving, therapy\" etc. States can't have bananas r/t potassium content but her K+ has been as low as 3.6 and currently 4.4. Will add ONS for increased calories and protein.    Nutrition Related Findings:    trace BLE edema. active b/s and + soft bm. Wound Type: Surgical Incision       Current Nutrition Intake & Therapies:    Average Meal Intake: 26-50%  Average Supplements Intake: None Ordered  ADULT DIET; Regular  ADULT ORAL NUTRITION SUPPLEMENT; Breakfast, Lunch, Dinner; Standard High Calorie/High Protein Oral Supplement    Anthropometric Measures:  Height: 152 cm (4' 11.84\")  Ideal Body Weight (IBW): 99 lbs (45 kg)    Admission Body Weight: 42.6 kg (93 lb 14.7 oz)  Current Body Weight: 51.4 kg (113 lb 5.1 oz), 95.7 % IBW. Weight Source: Bed Scale  Current BMI (kg/m2): 22.2  Usual Body Weight: 47.1 kg (103 lb 12.8 oz)  % Weight Change (Calculated): 9.2  Weight Adjustment For: No Adjustment                 BMI Categories: Normal Weight 
Discharge cancel do to pain and CT scans order.  CLAY Gonzalez    
Entered room and patient is resting in the bed. Vitals and assessment completed at this time, see flowsheet for details. A&O x4. Lung sounds clear throughout. 2+ pedal pulses and full sensation in RLE. Patient rated pain 10/10 if moving and requested PRN medication. Patient denies any further needs at this time. Call light in reach and bed alarm on.   
Entered room and patient is resting in the bed. Vitals and assessment completed at this time, see flowsheet for details. A&O x4. Patient denies any pain. Family is at bedside and patient denies any further needs at this time. Call light in reach and bed alarm on.   
Entered room and patient is resting in the bed. Vitals and assessment completed at this time, see flowsheet for details. A&O x4. Patient denies pain. Lung sounds clear. 2+ pedal pulse and full sensation in RLE. Dressing is intact with old drainage noted. Patient denies any further needs at this time. Call light in reach and bed alarm on.   
[facility]  Inpatient/Observation/Outpatient Rehabilitation    Date: 2024  Patient Name: Ayde Landis       [x] Inpatient Acute/Observation       []  Outpatient  : 1935           [] Pt no showed for scheduled appointment    [] As a reminder, pt was contacted/attempted contact via phone of upcoming appointments.    [] Pt refused/declined therapy at this time due to:           [] Pt cancelled due to:  [] No Reason Given   [] Sick/ill   [] Other:    [x] Evaluation held by RN/Provider due to:    [] High Heart Rate   [] High Blood Pressure   [x] Orthopedic Consult   [] Hgb < 7   [x] Other: Pt on bedrest awaiting surgery on Monday for hip fracture.     [] Pt does not require skilled services due to:      Therapist/Assistant will attempt to see this patient, at our earliest opportunity.       Paty Mcmanus, PT, DPT Date: 2024    
I notified RON Coats of the patients low urine output, she suggested making sure nothing was obstructing the flow in the tubing. I manipulated the tubing and there doesn't appear to be any obstruction. Humphrey emptied, 50cc out.   
[facility]  Inpatient/Observation/Outpatient Rehabilitation    Date: 2024  Patient Name: Ayde Landis       [] Inpatient Acute/Observation       []  Outpatient  : 1935       [] Pt no showed for scheduled appointment    [] As a reminder, pt was contacted/attempted contact via phone of upcoming appointments.    [] Pt refused/declined therapy at this time due to:           [] Pt cancelled due to:  [] No Reason Given   [] Sick/ill   [] Other:    [x] Evaluation held by RN/Provider due to:    [] High Heart Rate   [] High Blood Pressure   [] Orthopedic Consult   [] Hgb < 7   [x] Other: Patient having surgery on Monday, plan to evaluate post op when appropriate.    [] Pt does not require skilled services due to:      Therapist/Assistant will attempt to see this patient, at our earliest opportunity.       Julia Nelson OTR/L Date: 2024    
[facility]  Inpatient/Observation/Outpatient Rehabilitation    Date: 6/10/2024  Patient Name: Ayde Landis       [x] Inpatient Acute/Observation       []  Outpatient  : 1935     [x] Evaluation held by RN/Provider due to:    [x] Other: Pt demo increased fatigue and pain levels. Pt is not appropriate for therapy evaluation at this time.    Therapist will attempt to see this patient, at our earliest opportunity.       Christiano Feliciano, OTSHAYY, OTR/L Date: 6/10/2024   
[facility]  Inpatient/Observation/Outpatient Rehabilitation    Date: 2024  Patient Name: Ayde Landis       [x] Inpatient Acute/Observation   : 1935       [x] Pt cancelled due to:  [] No Reason Given   [] Sick/ill   [x] Other:  PM PT eval attempted: Pt with RN undergoing an EKG     Therapist/Assistant will attempt to see this patient, at our earliest opportunity.       Yair Berry, PT, DPT Date: 2024   
Occupational Therapy  Facility/Department: [facility] MED SURG  Occupational Therapy Initial Assessment    Name: Ayde Landis  : 1935  MRN: [id number]  Date of Service: 2024    Discharge Recommendations:  Continue to assess pending progress, Subacute/Skilled Nursing Facility  OT Equipment Recommendations  Equipment Needed: Yes  Mobility Devices: Walker  Walker: Rolling     Patient Diagnosis(es): The primary encounter diagnosis was Right hip pain. Diagnoses of Inability to ambulate due to hip, Failure of outpatient treatment, Pericardial effusion, and Subcapital fracture of right hip, closed, initial encounter ([facility]) were also pertinent to this visit.  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  Past Surgical History:  has a past surgical history that includes Cataract removal with implant; Glaucoma surgery; Nasal septum surgery; Dental surgery; Appendectomy (); Skin cancer excision; Hysterectomy, total abdominal (); Tonsillectomy (); Colonoscopy (10/17/2011); Colonoscopy (2016); colectomy (Right, 2016); Colonoscopy (2017); and pr colsc flx w/rmvl of tumor polyp lesion snare tq (N/A, 2017).    Treatment Diagnosis: Generalized weakness    Assessment   Performance deficits / Impairments: Decreased functional mobility ;Decreased safe awareness;Decreased balance;Decreased coordination;Decreased ADL status;Decreased posture;Decreased 
Occupational Therapy  Facility/Department: [facility] MED SURG  Daily Treatment Note  NAME: Ayde Landis  : 1935  MRN: [id number]    Date of Service: 2024    Discharge Recommendations:  Continue to assess pending progress, Subacute/Skilled Nursing Facility         Patient Diagnosis(es): The primary encounter diagnosis was Right hip pain. Diagnoses of Inability to ambulate due to hip, Failure of outpatient treatment, Pericardial effusion, and Subcapital fracture of right hip, closed, initial encounter ([facility]) were also pertinent to this visit.     Assessment    Activity Tolerance: Patient limited by pain  Discharge Recommendations: Continue to assess pending progress;Subacute/Skilled Nursing Facility      Plan   Occupational Therapy Plan  Times Per Day: Once a day  Days Per Week: 7 Days  Current Treatment Recommendations: Strengthening;Endurance training;Pain management;Safety education & training;Patient/Caregiver education & training;Positioning;Self-Care / ADL     Restrictions   40% WB ing on RLE from surgery. General Fall risk     Subjective   Subjective  Subjective: Pt denying OOB activities and self care, did agree to have brief changed.  Pain: High pain in bilateral hip area, did not rate on pain scale.  Orientation  Overall Orientation Status: Within Functional Limits  Cognition  Overall Cognitive Status: WFL        Objective    Vitals     Bed Mobility Training  Bed Mobility Training: Yes  Overall Level of Assistance: Minimum assistance;Assist X2 (Completed rolling to left side, needing assitance x 2 to complete due to increased pain in hip area.)  Interventions: Tactile cues;Verbal cues  Rolling: Minimum assistance;Assist X2     ADL  Additional Comments: Was dependent for breif change and anjelica care.        Safety Devices  Type of Devices: Nurse notified;Patient at risk for falls;All fall risk precautions in place;Call light within reach;Chair alarm in place;Heels elevated for pressure relief;Left in 
Occupational Therapy  [facility]  Inpatient/Observation/Outpatient Rehabilitation    Date: 2024  Patient Name: Ayde Landis       [x] Inpatient Acute/Observation       []  Outpatient  : 1935     [] Pt no showed for scheduled appointment    [] As a reminder, pt was contacted/attempted contact via phone of upcoming appointments.    [x] Pt refused/declined therapy at this time due to:      \"I just want to die, not do rehab.\"     [] Pt cancelled due to:  [] No Reason Given   [] Sick/ill   [] Other:    [] Evaluation held by RN/Provider due to:    [] High Heart Rate   [] High Blood Pressure   [] Orthopedic Consult   [] Hgb < 7   [] Other:    [] Pt does not require skilled services due to:        ROBERT Mueller Date: 2024   
[facility]  Inpatient/Observation/Outpatient Rehabilitation    Date: 2024  Patient Name: Ayde Landis       [x] Inpatient Acute/Observation       []  Outpatient  : 1935       Plan of Care/Recert ends    [] Pt no showed for scheduled appointment    [] As a reminder, pt was contacted/attempted contact via phone of upcoming appointments.    [] Pt refused/declined therapy at this time due to:           [] Pt cancelled due to:  [] No Reason Given   [] Sick/ill   [] Other:    [x] Evaluation held by RN/Provider due to:    [] High Heart Rate   [] High Blood Pressure   [x] Orthopedic Consult   [] Hgb < 7   [] Other: hold having surgery Monday    [] Pt does not require skilled services due to:      Therapist/Assistant will attempt to see this patient, at our earliest opportunity.       Claribel Bell, PT Date: 2024   
Ortho  INR 2.0  Bone Scan pending - R/O metastatic CA  Surgery possible on Monday  
Ortho  Reviewed CT, MRI, and bone scan  Patient appears to have stress fx of right hip subcapital  Findings were discussed with patient  Plan endo replacement right hip on Monday  
PHARMACY NOTE:    The electrolyte replacement protocol for potassium/magnesium has been discontinued per P&T guidelines because the patient has reduced renal function (CrCl < 30 mL/min).      The patient's most recent potassium & magnesium levels are:  Recent Labs     06/06/24  1440   K 3.8     Estimated Creatinine Clearance: 15 mL/min (A) (based on SCr of 1.7 mg/dL (H)).    For patients with decreased renal function (below 30ml/min) needing potassium/magnesium supplementation, please order individual bolus doses with appropriate monitoring.      Please contact the inpatient pharmacy with any concerns.  Thank you.    Morelia Harris, PharmD  PGY2 Pharmacy Resident 6/6/2024 4:28 PM [phone]   
Patient arrives back to unit at this time. Patient transferred to stretcher. Updated patient and family in room that discharge for today has been cancelled at this time. Verbalized understanding. Afternoon assessment and vitals completed at this time. A/Ox4, Respirations Easy and Nonlabored. Patient reports pain 7/10, left and right hips. Discussed that physician and Pauly MIGUEL feel the pain in her left hip is muscular, discussed CT that was completed and that we are awaiting results for that. Lung sounds clear  throughout. Repositioned patient for comfort. Patient denies any other questions or concerns at this time. Care ongoing.     
Patient called out to be repositioned because they were in pain. Writer and Karen RN went in to reposition patient and they stated \"I want to die, I can't take this pain anymore. Why can't I just die?\" Patient repositioned for comfort. PRN medication offered, patient refused. Patient now states she is no longer in pain.  
Patient continues to report 10/10 pain, mostly to left hip/lower back. Patient medicated per order with Zanaflex. Patient denies any other needs or concerns at this time. Care ongoing.   
Patient down to CT at this time.   
Patient noted to be resting in bed with eyes closed, respirations easy and nonlabored. Care ongoing.   
Patient noted to be resting in bed, eyes closed, respirations easy and nonlabored. FLACC score 0/10. Care ongoing.   
Patient noted to be resting with eyes closed in bed, FLACC 0/10. respirations easy and nonlabored. Care ongoing.   
Patient resting comfortably at this time. Patient denies any pain while sitting still and denies any other needs at this time. Patient alert & oriented x4 and able to answer all questions appropriately and follow commands. Assessment and vitals as charted. Bed alarm on, bed locked, gripper socks on, call light within reach and able to use appropriately. Will continue to monitor this shift.    
Patient resting comfortably at this time. Patient denies any pain while sitting up and denies any other needs at this time. Patient alert & oriented x4 and able to answer all questions appropriately and follow commands. Assessment and vitals as charted. Bed alarm on, bed locked, gripper socks on, call light within reach and able to use appropriately. Will continue to monitor this shift.    
Patient resting comfortably at this time. Patient states pain is tolerable at this time and denies any other needs. Patient alert & oriented x4 and able to answer all questions appropriately and follow commands. Assessment and vitals as charted. Bed alarm on, bed locked, gripper socks on, call light within reach and able to use appropriately. Will continue to monitor this shift.    
Patient too drowsy for IS instruction at this time.  
Patient vitals and assessment completed, see flowsheet. Patient A&Ox4, breathing normal. Pt denies pain or any further needs, call light within reach, bed alarm on, will continue to monitor.     
Patient vitals and assessment completed, see flowsheet. Patient A&Ox4, breathing normal. Pt says pain is 8/10 at surgical site, prn pain med given. The patient denies any further needs, call light within reach, will continue to monitor.     
Patient void 50 cc in bedpan, bladder scan resulted in 115 cc in bladder, RN notified Pauly MIGUEL at this time with verbal order to place vasquez, patient updated with POC and verbalized understanding.   
Pharmacy Note  Warfarin Consult    Ayde Landis is a 89 y.o. female for whom pharmacy has been consulted to manage warfarin therapy.     Consulting Physician: Dr. Hdez  Reason for Admission: Acute right hip pain    Warfarin dose prior to admission: 1.25 mg MWF and 2.5 mg all other days of the week  Warfarin indication: PE  Target INR range: 2-3      Recent Labs     06/06/24  1440   INR 2.0     Recent Labs     06/06/24  1440   HGB 11.6*   HCT 35.2*   *     Current warfarin drug-drug interactions: None    Date             INR        Dose   6/6/2024          2    2.5 mg    Daily PT/INR while inpatient.    Thank you for the consult.  Will continue to follow.    Morelia Harris, PharmD  PGY2 Pharmacy Resident 6/6/2024 5:43 PM [phone]   
Physical Therapy  Facility/Department: [facility] MED SURG  Daily Treatment Note  NAME: Ayde Landis  : 1935  MRN: [id number]    Date of Service: 2024    Discharge Recommendations:  Subacute/Skilled Nursing Facility, Continue to assess pending progress     Patient Diagnosis(es): The primary encounter diagnosis was Right hip pain. Diagnoses of Inability to ambulate due to hip, Failure of outpatient treatment, Pericardial effusion, and Subcapital fracture of right hip, closed, initial encounter ([facility]) were also pertinent to this visit.    Assessment   Assessment: Pt completed sit to stand with modA x 2 and max encouragement from staff. Max cues for technique including UE and LE placement, and ensuring PWB R LE. Pt. ambulated 5ftx1 with FWW and Inge/ModAx2 for safety. Pt. shuffled feet during ambulation and remained 40% WB throughout. Pt. performed supine R LE therex x10-15 of heel slides, ankle pumps, SLR, Quad sets, Glute sets. RB needed throughout d/t pain and fatigue.  Activity Tolerance: Patient limited by pain  Equipment Needed: No     Plan    Physical Therapy Plan  General Plan: 2 times a day 7 days a week  Current Treatment Recommendations: Strengthening;Transfer training;ROM;Balance training;Functional mobility training;IADL training;ADL/Self-care training;Endurance training;Gait training;Stair training;Neuromuscular re-education;Manual;Pain management;Return to work related activity;Home exercise program;Safety education & training;Patient/Caregiver education & training;Positioning;Therapeutic activities     Restrictions  Restrictions/Precautions  Restrictions/Precautions: Fall Risk, Weight Bearing  Lower Extremity Weight Bearing Restrictions  Right Lower Extremity Weight Bearing: Partial Weight Bearing  Partial Weight Bearing Percentage Or Pounds: 40%     Subjective    Subjective  Subjective: Pt. on BSC upon arrival, agreeable to therex at this time  Pain: 8/10 R hip pain  Orientation  Overall 
Physical Therapy  Facility/Department: [facility] MED SURG  Daily Treatment Note  NAME: Ayde Landis  : 1935  MRN: [id number]    Date of Service: 2024    Discharge Recommendations:  Subacute/Skilled Nursing Facility, Continue to assess pending progress     Patient Diagnosis(es): The primary encounter diagnosis was Right hip pain. Diagnoses of Inability to ambulate due to hip, Failure of outpatient treatment, Pericardial effusion, and Subcapital fracture of right hip, closed, initial encounter ([facility]) were also pertinent to this visit.    Assessment   Assessment: Pt. declines therapy d/t to much pain, RN aware. Pt. was incontinent while in room and agreeable to allow therapy to changer her. Pt. was Max x2 for bed mobility.  Activity Tolerance: Patient limited by pain     Plan    Physical Therapy Plan  General Plan: 2 times a day 7 days a week  Current Treatment Recommendations: Strengthening;Transfer training;ROM;Balance training;Functional mobility training;IADL training;ADL/Self-care training;Endurance training;Gait training;Stair training;Neuromuscular re-education;Manual;Pain management;Return to work related activity;Home exercise program;Safety education & training;Patient/Caregiver education & training;Positioning;Therapeutic activities     Restrictions  Restrictions/Precautions  Restrictions/Precautions: Fall Risk, Weight Bearing  Lower Extremity Weight Bearing Restrictions  Right Lower Extremity Weight Bearing: Partial Weight Bearing  Partial Weight Bearing Percentage Or Pounds: 40%     Subjective    Subjective  Subjective: Pt. in bed upon arrival, declines therapy, stated she was incontient and agreeable to allow therapy to assist in change.  Pain: B LE pain 8/10  Orientation  Overall Orientation Status: Within Functional Limits  Cognition  Overall Cognitive Status: WFL     Objective   Bed Mobility Training  Bed Mobility Training: Yes  Overall Level of Assistance: Minimum assistance;Assist 
[facility]  Inpatient/Observation/Outpatient Rehabilitation    Date: 6/10/2024  Patient Name: Ayde Landis       [x] Inpatient Acute/Observation   : 1935     [x] Pt refused/declined therapy at this time due to:        Pt refused due to pain and drowsiness.      Therapist/Assistant will attempt to see this patient, at our earliest opportunity.       Yair Berry, PT, DPT Date: 6/10/2024     
Progress Note    SUBJECTIVE:    Patient seen for f/u of Closed subcapital fracture of right femur with routine healing.  She resting in bed no distress.  Pain to right groin       ROS:   Constitutional: negative  for fevers, and negative for chills.  Respiratory: negative for shortness of breath, negative for cough, and negative for wheezing  Cardiovascular: negative for chest pain, and negative for palpitations  Gastrointestinal: negative for abdominal pain, negative for nausea,negative for vomiting, negative for diarrhea, and negative for constipation     All other systems were reviewed with the patient and are negative unless otherwise stated in HPI      OBJECTIVE:      Vitals:   Vitals:    06/07/24 0715   BP: (!) 165/78   Pulse: 70   Resp: 18   Temp: 97.2 °F (36.2 °C)   SpO2: 96%     Weight - Scale: 43.4 kg (95 lb 10.9 oz)   Height: 152.4 cm (5')     Weight  Wt Readings from Last 3 Encounters:   06/07/24 43.4 kg (95 lb 10.9 oz)   06/03/24 43 kg (94 lb 12.8 oz)   05/27/24 46.3 kg (102 lb)     Body mass index is 18.69 kg/m².    24HR INTAKE/OUTPUT:      Intake/Output Summary (Last 24 hours) at 6/7/2024 0740  Last data filed at 6/7/2024 0724  Gross per 24 hour   Intake 1380.23 ml   Output 775 ml   Net 605.23 ml     -----------------------------------------------------------------  Exam:    GEN:    Awake, alert and oriented x3.   EYES:  EOMI, pupils equal   NECK: Supple. No lymphadenopathy.  No carotid bruit  CVS:    regular rate and rhythm, no audible murmur  PULM:  CTA, no wheezes, rales or rhonchi, no acute respiratory distress  ABD:    Bowels sounds normal.  Abdomen is soft.  No distention.  no tenderness to palpation.   EXT:   no edema bilaterally .  No calf tenderness.  Right groin tender with palpation .  Able to flex and extend leg but has increase in pain   NEURO: Moves all extremities.  Motor and sensory are grossly intact  SKIN:  No rashes.  No skin lesions.  
Progress Note    SUBJECTIVE:    Patient seen for f/u of Closed subcapital fracture of right femur with routine healing.  She resting in bed no distress.  Pain to right groin   . Awaiting surgery today    ROS:   Constitutional: negative  for fevers, and negative for chills.  Respiratory: negative for shortness of breath, negative for cough, and negative for wheezing  Cardiovascular: negative for chest pain, and negative for palpitations  Gastrointestinal: negative for abdominal pain, negative for nausea,negative for vomiting, negative for diarrhea, and negative for constipation     All other systems were reviewed with the patient and are negative unless otherwise stated in HPI      OBJECTIVE:      Vitals:   Vitals:    06/10/24 0846   BP: (!) 160/73   Pulse: 86   Resp: 15   Temp: 98.2 °F (36.8 °C)   SpO2: 94%     Weight - Scale: 47.6 kg (104 lb 15 oz)   Height: 152 cm (4' 11.84\")     Weight  Wt Readings from Last 3 Encounters:   06/10/24 47.6 kg (104 lb 15 oz)   06/03/24 43 kg (94 lb 12.8 oz)   05/27/24 46.3 kg (102 lb)     Body mass index is 20.6 kg/m².    24HR INTAKE/OUTPUT:      Intake/Output Summary (Last 24 hours) at 6/10/2024 0930  Last data filed at 6/10/2024 0659  Gross per 24 hour   Intake 2493.1 ml   Output 600 ml   Net 1893.1 ml     -----------------------------------------------------------------  Exam:    GEN:    Awake, alert and oriented x3.   EYES:  EOMI, pupils equal   NECK: Supple. No lymphadenopathy.  No carotid bruit  CVS:    regular rate and rhythm, systolic 2/6 murmur  PULM:  CTA, no wheezes, rales or rhonchi, no acute respiratory distress  ABD:    Bowels sounds normal.  Abdomen is soft.  No distention.  no tenderness to palpation.   EXT:   no edema bilaterally .  No calf tenderness.  Right groin tender with palpation .  Able to flex and extend leg but has increase in pain   NEURO: Moves all extremities.  Motor and sensory are grossly intact  SKIN:  No rashes.  No skin lesions.  
Progress Note    SUBJECTIVE:    Patient seen for f/u of Closed subcapital fracture of right femur with routine healing.  She resting in chiar no distress.      ROS:   Constitutional: negative  for fevers, and negative for chills.  Respiratory: negative for shortness of breath, negative for cough, and negative for wheezing  Cardiovascular: negative for chest pain, and negative for palpitations  Gastrointestinal: negative for abdominal pain, negative for nausea,negative for vomiting, negative for diarrhea, and negative for constipation     All other systems were reviewed with the patient and are negative unless otherwise stated in HPI      OBJECTIVE:      Vitals:   Vitals:    06/11/24 0644   BP: 106/64   Pulse: 78   Resp: 18   Temp: 97.8 °F (36.6 °C)   SpO2: 93%     Weight - Scale: 51.4 kg (113 lb 5.1 oz)   Height: 152 cm (4' 11.84\")     Weight  Wt Readings from Last 3 Encounters:   06/11/24 51.4 kg (113 lb 5.1 oz)   06/03/24 43 kg (94 lb 12.8 oz)   05/27/24 46.3 kg (102 lb)     Body mass index is 22.25 kg/m².    24HR INTAKE/OUTPUT:      Intake/Output Summary (Last 24 hours) at 6/11/2024 0753  Last data filed at 6/11/2024 0610  Gross per 24 hour   Intake 2418 ml   Output 650 ml   Net 1768 ml     -----------------------------------------------------------------  Exam:    GEN:    Awake, alert and oriented x3.   EYES:  EOMI, pupils equal   NECK: Supple. No lymphadenopathy.  No carotid bruit  CVS:    regular rate and rhythm, systolic 2/6 murmur  PULM:  CTA, no wheezes, rales or rhonchi, no acute respiratory distress  ABD:    Bowels sounds normal.  Abdomen is soft.  No distention.  no tenderness to palpation.   EXT:   no edema bilaterally .  No calf tenderness.  Right groin tender with palpation . Dressing to Right Hip CDI  NEURO: Moves all extremities.  Motor and sensory are grossly intact  SKIN:  No rashes.  No skin lesions.    -----------------------------------------------------------------    Diagnostic 
Pt complaining of left sided hand weakness stating she noticed when trying to push the call light. No smile or tongue deviation noted hand  weak at this time, right hand  moderate. Dr Patel updated at this time.   
Pt sitting up in the bed doing a cross word when writer entered the room. Pt is A&O x4. Vitals and assessment as charted. Pedal pulse in the right foot is strong at 2+. Pt denies any further needs at this time. Call light within reach. Bed alarm on.   
Pt still too drowsy to perform IS.  
Pt taken down to surgery at this time.  
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    Colon polyp ,     Compression fracture of lumbar vertebra ([facility]) / prednisone related to PMR 07/15/2015    Diarrhea     Drug-induced hyperkalemia -- from Losartan     Drug-induced hyperkalemia from Lisinopril 2018    DVT (deep venous thrombosis) ([facility])     Emphysema     Essential hypertension     Hyperlipidemia     Irritable bowel syndrome     Lumbago     Osteoarthritis     Polymyalgia rheumatica ([facility])     Rheumatism     Seborrheic keratoses     on back    Urinary frequency 2024       PATIENT ASSESSMENT    LABORATORY DATA  Hematology:   Lab Results   Component Value Date/Time    WBC 9.3 06/10/2024 05:32 AM    RBC 3.17 06/10/2024 05:32 AM    HGB 10.0 06/10/2024 05:32 AM    HCT 30.9 06/10/2024 05:32 AM     06/10/2024 05:32 AM     Chemistry:  No results found for: \"PHART\", \"YKB5DBL\", \"PO2ART\", \"Q8MFSJGH\", \"WKQ3VJY\", \"PBEA\", \"NBEA\"    VITALS  Pulse: 66   Respirations: 18  BP: (!) 149/78  SpO2: 95 % O2 Device: Nasal cannula  Temp: 97.1 °F (36.2 °C)    SKIN COLOR  [x] Normal  [] Pale  [] Dusky  [] Cyanotic    RESPIRATORY PATTERN  [x] Normal  [] Dyspnea  [] Cheyne-Harris  [] Kussmaul  [] Biots    AMBULATORY  [] Yes  [x] No  [] With Assistance      Patient Acuity 0 1 2 3 4 Score   Level of Consciousness (LOC) [x]  Alert & Oriented or Pt normal LOC 
RN in to assess patient. Patient resting in bed, A/Ox4, Respirations Easy and Nonlabored. Patient denies pain. Lung sounds clear throughout. Humphrey catheter in place draining pale clear yellow urine, no sediment or odor noted. Patient denies needs or concerns at this time. IV fluids continue to infuse at 75ml/hr. Assessment and vital signs completed as charted. Care ongoing.   
RN in to assess patient. Patient resting in bed, A/Ox4, Respirations Easy and Nonlabored. Patient reports sharp, pain to left hip/lower back anytime she moves. Patient medicated with Norco by Ayde MCCARTHY, patient denies any improvement at this time. No other PRN medication available at this time. Patient reports as long as she is not moving the pain is tolerable. Patient reports she has some pain to her right hip from surgical procedure. Lung sounds clear  but diminished throughout. Assessment and vital signs completed as charted. Care ongoing.     
ROSANA Gao informed this RN of what patient said to her when she was in the room. Per Sima WAYNE patient stated \" I can't take any more of this pain, I would rather just die. How can I die, there is nothing in this room to do it with.\"     Updated Pauly MIGUEL of patient's continued pain, not improved by any medication provided by staff. Pauly MIGUEL aware of patient's comments mentioned above. New orders obtained for CT. No other orders obtained at this time.    Carlotta MCCARTHY supervisor aware of the above. Care ongoing.  
Report given to SHARI Vergara at [facility].   
SW spoke with [facility] and they will look at pt again next week after her surgery but should be able to accept as long as pt comes through surgery ok and is able to work with PT/OT. Notified pt of above and attempted to notify son but there was no answer. SW following. Yumiko Whitmore MSW LSW 6/7/2024   
[facility]  Inpatient/Observation/Outpatient Rehabilitation    Date: 2024  Patient Name: Ayde Landis       [] Inpatient Acute/Observation       []  Outpatient  : 1935     [] Pt no showed for scheduled appointment    [] As a reminder, pt was contacted/attempted contact via phone of upcoming appointments.    [] Pt refused/declined therapy at this time due to:           [] Pt cancelled due to:  [] No Reason Given   [] Sick/ill   [] Other:    [x] Evaluation held by RN/Provider due to:    [] High Heart Rate   [] High Blood Pressure   [x] Orthopedic Consult   [] Hgb < 7   [x] Other: Per note, patient having surgery on Monday for hip fracture. OT to eval post op when appropriate.    [] Pt does not require skilled services due to:          Julia Nelson OTR/JANKI Date: 2024    
Spoke to Pauly MIGUEL, per dr Becerra note patient to have surgery possibly on Monday. Verbal order to change diet to regular, called and spoke to nuclear medicine to see if she needed to be NPO prior to scan. Nuclear med denies any need for NPO status prior to scan.   
Writer encouraged patient to increase water intake rather than her pop due to decreased output in catheter and that patient was NPO before surgery.   
Writer notified RON Coats of increased weight gain and notified her of the bed not being able to be zeroed d/t surgical status and also of decreased output.  
Writer to bedside to complete morning assessment. Upon entry to room, pt in bed eyes closed, respirations even and unlabored while on room air. Vitals obtained and assessment completed, see flow sheet for details. Pt is A&Ox4. Pt denies any pain at this time. Lung sounds are clear throughout. Humphrey catheter in place and patent. Pt updated on plan of care and whiteboard updated. Pt denies further needs from writer at this time. Call light in reach. Care ongoing.      
Training  Transfer Training: Yes  Overall Level of Assistance: Assist X2;Moderate assistance  Interventions: Demonstration;Verbal cues  Sit to Stand: Assist X2;Moderate assistance  Stand to Sit: Assist X2;Moderate assistance           Safety Devices  Type of Devices: Chair alarm in place;Nurse notified;Left in chair;Patient at risk for falls;All fall risk precautions in place;Call light within reach;Gait belt  Restraints  Restraints Initially in Place: No       Goals  Short Term Goals  Time Frame for Short Term Goals: 10 days  Short Term Goal 1: Patient will ambulate 15' with FWW, CGA, maintaining PWB status  Short Term Goal 2: Patient will perform bed mobility tasks and transfers with MI  Short Term Goal 3: Patient will tolerate 20-30 minutes of therex/act to improve endurance for ADLs.  Patient Goals   Patient Goals : Feel better    Education  Patient Education  Education Given To: Patient  Education Provided Comments: Education on importance of participation, continue  Education Method: Verbal;Demonstration  Barriers to Learning: Other (Comment) (pain is limiting pt to motivate herself to complete therapy.)  Education Outcome: Continued education needed    Therapy Time   Individual Concurrent Group Co-treatment   Time In 1222         Time Out 1233         Minutes 11             Iman Coppola, PTA [address]           
unit  Bathroom Toilet: Standard  Bathroom Equipment: Built-in shower seat  Bathroom Accessibility: Accessible  Home Equipment: Cane, Walker - Rolling, Walker - 4-Wheeled  Has the patient had two or more falls in the past year or any fall with injury in the past year?: Yes  Receives Help From: Family  ADL Assistance: Independent  Homemaking Assistance: Independent  Ambulation Assistance: Independent  Transfer Assistance: Independent  Active : No  Patient's  Info: Son drives her to appointments and grocery store  Additional Comments: Sister live across the street and son stays with her at night.  Vision/Hearing  Vision  Vision: Impaired  Vision Exceptions: Wears glasses at all times  Hearing  Hearing: Exceptions to WFL  Hearing Exceptions: Bilateral hearing aid    Cognition   Orientation  Overall Orientation Status: Within Functional Limits  Cognition  Overall Cognitive Status: WFL     Objective   Temp: 97.8 °F (36.6 °C)  Pulse: 78  Heart Rate Source: Monitor;Apical  Respirations: 18  SpO2: 93 %  O2 Device: None (Room air)  BP: 106/64  MAP (Calculated): 78  BP Location: Left upper arm  BP Method: Automatic  Patient Position: Semi fowlers     Observation/Palpation  Posture: Fair        AROM RLE (degrees)  RLE General AROM: 0-90* from sitting to standing  AROM LLE (degrees)  LLE AROM : WFL  Strength RLE  Comment: Grossly 2+/5  Strength LLE  Comment: Grossly 3+/5           Bed mobility  Supine to Sit: Moderate assistance  Scooting: Maximal assistance  Transfers  Sit to Stand: Maximum Assistance  Stand to Sit: Maximum Assistance  Stand Pivot Transfers: Maximum Assistance  Ambulation  WB Status: PWB 40%  Ambulation  Surface: Level tile  Device: Rolling Walker  Assistance: Maximum assistance  Quality of Gait: Pt was able to shuffle feet to get to chair  Distance: 2'  More Ambulation?: No  Stairs/Curb  Stairs?: No     Balance  Posture: Fair  Sitting - Static: Good  Sitting - Dynamic: Fair  Standing - Static: 
osteoarthritis affecting both hips.  Medications:   Tizanidine 2 mg tid - muscle spasm to left hip  Continue Tylenol  Norco 5/325 1 every 4 hours as needed pain  IVL  Medication Monitoring / High Risk Medications: none      Urinary frequency  Condition is unchanged  Treatment plan:   Post residual void-voided 55 ml / 115 Post Residual  Remove Humphrey  Urinalysis negative  Imaging: no further imaging studies ordered today  Medications:  Continue Flomax    Nutrition status:   at risk for malnutrition  Dietician consult initiated     Hospital Prophylaxis:   DVT: SCD's   Stress Ulcer: H2 Blocker      Disposition:  Shared decision making: All test results, treatment options and disposition options were discussed with the patient today  Social determinants of health that may impact management: none  Code status: DNR-CCA   Disposition: Discharge plan is [facility] when approved    [facility] Advanced Care Planning documentation:  [x] I have confirmed that the patient's Advance Care Plan is present, Code Status is documented, or surrogate decision maker is listed in the patient's medical record  [If \"yes\", STOP HERE]     [] The patient's Advance Care Plan is NOT present because:    []  I confirmed today that the patient does not wish or was not able to name a   surrogate decision maker or provide and advance care plan.    [] Hospice care is currently being provided or has been provided within the   calendar year.    []  I did NOT confirm today the presence of an Advance Care Plan or surrogate   decision maker documented within the patient's medical record.   [DOES NOT SATISFY [facility] PERFORMANCE]    GARRY Luther - CNP , GARRY, NP-C  Hospitalist Medicine        6/12/2024, 10:02 AM  
the transcervical region may reflect nondisplaced fractures.  If clinical confirmation is needed, recommend MRI for further evaluation. Mild osteoarthritis affecting the bilateral hips.  Chondrocalcinosis.         ASSESSMENT:    Principal Problem:    Closed subcapital fracture of right femur with routine healing  Active Problems:    Severe malnutrition (HCC)    Urinary frequency  Resolved Problems:    * No resolved hospital problems. *      PLAN:  I agree with the assessment, plan and medical decision making documentation as outlined by APC:  Treatment plan:  Ortho consult appreciated  Pain control: Tylenol, Tizanidine, Norco  Start flomax due to incomplete emptying of the bladder    Disposition:    Discharge plan is pending    SHARED APC VISIT, PHYSICIAN ATTESTATION: Face-to-face  I personally performed a substantive part of the MDM during the patient's visit.  I personally evaluated and examined the patient.  I personally made or approved the documented management plan and acknowledge its risk of complications.  Test interpretation:  My independent EKG interpretation: Normal sinus rhythm with left axis deviation  My independent imaging interpretation:  not applicable  Management and/or test interpretation discussed with MYRIAM Kirk MD , M.D.  6/7/2024  12:27 PM

## 2024-06-17 ENCOUNTER — HOSPITAL ENCOUNTER (OUTPATIENT)
Age: 89
Setting detail: SPECIMEN
Discharge: HOME | End: 2024-06-17

## 2024-06-17 LAB
ALBUMIN SERPL-MCNC: 2.6 G/DL (ref 3.5–5.2)
ALBUMIN/GLOB SERPL: 1 {RATIO} (ref 1–2.5)
ALP SERPL-CCNC: 111 U/L (ref 35–104)
ALT SERPL-CCNC: <5 U/L (ref 5–33)
ANION GAP SERPL CALCULATED.3IONS-SCNC: 8 MMOL/L (ref 9–17)
AST SERPL-CCNC: 21 U/L
BASOPHILS # BLD: <0.03 K/UL (ref 0–0.2)
BASOPHILS NFR BLD: 0 % (ref 0–2)
BILIRUB SERPL-MCNC: 0.3 MG/DL (ref 0.3–1.2)
BUN SERPL-MCNC: 39 MG/DL (ref 8–23)
BUN/CREAT SERPL: 22 (ref 9–20)
CALCIUM SERPL-MCNC: 8.4 MG/DL (ref 8.6–10.4)
CHLORIDE SERPL-SCNC: 110 MMOL/L (ref 98–107)
CO2 SERPL-SCNC: 22 MMOL/L (ref 20–31)
CREAT SERPL-MCNC: 1.8 MG/DL (ref 0.5–0.9)
EOSINOPHIL # BLD: 0.2 K/UL (ref 0–0.44)
EOSINOPHILS RELATIVE PERCENT: 3 % (ref 1–4)
ERYTHROCYTE [DISTWIDTH] IN BLOOD BY AUTOMATED COUNT: 14.5 % (ref 11.8–14.4)
GFR, ESTIMATED: 27 ML/MIN/1.73M2
GLUCOSE SERPL-MCNC: 109 MG/DL (ref 70–99)
HCT VFR BLD AUTO: 26.1 % (ref 36.3–47.1)
HGB BLD-MCNC: 8.2 G/DL (ref 11.9–15.1)
IMM GRANULOCYTES # BLD AUTO: 0.04 K/UL (ref 0–0.3)
IMM GRANULOCYTES NFR BLD: 1 %
LYMPHOCYTES NFR BLD: 0.77 K/UL (ref 1.1–3.7)
LYMPHOCYTES RELATIVE PERCENT: 10 % (ref 24–43)
MCH RBC QN AUTO: 31.4 PG (ref 25.2–33.5)
MCHC RBC AUTO-ENTMCNC: 31.4 G/DL (ref 28.4–34.8)
MCV RBC AUTO: 100 FL (ref 82.6–102.9)
MONOCYTES NFR BLD: 0.73 K/UL (ref 0.1–1.2)
MONOCYTES NFR BLD: 9 % (ref 3–12)
NEUTROPHILS NFR BLD: 78 % (ref 36–65)
NEUTS SEG NFR BLD: 6.07 K/UL (ref 1.5–8.1)
NRBC BLD-RTO: 0 PER 100 WBC
PLATELET # BLD AUTO: ABNORMAL K/UL (ref 138–453)
PLATELET, FLUORESCENCE: 140 K/UL (ref 138–453)
PLATELETS.RETICULATED NFR BLD AUTO: 6.1 % (ref 1.1–10.3)
POTASSIUM SERPL-SCNC: 5 MMOL/L (ref 3.7–5.3)
PROT SERPL-MCNC: 5.1 G/DL (ref 6.4–8.3)
RBC # BLD AUTO: 2.61 M/UL (ref 3.95–5.11)
SODIUM SERPL-SCNC: 140 MMOL/L (ref 135–144)
WBC OTHER # BLD: 7.8 K/UL (ref 3.5–11.3)

## 2024-06-17 PROCEDURE — P9604 ONE-WAY ALLOW PRORATED TRIP: HCPCS

## 2024-06-17 PROCEDURE — 36415 COLL VENOUS BLD VENIPUNCTURE: CPT

## 2024-06-17 PROCEDURE — 85025 COMPLETE CBC W/AUTO DIFF WBC: CPT

## 2024-06-17 PROCEDURE — 80053 COMPREHEN METABOLIC PANEL: CPT

## 2024-06-18 ENCOUNTER — COMMUNITY CARE MANAGEMENT (OUTPATIENT)
Facility: CLINIC | Age: 89
End: 2024-06-18

## 2024-06-22 ENCOUNTER — APPOINTMENT (OUTPATIENT)
Dept: GENERAL RADIOLOGY | Age: 89
End: 2024-06-22
Payer: MEDICARE

## 2024-06-22 ENCOUNTER — APPOINTMENT (OUTPATIENT)
Dept: CT IMAGING | Age: 89
End: 2024-06-22
Payer: MEDICARE

## 2024-06-22 ENCOUNTER — HOSPITAL ENCOUNTER (EMERGENCY)
Age: 89
Discharge: HOME | End: 2024-06-22
Attending: EMERGENCY MEDICINE
Payer: MEDICARE

## 2024-06-22 VITALS
SYSTOLIC BLOOD PRESSURE: 125 MMHG | OXYGEN SATURATION: 94 % | TEMPERATURE: 98.7 F | WEIGHT: 121.25 LBS | BODY MASS INDEX: 23.8 KG/M2 | HEART RATE: 92 BPM | DIASTOLIC BLOOD PRESSURE: 57 MMHG | RESPIRATION RATE: 17 BRPM

## 2024-06-22 DIAGNOSIS — F11.920: ICD-10-CM

## 2024-06-22 DIAGNOSIS — R79.1 SUPRATHERAPEUTIC INR: ICD-10-CM

## 2024-06-22 DIAGNOSIS — R41.82 ALTERED MENTAL STATUS, UNSPECIFIED ALTERED MENTAL STATUS TYPE: Primary | ICD-10-CM

## 2024-06-22 LAB
ANION GAP SERPL CALCULATED.3IONS-SCNC: 7 MMOL/L (ref 9–17)
BASOPHILS # BLD: 0 K/UL (ref 0–0.2)
BASOPHILS NFR BLD: 0 % (ref 0–2)
BILIRUB UR QL STRIP: NEGATIVE
BNP SERPL-MCNC: 2916 PG/ML
BUN SERPL-MCNC: 50 MG/DL (ref 8–23)
BUN/CREAT SERPL: 28 (ref 9–20)
CALCIUM SERPL-MCNC: 7.7 MG/DL (ref 8.6–10.4)
CHLORIDE SERPL-SCNC: 112 MMOL/L (ref 98–107)
CLARITY UR: CLEAR
CO2 SERPL-SCNC: 20 MMOL/L (ref 20–31)
COLOR UR: YELLOW
CREAT SERPL-MCNC: 1.8 MG/DL (ref 0.5–0.9)
EKG ATRIAL RATE: 80 BPM
EKG P AXIS: 11 DEGREES
EKG P-R INTERVAL: 124 MS
EKG Q-T INTERVAL: 350 MS
EKG QRS DURATION: 74 MS
EKG QTC CALCULATION (BAZETT): 403 MS
EKG R AXIS: -11 DEGREES
EKG T AXIS: 11 DEGREES
EKG VENTRICULAR RATE: 80 BPM
EOSINOPHIL # BLD: 0 K/UL (ref 0–0.44)
EOSINOPHILS RELATIVE PERCENT: 0 % (ref 1–4)
EPI CELLS #/AREA URNS HPF: NORMAL /HPF (ref 0–25)
ERYTHROCYTE [DISTWIDTH] IN BLOOD BY AUTOMATED COUNT: 15.4 % (ref 11.8–14.4)
GFR, ESTIMATED: 27 ML/MIN/1.73M2
GLUCOSE SERPL-MCNC: 122 MG/DL (ref 70–99)
GLUCOSE UR STRIP-MCNC: NEGATIVE MG/DL
HCT VFR BLD AUTO: 23 % (ref 36.3–47.1)
HGB BLD-MCNC: 7.4 G/DL (ref 11.9–15.1)
HGB UR QL STRIP.AUTO: NEGATIVE
IMM GRANULOCYTES # BLD AUTO: 0 K/UL (ref 0–0.3)
IMM GRANULOCYTES NFR BLD: 0 %
INR PPP: 5.6
KETONES UR STRIP-MCNC: NEGATIVE MG/DL
LEUKOCYTE ESTERASE UR QL STRIP: NEGATIVE
LYMPHOCYTES NFR BLD: 0.13 K/UL (ref 1.1–3.7)
LYMPHOCYTES RELATIVE PERCENT: 2 % (ref 24–43)
MCH RBC QN AUTO: 32.2 PG (ref 25.2–33.5)
MCHC RBC AUTO-ENTMCNC: 32.2 G/DL (ref 28.4–34.8)
MCV RBC AUTO: 100 FL (ref 82.6–102.9)
MONOCYTES NFR BLD: 0.13 K/UL (ref 0.1–1.2)
MONOCYTES NFR BLD: 2 % (ref 3–12)
MORPHOLOGY: NORMAL
NEUTROPHILS NFR BLD: 96 % (ref 36–65)
NEUTS SEG NFR BLD: 6.47 K/UL (ref 1.5–8.1)
NITRITE UR QL STRIP: NEGATIVE
NRBC BLD-RTO: 0 PER 100 WBC
NUCLEATED RED BLOOD CELLS: 1 PER 100 WBC (ref 0–5)
PH UR STRIP: 6 [PH] (ref 5–9)
PLATELET # BLD AUTO: 218 K/UL (ref 138–453)
PMV BLD AUTO: 11.1 FL (ref 8.1–13.5)
POTASSIUM SERPL-SCNC: 5.4 MMOL/L (ref 3.7–5.3)
PROT UR STRIP-MCNC: NEGATIVE MG/DL
PROTHROMBIN TIME: 48 SEC (ref 11.7–14.1)
RBC # BLD AUTO: 2.3 M/UL (ref 3.95–5.11)
RBC #/AREA URNS HPF: NORMAL /HPF (ref 0–2)
SODIUM SERPL-SCNC: 139 MMOL/L (ref 135–144)
SP GR UR STRIP: 1.02 (ref 1.01–1.02)
TROPONIN I SERPL HS-MCNC: 52 NG/L (ref 0–14)
TROPONIN I SERPL HS-MCNC: 52 NG/L (ref 0–14)
UROBILINOGEN UR STRIP-ACNC: NORMAL EU/DL (ref 0–1)
WBC #/AREA URNS HPF: NORMAL /HPF (ref 0–5)
WBC OTHER # BLD: 6.7 K/UL (ref 3.5–11.3)

## 2024-06-22 PROCEDURE — 93005 ELECTROCARDIOGRAM TRACING: CPT | Performed by: EMERGENCY MEDICINE

## 2024-06-22 PROCEDURE — 85025 COMPLETE CBC W/AUTO DIFF WBC: CPT

## 2024-06-22 PROCEDURE — 99285 EMERGENCY DEPT VISIT HI MDM: CPT

## 2024-06-22 PROCEDURE — 81001 URINALYSIS AUTO W/SCOPE: CPT

## 2024-06-22 PROCEDURE — 72192 CT PELVIS W/O DYE: CPT

## 2024-06-22 PROCEDURE — 84484 ASSAY OF TROPONIN QUANT: CPT

## 2024-06-22 PROCEDURE — 85610 PROTHROMBIN TIME: CPT

## 2024-06-22 PROCEDURE — 36415 COLL VENOUS BLD VENIPUNCTURE: CPT

## 2024-06-22 PROCEDURE — 83880 ASSAY OF NATRIURETIC PEPTIDE: CPT

## 2024-06-22 PROCEDURE — 71045 X-RAY EXAM CHEST 1 VIEW: CPT

## 2024-06-22 PROCEDURE — 80048 BASIC METABOLIC PNL TOTAL CA: CPT

## 2024-06-22 PROCEDURE — 96374 THER/PROPH/DIAG INJ IV PUSH: CPT

## 2024-06-22 PROCEDURE — 6360000002 HC RX W HCPCS: Performed by: EMERGENCY MEDICINE

## 2024-06-22 PROCEDURE — 70450 CT HEAD/BRAIN W/O DYE: CPT

## 2024-06-22 RX ORDER — KETOROLAC TROMETHAMINE 15 MG/ML
15 INJECTION, SOLUTION INTRAMUSCULAR; INTRAVENOUS ONCE
Status: COMPLETED | OUTPATIENT
Start: 2024-06-22 | End: 2024-06-22

## 2024-06-22 RX ADMIN — KETOROLAC TROMETHAMINE 15 MG: 15 INJECTION, SOLUTION INTRAMUSCULAR; INTRAVENOUS at 11:50

## 2024-06-22 ASSESSMENT — PAIN SCALES - GENERAL
PAINLEVEL_OUTOF10: 10
PAINLEVEL_OUTOF10: 6

## 2024-06-22 ASSESSMENT — ENCOUNTER SYMPTOMS
SORE THROAT: 0
SHORTNESS OF BREATH: 0
BACK PAIN: 0
ABDOMINAL DISTENTION: 0

## 2024-06-22 NOTE — ED PROVIDER NOTES
denies any chest pain or shortness of breath.  She denies any other symptoms at this time.  She denies any recurrent falls.    REVIEW OF SYSTEMS    (2-9 systems for level 4, 10 or more for level 5)     Review of Systems   Constitutional:  Negative for fatigue and fever.   HENT:  Negative for congestion and sore throat.    Eyes:  Negative for visual disturbance.   Respiratory:  Negative for shortness of breath.    Cardiovascular:  Negative for chest pain and palpitations.   Gastrointestinal:  Negative for abdominal distention.   Genitourinary:  Negative for dysuria.   Musculoskeletal:  Negative for back pain.        Right hip and groin pain   Skin:  Negative for rash.   Neurological:  Negative for dizziness, weakness, light-headedness and numbness.   Psychiatric/Behavioral:  Negative for suicidal ideas.        Except as noted above the remainder of the review of systems was reviewed and negative.       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    Colon polyp 2006, 2009    Compression fracture of lumbar vertebra ([facility]) / prednisone related to PMR 07/15/2015    Diarrhea     Drug-induced hyperkalemia -- from Losartan 2023    Drug-induced hyperkalemia from Lisinopril 07/27/2018    DVT (deep venous thrombosis) ([facility]) 2015    Emphysema     Essential hypertension     Hyperlipidemia     Irritable bowel syndrome     Lumbago     Osteoarthritis     Polymyalgia rheumatica ([facility]) 2015    Rheumatism     Seborrheic keratoses     on back    Urinary frequency 06/06/2024         SURGICAL HISTORY       Past Surgical History:   Procedure Laterality Date    APPENDECTOMY  1949    CATARACT REMOVAL WITH IMPLANT      COLECTOMY Right 06/20/2016    open right hemicolectomy bowel resection    COLONOSCOPY   Coumadin for the next 3 days and recheck INR on June 25 prior to resuming her Coumadin.    Please call orthopedics office to have a close follow-up with them postoperatively as she has not had a postop visit yet.  Family verbalizes understanding and has no other questions or concerns.  Patient is a lot more alert at this time. [JI]   1248 Patient's hemoglobin is slightly dropped as well however she is hemodynamically stable.  We will continue watching it as an outpatient.  Recommend repeat CBC in the next 3 to 4 days. [JI]      ED Course User Index  [JI] Peggy García DO         FINAL IMPRESSION      1. Altered mental status, unspecified altered mental status type    2. Opioid intoxication, uncomplicated (HCC)    3. Supratherapeutic INR          DISPOSITION/PLAN   DISPOSITION Decision To Discharge 06/22/2024 12:49:11 PM      PATIENT REFERRED TO:  Samuel Cunha MD  [address] [place] [address] A  [place] [address]  [phone]    In 2 days  Please follow-up with orthopedics this coming week.      DISCHARGE MEDICATIONS:  New Prescriptions    No medications on file     Controlled Substances Monitoring:     RX Monitoring Acute Pain Prescriptions   9/28/2023   2:21 PM Severe pain not adequately treated with lower dose.       (Please note that portions of this note were completed with a voice recognition program.  Efforts were made to edit the dictations but occasionally words are mis-transcribed.)    Peggy García DO (electronically signed)  Attending Emergency Physician            Peggy García DO  06/22/24 2260

## 2024-06-22 NOTE — ED NOTES
Dr. García stated she wanted to give Toradol and is aware of critical INR. MD states it's a one time dose and don't want to give pt any narcotics or tylenol d/t getting narcotics and muscle relaxers and causing pt to be too drowsy and hypoxic and didn't want to give tylenol d/t getting norco. O2 decreased to 2L. Attempting to wean off of O2

## 2024-06-22 NOTE — ED NOTES
Faxed paperwork to [facility] for transport back to [facility].  Will have to check around as they don't have a [employer] truck.

## 2024-06-22 NOTE — DISCHARGE INSTRUCTIONS
Do not give Norco and tizanidine at the same time.  Wait for at least 2 hours and reassess the patient's mental status prior to giving the muscle relaxer.  Hold off Coumadin until Tuesday 6/25 and repeat INR and CBC at that time.  Ice to the area 20 minutes at a time multiple times a day.  Close follow-up with her primary care doctor as well as her surgeon who performed the hip surgery.

## 2024-06-24 ENCOUNTER — HOSPITAL ENCOUNTER (OUTPATIENT)
Age: 89
Setting detail: SPECIMEN
Discharge: HOME | End: 2024-06-24
Payer: MEDICARE

## 2024-06-24 ENCOUNTER — COMMUNITY CARE MANAGEMENT (OUTPATIENT)
Facility: CLINIC | Age: 89
End: 2024-06-24

## 2024-06-24 LAB
ALBUMIN SERPL-MCNC: 2.5 G/DL (ref 3.5–5.2)
ALBUMIN/GLOB SERPL: 1 {RATIO} (ref 1–2.5)
ALP SERPL-CCNC: 119 U/L (ref 35–104)
ALT SERPL-CCNC: 9 U/L (ref 5–33)
ANION GAP SERPL CALCULATED.3IONS-SCNC: 7 MMOL/L (ref 9–17)
AST SERPL-CCNC: 33 U/L
BASOPHILS # BLD: <0.03 K/UL (ref 0–0.2)
BASOPHILS NFR BLD: 0 % (ref 0–2)
BILIRUB SERPL-MCNC: 0.3 MG/DL (ref 0.3–1.2)
BUN SERPL-MCNC: 47 MG/DL (ref 8–23)
BUN/CREAT SERPL: 25 (ref 9–20)
CALCIUM SERPL-MCNC: 7.2 MG/DL (ref 8.6–10.4)
CHLORIDE SERPL-SCNC: 110 MMOL/L (ref 98–107)
CO2 SERPL-SCNC: 21 MMOL/L (ref 20–31)
CREAT SERPL-MCNC: 1.9 MG/DL (ref 0.5–0.9)
EKG ATRIAL RATE: 80 BPM
EKG P AXIS: 11 DEGREES
EKG P-R INTERVAL: 124 MS
EKG Q-T INTERVAL: 350 MS
EKG QRS DURATION: 74 MS
EKG QTC CALCULATION (BAZETT): 403 MS
EKG R AXIS: -11 DEGREES
EKG T AXIS: 11 DEGREES
EKG VENTRICULAR RATE: 80 BPM
EOSINOPHIL # BLD: <0.03 K/UL (ref 0–0.44)
EOSINOPHILS RELATIVE PERCENT: 0 % (ref 1–4)
ERYTHROCYTE [DISTWIDTH] IN BLOOD BY AUTOMATED COUNT: 15.3 % (ref 11.8–14.4)
GFR, ESTIMATED: 25 ML/MIN/1.73M2
GLUCOSE P FAST SERPL-MCNC: 91 MG/DL (ref 70–99)
HCT VFR BLD AUTO: 24.8 % (ref 36.3–47.1)
HGB BLD-MCNC: 7.9 G/DL (ref 11.9–15.1)
IMM GRANULOCYTES # BLD AUTO: 0.03 K/UL (ref 0–0.3)
IMM GRANULOCYTES NFR BLD: 1 %
LYMPHOCYTES NFR BLD: 0.53 K/UL (ref 1.1–3.7)
LYMPHOCYTES RELATIVE PERCENT: 12 % (ref 24–43)
MCH RBC QN AUTO: 32 PG (ref 25.2–33.5)
MCHC RBC AUTO-ENTMCNC: 31.9 G/DL (ref 28.4–34.8)
MCV RBC AUTO: 100.4 FL (ref 82.6–102.9)
MONOCYTES NFR BLD: 0.55 K/UL (ref 0.1–1.2)
MONOCYTES NFR BLD: 12 % (ref 3–12)
NEUTROPHILS NFR BLD: 75 % (ref 36–65)
NEUTS SEG NFR BLD: 3.33 K/UL (ref 1.5–8.1)
NRBC BLD-RTO: 0 PER 100 WBC
PLATELET # BLD AUTO: 201 K/UL (ref 138–453)
PMV BLD AUTO: 11.6 FL (ref 8.1–13.5)
POTASSIUM SERPL-SCNC: 5.7 MMOL/L (ref 3.7–5.3)
PROT SERPL-MCNC: 5 G/DL (ref 6.4–8.3)
RBC # BLD AUTO: 2.47 M/UL (ref 3.95–5.11)
SODIUM SERPL-SCNC: 138 MMOL/L (ref 135–144)
WBC OTHER # BLD: 4.5 K/UL (ref 3.5–11.3)

## 2024-06-24 PROCEDURE — P9603 ONE-WAY ALLOW PRORATED MILES: HCPCS

## 2024-06-24 PROCEDURE — 93010 ELECTROCARDIOGRAM REPORT: CPT | Performed by: INTERNAL MEDICINE

## 2024-06-24 PROCEDURE — 36415 COLL VENOUS BLD VENIPUNCTURE: CPT

## 2024-06-24 PROCEDURE — 80053 COMPREHEN METABOLIC PANEL: CPT

## 2024-06-24 PROCEDURE — 85025 COMPLETE CBC W/AUTO DIFF WBC: CPT

## 2024-06-27 ENCOUNTER — HOSPITAL ENCOUNTER (OUTPATIENT)
Age: 89
Setting detail: SPECIMEN
Discharge: HOME | End: 2024-06-27
Payer: MEDICARE

## 2024-06-27 LAB
BACTERIA URNS QL MICRO: ABNORMAL
BILIRUB UR QL STRIP: NEGATIVE
CLARITY UR: CLEAR
COLOR UR: YELLOW
EPI CELLS #/AREA URNS HPF: ABNORMAL /HPF (ref 0–25)
GLUCOSE UR STRIP-MCNC: NEGATIVE MG/DL
HGB UR QL STRIP.AUTO: NEGATIVE
KETONES UR STRIP-MCNC: NEGATIVE MG/DL
LEUKOCYTE ESTERASE UR QL STRIP: NEGATIVE
NITRITE UR QL STRIP: NEGATIVE
PH UR STRIP: 6 [PH] (ref 5–9)
PROT UR STRIP-MCNC: ABNORMAL MG/DL
RBC #/AREA URNS HPF: ABNORMAL /HPF (ref 0–2)
SP GR UR STRIP: 1.02 (ref 1.01–1.02)
UROBILINOGEN UR STRIP-ACNC: NORMAL EU/DL (ref 0–1)
WBC #/AREA URNS HPF: ABNORMAL /HPF (ref 0–5)

## 2024-06-27 PROCEDURE — 87186 SC STD MICRODIL/AGAR DIL: CPT

## 2024-06-27 PROCEDURE — 81001 URINALYSIS AUTO W/SCOPE: CPT

## 2024-06-27 PROCEDURE — 87086 URINE CULTURE/COLONY COUNT: CPT

## 2024-06-27 PROCEDURE — 87077 CULTURE AEROBIC IDENTIFY: CPT

## 2024-06-30 LAB
MICROORGANISM SPEC CULT: ABNORMAL
MICROORGANISM SPEC CULT: ABNORMAL
SPECIMEN DESCRIPTION: ABNORMAL

## 2024-07-01 ENCOUNTER — HOSPITAL ENCOUNTER (OUTPATIENT)
Age: 89
Setting detail: SPECIMEN
Discharge: HOME | End: 2024-07-01
Payer: MEDICARE

## 2024-07-01 LAB
ALBUMIN SERPL-MCNC: 2.4 G/DL (ref 3.5–5.2)
ALBUMIN/GLOB SERPL: 1 {RATIO} (ref 1–2.5)
ALP SERPL-CCNC: 102 U/L (ref 35–104)
ALT SERPL-CCNC: 11 U/L (ref 5–33)
ANION GAP SERPL CALCULATED.3IONS-SCNC: 10 MMOL/L (ref 9–17)
AST SERPL-CCNC: 29 U/L
BASOPHILS # BLD: 0 K/UL (ref 0–0.2)
BASOPHILS NFR BLD: 0 % (ref 0–2)
BILIRUB SERPL-MCNC: 0.3 MG/DL (ref 0.3–1.2)
BUN SERPL-MCNC: 42 MG/DL (ref 8–23)
BUN/CREAT SERPL: 22 (ref 9–20)
CALCIUM SERPL-MCNC: 7 MG/DL (ref 8.6–10.4)
CHLORIDE SERPL-SCNC: 110 MMOL/L (ref 98–107)
CO2 SERPL-SCNC: 19 MMOL/L (ref 20–31)
CREAT SERPL-MCNC: 1.9 MG/DL (ref 0.5–0.9)
EOSINOPHIL # BLD: 0 K/UL (ref 0–0.44)
EOSINOPHILS RELATIVE PERCENT: 0 % (ref 1–4)
ERYTHROCYTE [DISTWIDTH] IN BLOOD BY AUTOMATED COUNT: 15.2 % (ref 11.8–14.4)
GFR, ESTIMATED: 25 ML/MIN/1.73M2
GLUCOSE SERPL-MCNC: 107 MG/DL (ref 70–99)
HCT VFR BLD AUTO: 25.2 % (ref 36.3–47.1)
HGB BLD-MCNC: 8 G/DL (ref 11.9–15.1)
IMM GRANULOCYTES # BLD AUTO: 0 K/UL (ref 0–0.3)
IMM GRANULOCYTES NFR BLD: 0 %
LYMPHOCYTES NFR BLD: 0.19 K/UL (ref 1.1–3.7)
LYMPHOCYTES RELATIVE PERCENT: 3 % (ref 24–43)
MCH RBC QN AUTO: 31.3 PG (ref 25.2–33.5)
MCHC RBC AUTO-ENTMCNC: 31.7 G/DL (ref 28.4–34.8)
MCV RBC AUTO: 98.4 FL (ref 82.6–102.9)
MONOCYTES NFR BLD: 0.38 K/UL (ref 0.1–1.2)
MONOCYTES NFR BLD: 6 % (ref 3–12)
MORPHOLOGY: NORMAL
NEUTROPHILS NFR BLD: 91 % (ref 36–65)
NEUTS SEG NFR BLD: 5.73 K/UL (ref 1.5–8.1)
NRBC BLD-RTO: 0 PER 100 WBC
PLATELET # BLD AUTO: 148 K/UL (ref 138–453)
PMV BLD AUTO: 11.6 FL (ref 8.1–13.5)
POTASSIUM SERPL-SCNC: 5.8 MMOL/L (ref 3.7–5.3)
PROT SERPL-MCNC: 4.9 G/DL (ref 6.4–8.3)
RBC # BLD AUTO: 2.56 M/UL (ref 3.95–5.11)
SODIUM SERPL-SCNC: 139 MMOL/L (ref 135–144)
WBC OTHER # BLD: 6.3 K/UL (ref 3.5–11.3)

## 2024-07-01 PROCEDURE — 80053 COMPREHEN METABOLIC PANEL: CPT

## 2024-07-01 PROCEDURE — 85025 COMPLETE CBC W/AUTO DIFF WBC: CPT

## 2024-07-09 ENCOUNTER — COMMUNITY CARE MANAGEMENT (OUTPATIENT)
Facility: CLINIC | Age: 89
End: 2024-07-09

## 2024-07-09 NOTE — PROGRESS NOTES
TCM Care Coordination Summary  [facility] [phone] 07/08/24 Patient discharged to [facility] 07/05/24. Call made to [facility] to speak with patient's nurse. No answer. Unable to leave a message.-MARGARITO CARDONA 07/09/24 2nd call made to [facility]. Spoke with Alison, facility . Alison confirmed that patient is at their facility. Alison stated that patient's son has requested that patient be transferred to the [place] at [place]. Alison stated that she is still working on the transfer. No other needs at this time.-MARGARITO CARDONA

## 2024-07-11 ENCOUNTER — ANTI-COAG VISIT (OUTPATIENT)
Dept: PHARMACY | Age: 89
End: 2024-07-11

## 2024-07-11 DIAGNOSIS — I26.92 CHRONIC SADDLE PULMONARY EMBOLISM WITHOUT ACUTE COR PULMONALE (HCC): ICD-10-CM

## 2024-07-11 DIAGNOSIS — I27.82 CHRONIC SADDLE PULMONARY EMBOLISM WITHOUT ACUTE COR PULMONALE (HCC): ICD-10-CM

## 2024-07-11 DIAGNOSIS — Z79.01 LONG TERM CURRENT USE OF ANTICOAGULANT THERAPY: Primary | ICD-10-CM

## 2024-07-11 NOTE — PROGRESS NOTES
Patient currently residing at [facility].  I spoke to patient's sister who states it is likely a long-term stay and it's unlikely she will return home.   Termination of consult agreement letter faxed to Dr. Cunha - referring provider  Patient discharged from anticoagulation clinic.

## 2024-08-09 NOTE — CARE COORDINATION
Case Management Assessment  Initial Evaluation    Date/Time of Evaluation: 9/6/2023 3:04 PM  Assessment Completed by: CLAY Miller    If patient is discharged prior to next notation, then this note serves as note for discharge by case management. 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 Yes    Chart Reviewed: Yes      History Provided by: Patient  Patient Orientation: Alert and Oriented, Person, Place, Situation, Self    Patient Cognition: Alert    Hospitalization in the last 30 days (Readmission):  No    If yes, Readmission Assessment in  Navigator will be completed. Advance Directives:      Code Status: Full Code   Patient's Primary Decision Maker is: Named in [address] [place], [place] [address]on     Primary Decision MakerGwynneth Haq - Child - [phone]    Discharge Planning:    Patient lives with: Alone Type of Home: House  Primary Care Giver: Self  Patient Support Systems include: Children, Family Members, Friends/Neighbors   Current Financial resources: Medicare  Current community resources: None  Current services prior to admission: Durable Medical Equipment            Current DME: Shower Chair, Walker, Cane            Type of Home Care services:  None    ADLS  Prior functional level: Independent in ADLs/IADLs  Current functional level: Assistance with the following:, Bathing, Dressing, Toileting, Cooking, Housework, Shopping    PT AM-PAC:   /24  OT AM-PAC:   /24    Family can provide assistance at DC:  Yes  Would you like Case Management to discuss the discharge plan with any other family members/significant others, and - - -

## 2024-09-14 NOTE — ED NOTES
[facility]   [facility]  part of [facility]    Progress Note    Margaret Silverio Patient Status:  Inpatient    3/14/1946 MRN [id number]   Location [facility] 4W/SW/SE Attending Michelle Buck MD   Hosp Day # 4 PCP Jhonathon Rodriguez DO     Chief complaint: failure to thrive  Subjective:     Looks comfortable, decr responsiveness, staring in space, increased edema  no fevers  No family at bedside    Objective:   Blood pressure (!) 142/93, pulse 60, temperature 98.2 °F (36.8 °C), temperature source Axillary, resp. rate 16, SpO2 98%.    GEN: looks ill, edematous  HEENT: eyes open  Pulmonary:  course and diminished to auscultation bilaterally  Cardiovascular: Loud heart murmur  Extremities: +edema  Pulses: palpable   Psychiatric: calm    Assessment and Plan:     Sepsis  Failure to thrive  Infected sacral decubitus ulcer  Acute metabolic encephalopathy  GEORGE/cardiorenal  Severe Nonischmeic CM and Severe Pulm HTN  None anion gap metabolic acidosis  Tachypnea  Remote history of seizures  Sepsis, present on admission, now resolved  Hyponatremia, resolved  UTI  Nonischemic cardiomyopathy  Chronic systolic congestive heart failure  Paroxysmal atrial fibrillation  CKD stage III  Thrombocytosis     Dispo: continue comfort care    D/w RN      Results:     Lab Results   Component Value Date    WBC 9.5 09/10/2024    HGB 8.2 (L) 09/10/2024    HCT 26.1 (L) 09/10/2024    .0 09/10/2024    CREATSERUM 1.39 (H) 09/10/2024    BUN 30 (H) 09/10/2024     09/10/2024    K 3.9 09/10/2024     09/10/2024    CO2 21.0 09/10/2024    GLU 95 09/10/2024    CA 8.9 09/10/2024    ALB 2.7 (L) 09/10/2024    ALKPHO 198 (H) 2024    BILT 0.7 2024    TP 5.5 (L) 2024     (H) 2024    ALT 79 (H) 2024    PTT 39.2 (H) 2024    INR 1.72 (H) 2024    T4F 1.1 2024    TSH 6.866 (H) 2024    LIP 32 2024    ESRML 40 (H) 2024    CRP 9.00 (H) 2024    MG  2.1 09/08/2024    PHOS 4.8 09/10/2024    B12 >2,000 (H) 07/01/2024       No results found.          CEE CHIU MD  9/14/2024

## (undated) DEVICE — NEEDLE 25GAX5.0MM INJ CARR LOCKE

## (undated) DEVICE — ST. VAGINAL PACKING X-RAY DETECTABLE: Brand: DEROYAL

## (undated) DEVICE — CEMENT MIXING SYSTEM WITH FEMORAL BREAKWAY NOZZLE: Brand: REVOLUTION

## (undated) DEVICE — CANNULA ORAL NSL AD CO2 N INTUB O2 DEL DISP TRU LNK

## (undated) DEVICE — TOWEL,OR,DSP,ST,NATURAL,DLX,4/PK,20PK/CS: Brand: MEDLINE

## (undated) DEVICE — SOLUTION IV IRRIG POUR BRL 0.9% SODIUM CHL 2F7124

## (undated) DEVICE — DRESSING TRNSPAR W8XL12IN FLM SURESITE 123

## (undated) DEVICE — SOLUTION IRRIG 1000ML 0.9% SOD CHL USP POUR PLAS BTL

## (undated) DEVICE — MEDI-VAC NON-CONDUCTIVE TUBING7MM X 30.5 (100FT): Brand: CARDINAL HEALTH

## (undated) DEVICE — GLOVE SURG SZ 75 L12IN FNGR THK79MIL GRN LTX FREE

## (undated) DEVICE — HIGH CAPACITY NARROW INTRAMEDULLARY TIP: Brand: PULSAVAC®

## (undated) DEVICE — SNARE EXACTO COLD

## (undated) DEVICE — SOLUTION IRRIG 3000ML 0.9% SOD CHL USP UROMATIC PLAS CONT

## (undated) DEVICE — Device

## (undated) DEVICE — TRAP SURG QUAD PARABOLA SLOT DSGN SFTY SCRN TRAPEASE